# Patient Record
Sex: MALE | Race: WHITE | Employment: OTHER | URBAN - METROPOLITAN AREA
[De-identification: names, ages, dates, MRNs, and addresses within clinical notes are randomized per-mention and may not be internally consistent; named-entity substitution may affect disease eponyms.]

---

## 2017-04-18 ENCOUNTER — OFFICE VISIT (OUTPATIENT)
Dept: CARDIOLOGY CLINIC | Age: 77
End: 2017-04-18

## 2017-04-18 VITALS
SYSTOLIC BLOOD PRESSURE: 110 MMHG | OXYGEN SATURATION: 99 % | WEIGHT: 226.4 LBS | HEIGHT: 71 IN | HEART RATE: 67 BPM | DIASTOLIC BLOOD PRESSURE: 80 MMHG | BODY MASS INDEX: 31.69 KG/M2

## 2017-04-18 DIAGNOSIS — I11.9 BENIGN HYPERTENSIVE HEART DISEASE WITHOUT HEART FAILURE: ICD-10-CM

## 2017-04-18 DIAGNOSIS — G47.33 OSA (OBSTRUCTIVE SLEEP APNEA): ICD-10-CM

## 2017-04-18 DIAGNOSIS — I51.89 DIASTOLIC DYSFUNCTION: ICD-10-CM

## 2017-04-18 DIAGNOSIS — I48.20 CHRONIC ATRIAL FIBRILLATION (HCC): Primary | ICD-10-CM

## 2017-04-18 DIAGNOSIS — I83.90 VARICOSE VEIN OF LEG: ICD-10-CM

## 2017-04-18 NOTE — MR AVS SNAPSHOT
Visit Information Date & Time Provider Department Dept. Phone Encounter #  
 4/18/2017  1:40 PM Chris Gan MD [facility] Narda Mehta [phone] [id number] Follow-up Instructions Return in about 6 months (around 10/18/2017). Upcoming Health Maintenance Date Due DTaP/Tdap/Td series (1 - Tdap) 1/24/1959 ZOSTER VACCINE AGE 60> 1/24/1998 GLAUCOMA SCREENING Q2Y 1/24/2003 Pneumococcal 65+ Low/Medium Risk (1 of 2 - PCV13) 1/24/2003 MEDICARE YEARLY EXAM 1/24/2003 INFLUENZA AGE 9 TO ADULT 8/1/2016 Allergies as of 4/18/2017  Review Complete On: 4/18/2017 By: Cristina Verma  
  
 Severity Noted Reaction Type Reactions Ace Inhibitors Medium 12/09/2013   Side Effect Cough Patients states he is not allergic Pcn [Penicillins] Medium 01/12/2011   Side Effect Rash  
 Nsaids (Non-steroidal Anti-inflammatory Drug)  05/17/2016    Other (comments) \"causes blood in urine\" Percocet [Oxycodone-acetaminophen]  05/17/2016   Side Effect Other (comments)  
 constipation Current Immunizations  Never Reviewed No immunizations on file. Not reviewed this visit You Were Diagnosed With   
  
 Codes Comments Chronic atrial fibrillation (HCC)    -  Primary ICD-10-CM: U96.4 ICD-9-CM: 427.31 Benign hypertensive heart disease without heart failure     ICD-10-CM: I11.9 ICD-9-CM: 402.10 Diastolic dysfunction     WOQ-13-EU: I51.9 ICD-9-CM: 429.9 OLEG (obstructive sleep apnea)     ICD-10-CM: G47.33 
ICD-9-CM: 327.23 Varicose vein of leg     ICD-10-CM: I83.90 ICD-9-CM: 454.9 Vitals BP Pulse Height(growth percentile) Weight(growth percentile) SpO2 BMI  
 110/80 (BP 1 Location: Left arm, BP Patient Position: Sitting) 67 5' 11\" (1.803 m) 226 lb 6.4 oz (102.7 kg) 99% 31.58 kg/m2 Smoking Status Former Smoker Vitals History BMI and BSA Data Body Mass Index Body Surface Area 31.58 kg/m 2 2.27 m 2 Preferred Pharmacy Pharmacy Name Phone [address] [address], [place] 2017 [facility] [phone] Your Updated Medication List  
  
   
This list is accurate as of: 4/18/17  2:11 PM.  Always use your most recent med list.  
  
  
  
  
 fish oil-omega-3 fatty acids 340-1,000 mg capsule Take 1 Cap by mouth daily (after breakfast). ipratropium 0.03 % nasal spray Commonly known as:  ATROVENT  
2 Sprays by Both Nostrils route daily (after breakfast). Indications: ALLERGIC RHINITIS  
  
 losartan-hydroCHLOROthiazide 100-25 mg per tablet Commonly known as:  HYZAAR  
TAKE ONE TABLET BY MOUTH DAILY  
  
 magnesium 250 mg Tab Take 400 mg by mouth daily (after dinner). multivitamin tablet Commonly known as:  ONE A DAY Take 1 Tab by mouth daily (after breakfast). VITAMIN C PO Take 500 mg by mouth daily (after breakfast). VITAMIN D 2,000 unit Cap capsule Generic drug:  Cholecalciferol (Vitamin D3) Take 2,000 Units by mouth daily (after dinner). * warfarin 4 mg tablet Commonly known as:  COUMADIN Take 4 mg by mouth six (6) days a week. * warfarin 2 mg tablet Commonly known as:  COUMADIN Take 2 mg by mouth every Monday. * Notice: This list has 2 medication(s) that are the same as other medications prescribed for you. Read the directions carefully, and ask your doctor or other care provider to review them with you. Follow-up Instructions Return in about 6 months (around 10/18/2017). Patient Instructions Check out Dr. Lindsay Clark for (vascular surgeon) . Introducing [facility] & HEALTH SERVICES! Michelle Tucker introduces [employer] patient portal. Now you can access parts of your medical record, email your doctor's office, and request medication refills online. 1. In your internet browser, go to [email or web address] 2. Click on the First Time User? Click Here link in the Sign In box. You will see the New Member Sign Up page. 3. Enter your Medipacs Access Code exactly as it appears below. You will not need to use this code after youve completed the sign-up process. If you do not sign up before the expiration date, you must request a new code. · Medipacs Access Code: [email or web address] Expires: 7/17/2017  2:11 PM 
 
4. Enter the last four digits of your Social Security Number (xxxx) and Date of Birth (mm/dd/yyyy) as indicated and click Submit. You will be taken to the next sign-up page. 5. Create a Medipacs ID. This will be your Medipacs login ID and cannot be changed, so think of one that is secure and easy to remember. 6. Create a Medipacs password. You can change your password at any time. 7. Enter your Password Reset Question and Answer. This can be used at a later time if you forget your password. 8. Enter your e-mail address. You will receive e-mail notification when new information is available in [address] 9. Click Sign Up. You can now view and download portions of your medical record. 10. Click the Download Summary menu link to download a portable copy of your medical information. If you have questions, please visit the Frequently Asked Questions section of the Medipacs website. Remember, Medipacs is NOT to be used for urgent needs. For medical emergencies, dial 911. Now available from your iPhone and Android! Please provide this summary of care documentation to your next provider. Your primary care clinician is listed as Carlos Hogan. If you have any questions after today's visit, please call [phone].

## 2017-04-18 NOTE — PROGRESS NOTES
Visit Vitals    /80 (BP 1 Location: Left arm, BP Patient Position: Sitting)    Pulse 67    Ht 5' 11\" (1.803 m)    Wt 226 lb 6.4 oz (102.7 kg)    SpO2 99%    BMI 31.58 kg/m2

## 2017-04-18 NOTE — PROGRESS NOTES
History of Present Illness    Bonnie Brito is a 76 y.o. male. Last seen 6 months ago. Problem List  Date Reviewed: 6/17/2013          Codes Class Noted    Hip arthritis ICD-10-CM: M16.10  ICD-9-CM: 716.95  0/93/9948        Diastolic dysfunction OWX-65-KS: I51.9  ICD-9-CM: 429.9  9/17/2013        DJD (degenerative joint disease) of knee ICD-10-CM: M17.10  ICD-9-CM: 715.36  Unknown    Overview Signed 6/1/2013  1:02 PM by Brooklyn Padron MD     right             Diverticulosis ICD-10-CM: K57.90  ICD-9-CM: 562.10  Unknown        Varicose vein of leg ICD-10-CM: I83.90  ICD-9-CM: 454.9  1/12/2012        OLEG (obstructive sleep apnea) ICD-10-CM: G47.33  ICD-9-CM: 327.23  7/12/2011        Atrial fibrillation ([address] ICD-10-CM: I48.91  ICD-9-CM: 427.31  Unknown    Overview Signed 1/12/2011  4:13 PM by Fatmata Green MD     Discovered incidentally prior to hernia surgery 2008  Previously intermittent, now permanent  CHADS2 score 2 - coumadin    Cardiac testing  Normal stress echo May 2008             Benign hypertensive heart disease without heart failure ICD-10-CM: I11.9  ICD-9-CM: 402.10  Unknown               Cardiac Testing  Echo 6/3/13: EF 50-55%; JULIO C; TR - moderate; pulm. htn - moderate, PA 45 mmHg  [facility] cardiolite 10/3/13 - normal perfusion, EF 54%  [facility] 5/10[facility]2016- normal perfusion, EF 64%      HPI    Mr. Hakeem Walker is doing well with no interval issues. No recurrent spells of A-fib. His heart rates typically run in the 58-60s range, but once in a while runs in th 49s usually in the morning. He has OLEG and wears his CPAP faithfully. No bleeding issues on Coumadin. He remains active without any exertional symptoms. Patient denies any exertional chest pain, dyspnea, palpitations, syncope, orthopnea, edema or paroxysmal nocturnal dyspnea.     Current Outpatient Prescriptions on File Prior to Visit   Medication Sig Dispense Refill    losartan-hydroCHLOROthiazide (HYZAAR) 100-25 mg per tablet TAKE ONE TABLET BY MOUTH DAILY 90 Tab 3    ipratropium (ATROVENT) 0.03 % nasal spray 2 Sprays by Both Nostrils route daily (after breakfast). Indications: ALLERGIC RHINITIS      fish oil-omega-3 fatty acids 340-1,000 mg capsule Take 1 Cap by mouth daily (after breakfast).  warfarin (COUMADIN) 2 mg tablet Take 2 mg by mouth every Monday.  magnesium 250 mg tab Take 400 mg by mouth daily (after dinner).  warfarin (COUMADIN) 4 mg tablet Take 4 mg by mouth six (6) days a week.  multivitamin (ONE A DAY) tablet Take 1 Tab by mouth daily (after breakfast).  ASCORBATE CALCIUM (VITAMIN C PO) Take 500 mg by mouth daily (after breakfast).  Cholecalciferol, Vitamin D3, (VITAMIN D) 2,000 unit Cap Take 2,000 Units by mouth daily (after dinner). No current facility-administered medications on file prior to visit. Allergies   Allergen Reactions    Ace Inhibitors Cough     Patients states he is not allergic    Pcn [Penicillins] Rash    Nsaids (Non-Steroidal Anti-Inflammatory Drug) Other (comments)     \"causes blood in urine\"    Percocet [Oxycodone-Acetaminophen] Other (comments)     constipation     . Review of Systems  Constitutional: Negative for fever, chills, malaise/fatigue and diaphoresis. Respiratory: Negative for cough, hemoptysis, sputum production, shortness of breath and wheezing. Cardiovascular: Negative for chest pain, palpitations, orthopnea, claudication, leg swelling and PND. Gastrointestinal: Negative for heartburn, nausea, vomiting, blood in stool and melena. Genitourinary: Negative for dysuria and flank pain. Musculoskeletal: Positive for knee pain. Skin: Negative for rash. Neurological: Negative for focal weakness, seizures, loss of consciousness, weakness and headaches. Endo/Heme/Allergies: Does not bruise/bleed easily. Psychiatric/Behavioral: Negative for memory loss. The patient does not have insomnia.       Visit Vitals    /80 (BP 1 Location: Left arm, BP Patient Position: Sitting)    Pulse 67    Ht 5' 11\" (1.803 m)    Wt 226 lb 6.4 oz (102.7 kg)    SpO2 99%    BMI 31.58 kg/m2     Wt Readings from Last 3 Encounters:   04/18/17 226 lb 6.4 oz (102.7 kg)   10/10/16 228 lb 3.2 oz (103.5 kg)   05/17/16 221 lb (100.2 kg)       Physical Exam  Vitals reviewed. Constitutional: He is oriented to person, place and time. He appears well-nourished. Head: Normocephalic. Neck: Neck supple. No JVD present. Cardiovascular: Normal rate, irregular rhythm, normal heart sounds and intact distal pulses. Exam reveals no gallop. 2/6 systolic murmur heard  Pulmonary/Chest: Effort normal and breath sounds normal.  Abdominal: Soft, nontender. Bowel sounds normal.  Musculoskeletal: 1+ edema, R>L  Neurological: Alert and oriented to person, place and time. Skin: Skin is warm and dry. Psychiatric: He has a normal mood and affect. Cardiographics  EKG 7/7/14 - AF 60s  EKG 7/6/15 - AF 60s    ASSESSMENT and PLAN:  Encounter Diagnoses   Name Primary?  Chronic atrial fibrillation (HCC) Yes    Benign hypertensive heart disease without heart failure     Diastolic dysfunction     OLEG (obstructive sleep apnea)     Varicose vein of leg         Mr. Lesley An has chronic AF with asymptomatic conduction disease in the setting of HTN and OLEG. He has no structural or ischemic heart disease. He has no symptoms of bradycarida. We reviewed sxs that would raise concern. No issues with anticoagulation. Normotensive on combination therapy. His functional capacity is somewhat limited by diffuse arthritis. I encouraged him to remain active. Follow-up Disposition:  Return in about 6 months (around 10/18/2017).      Written by Silverio Whitlock, as dictated by Erlin Leal MD.   Erlin Leal MD

## 2017-05-09 ENCOUNTER — HOSPITAL ENCOUNTER (OUTPATIENT)
Dept: MAMMOGRAPHY | Age: 77
Discharge: HOME | End: 2017-05-09
Attending: FAMILY MEDICINE
Payer: MEDICARE

## 2017-05-09 DIAGNOSIS — M81.0 OSTEOPOROSIS: ICD-10-CM

## 2017-05-09 PROCEDURE — 77080 DXA BONE DENSITY AXIAL: CPT

## 2017-10-17 ENCOUNTER — OFFICE VISIT (OUTPATIENT)
Dept: CARDIOLOGY CLINIC | Age: 77
End: 2017-10-17

## 2017-10-17 DIAGNOSIS — I11.9 BENIGN HYPERTENSIVE HEART DISEASE WITHOUT HEART FAILURE: Primary | ICD-10-CM

## 2017-10-17 DIAGNOSIS — I48.20 CHRONIC ATRIAL FIBRILLATION (HCC): ICD-10-CM

## 2017-10-17 DIAGNOSIS — I51.89 DIASTOLIC DYSFUNCTION: ICD-10-CM

## 2017-10-17 DIAGNOSIS — G47.33 OSA (OBSTRUCTIVE SLEEP APNEA): ICD-10-CM

## 2017-10-17 NOTE — MR AVS SNAPSHOT
Visit Information Date & Time Provider Department Dept. Phone Encounter #  
 10/17/2017  3:20 PM Damari Laguerre MD [facility] Sarai Lugo [phone] [id number] Follow-up Instructions Return in about 6 months (around 4/17/2018). Your Appointments 4/30/2018  9:40 AM  
ESTABLISHED PATIENT with Damari Laguerre MD  
[facility] ([facility]) Appt Note: 6 mo fup; 6 mo [address] [facility]  
[address] [facility] [address] [facility] [address] Upcoming Health Maintenance Date Due DTaP/Tdap/Td series (1 - Tdap) 1/24/1959 ZOSTER VACCINE AGE 60> 11/24/1997 GLAUCOMA SCREENING Q2Y 1/24/2003 Pneumococcal 65+ Low/Medium Risk (1 of 2 - PCV13) 1/24/2003 MEDICARE YEARLY EXAM 1/24/2003 INFLUENZA AGE 9 TO ADULT 8/1/2017 Allergies as of 10/17/2017  Review Complete On: 4/18/2017 By: Kati Dodge  
  
 Severity Noted Reaction Type Reactions Ace Inhibitors Medium 12/09/2013   Side Effect Cough Patients states he is not allergic Pcn [Penicillins] Medium 01/12/2011   Side Effect Rash  
 Nsaids (Non-steroidal Anti-inflammatory Drug)  05/17/2016    Other (comments) \"causes blood in urine\" Percocet [Oxycodone-acetaminophen]  05/17/2016   Side Effect Other (comments)  
 constipation Current Immunizations  Never Reviewed No immunizations on file. Not reviewed this visit You Were Diagnosed With   
  
 Codes Comments Benign hypertensive heart disease without heart failure    -  Primary ICD-10-CM: I11.9 ICD-9-CM: 402.10 Chronic atrial fibrillation (HCC)     ICD-10-CM: B92.8 ICD-9-CM: 427.31 Diastolic dysfunction     AAQ-36-TO: I51.9 ICD-9-CM: 429.9 OLEG (obstructive sleep apnea)     ICD-10-CM: G47.33 
ICD-9-CM: 327.23 Vitals Smoking Status Former Smoker Preferred Pharmacy Pharmacy Name Phone Kasey Tobar [address], [place] 2017 [facility] [phone] Your Updated Medication List  
  
   
This list is accurate as of: 10/17/17  3:43 PM.  Always use your most recent med list.  
  
  
  
  
 fish oil-omega-3 fatty acids 340-1,000 mg capsule Take 1 Cap by mouth daily (after breakfast). FLONASE NA  
by Nasal route. ipratropium 0.03 % nasal spray Commonly known as:  ATROVENT  
2 Sprays by Both Nostrils route daily (after breakfast). Indications: ALLERGIC RHINITIS  
  
 losartan-hydroCHLOROthiazide 100-25 mg per tablet Commonly known as:  HYZAAR  
TAKE ONE TABLET BY MOUTH DAILY  
  
 magnesium 250 mg Tab Take 400 mg by mouth daily (after dinner). multivitamin tablet Commonly known as:  ONE A DAY Take 1 Tab by mouth daily (after breakfast). PRESERVISION AREDS 2 PO Take  by mouth. VITAMIN C PO Take 500 mg by mouth daily (after breakfast). VITAMIN D 2,000 unit Cap capsule Generic drug:  Cholecalciferol (Vitamin D3) Take 2,000 Units by mouth daily (after dinner). * warfarin 4 mg tablet Commonly known as:  COUMADIN Take 4 mg by mouth six (6) days a week. * warfarin 2 mg tablet Commonly known as:  COUMADIN Take 2 mg by mouth every Monday. * Notice: This list has 2 medication(s) that are the same as other medications prescribed for you. Read the directions carefully, and ask your doctor or other care provider to review them with you. We Performed the Following AMB POC EKG ROUTINE W/ 12 LEADS, INTER & REP [53052 CPT(R)] Follow-up Instructions Return in about 6 months (around 4/17/2018). Introducing [facility] & HEALTH SERVICES! [facility] introduces [employer] patient portal. Now you can access parts of your medical record, email your doctor's office, and request medication refills online.    
 
1. In your internet browser, go to [email or web address] 2. Click on the First Time User? Click Here link in the Sign In box. You will see the New Member Sign Up page. 3. Enter your Nexgate Access Code exactly as it appears below. You will not need to use this code after youve completed the sign-up process. If you do not sign up before the expiration date, you must request a new code. · Nexgate Access Code: [email or web address] Expires: 1/15/2018  3:43 PM 
 
4. Enter the last four digits of your Social Security Number (xxxx) and Date of Birth (mm/dd/yyyy) as indicated and click Submit. You will be taken to the next sign-up page. 5. Create a Yooz[employer] ID. This will be your Nexgate login ID and cannot be changed, so think of one that is secure and easy to remember. 6. Create a Nexgate password. You can change your password at any time. 7. Enter your Password Reset Question and Answer. This can be used at a later time if you forget your password. 8. Enter your e-mail address. You will receive e-mail notification when new information is available in [address] 9. Click Sign Up. You can now view and download portions of your medical record. 10. Click the Download Summary menu link to download a portable copy of your medical information. If you have questions, please visit the Frequently Asked Questions section of the Nexgate website. Remember, Nexgate is NOT to be used for urgent needs. For medical emergencies, dial 911. Now available from your iPhone and Android! Please provide this summary of care documentation to your next provider. Your primary care clinician is listed as James Sal. If you have any questions after today's visit, please call [phone].

## 2017-10-17 NOTE — PROGRESS NOTES
History of Present Illness    Arabella Medellin is a 76 y.o. male. Last seen 6 months ago. Problem List  Date Reviewed: 6/17/2013          Codes Class Noted    Hip arthritis ICD-10-CM: M16.10  ICD-9-CM: 716.95  4/36/4748        Diastolic dysfunction BUG-53-: I51.9  ICD-9-CM: 429.9  9/17/2013        DJD (degenerative joint disease) of knee ICD-10-CM: M17.10  ICD-9-CM: 715.36  Unknown    Overview Signed 6/1/2013  1:02 PM by aRysa Medina MD     right             Diverticulosis ICD-10-CM: K57.90  ICD-9-CM: 562.10  Unknown        Varicose vein of leg ICD-10-CM: I83.90  ICD-9-CM: 454.9  1/12/2012        OLEG (obstructive sleep apnea) ICD-10-CM: G47.33  ICD-9-CM: 327.23  7/12/2011        Atrial fibrillation ([address] ICD-10-CM: I48.91  ICD-9-CM: 427.31  Unknown    Overview Signed 1/12/2011  4:13 PM by Ketty Gann MD     Discovered incidentally prior to hernia surgery 2008  Previously intermittent, now permanent  CHADS2 score 2 - coumadin    Cardiac testing  Normal stress echo May 2008             Benign hypertensive heart disease without heart failure ICD-10-CM: I11.9  ICD-9-CM: 402.10  Unknown               Cardiac Testing  Echo 6/3/13: EF 50-55%; JULIO C; TR - moderate; pulm. htn - moderate, PA 45 mmHg  Lexican cardiolite 10/3/13 - normal perfusion, EF 54%  Lexican cardiolite 5/10/2016- normal perfusion, EF 64%      HPI    Mr. Betty Schultz HRs have been in the 58-62 range at home. BPs at home have been about 120/75. He denies any dizziness or lightheadedness. He walks on the treadmill and uses the stationary bicycle almost daily with no exertional sxs. HR will get up to 80-85 with exercise. His arthritis limits him from walking fast. He is adherent with CPAP, Hyzaar, and a low sodium diet. He states he feels weak and does not have the endurance that he used to but attributes this to getting older.  Patient denies any exertional chest pain, dyspnea, palpitations, syncope, orthopnea, edema or paroxysmal nocturnal dyspnea. He denies any bleeding issues on Coumadin. INR monitored by Dr. Dustin Beltran, has been around 2.3. He notes he had a cortisone shot in his right shoulder this AM.     Current Outpatient Prescriptions on File Prior to Visit   Medication Sig Dispense Refill    losartan-hydroCHLOROthiazide (HYZAAR) 100-25 mg per tablet TAKE ONE TABLET BY MOUTH DAILY 90 Tab 3    fish oil-omega-3 fatty acids 340-1,000 mg capsule Take 1 Cap by mouth daily (after breakfast).  warfarin (COUMADIN) 2 mg tablet Take 2 mg by mouth every Monday.  magnesium 250 mg tab Take 400 mg by mouth daily (after dinner).  warfarin (COUMADIN) 4 mg tablet Take 4 mg by mouth six (6) days a week.  multivitamin (ONE A DAY) tablet Take 1 Tab by mouth daily (after breakfast).  ASCORBATE CALCIUM (VITAMIN C PO) Take 500 mg by mouth daily (after breakfast).  Cholecalciferol, Vitamin D3, (VITAMIN D) 2,000 unit Cap Take 2,000 Units by mouth daily (after dinner).  ipratropium (ATROVENT) 0.03 % nasal spray 2 Sprays by Both Nostrils route daily (after breakfast). Indications: ALLERGIC RHINITIS       No current facility-administered medications on file prior to visit. Allergies   Allergen Reactions    Ace Inhibitors Cough     Patients states he is not allergic    Pcn [Penicillins] Rash    Nsaids (Non-Steroidal Anti-Inflammatory Drug) Other (comments)     \"causes blood in urine\"    Percocet [Oxycodone-Acetaminophen] Other (comments)     constipation     . Review of Systems  Constitutional: Negative for fever, chills, and diaphoresis. +fatigue  Respiratory: Negative for cough, hemoptysis, sputum production, shortness of breath and wheezing. Cardiovascular: Negative for chest pain, palpitations, orthopnea, claudication, leg swelling and PND. Gastrointestinal: Negative for heartburn, nausea, vomiting, blood in stool and melena. Genitourinary: Negative for dysuria and flank pain.    Musculoskeletal: +joint pain.  Skin: Negative for rash. Neurological: Negative for focal weakness, seizures, loss of consciousness, and headaches. +generalized weakness  Endo/Heme/Allergies: Does not bruise/bleed easily. Psychiatric/Behavioral: Negative for memory loss. The patient does not have insomnia. There were no vitals taken for this visit. Wt Readings from Last 3 Encounters:   04/18/17 226 lb 6.4 oz (102.7 kg)   10/10/16 228 lb 3.2 oz (103.5 kg)   05/17/16 221 lb (100.2 kg)       Physical Exam  Vitals reviewed. Constitutional: He is oriented to person, place and time. He appears well-nourished. Head: Normocephalic. Neck: Neck supple. No JVD present. Cardiovascular: Normal rate, irregular rhythm, normal heart sounds and intact distal pulses. Exam reveals no gallop. 2/6 systolic murmur heard  Pulmonary/Chest: Effort normal and breath sounds normal.  Abdominal: Soft, nontender. Bowel sounds normal.  Musculoskeletal: 1+ edema, R>L  Neurological: Alert and oriented to person, place and time. Skin: Skin is warm and dry. Psychiatric: He has a normal mood and affect. Cardiographics  EKG 7/7/14 - AF 60s  EKG 7/6/15 - AF 60s  EKG 10/17/17- AF 60s    ASSESSMENT and PLAN:  Encounter Diagnoses   Name Primary?  Benign hypertensive heart disease without heart failure Yes    Chronic atrial fibrillation (HCC)     Diastolic dysfunction     OLEG (obstructive sleep apnea)         Mr. Tesfaye Goff has chronic AF with asymptomatic conduction disease in the setting of HTN and OLEG. He has no structural or ischemic heart disease. He has no symptoms of bradycardia or chronotropic incompetence. We reviewed sxs that would raise concern. No issues with anticoagulation. BP is high today but apparently normal at home. I have asked him to reevaluate at home. He remains adherent to Hyzaar, low sodium diet and CPAP. He feels like his body is aging, particularly related to arthritis.      Follow-up Disposition:  Return in about 6 months (around 4/17/2018).      Written by Blane Parker, as dictated by Artemio Yun MD.   Artemio Ynu MD

## 2017-12-20 RX ORDER — LOSARTAN POTASSIUM AND HYDROCHLOROTHIAZIDE 25; 100 MG/1; MG/1
TABLET ORAL
Qty: 90 TAB | Refills: 2 | Status: SHIPPED | OUTPATIENT
Start: 2017-12-20 | End: 2018-09-25 | Stop reason: SDUPTHER

## 2018-03-16 ENCOUNTER — OFFICE VISIT (OUTPATIENT)
Dept: NEUROLOGY | Age: 78
End: 2018-03-16

## 2018-03-16 VITALS
DIASTOLIC BLOOD PRESSURE: 80 MMHG | OXYGEN SATURATION: 98 % | HEART RATE: 65 BPM | RESPIRATION RATE: 17 BRPM | TEMPERATURE: 97.8 F | WEIGHT: 220 LBS | HEIGHT: 71 IN | SYSTOLIC BLOOD PRESSURE: 124 MMHG | BODY MASS INDEX: 30.8 KG/M2

## 2018-03-16 DIAGNOSIS — R20.0 NUMBNESS AND TINGLING OF BOTH FEET: ICD-10-CM

## 2018-03-16 DIAGNOSIS — R20.0 NUMBNESS IN FEET: Primary | ICD-10-CM

## 2018-03-16 DIAGNOSIS — R20.2 NUMBNESS AND TINGLING OF BOTH FEET: ICD-10-CM

## 2018-03-16 NOTE — PROGRESS NOTES
New patient presenting with numbness and tingling in feet for several years. Saw podiatrist in [place] and diagnosed with neuropathy.

## 2018-03-16 NOTE — PROGRESS NOTES
Colten We-07-A 1498   [facility] [address] [place], [address]   [phone] [facility]   [phone] Fax             Referring: Xavier Saucedo MD      Chief Complaint   Patient presents with    Numbness     new patient     78-year-old right-handed man who comes today for evaluation of what he calls neuropathy in his feet. He tells me that he has had for the last several years of feeling that his feet are dull her dad and he describes a dull or dead feeling as a tingling sensation. He notes that this occurs more on the bottoms of the feet. It is bilateral.  He has no pain. He feels that his balance is not as good as it used to be but he attributes that to his osteoarthritis having had a left hip replacement and needing a right hip replacement. He does not fall. He does not have diabetes. He says that he spends his shafer in [place] and while down there recently he saw podiatrist who told him he had neuropathy and he should see a neurologist.  No known toxic exposures or vitamin deficiencies. He has not had any EMG. No weakness of the feet although he says at times he has a difficult time controlling his feet and by that he says he has a difficult time knowing where they are. He does not drag her foot. No weakness of the leg. No radicular symptoms. No numbness or tingling in the upper extremities. Symptoms do not go into the dorsum of the feet. He does have atrial fibrillation and he notes that he has not had any strokelike symptoms. His INR has been good and yesterday the INR was 1.2. He endorses compliance with his medications. He has never been given any medication for the symptoms. He has not had any fever or chills. He just recently had a varicose vein procedure performed.     Past Medical History:   Diagnosis Date    A-fib ([facility] [address]     Arthritis     Back, knees, shoulders    Atrial fibrillation (HCC)     Benign hypertensive heart disease without heart failure     Chronic pain     Left hip pain    Diverticulosis     DJD (degenerative joint disease) of knee     right    Hematuria     due to certain medications    Hypertension     Sleep apnea 4/1/2011    compliant with c-pap    Sleep apnea     Snoring     Varicose vein of leg 1/12/2012       Past Surgical History:   Procedure Laterality Date    HX HEENT      tonsillectomy at 2yrs. old    HX HERNIA REPAIR      HX ORTHOPAEDIC      HX OTHER SURGICAL      hernia & Hydrocele    HX OTHER SURGICAL      bilateral catarats    VASCULAR SURGERY PROCEDURE UNLIST  2015    varicose veins       Current Outpatient Prescriptions   Medication Sig Dispense Refill    losartan-hydroCHLOROthiazide (HYZAAR) 100-25 mg per tablet TAKE ONE TABLET BY MOUTH DAILY 90 Tab 2    VIT C/E/ZN/COPPR/LUTEIN/ZEAXAN (PRESERVISION AREDS 2 PO) Take  by mouth.  fish oil-omega-3 fatty acids 340-1,000 mg capsule Take 1 Cap by mouth daily (after breakfast).  magnesium 250 mg tab Take 400 mg by mouth daily (after dinner).  warfarin (COUMADIN) 4 mg tablet Take 4 mg by mouth six (6) days a week.  multivitamin (ONE A DAY) tablet Take 1 Tab by mouth daily (after breakfast).  ASCORBATE CALCIUM (VITAMIN C PO) Take 500 mg by mouth daily (after breakfast).  Cholecalciferol, Vitamin D3, (VITAMIN D) 2,000 unit Cap Take 2,000 Units by mouth daily (after dinner).  FLUTICASONE PROPIONATE (FLONASE NA) by Nasal route.  ipratropium (ATROVENT) 0.03 % nasal spray 2 Sprays by Both Nostrils route daily (after breakfast). Indications: ALLERGIC RHINITIS      warfarin (COUMADIN) 2 mg tablet Take 2 mg by mouth every Monday.           Allergies   Allergen Reactions    Ace Inhibitors Cough     Patients states he is not allergic    Pcn [Penicillins] Rash    Nsaids (Non-Steroidal Anti-Inflammatory Drug) Other (comments)     \"causes blood in urine\"    Percocet [Oxycodone-Acetaminophen] Other (comments) constipation       Social History   Substance Use Topics    Smoking status: Former Smoker     Packs/day: 0.50     Years: 40.00     Types: Cigarettes    Smokeless tobacco: Former User     Quit date: 7/12/1986      Comment: quit >25 years ago    Alcohol use 0.6 oz/week     1 Standard drinks or equivalent per week      Comment: 1 drink weekly        Family History   Problem Relation Age of Onset    Stroke Mother     Dementia Mother     Stroke Father     Asthma Father     Lung Disease Father     Diabetes Brother     Heart Disease Brother     Heart Disease Brother      Review of Systems  Pertinent positives and negatives are as noted above otherwise comprehensive systems review is negative    Examination  Visit Vitals    /80    Pulse 65    Temp 97.8 °F (36.6 °C)    Resp 17    Ht 5' 11\" (1.803 m)    Wt 99.8 kg (220 lb)    SpO2 98%    BMI 30.68 kg/m2     Pleasant, well appearing gentleman with appropriate dress and grooming. He has dry skin. He has varicose veins in the lower extremities. .  No icterus. Oropharynx clear. Supple neck without bruit. Heart is irregular today. Pulses are symmetrical. No edema. He is awake alert oriented and conversant. He has normal speech and language. Normal cognitive function. Cranial nerves intact 2-12. No nystagmus. Disc margins are not well seen. 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 No abnormal movement. No pronation or drift. Resistance is full in the upper and lower extremities in all muscle groups to direct testing. Reflexes are globally depressed but are symmetrical.  No ankle jerks are present bilaterally. No pathologic reflexes. Detailed sensory examination finds decreased vibratory sense to the ankle bilaterally. He has decreased pinprick to the shins bilaterally. He has some sway with Romberg but does not lose his balance and does not fall. Gait is steady. No ataxia.     Impression/Plan  [de-identified]year-old gentleman with numbness and tingling in his bilateral feet and with an examination demonstrating graded sensory loss to pinprick to the shin bilaterally and question as to whether or not this may be peripheral neuropathy. He has no known cause for such. At this juncture we will arrange for an EMG to get a definitive diagnosis. If the EMG returns with a diagnosis of peripheral neuropathy then we will send him for a multitude of blood work looking for potential treatable causes. He will follow after his EMG. Zhen Gaxiola MD    This note was created using voice recognition software. Despite editing, there may be syntax errors. This note will not be viewable in [address]

## 2018-03-16 NOTE — MR AVS SNAPSHOT
[facility] [address] [address] [phone] Patient: Freida Driver MRN: [id number] RZX:[id number] Visit Information Date & Time Provider Department Dept. Phone Encounter #  
 3/16/2018  1:00 PM Karen Sultana MD [facility] Neurology [facility] [phone] [id number] Follow-up Instructions Return for After Tests. Your Appointments 3/26/2018 11:00 AM  
PROCEDURE with EMG SCHEDULE 1991 [facility] Appt Note: bilat [address] [address] [phone]  
  
   
 [facility] [address] [id number] [address] [place] 4/30/2018  9:40 AM  
ESTABLISHED PATIENT with Reid Oro MD  
[facility] ([facility]) Appt Note: 6 mo fup; 6 mo [address] [facility] [address] [address] [address] [facility] 91[facility] Upcoming Health Maintenance Date Due DTaP/Tdap/Td series (1 - Tdap) 1/24/1961 ZOSTER VACCINE AGE 60> 11/24/1997 GLAUCOMA SCREENING Q2Y 1/24/2003 Pneumococcal 65+ Low/Medium Risk (1 of 2 - PCV13) 1/24/2003 MEDICARE YEARLY EXAM 1/24/2003 Influenza Age 5 to Adult 8/1/2017 Allergies as of 3/16/2018  Review Complete On: 3/16/2018 By: Karen Sultana MD  
  
 Severity Noted Reaction Type Reactions Ace Inhibitors Medium 12/09/2013   Side Effect Cough Patients states he is not allergic Pcn [Penicillins] Medium 01/12/2011   Side Effect Rash  
 Nsaids (Non-steroidal Anti-inflammatory Drug)  05/17/2016    Other (comments) \"causes blood in urine\" Percocet [Oxycodone-acetaminophen]  05/17/2016   Side Effect Other (comments)  
 constipation Current Immunizations  Never Reviewed No immunizations on file. Not reviewed this visit You Were Diagnosed With   
  
 Codes Comments Numbness in feet    -  Primary ICD-10-CM: R20.0 ICD-9-CM: 782.0 Numbness and tingling of both feet     ICD-10-CM: R20.0, R20.2 ICD-9-CM: 387. 0 Vitals BP Pulse Temp Resp Height(growth percentile) Weight(growth percentile) 124/80 65 97.8 °F (36.6 °C) 17 5' 11\" (1.803 m) 220 lb (99.8 kg) SpO2 BMI Smoking Status 98% 30.68 kg/m2 Former Smoker BMI and BSA Data Body Mass Index Body Surface Area  
 30.68 kg/m 2 2.24 m 2 Preferred Pharmacy Pharmacy Name Phone [facility] [address] [place] [phone] Your Updated Medication List  
  
   
This list is accurate as of 3/16/18  1:28 PM.  Always use your most recent med list.  
  
  
  
  
 fish oil-omega-3 fatty acids 340-1,000 mg capsule Take 1 Cap by mouth daily (after breakfast). losartan-hydroCHLOROthiazide 100-25 mg per tablet Commonly known as:  HYZAAR  
TAKE ONE TABLET BY MOUTH DAILY  
  
 magnesium 250 mg Tab Take 400 mg by mouth daily (after dinner). multivitamin tablet Commonly known as:  ONE A DAY Take 1 Tab by mouth daily (after breakfast). PRESERVISION AREDS 2 PO Take  by mouth. VITAMIN C PO Take 500 mg by mouth daily (after breakfast). VITAMIN D 2,000 unit Cap capsule Generic drug:  Cholecalciferol (Vitamin D3) Take 2,000 Units by mouth daily (after dinner). warfarin 4 mg tablet Commonly known as:  COUMADIN Take 4 mg by mouth six (6) days a week. Follow-up Instructions Return for After Tests. To-Do List   
 03/16/2018 Neurology:  EMG LIMITED Patient Instructions Information Regarding Testing If you have physican order for a test or a medication denied by your insurance company, this does not mean the test or medication is not appropriate for you as that is a medical decision, not a decision to be made by an insurance company representative or by an [address] [place] [address] who has not interviewed and examined you. This is a decision to be made between you and your physician. The denial of services is a contractual matter between you and your insurance company, not an issue between your physician and the insurance company. If your test or medication is denied, you can take the following steps to help resolve the issue: 1. File a complaint with the [facility] regarding your insurance company's denial of services ordered for you. You can do this either by calling them directly or by completing an on-line complaint form on the [employer]. This can be found at [email or web address].[email or web address] 2. Also file a formal complaint with your insurance company and ask to have the name of the person denying the service so that you may explore a legal option should you be harmed by this denial of service. Again, the fact the insurance company will not pay for the service does not mean it is not medically necessary and I would encourage you to follow through with the plan that was made with your physician 3. File a written complaint with your employer so your employer and benefit manager is aware of the poor coverage they are providing their employees. If you have medicare/medicaid, complain to your representative in the House and to your Heather Stack. PRESCRIPTION REFILL POLICY [facility] Statement to Patients April 1, 2014 In an effort to ensure the large volume of patient prescription refills is processed in the most efficient and expeditious manner, we are asking our patients to assist us by calling your Pharmacy for all prescription refills, this will include also your  Mail Order Pharmacy. The pharmacy will contact our office electronically to continue the refill process. Please do not wait until the last minute to call your pharmacy. We need at least 48 hours (2days) to fill prescriptions. We also encourage you to call your pharmacy before going to  your prescription to make sure it is ready. With regard to controlled substance prescription refill requests (narcotic refills) that need to be picked up at our office, we ask your cooperation by providing us with at least 72 hours (3days) notice that you will need a refill. We will not refill narcotic prescription refill requests after 4:00pm on any weekday, Monday through Thursday, or after 2:00pm on Fridays, or on the weekends. We encourage everyone to explore another way of getting your prescription refill request processed using [employer], our patient web portal through our electronic medical record system. [employer] is an efficient and effective way to communicate your medication request directly to the office and  downloadable as an sabino on your smart phone . S[employer] also features a review functionality that allows you to view your medication list as well as leave messages for your physician. Are you ready to get connected? If so please review the attatched instructions or speak to any of our staff to get you set up right away! Thank you so much for your cooperation. Should you have any questions please contact our Practice Administrator. The Physicians and Staff,  Jessyfranklin AlmazanQueen of the [facility] If we have ordered testing for you, we do not call patients with results and we do not give test results over the phone. We schedule follow up appointments so that your results can be discussed in person and any questions you have regarding them may be addressed. If something of concern is revealed on your test, we will call you for a sooner follow up appointment.   Additionally, results may be found by using the My Chart feature and one of our patient service representatives at the  can give you instructions on how to access this feature of our electronic medical record system. Lana Diaz 1721 What is a living will? A living will is a legal form you use to write down the kind of care you want at the end of your life. It is used by the health professionals who will treat you if you aren't able to decide for yourself. If you put your wishes in writing, your loved ones and others will know what kind of care you want. They won't need to guess. This can ease your mind and be helpful to others. A living will is not the same as an estate or property will. An estate will explains what you want to happen with your money and property after you die. Is a living will a legal document? A living will is a legal document. Each state has its own laws about living lizarraga. If you move to another state, make sure that your living will is legal in the state where you now live. Or you might use a universal form that has been approved by many states. This kind of form can sometimes be completed and stored online. Your electronic copy will then be available wherever you have a connection to the Internet. In most cases, doctors will respect your wishes even if you have a form from a different state. · You don't need an  to complete a living will. But legal advice can be helpful if your state's laws are unclear, your health history is complicated, or your family can't agree on what should be in your living will. · You can change your living will at any time. Some people find that their wishes about end-of-life care change as their health changes. · In addition to making a living will, think about completing a medical power of  form. This form lets you name the person you want to make end-of-life treatment decisions for you (your \"health care agent\") if you're not able to.  Many hospitals and nursing homes will give you the forms you need to complete a living will and a medical power of . · Your living will is used only if you can't make or communicate decisions for yourself anymore. If you become able to make decisions again, you can accept or refuse any treatment, no matter what you wrote in your living will. · Your state may offer an online registry. This is a place where you can store your living will online so the doctors and nurses who need to treat you can find it right away. What should you think about when creating a living will? Talk about your end-of-life wishes with your family members and your doctor. Let them know what you want. That way the people making decisions for you won't be surprised by your choices. Think about these questions as you make your living will: · Do you know enough about life support methods that might be used? If not, talk to your doctor so you know what might be done if you can't breathe on your own, your heart stops, or you're unable to swallow. · What things would you still want to be able to do after you receive life-support methods? Would you want to be able to walk? To speak? To eat on your own? To live without the help of machines? · If you have a choice, where do you want to be cared for? In your home? At a hospital or nursing home? · Do you want certain Adventism practices performed if you become very ill? · If you have a choice at the end of your life, where would you prefer to die? At home? In a hospital or nursing home? Somewhere else? · Would you prefer to be buried or cremated? · Do you want your organs to be donated after you die? What should you do with your living will? · Make sure that your family members and your health care agent have copies of your living will. · Give your doctor a copy of your living will to keep in your medical record. If you have more than one doctor, make sure that each one has a copy. · You may want to put a copy of your living will where it can be easily found. Where can you learn more? Go to http://siddharth-joão.info/. Enter M484 in the search box to learn more about \"Learning About Living Yong. \" Current as of: September 24, 2016 Content Version: 11.4 © 6844-1798 [employer]. Care instructions adapted under license by [employer] (which disclaims liability or warranty for this information). If you have questions about a medical condition or this instruction, always ask your healthcare professional. [address] any warranty or liability for your use of this information. Introducing [facility] & HEALTH SERVICES! Loren Simmons introduces [employer] patient portal. Now you can access parts of your medical record, email your doctor's office, and request medication refills online. 1. In your internet browser, go to [email or web address] 2. Click on the First Time User? Click Here link in the Sign In box. You will see the New Member Sign Up page. 3. Enter your [employer] Access Code exactly as it appears below. You will not need to use this code after youve completed the sign-up process. If you do not sign up before the expiration date, you must request a new code. · [employer] Access Code: [email or web address] Expires: 6/14/2018 12:33 PM 
 
4. Enter the last four digits of your Social Security Number (xxxx) and Date of Birth (mm/dd/yyyy) as indicated and click Submit. You will be taken to the next sign-up page. 5. Create a [employer]2Workst ID. This will be your [employer] login ID and cannot be changed, so think of one that is secure and easy to remember. 6. Create a [employer] password. You can change your password at any time. 7. Enter your Password Reset Question and Answer. This can be used at a later time if you forget your password. 8. Enter your e-mail address.  You will receive e-mail notification when new information is available in [employer]. 9. Click Sign Up. You can now view and download portions of your medical record. 10. Click the Download Summary menu link to download a portable copy of your medical information. If you have questions, please visit the Frequently Asked Questions section of the [employer] website. Remember, [employer] is NOT to be used for urgent needs. For medical emergencies, dial 911. Now available from your iPhone and Android! Please provide this summary of care documentation to your next provider. Your primary care clinician is listed as Marie Rojas. If you have any questions after today's visit, please call [phone].

## 2018-03-16 NOTE — PATIENT INSTRUCTIONS
Information Regarding Testing     If you have physican order for a test or a medication denied by your insurance company, this does not mean the test or medication is not appropriate for you as that is a medical decision, not a decision to be made by an insurance company representative or by an [facility] physician who has not interviewed and examined you. This is a decision to be made between you and your physician. The denial of services is a contractual matter between you and your insurance company, not an issue between your physician and the insurance company. If your test or medication is denied, you can take the following steps to help resolve the issue:    1. File a complaint with the [facility] regarding your insurance company's denial of services ordered for you. You can do this either by calling them directly or by completing an on-line complaint form on the [employer]. This can be found at [email or web address]    2. Also file a formal complaint with your insurance company and ask to have the name of the person denying the service so that you may explore a legal option should you be harmed by this denial of service. Again, the fact the insurance company will not pay for the service does not mean it is not medically necessary and I would encourage you to follow through with the plan that was made with your physician    3. File a written complaint with your employer so your employer and benefit manager is aware of the poor coverage they are providing their employees. If you have medicare/medicaid, complain to your representative in the House and to your Heather Stack.         [facility]   Statement to Patients  April 1, 2014      In an effort to ensure the large volume of patient prescription refills is processed in the most efficient and expeditious manner, we are asking our patients to assist us by calling your Pharmacy for all prescription refills, this will include also your  Mail Order Pharmacy. The pharmacy will contact our office electronically to continue the refill process. Please do not wait until the last minute to call your pharmacy. We need at least 48 hours (2days) to fill prescriptions. We also encourage you to call your pharmacy before going to  your prescription to make sure it is ready. With regard to controlled substance prescription refill requests (narcotic refills) that need to be picked up at our office, we ask your cooperation by providing us with at least 72 hours (3days) notice that you will need a refill. We will not refill narcotic prescription refill requests after 4:00pm on any weekday, Monday through Thursday, or after 2:00pm on Fridays, or on the weekends. We encourage everyone to explore another way of getting your prescription refill request processed using [employer], our patient web portal through our electronic medical record system. [employer]burg is an efficient and effective way to communicate your medication request directly to the office and  downloadable as an sabino on your smart phone . Canburg also features a review functionality that allows you to view your medication list as well as leave messages for your physician. Are you ready to get connected? If so please review the attatched instructions or speak to any of our staff to get you set up right away! Thank you so much for your cooperation. Should you have any questions please contact our Practice Administrator. The Physicians and Staff,  [address] [place] [address] Neu[facility] Clinic       If we have ordered testing for you, we do not call patients with results and we do not give test results over the phone. We schedule follow up appointments so that your results can be discussed in person and any questions you have regarding them may be addressed.   If something of concern is revealed on your test, we will call you for a sooner follow up appointment. Additionally, results may be found by using the My Chart feature and one of our patient service representatives at the  can give you instructions on how to access this feature of our electronic medical record system. Learning About Living Yong  What is a living will? A living will is a legal form you use to write down the kind of care you want at the end of your life. It is used by the health professionals who will treat you if you aren't able to decide for yourself. If you put your wishes in writing, your loved ones and others will know what kind of care you want. They won't need to guess. This can ease your mind and be helpful to others. A living will is not the same as an estate or property will. An estate will explains what you want to happen with your money and property after you die. Is a living will a legal document? A living will is a legal document. Each state has its own laws about living lizarraga. If you move to another state, make sure that your living will is legal in the state where you now live. Or you might use a universal form that has been approved by many states. This kind of form can sometimes be completed and stored online. Your electronic copy will then be available wherever you have a connection to the Internet. In most cases, doctors will respect your wishes even if you have a form from a different state. · You don't need an  to complete a living will. But legal advice can be helpful if your state's laws are unclear, your health history is complicated, or your family can't agree on what should be in your living will. · You can change your living will at any time. Some people find that their wishes about end-of-life care change as their health changes. · In addition to making a living will, think about completing a medical power of  form.  This form lets you name the person you want to make end-of-life treatment decisions for you (your \"health care agent\") if you're not able to. Many hospitals and nursing homes will give you the forms you need to complete a living will and a medical power of . · Your living will is used only if you can't make or communicate decisions for yourself anymore. If you become able to make decisions again, you can accept or refuse any treatment, no matter what you wrote in your living will. · Your state may offer an online registry. This is a place where you can store your living will online so the doctors and nurses who need to treat you can find it right away. What should you think about when creating a living will? Talk about your end-of-life wishes with your family members and your doctor. Let them know what you want. That way the people making decisions for you won't be surprised by your choices. Think about these questions as you make your living will:  · Do you know enough about life support methods that might be used? If not, talk to your doctor so you know what might be done if you can't breathe on your own, your heart stops, or you're unable to swallow. · What things would you still want to be able to do after you receive life-support methods? Would you want to be able to walk? To speak? To eat on your own? To live without the help of machines? · If you have a choice, where do you want to be cared for? In your home? At a hospital or nursing home? · Do you want certain Anglican practices performed if you become very ill? · If you have a choice at the end of your life, where would you prefer to die? At home? In a hospital or nursing home? Somewhere else? · Would you prefer to be buried or cremated? · Do you want your organs to be donated after you die? What should you do with your living will? · Make sure that your family members and your health care agent have copies of your living will. · Give your doctor a copy of your living will to keep in your medical record.  If you have more than one doctor, make sure that each one has a copy. · You may want to put a copy of your living will where it can be easily found. Where can you learn more? Go to http://siddharth-joão.info/. Enter U490 in the search box to learn more about \"Learning About Living Perropapo. \"  Current as of: September 24, 2016  Content Version: 11.4  © 0132-2307 [employer]. Care instructions adapted under license by [employer] (which disclaims liability or warranty for this information). If you have questions about a medical condition or this instruction, always ask your healthcare professional. [address] any warranty or liability for your use of this information.

## 2018-03-26 ENCOUNTER — OFFICE VISIT (OUTPATIENT)
Dept: NEUROLOGY | Age: 78
End: 2018-03-26

## 2018-03-26 DIAGNOSIS — R20.2 PARESTHESIA: Primary | ICD-10-CM

## 2018-03-26 NOTE — PROCEDURES
EMG/ NCS Report   [facility]  [facility], [address] [place], [address]   Ph: [phone]   FAX: [phone]  Test Date:  3/26/2018    Patient: Michael Judd : 1940 Physician: Ed Curran MD   Sex: Male Height: ' \" Ref Phys: Kailee Kim MD   ID#: [id number] Weight:  lbs. Technician: Jonathan Ruiz     Patient History / Exam:  Patient is coming in for 1-2 yrs of progressive numbness of both feet associated with gait disturbance described as being off balanced. (-) falls (-) weakness (-) lower back pain  (+) Afib on Coumadin. Patient is coming for polyneuropathy evaluation. EMG & NCV Findings:  Evaluation of the left Fibular motor nerve showed normal distal onset latency (3.8 ms), reduced amplitude (0.4 mV), decreased conduction velocity (B Fib-Ankle, 37 m/s), and normal conduction velocity (Poplt-B Fib, 45 m/s). The right Fibular motor nerve showed normal distal onset latency (3.2 ms), reduced amplitude (0.7 mV), decreased conduction velocity (B Fib-Ankle, 33 m/s), and decreased conduction velocity (Poplt-B Fib, 40 m/s). The left Fibular TA motor and the right Fibular TA motor nerves showed normal distal onset latency (L3.9, R3.3 ms), normal amplitude (L3.3, R4.9 mV), and normal conduction velocity (Poplit-Fib Head, L71, R48 m/s). The left tibial motor and the right tibial motor nerves showed normal distal onset latency (L4.3, R5.2 ms), reduced amplitude (L0.2, R0.4 mV), and decreased conduction velocity (Knee-Ankle, L34, R35 m/s). The left Sup Fibular sensory nerve showed no response (Lower leg) and no response (Site 2). The right Sup Fibular sensory nerve showed no response (Lower leg). The left sural sensory and the right sural sensory nerves showed no response (Calf). F Wave studies indicate that the left tibial F wave has prolonged latency (66.31 ms). The right tibial F wave has no response.       H-reflex studies indicate that the left tibial H-reflex has no response. The right tibial H-reflex has no response. Needle evaluation of the left extensor digitorum brevis muscle showed recruitment, Incr Duration, and very increased polyphasic potentials. The left abductor hallucis and the right abductor hallucis muscles showed recruitment, - Duration, motor unit amplitude, and polyphasic potentials. The right extensor digitorum brevis muscle showed recruitment, Incr Duration, and moderately increased polyphasic potentials. All remaining muscles (as indicated in the following table) showed no evidence of electrical instability. 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 Needle examination of the lower back was not done due to patient's history of Coumadin use.         Arnav Isidro MD  Diplomate, American Board of Psychiatry and Neurology  Diplomate, Neuromuscular Medicine  Diplomate, American Board of Electrodiagnostic Medicine  , [address] [place] [address] Accredited Laboratory with Exemplary Status          Nerve Conduction Studies  Anti Sensory Summary Table     Stim Site NR Peak (ms) Norm Peak (ms) P-T Amp (µV) Norm P-T Amp Site1 Site2 Dist (cm)   Left Sup Fibular Anti Sensory (Lat ankle)  31.8°C   Lower leg NR  <4.6  >4 Lower leg Lat ankle 10.0   Site 2 NR          Right Sup Fibular Anti Sensory (Lat ankle)  36.1°C   Lower leg NR  <4.6  >4 Lower leg Lat ankle 10.0   Left Sural Anti Sensory (Lat Mall) 32.1°C   Calf NR  <4.5  >4.0 Calf Lat Mall 14.0   Right Sural Anti Sensory (Lat Mall)  33.2°C   Calf NR  <4.5  >4.0 Calf Lat Mall 14.0     Motor Summary Table     Stim Site NR Onset (ms) Norm Onset (ms) O-P Amp (mV) Norm O-P Amp Amp (Prev) (%) Site1 Site2 Dist (cm) Salvador (m/s) Norm Salvador (m/s)   Left Fibular Motor (Ext Dig Brev)  31.5°C   Ankle    3.8 <6.5 0.4 >1.1 100.0 Ankle Ext Dig Brev 8.0     B Fib    13.1  0.4  100.0 B Fib Ankle 34.0 37 >38   Poplt    15.3  0.3  75.0 Poplt B Fib 10.0 45 >42   Right Fibular Motor (Ext Dig Brev)  33.2°C   Ankle    3.2 <6.5 0.7 >1.1 100.0 Ankle Ext Dig Brev 8.0     B Fib    13.7  0.6  85.7 B Fib Ankle 35.0 33 >38   Poplt    16.2  0.5  83.3 Poplt B Fib 10.0 40 >42   Left Fibular TA Motor (Tib Ant)  30.9°C   Fib Head    3.9 <4.5 3.3 >3.0 100.0 Fib Head Tib Ant 10.0     Poplit    5.3  3.4  103.0 Poplit Fib Head 10.0 71 >40   Right Fibular TA Motor (Tib Ant)  31.3°C   Fib Head    3.3 <4.5 4.9 >3.0 100.0 Fib Head Tib Ant 10.0     Poplit    5.4  4.9  100.0 Poplit Fib Head 10.0 48 >40   Left Tibial Motor (Abd Bui Brev)  31.1°C   Ankle    4.3 <6.1 0.2 >1.1 100.0 Ankle Abd Bui Brev 8.0     Knee    16.0  0.1  50.0 Knee Ankle 40.0 34 >39   Right Tibial Motor (Abd Bui Brev)  31.7°C   Ankle    5.2 <6.1 0.4 >1.1 100.0 Ankle Abd Bui Brev 8.0     Knee    17.3  0.4  100.0 Knee Ankle 42.0 35 >39     F Wave Studies     NR F-Lat (ms) Lat Norm (ms) L-R F-Lat (ms) L-R Lat Norm   Left Tibial (Mrkrs) (Abd Hallucis)  30.7°C      66.31 <56  <5.7   Right Tibial (Mrkrs) (Abd Hallucis)  30.9°C   NR  <56  <5.7     H Reflex Studies     NR H-Lat (ms) L-R H-Lat (ms) L-R Lat Norm   Left Tibial (Gastroc)  30.6°C   NR   <2.0   Right Tibial (Gastroc)  30.8°C   NR   <2.0     EMG     Side Muscle Nerve Root Ins Act Fibs Psw Recrt Duration Amp Poly Comment   Left Ext Dig Brev Dp Br Peron L5, S1 Nml Nml Nml SMU Incr Nml 3+    Left AbdHallucis MedPlantar S1-2 Nml Nml Nml No MUAP - - -    Left AntTibialis Dp Br Peron L4-5 Nml Nml Nml Nml Nml Nml Nml    Left MedGastroc Tibial S1-2 Nml Nml Nml Nml Nml Nml Nml    Left VastusLat Femoral L2-4 Nml Nml Nml Nml Nml Nml Nml    Right Ext Dig Brev Dp Br Peron L5, S1 Nml Nml Nml SMU Incr Nml 2+    Right AbdHallucis MedPlantar S1-2 Nml Nml Nml No MUAP - - -    Right AntTibialis Dp Br Peron L4-5 Nml Nml Nml Nml Nml Nml Nml    Right MedGastroc Tibial S1-2 Nml Nml Nml Nml Nml Nml Nml    Right VastusLat Femoral L2-4 Nml Nml Nml Nml Nml Nml Nml                Nerve Conduction Studies  Anti Sensory Left/Right Comparison     Stim Site L Lat (ms) R Lat (ms) L-R Lat (ms) L Amp (µV) R Amp (µV) L-R Amp (%) Site1 Site2 L Salvador (m/s) R Salvador (m/s) L-R Salvador (m/s)   Sup Fibular Anti Sensory (Lat ankle)  31.8°C   Lower leg       Lower leg Lat ankle      Site 2              Sural Anti Sensory (Lat Mall)  32.1°C   Calf       Calf Lat Mall        Motor Left/Right Comparison     Stim Site L Lat (ms) R Lat (ms) L-R Lat (ms) L Amp (mV) R Amp (mV) L-R Amp (%) Site1 Site2 L Salvador (m/s) R Salvador (m/s) L-R Salvador (m/s)   Fibular Motor (Ext Dig Brev)  31.5°C   Ankle 3.8 3.2 0.6 0.4 0.7 42.9 Ankle Ext Dig Brev      B Fib 13.1 13.7 0.6 0.4 0.6 33.3 B Fib Ankle 37 33 4   Poplt 15.3 16.2 0.9 0.3 0.5 40.0 Poplt B Fib 45 40 5   Fibular TA Motor (Tib Ant)  30.9°C   Fib Head 3.9 3.3 0.6 3.3 4.9 32.7 Fib Head Tib Ant      Poplit 5.3 5.4 0.1 3.4 4.9 30.6 Poplit Fib Head 71 48 23   Tibial Motor (Abd Bui Brev)  31.1°C   Ankle 4.3 5.2 0.9 0.2 0.4 50.0 Ankle Abd Bui Brev      Knee 16.0 17.3 1.3 0.1 0.4 75.0 Knee Ankle 34 35 1         Waveforms:

## 2018-04-04 ENCOUNTER — OFFICE VISIT (OUTPATIENT)
Dept: NEUROLOGY | Age: 78
End: 2018-04-04

## 2018-04-04 VITALS
WEIGHT: 221 LBS | BODY MASS INDEX: 30.94 KG/M2 | DIASTOLIC BLOOD PRESSURE: 78 MMHG | HEIGHT: 71 IN | SYSTOLIC BLOOD PRESSURE: 112 MMHG

## 2018-04-04 DIAGNOSIS — G62.9 NEUROPATHY: Primary | ICD-10-CM

## 2018-04-04 NOTE — PROGRESS NOTES
Patient is here for EMG results. Patient had shots in both knees about 2 weeks. He is not sure what questions to ask.

## 2018-04-04 NOTE — PATIENT INSTRUCTIONS
10 [facility]   Statement to Patients  April 1, 2014      In an effort to ensure the large volume of patient prescription refills is processed in the most efficient and expeditious manner, we are asking our patients to assist us by calling your Pharmacy for all prescription refills, this will include also your  Mail Order Pharmacy. The pharmacy will contact our office electronically to continue the refill process. Please do not wait until the last minute to call your pharmacy. We need at least 48 hours (2days) to fill prescriptions. We also encourage you to call your pharmacy before going to  your prescription to make sure it is ready. With regard to controlled substance prescription refill requests (narcotic refills) that need to be picked up at our office, we ask your cooperation by providing us with at least 72 hours (3days) notice that you will need a refill. We will not refill narcotic prescription refill requests after 4:00pm on any weekday, Monday through Thursday, or after 2:00pm on Fridays, or on the weekends. We encourage everyone to explore another way of getting your prescription refill request processed using [email or web address], our patient web portal through our electronic medical record system. [email or web address] is an efficient and effective way to communicate your medication request directly to the office and  downloadable as an sabino on your smart phone . [email or web address] also features a review functionality that allows you to view your medication list as well as leave messages for your physician. Are you ready to get connected? If so please review the attatched instructions or speak to any of our staff to get you set up right away! Thank you so much for your cooperation. Should you have any questions please contact our Practice Administrator.     The Physicians and Staff,  [facility]

## 2018-04-04 NOTE — MR AVS SNAPSHOT
[facility] 
 
 
 [place] [address] [address] [phone] Patient: Maria Ines Diop MRN: [id number] Visit Information Date & Time Provider Department Dept. Phone Encounter #  
 4/4/2018 11:00 AM Tyler Edmonds NP [facility] Neurology [facility] [phone] [id number] Your Appointments 4/30/2018  9:40 AM  
ESTABLISHED PATIENT with Natasha Barfield MD  
[facility] ([facility]) Appt Note: 6 mo fup; 6 mo [address] [facility] [address] [facility] [address]  
[phone]  
  
   
 [address] [place] [id number]  
  
    
 5/30/2018  9:40 AM  
Follow Up with Jared Pugh MD  
[facility]) Appt Note: f/u leathw [place] [address] [address] [phone]  
  
   
 [place] [address] [facility] [address] [address] [address] Upcoming Health Maintenance Date Due DTaP/Tdap/Td series (1 - Tdap) 1/24/1959 ZOSTER VACCINE AGE 60> 11/24/1997 GLAUCOMA SCREENING Q2Y 1/24/2003 Pneumococcal 65+ Low/Medium Risk (1 of 2 - PCV13) 1/24/2003 Influenza Age 5 to Adult 8/1/2017 MEDICARE YEARLY EXAM 3/14/2018 Allergies as of 4/4/2018  Review Complete On: 4/4/2018 By: Nubia Gifford Severity Noted Reaction Type Reactions Ace Inhibitors Medium 12/09/2013   Side Effect Cough Patients states he is not allergic Pcn [Penicillins] Medium 01/12/2011   Side Effect Rash  
 Nsaids (Non-steroidal Anti-inflammatory Drug)  05/17/2016    Other (comments) \"causes blood in urine\" Percocet [Oxycodone-acetaminophen]  05/17/2016   Side Effect Other (comments)  
 constipation Current Immunizations  Never Reviewed No immunizations on file. Not reviewed this visit You Were Diagnosed With   
  
 Codes Comments Neuropathy    -  Primary ICD-10-CM: G62.9 ICD-9-CM: 039. 9 Vitals BP Height(growth percentile) Weight(growth percentile) BMI Smoking Status 112/78 5' 11\" (1.803 m) 221 lb (100.2 kg) 30.82 kg/m2 Former Smoker BMI and BSA Data Body Mass Index Body Surface Area  
 30.82 kg/m 2 2.24 m 2 Preferred Pharmacy Pharmacy Name Phone [facility] [phone] Your Updated Medication List  
  
   
This list is accurate as of 4/4/18 11:48 AM.  Always use your most recent med list.  
  
  
  
  
 fish oil-omega-3 fatty acids 340-1,000 mg capsule Take 1 Cap by mouth daily (after breakfast). losartan-hydroCHLOROthiazide 100-25 mg per tablet Commonly known as:  HYZAAR  
TAKE ONE TABLET BY MOUTH DAILY  
  
 magnesium 250 mg Tab Take 400 mg by mouth daily (after dinner). multivitamin tablet Commonly known as:  ONE A DAY Take 1 Tab by mouth daily (after breakfast). PRESERVISION AREDS 2 PO Take  by mouth. VITAMIN C PO Take 500 mg by mouth daily (after breakfast). VITAMIN D 2,000 unit Cap capsule Generic drug:  Cholecalciferol (Vitamin D3) Take 2,000 Units by mouth daily (after dinner). warfarin 4 mg tablet Commonly known as:  COUMADIN Take 4 mg by mouth six (6) days a week. We Performed the Following SOCRATES W/REFLEX [25002 CPT(R)] HEAVY METALS PROFILE I, BLOOD [40241 CPT(R)] LEAD PROFILE [67330 CPT(R)] PROTEIN ELECTROPHORESIS [00888 CPT(R)] REFERRAL TO PHYSICAL THERAPY [HZH68 Custom] Comments:  
 Gait and balance training SED RATE (ESR) V8233238 CPT(R)] VITAMIN B12 & FOLATE [92070 CPT(R)] Referral Information Referral ID Referred By Referred To  
  
 [id number] Evangelista Ceja [facility] Not Available Visits Status Start Date End Date 1 New Request 4/4/18 4/4/19 If your referral has a status of pending review or denied, additional information will be sent to support the outcome of this decision. Patient Instructions PRESCRIPTION REFILL POLICY [facility] Statement to Patients April 1, 2014 In an effort to ensure the large volume of patient prescription refills is processed in the most efficient and expeditious manner, we are asking our patients to assist us by calling your Pharmacy for all prescription refills, this will include also your  Mail Order Pharmacy. The pharmacy will contact our office electronically to continue the refill process. Please do not wait until the last minute to call your pharmacy. We need at least 48 hours (2days) to fill prescriptions. We also encourage you to call your pharmacy before going to  your prescription to make sure it is ready. With regard to controlled substance prescription refill requests (narcotic refills) that need to be picked up at our office, we ask your cooperation by providing us with at least 72 hours (3days) notice that you will need a refill. We will not refill narcotic prescription refill requests after 4:00pm on any weekday, Monday through Thursday, or after 2:00pm on Fridays, or on the weekends. We encourage everyone to explore another way of getting your prescription refill request processed using [employer], our patient web portal through our electronic medical record system. [employer] is an efficient and effective way to communicate your medication request directly to the office and  downloadable as an sabino on your smart phone . [employer] also features a review functionality that allows you to view your medication list as well as leave messages for your physician. Are you ready to get connected? If so please review the attatched instructions or speak to any of our staff to get you set up right away! Thank you so much for your cooperation. Should you have any questions please contact our Practice Administrator. The Physicians and Staff,  [facility] Introducing [facility]! [employer] introduces [employer] patient portal. Now you can access parts of your medical record, email your doctor's office, and request medication refills online. 1. In your internet browser, go to [email or web address] 2. Click on the First Time User? Click Here link in the Sign In box. You will see the New Member Sign Up page. 3. Enter your [employer] Access Code exactly as it appears below. You will not need to use this code after youve completed the sign-up process. If you do not sign up before the expiration date, you must request a new code. · [employer] Access Code: [email or web address] Expires: 6/14/2018 12:33 PM 
 
4. Enter the last four digits of your Social Security Number (xxxx) and Date of Birth (mm/dd/yyyy) as indicated and click Submit. You will be taken to the next sign-up page. 5. Create a [employer] ID. This will be your [employer] login ID and cannot be changed, so think of one that is secure and easy to remember. 6. Create a [employer] password. You can change your password at any time. 7. Enter your Password Reset Question and Answer. This can be used at a later time if you forget your password. 8. Enter your e-mail address. You will receive e-mail notification when new information is available in [address] 9. Click Sign Up. You can now view and download portions of your medical record. 10. Click the Download Summary menu link to download a portable copy of your medical information. If you have questions, please visit the Frequently Asked Questions section of the [employer] website. Remember, [employer] is NOT to be used for urgent needs. For medical emergencies, dial 911. Now available from your iPhone and Android! Please provide this summary of care documentation to your next provider. Your primary care clinician is listed as Marianna Beauchamp. If you have any questions after today's visit, please call [phone].

## 2018-04-04 NOTE — PROGRESS NOTES
Flor Singh is a [de-identified] y.o. male who presents with the following  Chief Complaint   Patient presents with    Results     EMG results       HPI Patient comes back for a follow up for EMG results bilateral LE to look at numbness, tingling, weakness, falls and balance disturbance. No pain associated with symptoms. He has fallen but taken precautions to hopefully not fall again. Symptoms have been ongoing for about 3 years or longer now. Allergies   Allergen Reactions    Ace Inhibitors Cough     Patients states he is not allergic    Pcn [Penicillins] Rash    Nsaids (Non-Steroidal Anti-Inflammatory Drug) Other (comments)     \"causes blood in urine\"    Percocet [Oxycodone-Acetaminophen] Other (comments)     constipation       Current Outpatient Prescriptions   Medication Sig    losartan-hydroCHLOROthiazide (HYZAAR) 100-25 mg per tablet TAKE ONE TABLET BY MOUTH DAILY    VIT C/E/ZN/COPPR/LUTEIN/ZEAXAN (PRESERVISION AREDS 2 PO) Take  by mouth.  fish oil-omega-3 fatty acids 340-1,000 mg capsule Take 1 Cap by mouth daily (after breakfast).  magnesium 250 mg tab Take 400 mg by mouth daily (after dinner).  warfarin (COUMADIN) 4 mg tablet Take 4 mg by mouth six (6) days a week.  multivitamin (ONE A DAY) tablet Take 1 Tab by mouth daily (after breakfast).  ASCORBATE CALCIUM (VITAMIN C PO) Take 500 mg by mouth daily (after breakfast).  Cholecalciferol, Vitamin D3, (VITAMIN D) 2,000 unit Cap Take 2,000 Units by mouth daily (after dinner). No current facility-administered medications for this visit.         History   Smoking Status    Former Smoker    Packs/day: 0.50    Years: 40.00    Types: Cigarettes   Smokeless Tobacco    Former User    Quit date: 7/12/1986     Comment: quit >25 years ago       Past Medical History:   Diagnosis Date    A-fib ([facility] [address]     Arthritis     Back, knees, shoulders    Atrial fibrillation (HCC)     Benign hypertensive heart disease without heart failure     Chronic pain     Left hip pain    Diverticulosis     DJD (degenerative joint disease) of knee     right    Hematuria     due to certain medications    Hypertension     Sleep apnea 2011    compliant with c-pap    Sleep apnea     Snoring     Varicose vein of leg 2012       Past Surgical History:   Procedure Laterality Date    HX HEENT      tonsillectomy at 2yrs. old    HX HERNIA REPAIR      HX ORTHOPAEDIC      HX OTHER SURGICAL      hernia & Hydrocele    HX OTHER SURGICAL      bilateral catarats    VASCULAR SURGERY PROCEDURE UNLIST      varicose veins       Family History   Problem Relation Age of Onset    Stroke Mother     Dementia Mother     Stroke Father     Asthma Father     Lung Disease Father     Diabetes Brother     Heart Disease Brother     Heart Disease Brother        Social History     Social History    Marital status:      Spouse name: N/A    Number of children: N/A    Years of education: N/A     Social History Main Topics    Smoking status: Former Smoker     Packs/day: 0.50     Years: 40.00     Types: Cigarettes    Smokeless tobacco: Former User     Quit date: 1986      Comment: quit >25 years ago    Alcohol use 0.6 oz/week     1 Standard drinks or equivalent per week      Comment: 1 drink weekly     Drug use: No    Sexual activity: Not Asked     Other Topics Concern    None     Social History Narrative       Review of Systems   Constitutional: Positive for malaise/fatigue. Musculoskeletal: Positive for falls. Neurological: Positive for tingling, sensory change and weakness. Remainder of comprehensive review is negative.      Physical Exam :    Visit Vitals    /78    Ht 5' 11\" (1.803 m)    Wt 100.2 kg (221 lb)    BMI 30.82 kg/m2       Impression:  ABNORMAL     Extensive electrodiagnostic examination of the right and left lower extremities shows the followin) Absent sensory responses in both lower extremities  2) Reduced motor amplitude responses recording intrinsic foot muscles in both lower extremities  3) Chronic motor axon loss changes in the distal muscles of the lower extremity in a distal to proximal gradient     These findings are consistent with a symmetric generalized sensorimotor polyneuropathy, 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     Needle examination of the lower back was not done due to patient's history of Coumadin use.              Results for orders placed or performed during the hospital encounter of 10/72/99   METABOLIC PANEL, BASIC   Result Value Ref Range    Sodium 136 136 - 145 mmol/L    Potassium 4.2 3.5 - 5.1 mmol/L    Chloride 102 97 - 108 mmol/L    CO2 28 21 - 32 mmol/L    Anion gap 6 5 - 15 mmol/L    Glucose 131 (H) 65 - 100 mg/dL    BUN 19 6 - 20 MG/DL    Creatinine 0.98 0.70 - 1.30 MG/DL    BUN/Creatinine ratio 19 12 - 20      GFR est AA >60 >60 ml/min/1.73m2    GFR est non-AA >60 >60 ml/min/1.73m2    Calcium 8.1 (L) 8.5 - 10.1 MG/DL   HEMOGLOBIN   Result Value Ref Range    HGB 12.8 12.1 - 17.0 g/dL   PROTHROMBIN TIME + INR   Result Value Ref Range    INR 1.2 (H) 0.9 - 1.1      Prothrombin time 12.0 (H) 9.0 - 11.1 sec   HEMOGLOBIN   Result Value Ref Range    HGB 12.1 12.1 - 17.0 g/dL   PROTHROMBIN TIME + INR   Result Value Ref Range    INR 1.2 (H) 0.9 - 1.1      Prothrombin time 12.6 (H) 9.0 - 11.1 sec   PROTHROMBIN TIME + INR   Result Value Ref Range    INR 1.2 (H) 0.9 - 1.1      Prothrombin time 11.8 (H) 9.0 - 11.1 sec   PROTHROMBIN TIME + INR   Result Value Ref Range    INR 1.3 (H) 0.9 - 1.1      Prothrombin time 13.6 (H) 9.0 - 11.1 sec   PROTHROMBIN TIME + INR   Result Value Ref Range    INR 1.5 (H) 0.9 - 1.1      Prothrombin time 15.1 (H) 9.0 - 11.1 sec   PROTHROMBIN TIME + INR   Result Value Ref Range    INR 2.1 (H) 0.9 - 1.1      Prothrombin time 21.9 (H) 9.0 - 11.1 sec   POC INR   Result Value Ref Range    INR (POC) 1.5 (H) <1.2         Orders Placed This Encounter    PROTEIN ELECTROPHORESIS    LEAD PROFILE    HEAVY METALS PROFILE I, BLOOD    VITAMIN B12 & FOLATE    SOCRATES W/REFLEX    SED RATE (ESR)    REFERRAL TO PHYSICAL THERAPY     Referral Priority:   Routine     Referral Type:   PT/OT/ST     Referral Reason:   Specialty Services Required       1. Neuropathy        Follow-up Disposition: Not on File    EMG as seen above. We discussed this in full and he has no questions. We will evaluate for further causes with blood work as seen above in order to find out if anything is causing this. He has had recent thyroid and h1c through PCP with normal limits. For his balance, falls, unsteady feeling we will send him to PT for some balance and gait training. Discussed no treatment to reverse what is going on but want him more confident on feet. Can treat pain if necessary but nothing yet.          This note will not be viewable in [employer]

## 2018-04-26 ENCOUNTER — TELEPHONE (OUTPATIENT)
Dept: CARDIOLOGY CLINIC | Age: 78
End: 2018-04-26

## 2018-04-26 NOTE — TELEPHONE ENCOUNTER
[phone],,,Pt returned call regarding a vm he received re: r/s of Dr. Faby Hood appt from April 30th. Pt expressed he doesn't want to wait until July to be seen and doesn't want to see the NP. Pt is requesting a return call from his  for a sooner appt.   Thanks

## 2018-05-04 ENCOUNTER — OFFICE VISIT (OUTPATIENT)
Dept: CARDIOLOGY CLINIC | Age: 78
End: 2018-05-04

## 2018-05-04 VITALS
OXYGEN SATURATION: 97 % | DIASTOLIC BLOOD PRESSURE: 76 MMHG | WEIGHT: 227.4 LBS | SYSTOLIC BLOOD PRESSURE: 124 MMHG | HEIGHT: 71 IN | BODY MASS INDEX: 31.84 KG/M2 | RESPIRATION RATE: 14 BRPM | HEART RATE: 65 BPM

## 2018-05-04 DIAGNOSIS — I48.20 CHRONIC ATRIAL FIBRILLATION (HCC): Primary | ICD-10-CM

## 2018-05-04 DIAGNOSIS — G47.33 OSA (OBSTRUCTIVE SLEEP APNEA): ICD-10-CM

## 2018-05-04 DIAGNOSIS — I10 ESSENTIAL HYPERTENSION: ICD-10-CM

## 2018-05-04 RX ORDER — IPRATROPIUM BROMIDE 21 UG/1
SPRAY, METERED NASAL AS NEEDED
COMMUNITY
Start: 2017-08-07

## 2018-05-04 NOTE — PROGRESS NOTES
Chief Complaint   Patient presents with    Hypertension     6 mth f/u    Irregular Heart Beat     1. Have you been to the ER, urgent care clinic since your last visit? Hospitalized since your last visit? No    2. Have you seen or consulted any other health care providers outside of the [address] [place] [address] since your last visit? Include any pap smears or colon screening.  No    Visit Vitals    /76 (BP 1 Location: Right arm, BP Patient Position: Sitting)    Pulse 65    Resp 14    Ht 5' 11\" (1.803 m)    Wt 227 lb 6.4 oz (103.1 kg)    SpO2 97%    BMI 31.72 kg/m2

## 2018-05-04 NOTE — PROGRESS NOTES
Office Follow-up    NAME: Nikki Fisher   :  1940  MRM:  [id number]    Date:  2018            Assessment:     Problem List  Date Reviewed: 2018          Codes Class Noted    Hip arthritis ICD-10-CM: M16.10  ICD-9-CM: 716.95  264        Diastolic dysfunction ZQO-68-ZC: I51.9  ICD-9-CM: 429.9  2013        DJD (degenerative joint disease) of knee ICD-10-CM: M17.10  ICD-9-CM: 715.36  Unknown    Overview Signed 2013  1:02 PM by Olivia Garcia MD     right             Diverticulosis ICD-10-CM: K57.90  ICD-9-CM: 562.10  Unknown        Varicose vein of leg ICD-10-CM: I83.90  ICD-9-CM: 454.9  2012        OLEG (obstructive sleep apnea) ICD-10-CM: O72.75  ICD-9-CM: 327.23  2011        Atrial fibrillation ([address] ICD-10-CM: I48.91  ICD-9-CM: 427.31  Unknown    Overview Signed 2011  4:13 PM by Merlinda Sellar, MD     Discovered incidentally prior to hernia surgery   Previously intermittent, now permanent  CHADS2 score 2 - coumadin    Cardiac testing  Normal stress echo May 2008             Benign hypertensive heart disease without heart failure ICD-10-CM: I11.9  ICD-9-CM: 402.10  Unknown                 Plan:     1. Atrial fibrillation: The rate is intrinsically controlled. No rate control medications. Continue anticoagulation with Coumadin. 2. Hypertension: Blood pressure is well controlled. We will continue Hyzaar. Low-salt diet. Continue to use CPAP. 3. Sleep apnea: Continue CPAP. 4. Mild bilateral lower extremity edema: This is likely due to varicose vein. He underwent laser stripping of the varicose veins by Dr. James Eden. 5. Follow-up with Dr. Corinthia Goodell in 6 months. Subjective:     Nikki Fisher, a [de-identified]y.o. year-old who presents for followup. He normally sees Dr. Corinthia Goodell and is here for follow-up because of inability to achieve appointments with him. He has known history of chronic atrial fibrillation, hypertension.   His atrial fibrillation is intrinsically rate controlled without any rate control medications. He is on chronic anticoagulation with Coumadin. He was recently diagnosed with neuropathy and is taking physical therapy. He denies any symptoms of chest pain, shortness of breath, lightheadedness, dizziness, presyncope or syncope. Exam:     Physical Exam:  Visit Vitals    /76 (BP 1 Location: Right arm, BP Patient Position: Sitting)    Pulse 65    Resp 14    Ht 5' 11\" (1.803 m)    Wt 227 lb 6.4 oz (103.1 kg)    SpO2 97%    BMI 31.72 kg/m2     General appearance - alert, well appearing, and in no distress  Mental status - affect appropriate to mood  Eyes - sclera anicteric, moist mucous membranes  Neck - supple, no significant adenopathy  Chest - clear to auscultation, no wheezes, rales or rhonchi  Heart - normal rate, irregular rhythm, normal S1, S2, no murmurs, rubs, clicks or gallops  Abdomen - soft, nontender, nondistended, no masses or organomegaly  Extremities - peripheral pulses normal, Mild 1+ b/l pedal edema  Skin - normal coloration  no rashes    Medications:     Current Outpatient Prescriptions   Medication Sig    ipratropium (ATROVENT) 0.03 % nasal spray by Both Nostrils route daily.  losartan-hydroCHLOROthiazide (HYZAAR) 100-25 mg per tablet TAKE ONE TABLET BY MOUTH DAILY    VIT C/E/ZN/COPPR/LUTEIN/ZEAXAN (PRESERVISION AREDS 2 PO) Take  by mouth.  fish oil-omega-3 fatty acids 340-1,000 mg capsule Take 1 Cap by mouth daily (after breakfast).  magnesium 250 mg tab Take 400 mg by mouth daily (after dinner).  warfarin (COUMADIN) 4 mg tablet Take 4 mg by mouth every seven (7) days.  multivitamin (ONE A DAY) tablet Take 1 Tab by mouth daily (after breakfast).  ASCORBATE CALCIUM (VITAMIN C PO) Take 500 mg by mouth daily (after breakfast).  Cholecalciferol, Vitamin D3, (VITAMIN D) 2,000 unit Cap Take 2,000 Units by mouth daily (after dinner).      No current facility-administered medications for this visit.       Diagnostic Data Review:       5/10/2016: Doneta Boor- normal perfusion, EF 64%  10/3/13: LEXISCAN- normal perfusion, EF 54%  6/3/13: ECHO- EF 50-55%; JULIO C; TR - moderate; pulm. htn - moderate, PA 45 mmHg      Lab Review:   No results found for: CHOL, CHOLX, CHLST, CHOLV, 944241, HDL, LDL, LDLC, DLDLP, TGLX, TRIGL, TRIGP, CHHD, CHHDX  Lab Results   Component Value Date/Time    Creatinine (POC) 1.6 (H) 05/31/2013 06:45 PM    Creatinine 0.98 05/18/2016 04:40 AM     Lab Results   Component Value Date/Time    BUN 19 05/18/2016 04:40 AM    BUN (POC) 45 (H) 05/31/2013 06:45 PM     Lab Results   Component Value Date/Time    Potassium 4.2 05/18/2016 04:40 AM     Lab Results   Component Value Date/Time    Hemoglobin A1c 6.0 05/09/2016 12:00 PM     Lab Results   Component Value Date/Time    Hemoglobin (POC) 14.3 05/31/2013 06:45 PM    HGB 12.1 05/19/2016 03:10 AM     Lab Results   Component Value Date/Time    PLATELET 011 (L) 10/71/3299 05:00 AM     No results for input(s): CPK, CKMB, TROIQ in the last 72 hours. No lab exists for component: CKQMB, CPKMB             ___________________________________________________    Dimple Paul.  Vinayak Almendarez MD, [facility]

## 2018-05-07 ENCOUNTER — TELEPHONE (OUTPATIENT)
Dept: NEUROLOGY | Age: 78
End: 2018-05-07

## 2018-05-07 NOTE — TELEPHONE ENCOUNTER
----- Message from [employer] sent at 5/7/2018  9:20 AM EDT -----  Regarding: Dr. Montana Love  Pt called concerning fasting before the lab is complete and would like a call back.  Pt best contact number [phone]

## 2018-05-09 LAB
ALBUMIN SERPL ELPH-MCNC: 4.1 G/DL (ref 2.9–4.4)
ALBUMIN/GLOB SERPL: 1.5 {RATIO} (ref 0.7–1.7)
ALPHA1 GLOB SERPL ELPH-MCNC: 0.1 G/DL (ref 0–0.4)
ALPHA2 GLOB SERPL ELPH-MCNC: 0.6 G/DL (ref 0.4–1)
ANA SER QL: NEGATIVE
ARSENIC BLD-MCNC: 6 UG/L (ref 2–23)
B-GLOBULIN SERPL ELPH-MCNC: 0.9 G/DL (ref 0.7–1.3)
ERYTHROCYTE [SEDIMENTATION RATE] IN BLOOD BY WESTERGREN METHOD: 3 MM/HR (ref 0–30)
FEP BLD-MCNC: 26 UG/DL (ref 0–100)
FOLATE SERPL-MCNC: >20 NG/ML
GAMMA GLOB SERPL ELPH-MCNC: 1 G/DL (ref 0.4–1.8)
GLOBULIN SER CALC-MCNC: 2.7 G/DL (ref 2.2–3.9)
LEAD BLD-MCNC: 2 UG/DL (ref 0–19)
LEAD BLOOD, ADULT, 17379: 2 UG/DL (ref 0–19)
M PROTEIN SERPL ELPH-MCNC: NORMAL G/DL
MERCURY BLD-MCNC: NORMAL UG/L (ref 0–14.9)
PLEASE NOTE, 011150: NORMAL
PROT SERPL-MCNC: 6.8 G/DL (ref 6–8.5)
VIT B12 SERPL-MCNC: 436 PG/ML (ref 232–1245)
ZPP RBC-MCNC: 29 UG/DL (ref 0–100)

## 2018-05-30 ENCOUNTER — OFFICE VISIT (OUTPATIENT)
Dept: NEUROLOGY | Age: 78
End: 2018-05-30

## 2018-05-30 VITALS
HEIGHT: 71 IN | RESPIRATION RATE: 18 BRPM | BODY MASS INDEX: 31.78 KG/M2 | WEIGHT: 227 LBS | HEART RATE: 62 BPM | OXYGEN SATURATION: 90 % | SYSTOLIC BLOOD PRESSURE: 128 MMHG | DIASTOLIC BLOOD PRESSURE: 84 MMHG | TEMPERATURE: 98 F

## 2018-05-30 DIAGNOSIS — G60.9 IDIOPATHIC PERIPHERAL NEUROPATHY: Primary | ICD-10-CM

## 2018-05-30 DIAGNOSIS — R26.89 IMBALANCE: ICD-10-CM

## 2018-05-30 NOTE — PROGRESS NOTES
[address] [facility]   [place] [address]   [place], [address]   [phone] [facility]   [phone] Fax               Chief Complaint   Patient presents with    Tingling     follow up     Current Outpatient Prescriptions   Medication Sig Dispense Refill    ipratropium (ATROVENT) 0.03 % nasal spray by Both Nostrils route daily.  losartan-hydroCHLOROthiazide (HYZAAR) 100-25 mg per tablet TAKE ONE TABLET BY MOUTH DAILY 90 Tab 2    VIT C/E/ZN/COPPR/LUTEIN/ZEAXAN (PRESERVISION AREDS 2 PO) Take  by mouth.  fish oil-omega-3 fatty acids 340-1,000 mg capsule Take 1 Cap by mouth daily (after breakfast).  magnesium 250 mg tab Take 400 mg by mouth daily (after dinner).  warfarin (COUMADIN) 4 mg tablet Take 4 mg by mouth every seven (7) days.  multivitamin (ONE A DAY) tablet Take 1 Tab by mouth daily (after breakfast).  ASCORBATE CALCIUM (VITAMIN C PO) Take 500 mg by mouth daily (after breakfast).  Cholecalciferol, Vitamin D3, (VITAMIN D) 2,000 unit Cap Take 2,000 Units by mouth daily (after dinner). Allergies   Allergen Reactions    Ace Inhibitors Cough     Patients states he is not allergic    Pcn [Penicillins] Rash    Nsaids (Non-Steroidal Anti-Inflammatory Drug) Other (comments)     \"causes blood in urine\"    Percocet [Oxycodone-Acetaminophen] Other (comments)     constipation     Social History   Substance Use Topics    Smoking status: Former Smoker     Packs/day: 0.50     Years: 40.00     Types: Cigarettes    Smokeless tobacco: Former User     Quit date: 7/12/1986      Comment: quit >25 years ago    Alcohol use 0.6 oz/week     1 Standard drinks or equivalent per week      Comment: 1 drink weekly      Patient returns today for follow-up numbness and tingling in his feet.   He had an EMG performed and the results of which demonstrated:  \"symmetric generalized sensorimotor polyneuropathy, axon loss in type, moderately affecting the lower extremities. \"  He saw our nurse practitioner, Marisabel Page, on last visit. He was sent for physical therapy. I have the physical therapy report today from [facility] and. This is a indicates that he has had no falls since beginning physical therapy. Patient reported balance is better. His TUG score has improved. Recommendations were made in terms of continued exercises. He notes that he is not having any pain. Has multiple questions about exercises. We discussed the fact that we cannot change numbness. We discussed that doing the exercises regularly at home i.e. home exercise program is the only way to help maintain his balance. He has several questions about the exercises at home versus the gym. We discussed having him meet with physical therapy for another couple to 3 sessions to really translate exercises to the gym and answer these questions. Discussed that there is nothing we can do about the neuropathy meaning that it is not fixable. Examination  Visit Vitals    /84    Pulse 62    Temp 98 °F (36.7 °C)    Resp 18    Ht 5' 11\" (1.803 m)    Wt 103 kg (227 lb)    SpO2 90%    BMI 31.66 kg/m2         Impression/Plan    Idiopathic peripheral neuropathy. No known etiology. Continue home exercise program.  Order sent to the physical therapist.  Follow-up here as needed. Total time: 15 min   Counseling / coordination time: 10 min   > 50% counseling / coordination?: Yes re: as documented above      Denise Enriquez MD        This note was created using voice recognition software. Despite editing, there may be syntax errors. This note will not be viewable in [address]

## 2018-05-30 NOTE — PROGRESS NOTES
Follow up for numbness and tingling in feet. Reports no significant changes in numbness and tingling in feet. No acute problems reported. Completed therapy.

## 2018-05-30 NOTE — MR AVS SNAPSHOT
[address] [facility] [address] [facility] [address] [phone] Patient: Adeline Mccann MRN: [id number] IMD:[id number] Visit Information Date & Time Provider Department Dept. Phone Encounter #  
 5/30/2018  9:40 AM Deepika Morris MD [facility] Neurology [facility] [phone] [id number] Your Appointments 11/12/2018 10:20 AM  
ESTABLISHED PATIENT with Jackie Varela MD  
[facility] ([address]) Appt Note: 6 month follow up  
 [address] [facility] [address] [address] [address] Upcoming Health Maintenance Date Due DTaP/Tdap/Td series (1 - Tdap) [birth year]/24/1961 ZOSTER VACCINE AGE 60> 11/24/1997 GLAUCOMA SCREENING Q2Y 1/24/2003 Pneumococcal 65+ Low/Medium Risk (1 of 2 - PCV13) 1/24/2003 MEDICARE YEARLY EXAM 3/14/2018 Influenza Age 5 to Adult 8/1/2018 Allergies as of 5/30/2018  Review Complete On: 5/30/2018 By: Deepika Morris MD  
  
 Severity Noted Reaction Type Reactions Ace Inhibitors Medium 12/09/2013   Side Effect Cough Patients states he is not allergic Pcn [Penicillins] Medium 01/12/2011   Side Effect Rash  
 Nsaids (Non-steroidal Anti-inflammatory Drug)  05/17/2016    Other (comments) \"causes blood in urine\" Percocet [Oxycodone-acetaminophen]  05/17/2016   Side Effect Other (comments)  
 constipation Current Immunizations  Reviewed on 5/4/2018 No immunizations on file. Not reviewed this visit You Were Diagnosed With   
  
 Codes Comments Idiopathic peripheral neuropathy    -  Primary ICD-10-CM: G60.9 ICD-9-CM: 356.9 Imbalance     ICD-10-CM: R26.89 
ICD-9-CM: 781. 2 Vitals BP Pulse Temp Resp Height(growth percentile) Weight(growth percentile) 128/84 62 98 °F (36.7 °C) 18 5' 11\" (1.803 m) 227 lb (103 kg) SpO2 BMI Smoking Status 90% 31.66 kg/m2 Former Smoker Vitals History BMI and BSA Data Body Mass Index Body Surface Area  
 31.66 kg/m 2 2.27 m 2 Preferred Pharmacy Pharmacy Name Phone [facility] [address] [phone] Your Updated Medication List  
  
   
This list is accurate as of 5/30/18 10:14 AM.  Always use your most recent med list.  
  
  
  
  
 fish oil-omega-3 fatty acids 340-1,000 mg capsule Take 1 Cap by mouth daily (after breakfast). ipratropium 0.03 % nasal spray Commonly known as:  ATROVENT  
by Both Nostrils route daily. losartan-hydroCHLOROthiazide 100-25 mg per tablet Commonly known as:  HYZAAR  
TAKE ONE TABLET BY MOUTH DAILY  
  
 magnesium 250 mg Tab Take 400 mg by mouth daily (after dinner). multivitamin tablet Commonly known as:  ONE A DAY Take 1 Tab by mouth daily (after breakfast). PRESERVISION AREDS 2 PO Take  by mouth. VITAMIN C PO Take 500 mg by mouth daily (after breakfast). VITAMIN D 2,000 unit Cap capsule Generic drug:  Cholecalciferol (Vitamin D3) Take 2,000 Units by mouth daily (after dinner). warfarin 4 mg tablet Commonly known as:  COUMADIN Take 4 mg by mouth every seven (7) days. Introducing [facility]! [employer] introduces [employer] patient portal. Now you can access parts of your medical record, email your doctor's office, and request medication refills online. 1. In your internet browser, go to [email or web address] 2. Click on the First Time User? Click Here link in the Sign In box. You will see the New Member Sign Up page. 3. Enter your [employer] Access Code exactly as it appears below. You will not need to use this code after youve completed the sign-up process. If you do not sign up before the expiration date, you must request a new code. · [employer] Access Code: [email or web address] Expires: 6/14/2018 12:33 PM 
 
4. Enter the last four digits of your Social Security Number (xxxx) and Date of Birth (mm/dd/yyyy) as indicated and click Submit. You will be taken to the next sign-up page. 5. Create a [employer] ID. This will be your [employer] login ID and cannot be changed, so think of one that is secure and easy to remember. 6. Create a [employer] password. You can change your password at any time. 7. Enter your Password Reset Question and Answer. This can be used at a later time if you forget your password. 8. Enter your e-mail address. You will receive e-mail notification when new information is available in [address] 9. Click Sign Up. You can now view and download portions of your medical record. 10. Click the Download Summary menu link to download a portable copy of your medical information. If you have questions, please visit the Frequently Asked Questions section of the [employer] website. Remember, [employer] is NOT to be used for urgent needs. For medical emergencies, dial 911. Now available from your iPhone and Android! Please provide this summary of care documentation to your next provider. Your primary care clinician is listed as Rickey Pacheco. If you have any questions after today's visit, please call [phone].

## 2018-07-26 ENCOUNTER — HOSPITAL ENCOUNTER (EMERGENCY)
Age: 78
Discharge: HOME | End: 2018-07-27
Attending: STUDENT IN AN ORGANIZED HEALTH CARE EDUCATION/TRAINING PROGRAM
Payer: MEDICARE

## 2018-07-26 VITALS
OXYGEN SATURATION: 99 % | HEART RATE: 65 BPM | HEIGHT: 71 IN | RESPIRATION RATE: 18 BRPM | TEMPERATURE: 97.7 F | WEIGHT: 225 LBS | SYSTOLIC BLOOD PRESSURE: 196 MMHG | DIASTOLIC BLOOD PRESSURE: 100 MMHG | BODY MASS INDEX: 31.5 KG/M2

## 2018-07-26 DIAGNOSIS — I83.899 BLEEDING FROM VARICOSE VEIN: Primary | ICD-10-CM

## 2018-07-26 PROCEDURE — 99282 EMERGENCY DEPT VISIT SF MDM: CPT

## 2018-07-26 PROCEDURE — 90715 TDAP VACCINE 7 YRS/> IM: CPT | Performed by: STUDENT IN AN ORGANIZED HEALTH CARE EDUCATION/TRAINING PROGRAM

## 2018-07-26 PROCEDURE — 74011250636 HC RX REV CODE- 250/636: Performed by: STUDENT IN AN ORGANIZED HEALTH CARE EDUCATION/TRAINING PROGRAM

## 2018-07-26 PROCEDURE — 75810000293 HC SIMP/SUPERF WND  RPR

## 2018-07-26 PROCEDURE — 90471 IMMUNIZATION ADMIN: CPT

## 2018-07-26 RX ADMIN — TETANUS TOXOID, REDUCED DIPHTHERIA TOXOID AND ACELLULAR PERTUSSIS VACCINE, ADSORBED 0.5 ML: 5; 2.5; 8; 8; 2.5 SUSPENSION INTRAMUSCULAR at 23:52

## 2018-07-27 PROCEDURE — 77030039266 HC ADH SKN EXOFIN S2SG -A
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
## 2018-07-27 NOTE — DISCHARGE INSTRUCTIONS
Varicose Veins: Care Instructions  Your Care Instructions  Varicose veins are twisted, enlarged veins near the surface of the skin. They develop most often in the legs and ankles. Some people may be more likely than others to get varicose veins because of aging or hormone changes or because a parent has them. Being overweight or pregnant can make varicose veins worse. Jobs that require standing for long periods of time also can make them worse. Follow-up care is a key part of your treatment and safety. Be sure to make and go to all appointments, and call your doctor if you are having problems. It's also a good idea to know your test results and keep a list of the medicines you take. How can you care for yourself at home? · Wear compression stockings during the day to help relieve symptoms. They improve blood flow and are the main treatment for varicose veins. Talk to your doctor about which ones to get and where to get them. · Prop up your legs at or above the level of your heart when possible. This helps keep the blood from pooling in your lower legs and improves blood flow to the rest of your body. · Avoid sitting and standing for long periods. This puts added stress on your veins. · Get regular exercise, and control your weight. Walk, bicycle, or swim to improve blood flow in your legs. · If you bump your leg so hard that you know it is likely to bruise, prop up your leg and put ice or a cold pack on the area for 10 to 20 minutes at a time. Try to do this every 1 to 2 hours for the next 3 days (when you are awake) or until the swelling goes down. Put a thin cloth between the ice and your skin. · If you cut or scratch the skin over a vein, it may bleed a lot. Prop up your leg and apply firm pressure with a clean bandage over the site of the bleeding. Continue to apply pressure for a full 15 minutes. Do not check sooner to see if the bleeding has stopped.  If the bleeding has not stopped after 15 minutes, apply pressure again for another 15 minutes. You can repeat this up to 3 times for a total of 45 minutes. If you have a blood clot in a varicose vein, you may have tenderness and swelling over the vein. The vein may feel firm. Be sure to call your doctor right away if you have these symptoms. If your doctor has told you how to care for the clot, follow his or her instructions. Care may include the following:  · Prop up your leg and apply heat with a warm, damp cloth or a heating pad set on low (put a towel or cloth between your leg and the heating pad to prevent burns). · Ask your doctor if you can take an over-the-counter pain medicine, such as acetaminophen (Tylenol), ibuprofen (Advil, Motrin), or naproxen (Aleve). Be safe with medicines. Read and follow all instructions on the label. When should you call for help? Call 911 anytime you think you may need emergency care. For example, call if:    · You have sudden chest pain and shortness of breath, or you cough up blood.    Call your doctor now or seek immediate medical care if:    · You have signs of a blood clot, such as:  ¨ Pain in your calf, back of the knee, thigh, or groin. ¨ Redness and swelling in your leg or groin.     · A varicose vein begins to bleed and you cannot stop it.     · You have a tender lump in your leg.     · You get an open sore.    Watch closely for changes in your health, and be sure to contact your doctor if:    · Your varicose vein symptoms do not improve with home treatment. Where can you learn more? Go to http://siddharth-joão.info/. Enter C398 in the search box to learn more about \"Varicose Veins: Care Instructions. \"  Current as of: November 21, 2017  Content Version: 11.7  © 2629-9546 [employer]. Care instructions adapted under license by [employer] (which disclaims liability or warranty for this information).  If you have questions about a medical condition or this instruction, always ask your healthcare professional. [place] [address] any warranty or liability for your use of this information.

## 2018-07-27 NOTE — ED NOTES
Pt discharged by provider, pt and wife verbalized an understanding, agree to discharge and follow up plan. No further questions at this time.      Pt able to ambulate to car
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
## 2018-09-25 RX ORDER — LOSARTAN POTASSIUM AND HYDROCHLOROTHIAZIDE 25; 100 MG/1; MG/1
TABLET ORAL
Qty: 90 TAB | Refills: 1 | Status: SHIPPED | OUTPATIENT
Start: 2018-09-25 | End: 2019-04-19 | Stop reason: SDUPTHER

## 2018-11-12 ENCOUNTER — OFFICE VISIT (OUTPATIENT)
Dept: CARDIOLOGY CLINIC | Age: 78
End: 2018-11-12

## 2018-11-12 VITALS
RESPIRATION RATE: 18 BRPM | SYSTOLIC BLOOD PRESSURE: 138 MMHG | WEIGHT: 227.6 LBS | HEART RATE: 58 BPM | HEIGHT: 71 IN | BODY MASS INDEX: 31.86 KG/M2 | DIASTOLIC BLOOD PRESSURE: 82 MMHG

## 2018-11-12 DIAGNOSIS — I11.9 BENIGN HYPERTENSIVE HEART DISEASE WITHOUT HEART FAILURE: ICD-10-CM

## 2018-11-12 DIAGNOSIS — G47.33 OSA (OBSTRUCTIVE SLEEP APNEA): ICD-10-CM

## 2018-11-12 DIAGNOSIS — I51.89 DIASTOLIC DYSFUNCTION: ICD-10-CM

## 2018-11-12 DIAGNOSIS — I48.20 CHRONIC ATRIAL FIBRILLATION (HCC): Primary | ICD-10-CM

## 2018-11-12 DIAGNOSIS — I10 ESSENTIAL HYPERTENSION: ICD-10-CM

## 2018-11-12 NOTE — PROGRESS NOTES
Visit Vitals  /82 (BP 1 Location: Left arm)   Resp 18   Ht 5' 11\" (1.803 m)   Wt 227 lb 9.6 oz (103.2 kg)   BMI 31.74 kg/m²       Patient is here for follow up. No cardiac complaints.

## 2018-11-12 NOTE — PROGRESS NOTES
History of Present Illness    Hernán Li is a [de-identified] y.o. male. Last seen about 1 year ago. Problem List  Date Reviewed: 5/30/2018          Codes Class Noted    Hip arthritis ICD-10-CM: M16.10  ICD-9-CM: 716.95  8/70/7995        Diastolic dysfunction TUT-62-MY: I51.9  ICD-9-CM: 429.9  9/17/2013        DJD (degenerative joint disease) of knee ICD-10-CM: M17.10  ICD-9-CM: 715.36  Unknown    Overview Signed 6/1/2013  1:02 PM by Carolyn Munoz MD     right             Diverticulosis ICD-10-CM: K57.90  ICD-9-CM: 562.10  Unknown        Varicose vein of leg ICD-10-CM: I83.90  ICD-9-CM: 454.9  1/12/2012        OLEG (obstructive sleep apnea) ICD-10-CM: G47.33  ICD-9-CM: 327.23  7/12/2011        Atrial fibrillation ([address] ICD-10-CM: I48.91  ICD-9-CM: 427.31  Unknown    Overview Signed 1/12/2011  4:13 PM by Quan Mendez MD     Discovered incidentally prior to hernia surgery 2008  Previously intermittent, now permanent  CHADS2 score 2 - coumadin    Cardiac testing  Normal stress echo May 2008             Benign hypertensive heart disease without heart failure ICD-10-CM: I11.9  ICD-9-CM: 402.10  Unknown             Cardiac Testing  5/10/2016: Fide Gallagher- normal perfusion, EF 64%  10/3/13: LEXISCAN- normal perfusion, EF 54%  6/3/13: ECHO- EF 50-55%; JULIO C; TR - moderate; pulm. htn - moderate, PA 45 mmHg    HPI  Mr. Ronnie Mena reports neuropathy in his legs and osteoarthritis in his knees, hips and backs. He is s/p THR. He is fairly sedentary due to pain, but goes to the gym and walks on the treadmill 3-4x/week. He denies exertional symptoms with the things he does. He has some difficulty with his balance. He states his HR is 85-90 with exercise. Patient denies any dizziness, exertional chest pain, dyspnea, syncope, orthopnea, edema or paroxysmal nocturnal dyspnea. He does not notice he is in AF, apart from an episode of diaphoresis 1x/month. He is adherent with Warfarin. His INR has been within range.      Current Outpatient Medications on File Prior to Visit   Medication Sig Dispense Refill    OTHER Metamx - take one capsule twice daily.  losartan-hydroCHLOROthiazide (HYZAAR) 100-25 mg per tablet TAKE ONE TABLET BY MOUTH DAILY 90 Tab 1    ipratropium (ATROVENT) 0.03 % nasal spray by Both Nostrils route as needed.  VIT C/E/ZN/COPPR/LUTEIN/ZEAXAN (PRESERVISION AREDS 2 PO) Take  by mouth two (2) times a day.  fish oil-omega-3 fatty acids 340-1,000 mg capsule Take 1 Cap by mouth daily (after breakfast).  magnesium 250 mg tab Take 400 mg by mouth daily (after dinner).  warfarin (COUMADIN) 4 mg tablet Take 4mg five days a week, then 2mg all other days.  multivitamin (ONE A DAY) tablet Take 1 Tab by mouth daily (after breakfast).  ASCORBATE CALCIUM (VITAMIN C PO) Take 500 mg by mouth daily (after breakfast).  Cholecalciferol, Vitamin D3, (VITAMIN D) 2,000 unit Cap Take 2,000 Units by mouth daily (after dinner). No current facility-administered medications on file prior to visit. Allergies   Allergen Reactions    Ace Inhibitors Cough     Patients states he is not allergic    Pcn [Penicillins] Rash    Nsaids (Non-Steroidal Anti-Inflammatory Drug) Other (comments)     \"causes blood in urine\"    Percocet [Oxycodone-Acetaminophen] Other (comments)     constipation     . Review of Systems  Constitutional: Negative for fever, chills, and diaphoresis. +fatigue  Respiratory: Negative for cough, hemoptysis, sputum production, shortness of breath and wheezing. Cardiovascular: Negative for chest pain, palpitations, orthopnea, claudication, leg swelling and PND. Gastrointestinal: Negative for heartburn, nausea, vomiting, blood in stool and melena. Genitourinary: Negative for dysuria and flank pain. Musculoskeletal: +joint pain. Skin: Negative for rash.    Neurological: Negative for focal weakness, seizures, loss of consciousness, and headaches. +generalized weakness  Endo/Heme/Allergies: Does not bruise/bleed easily. Psychiatric/Behavioral: Negative for memory loss. The patient does not have insomnia. Visit Vitals  /82 (BP 1 Location: Left arm)   Pulse (!) 58   Resp 18   Ht 5' 11\" (1.803 m)   Wt 227 lb 9.6 oz (103.2 kg)   BMI 31.74 kg/m²     Wt Readings from Last 3 Encounters:   11/12/18 227 lb 9.6 oz (103.2 kg)   07/26/18 225 lb (102.1 kg)   05/30/18 227 lb (103 kg)       Physical Exam  Vitals reviewed. Constitutional: He is oriented to person, place and time. He appears well-nourished. Head: Normocephalic. Neck: Neck supple. No JVD present. Cardiovascular: Normal rate, irregular rhythm, normal heart sounds and intact distal pulses. Exam reveals no gallop. 2/6 systolic murmur heard  Pulmonary/Chest: Effort normal and breath sounds normal.  Abdominal: Soft, nontender. Bowel sounds normal.  Musculoskeletal: 1+ edema, R>L  Neurological: Alert and oriented to person, place and time. Skin: Skin is warm and dry. Psychiatric: He has a normal mood and affect. Cardiographics  EKG 7/7/14 - AF 60s  EKG 7/6/15 - AF 60s  EKG 10/17/17- AF 60s  EKG 11/12/18 - AF 60s    ASSESSMENT and PLAN:  Encounter Diagnoses   Name Primary?  Chronic atrial fibrillation (HCC) Yes    OLEG (obstructive sleep apnea)     Essential hypertension     Benign hypertensive heart disease without heart failure     Diastolic dysfunction       Mr. Cj Moody has chronic AF with asymptomatic conduction disease in the setting of HTN and OLEG. He has no structural or ischemic heart disease. He has no symptoms of bradycardia or chronotropic incompetence. We reviewed sxs that would raise concern. No issues with anticoagulation. HTN, controlled on combination therapy. Functional capacity limited by diffuse arthritis and lower extremity arthritis. I encouraged him to continue going to the gym but add resistance training to his regimen.      Follow-up Disposition:  Return in about 6 months (around 5/12/2019).      Written by Phan Luevano, as dictated by Earle Severe, MD.     Earle Severe, MD

## 2019-05-21 ENCOUNTER — TELEPHONE (OUTPATIENT)
Dept: CARDIOLOGY CLINIC | Age: 79
End: 2019-05-21

## 2019-05-21 NOTE — TELEPHONE ENCOUNTER
Patient states he had surgery and will not be able to make his appointment on 6/3/19 with Dr. Sanna Heaton.  He does not want to see the NP and would like to know if he can be squeezed in with Dr. Sanna Heaton sooner than August.     Phone: [phone]

## 2019-05-28 ENCOUNTER — TELEPHONE (OUTPATIENT)
Dept: CARDIOLOGY CLINIC | Age: 79
End: 2019-05-28

## 2019-05-28 NOTE — TELEPHONE ENCOUNTER
Patient wife calling to get an appt for Dr. Connor Canchola ASAP for knee surg. Adina Leung can be reached at [phone].     [facility]

## 2019-05-29 NOTE — TELEPHONE ENCOUNTER
Patient states that he needs to see Dr. Niesha Romeo soon. He stated that he needs to have a stress test but I did not see an order. He stated that his surgery was canceled because his heart is too weak. Please advise.     Phone #: [phone]  Thanks

## 2019-05-31 ENCOUNTER — OFFICE VISIT (OUTPATIENT)
Dept: CARDIOLOGY CLINIC | Age: 79
End: 2019-05-31

## 2019-05-31 VITALS
OXYGEN SATURATION: 98 % | HEART RATE: 66 BPM | BODY MASS INDEX: 32.28 KG/M2 | HEIGHT: 71 IN | DIASTOLIC BLOOD PRESSURE: 80 MMHG | WEIGHT: 230.6 LBS | SYSTOLIC BLOOD PRESSURE: 130 MMHG

## 2019-05-31 DIAGNOSIS — I11.9 BENIGN HYPERTENSIVE HEART DISEASE WITHOUT HEART FAILURE: ICD-10-CM

## 2019-05-31 DIAGNOSIS — G47.33 OSA (OBSTRUCTIVE SLEEP APNEA): ICD-10-CM

## 2019-05-31 DIAGNOSIS — R79.89 ELEVATED BRAIN NATRIURETIC PEPTIDE (BNP) LEVEL: ICD-10-CM

## 2019-05-31 DIAGNOSIS — I83.93 ASYMPTOMATIC VARICOSE VEINS OF BOTH LOWER EXTREMITIES: ICD-10-CM

## 2019-05-31 DIAGNOSIS — I48.20 CHRONIC ATRIAL FIBRILLATION (HCC): Primary | ICD-10-CM

## 2019-05-31 DIAGNOSIS — M17.11 PRIMARY OSTEOARTHRITIS OF RIGHT KNEE: ICD-10-CM

## 2019-05-31 NOTE — PROGRESS NOTES
Patient has swollen feet and legs. Patient is suppose to have a right total knee replacement.       Visit Vitals  /80 (BP 1 Location: Left arm, BP Patient Position: Sitting)   Pulse 66   Ht 5' 11\" (1.803 m)   Wt 230 lb 9.6 oz (104.6 kg)   SpO2 98%   BMI 32.16 kg/m²

## 2019-05-31 NOTE — PROGRESS NOTES
[address], [address]  [address], [facility], [address] [facility]    Office [phone]  Fax [phone]  Cell [phone]      Alexsandra King is a 78 y.o. male. Last seen by me 6 months ago. Pre-op cardiac visit for right TKR by Dr. Jesus Rodriguez  Encounter Diagnoses     ICD-10-CM ICD-9-CM   1. Chronic atrial fibrillation (HCC) I48.2 427.31   2. Elevated brain natriuretic peptide (BNP) level R79.89 790.99   3. Benign hypertensive heart disease without heart failure I11.9 402.10   4. Primary osteoarthritis of right knee M17.11 715.16   5. Asymptomatic varicose veins of both lower extremities I83.93 454.9   6. OLEG (obstructive sleep apnea) G47.33 327.23       ASSESSMENT/PLAN    1. Chronic AF with bradycardia, asymptomatic in the setting of HTN and OLEG. He has no structural or ischemic heart disease. Lexiscan cardiolite 2016 was normal. Echo 2013 demonstrated EF 28-53%, PA systolic 17AZPZ. He has no symptoms of bradycardia or chronotropic incompetence. He has no exertional sxs suggestive of HF. We reviewed sxs that would raise concern. Agree with recent switch from warfarin to Xarelto, but the optimal dose should be 20 mg/d. We discussed recent safety and efficacy data comparing s Eliquis vs Xarelto. 2. HTN, controlled on combination therapy. 3. Elevated ProBNP is due to chronic AF, not HF    4. Chronic lower extremity edema is due to venous insufficiency/varicose veins, likely aggravated by inflammation from arthritis. Advised continue compression stockings. No indication for diuretics. 5. Right knee DJD. Intermediate cardiac risk for TKR. Will revaluate cardiac function with echo preop since it has been 6 years since last study. Assuming no significant abnormalities, will proceed with surgery in near future.  Stop Xarelto 3 days preop     Phone follow up after reviewing tests         Patient Active Problem List    Diagnosis    Hip arthritis    Diastolic dysfunction    DJD (degenerative joint disease) of knee     right      Diverticulosis    Varicose vein of leg    OLEG (obstructive sleep apnea)    Atrial fibrillation ([address]     Discovered incidentally prior to hernia surgery 2008  Previously intermittent, now permanent  CHADS2 score 2 - coumadin    Cardiac testing  Normal stress echo May 2008      Benign hypertensive heart disease without heart failure       Cardiac Testing  5/10/2016: Rosalva Led- normal perfusion, EF 64%  10/3/13: LEXISCAN- normal perfusion, EF 54%  6/3/13: ECHO- EF 50-55%; JULIO C; TR - moderate; pulm. htn - moderate, PA 45 mmHg    HPI  Marita Marin reports he has been doing well. He presents today for a cardiac pre-op for right TKR. He was set for surgery on Tuesday and about to undergo anesthesia, but the anesthesiologist halted the procedure because of his elevated ProBNP. He had seen Dr. Trini Gutierrez a couple of weeks prior for pre-op labs, which were WNL except for elevated Pro-BNP of 1276. Therefore, surgery was cancelled and he was told he needed cardiac clearance. Additionally, he states he was told to stop his Xarelto only 24 hours prior to surgery initially, but in the hospital the surgeons told him he had to stop Xarelto 3 days prior to surgery. He notes that he was switched from Warfarin to Xarelto 15 mg about one month ago. He complains of severe neuropathy in legs and feet bilaterally. He also has 2+ bilateral edema attributed to varcose veins. He notes these remain unchanged from before. He reports going to the gym regularly and using the treadmill and stationary bike for about 20 minutes, although  the knee pain and neuropathy have been limiting this activity. He was exercising daily but now only exercising about 2-3 x/week due to his knee/leg pain. He mentions his pulse goes up to about 80 while exercising. He is also able to use stairs at home, albeit slowly, but without exertional symptoms.  He denies noticing any WALSH or SOB but notes that people occasionally tell him he looks short of breath. Patient denies any dizziness, exertional chest pain, dyspnea, syncope, orthopnea, or paroxysmal nocturnal dyspnea. Pt's wife is present and contributing to hx    Past Medical History:   Diagnosis Date    A-fib ([address]     Arthritis     Back, knees, shoulders    Atrial fibrillation (HCC)     Benign hypertensive heart disease without heart failure     Chronic pain     Left hip pain    Diverticulosis     DJD (degenerative joint disease) of knee     right    Hematuria     due to certain medications    Hypertension     Sleep apnea 4/1/2011    compliant with c-pap    Sleep apnea     Snoring     Varicose vein of leg 1/12/2012       Current Outpatient Medications on File Prior to Visit   Medication Sig Dispense Refill    rivaroxaban (XARELTO) 15 mg tab tablet Take  by mouth daily.  UBIQUINONE PO Take 100 mg by mouth daily.  psyllium (METAMUCIL) powd Take  by mouth.  losartan-hydroCHLOROthiazide (HYZAAR) 100-25 mg per tablet TAKE ONE TABLET BY MOUTH DAILY 90 Tab 0    VIT C/E/ZN/COPPR/LUTEIN/ZEAXAN (PRESERVISION AREDS 2 PO) Take  by mouth two (2) times a day.  fish oil-omega-3 fatty acids 340-1,000 mg capsule Take 1 Cap by mouth daily (after breakfast).  magnesium 250 mg tab Take 400 mg by mouth daily (after dinner).  multivitamin (ONE A DAY) tablet Take 1 Tab by mouth daily (after breakfast).  ASCORBATE CALCIUM (VITAMIN C PO) Take 500 mg by mouth daily (after breakfast).  Cholecalciferol, Vitamin D3, (VITAMIN D) 2,000 unit Cap Take 2,000 Units by mouth daily (after dinner).  OTHER Metamx - take one capsule twice daily.  ipratropium (ATROVENT) 0.03 % nasal spray by Both Nostrils route as needed.  warfarin (COUMADIN) 4 mg tablet Take 4mg five days a week, then 2mg all other days. No current facility-administered medications on file prior to visit.       Allergies   Allergen Reactions    Ace Inhibitors Cough     Patients states he is not allergic    Pcn [Penicillins] Rash    Nsaids (Non-Steroidal Anti-Inflammatory Drug) Other (comments)     \"causes blood in urine\"    Percocet [Oxycodone-Acetaminophen] Other (comments)     constipation     . Review of Systems  Constitutional: Negative for fever, chills, and diaphoresis. +fatigue  Respiratory: Negative for cough, hemoptysis, sputum production, shortness of breath and wheezing. Cardiovascular: Negative for chest pain, palpitations, orthopnea, claudication, leg swelling and PND. Gastrointestinal: Negative for heartburn, nausea, vomiting, blood in stool and melena. Genitourinary: Negative for dysuria and flank pain. Musculoskeletal: +joint pain. Skin: Negative for rash. Neurological: Negative for focal weakness, seizures, loss of consciousness, and headaches. +generalized weakness  Endo/Heme/Allergies: Does not bruise/bleed easily. Psychiatric/Behavioral: Negative for memory loss. The patient does not have insomnia. Physical Exam  Visit Vitals  /80 (BP 1 Location: Left arm, BP Patient Position: Sitting)   Pulse 66   Ht 5' 11\" (1.803 m)   Wt 230 lb 9.6 oz (104.6 kg)   SpO2 98%   BMI 32.16 kg/m²     Wt Readings from Last 3 Encounters:   05/31/19 230 lb 9.6 oz (104.6 kg)   11/12/18 227 lb 9.6 oz (103.2 kg)   07/26/18 225 lb (102.1 kg)   Vitals reviewed. Constitutional: He is oriented to person, place and time. He appears well-nourished. Head: Normocephalic. Neck: Neck supple. No JVD present. Cardiovascular: Normal rate, irregular rhythm, normal heart sounds and intact distal pulses. Exam reveals no gallop. 2/6 systolic murmur heard  Pulmonary/Chest: Effort normal and breath sounds normal.  Abdominal: Soft, nontender. Bowel sounds normal.  Musculoskeletal: 2+ edema bilaterally, varicose veins bilaterally   Neurological: Alert and oriented to person, place and time.   Skin: Skin is warm and dry.  Psychiatric: He has a normal mood and affect. Cardiographics  EKG 7/7/14 - AF 60s  EKG 7/6/15 - AF 60s  EKG 10/17/17- AF 60s  EKG 11/12/18 - AF 60s  EKG 5/31/19 - AF 50's          Medical documentation entered into the chart by Iraida Abad, medical scribe for Lanie Griffith MD on 5/31/2019.      Lanie Griffith MD

## 2019-06-05 ENCOUNTER — TELEPHONE (OUTPATIENT)
Dept: CARDIOLOGY CLINIC | Age: 79
End: 2019-06-05

## 2019-06-05 NOTE — TELEPHONE ENCOUNTER
Echo 6/4/19 - EF 55%, severe LAE, mild MR, PA sys 48, largely unchanged from 2013 study    He may have component of HFpEF but his functional class is 2, volume status fine on no diuretic Rx. Elevated proBNP largely due to AF    Proceed with TKR as scheduled. Reminded Mr Russo Lively to stop Xarelto 3 days preop. Will switch him to Eliquis 5 mg bid post op - he will contact us to facilitate      Will see him back in 6 months.     Douglas Gibson MD

## 2019-08-09 ENCOUNTER — TELEPHONE (OUTPATIENT)
Dept: CARDIOLOGY CLINIC | Age: 79
End: 2019-08-09

## 2019-08-12 NOTE — TELEPHONE ENCOUNTER
Seb Acosta stated  refilled xarelto and patient will continue with Xarelto. Surgeon sent RX for dental procedure. Patient had no further questions.

## 2019-12-23 ENCOUNTER — OFFICE VISIT (OUTPATIENT)
Dept: CARDIOLOGY CLINIC | Age: 79
End: 2019-12-23

## 2019-12-23 VITALS
HEART RATE: 65 BPM | BODY MASS INDEX: 31.08 KG/M2 | DIASTOLIC BLOOD PRESSURE: 64 MMHG | WEIGHT: 222 LBS | OXYGEN SATURATION: 97 % | SYSTOLIC BLOOD PRESSURE: 130 MMHG | HEIGHT: 71 IN

## 2019-12-23 DIAGNOSIS — I48.20 CHRONIC ATRIAL FIBRILLATION (HCC): Primary | ICD-10-CM

## 2019-12-23 DIAGNOSIS — I11.9 BENIGN HYPERTENSIVE HEART DISEASE WITHOUT HEART FAILURE: ICD-10-CM

## 2019-12-23 DIAGNOSIS — M16.10 HIP ARTHRITIS: ICD-10-CM

## 2019-12-23 DIAGNOSIS — I83.93 ASYMPTOMATIC VARICOSE VEINS OF BOTH LOWER EXTREMITIES: ICD-10-CM

## 2019-12-23 DIAGNOSIS — G47.33 OSA (OBSTRUCTIVE SLEEP APNEA): ICD-10-CM

## 2019-12-23 RX ORDER — LOSARTAN POTASSIUM AND HYDROCHLOROTHIAZIDE 25; 100 MG/1; MG/1
TABLET ORAL
Qty: 90 TAB | Refills: 3 | Status: SHIPPED | OUTPATIENT
Start: 2019-12-23 | End: 2020-12-28

## 2019-12-23 NOTE — PROGRESS NOTES
[address], [address]  [address],  [address]  [place], [address] [facility]    Office [phone]  Fax [phone]  Cell [phone]      Yolis Golden is a 78 y.o. male. Last seen by me 7 months ago. DIAGNOSES  Encounter Diagnoses     ICD-10-CM ICD-9-CM   1. Chronic atrial fibrillation I48.20 427.31   2. Benign hypertensive heart disease without heart failure I11.9 402.10   3. OLEG (obstructive sleep apnea) G47.33 327.23   4. Asymptomatic varicose veins of both lower extremities I83.93 454.9   5. Hip arthritis M16.10 716.95       ASSESSMENT/PLAN    Chronic AF with bradycardia, asymptomatic in the setting of HTN and OLEG. He has no structural or ischemic heart disease. Lexiscan cardiolite 2016 was normal. Echo June 2019 demonstrated EF 35%, PA systolic 51FQCL. He has no sxs of bradycardia or chronotropic incompetence. He has no exertional sxs. We reviewed sxs that would raise concern. - Continue Xarelto 20mg/d    HTN, controlled on combination therapy. Chronic lower extremity edema is due to venous insufficiency/varicose veins, likely aggravated by inflammation from arthritis. Advised continue compression stockings. No indication for diuretics. Hip DJD. He will likely require right hip replacement sometime int he spring/early summer. His cardiac risk would be low-intermediate. He can stop Xarelto 3 days pre-op    He and his wife are going to [facility] ''  in [facility] or the next 3 months. Follow-up and Dispositions    · Return in about 6 months (around 6/23/2020). HPI    Yolis Golden reports he underwent right TKR in June by Dr. Traci Beltrán. Today, he is doing well and recovering. He has some hip issues post TKR as well. He is thinking about having his right hip replaced in May with Dr. Alejandra Gomez. Patient denies any dizziness, exertional chest pain, dyspnea, syncope, orthopnea, or paroxysmal nocturnal dyspnea. He has neuropathy, followed by Dr. Robert Bentley.      He is adherent with CPAP.    Of note, he is going to [facility] in [place] in a few weeks. Cardiac testing  5/10/2016: LEXISCAN- normal perfusion, EF 64%  10/3/13: LEXISCAN- normal perfusion, EF 54%  6/3/13: ECHO- EF 50-55%; JULIO C; TR - moderate; pulm. htn - moderate, PA 45 mmHg  Echo 6/4/19 - EF 55%, severe LAE, mild MR, PA sys 48, largely unchanged from 2013 study      Past Medical History:   Diagnosis Date    [address] ([facility] [address]     Arthritis     Back, knees, shoulders    Atrial fibrillation (HCC)     Benign hypertensive heart disease without heart failure     Chronic pain     Left hip pain    Diverticulosis     DJD (degenerative joint disease) of knee     right    Hematuria     due to certain medications    Hypertension     Sleep apnea 4/1/2011    compliant with c-pap    Sleep apnea     Snoring     Varicose vein of leg 1/12/2012       Current Outpatient Medications on File Prior to Visit   Medication Sig Dispense Refill    UBIQUINONE PO Take 100 mg by mouth daily.  psyllium (METAMUCIL) powd Take  by mouth.  ipratropium (ATROVENT) 0.03 % nasal spray by Both Nostrils route as needed.  VIT C/E/ZN/COPPR/LUTEIN/ZEAXAN (PRESERVISION AREDS 2 PO) Take  by mouth two (2) times a day.  fish oil-omega-3 fatty acids 340-1,000 mg capsule Take 1 Cap by mouth daily (after breakfast).  magnesium 250 mg tab Take 400 mg by mouth daily (after dinner).  multivitamin (ONE A DAY) tablet Take 1 Tab by mouth daily (after breakfast).  ASCORBATE CALCIUM (VITAMIN C PO) Take 500 mg by mouth daily (after breakfast).  Cholecalciferol, Vitamin D3, (VITAMIN D) 2,000 unit Cap Take 2,000 Units by mouth daily (after dinner). No current facility-administered medications on file prior to visit.       Allergies   Allergen Reactions    Ace Inhibitors Cough     Patients states he is not allergic    Pcn [Penicillins] Rash    Nsaids (Non-Steroidal Anti-Inflammatory Drug) Other (comments)     \"causes blood in urine\"  Percocet [Oxycodone-Acetaminophen] Other (comments)     constipation     . Review of Systems  Constitutional: Negative for fever, chills, and diaphoresis. Respiratory: Negative for cough, hemoptysis, sputum production, shortness of breath and wheezing. Cardiovascular: Negative for chest pain, palpitations, orthopnea, claudication, leg swelling and PND. Gastrointestinal: Negative for heartburn, nausea, vomiting, blood in stool and melena. Genitourinary: Negative for dysuria and flank pain. Musculoskeletal: +knee and hip pain bilaterally   Skin: Negative for rash. Neurological: Negative for focal weakness, seizures, loss of consciousness, and headaches. Endo/Heme/Allergies: Does not bruise/bleed easily. Psychiatric/Behavioral: Negative for memory loss. The patient does not have insomnia. Physical Exam  Visit Vitals  /64 (BP 1 Location: Left arm, BP Patient Position: Sitting)   Pulse 65   Ht 5' 11\" (1.803 m)   Wt 222 lb (100.7 kg)   SpO2 97%   BMI 30.96 kg/m²     Wt Readings from Last 3 Encounters:   12/23/19 222 lb (100.7 kg)   06/04/19 230 lb (104.3 kg)   05/31/19 230 lb 9.6 oz (104.6 kg)   Vitals reviewed. Constitutional: He is oriented to person, place and time. He appears well-nourished. Head: Normocephalic. Neck: Neck supple. No JVD present. Cardiovascular: Normal rate, irregular rhythm, normal heart sounds and intact distal pulses. Exam reveals no gallop. 2/6 systolic murmur heard  Pulmonary/Chest: Effort normal and breath sounds normal.  Abdominal: Soft, nontender. Bowel sounds normal.  Musculoskeletal: 2+ edema bilaterally, varicose veins bilaterally   Neurological: Alert and oriented to person, place and time. Skin: Skin is warm and dry. Psychiatric: He has a normal mood and affect.     Cardiographics  EKG 7/7/14 - AF 60s  EKG 7/6/15 - AF 60s  EKG 10/17/17- AF 60s  EKG 11/12/18 - AF 60s  EKG 5/31/19 - AF 50's          Written by Stefan Benjamin, as dictated by Macey Haywood M.D.      Macey Haywood MD

## 2019-12-23 NOTE — LETTER
12/27/19 Patient: Cookie Carty YOB: 1940 Date of Visit: 12/23/2019 Krysten Lyons MD 
[address] [facility] [address] [place] [id number] VIA Facsimile: [phone] Dear Krysten Lyons MD, Thank you for referring Mr. Yajaira King to [address] [place] [address] for evaluation. My notes for this consultation are attached. If you have questions, please do not hesitate to call me. I look forward to following your patient along with you. Sincerely, Betty Rowan MD

## 2019-12-23 NOTE — PROGRESS NOTES
Cheli Whitman is a 78 y.o. male    Chief Complaint   Patient presents with    Hypertension    Irregular Heart Beat       Chest pain No    SOB No    Dizziness No    Swelling No    Refills losartan-hydrochlorothiazide; has a question about Xarelto    Visit Vitals  /64 (BP 1 Location: Left arm, BP Patient Position: Sitting)   Pulse 65   Ht 5' 11\" (1.803 m)   Wt 222 lb (100.7 kg)   SpO2 97%   BMI 30.96 kg/m²       1. Have you been to the ER, urgent care clinic since your last visit? Hospitalized since your last visit? no    2. Have you seen or consulted any other health care providers outside of the [address] [place] [address] since your last visit? Include any pap smears or colon screening.   No

## 2020-01-20 ENCOUNTER — TELEPHONE (OUTPATIENT)
Dept: CARDIOLOGY CLINIC | Age: 80
End: 2020-01-20

## 2020-01-20 NOTE — TELEPHONE ENCOUNTER
[facility] requesting recommendation to hold xarelto for dental appointment.     Marcia Pemberton advise

## 2020-01-20 NOTE — TELEPHONE ENCOUNTER
Vidya w/ [address] [place] [address] is checking the status of a clearance request that was faxed to [phone] today. She can be reached at [phone]. Please advise.     Fax #: [phone]  Thanks

## 2020-02-27 ENCOUNTER — TELEPHONE (OUTPATIENT)
Dept: CARDIOLOGY CLINIC | Age: 80
End: 2020-02-27

## 2020-04-09 ENCOUNTER — TELEPHONE (OUTPATIENT)
Dept: CARDIOLOGY CLINIC | Age: 80
End: 2020-04-09

## 2020-04-09 NOTE — TELEPHONE ENCOUNTER
Patient states that he would like to speak with Dr. Vasiliy Allen or Sudha Granado regarding question the he needs answered \"fairly soon\". Please advise.     Phone: [phone]

## 2020-06-04 ENCOUNTER — OFFICE VISIT (OUTPATIENT)
Dept: CARDIOLOGY CLINIC | Age: 80
End: 2020-06-04

## 2020-06-04 VITALS
HEIGHT: 71 IN | HEART RATE: 60 BPM | DIASTOLIC BLOOD PRESSURE: 70 MMHG | OXYGEN SATURATION: 96 % | BODY MASS INDEX: 31.78 KG/M2 | WEIGHT: 227 LBS | SYSTOLIC BLOOD PRESSURE: 158 MMHG

## 2020-06-04 DIAGNOSIS — Z79.01 CHRONIC ANTICOAGULATION: ICD-10-CM

## 2020-06-04 DIAGNOSIS — G47.33 OSA (OBSTRUCTIVE SLEEP APNEA): ICD-10-CM

## 2020-06-04 DIAGNOSIS — I11.9 BENIGN HYPERTENSIVE HEART DISEASE WITHOUT HEART FAILURE: ICD-10-CM

## 2020-06-04 DIAGNOSIS — I87.2 EDEMA OF BOTH LOWER EXTREMITIES DUE TO PERIPHERAL VENOUS INSUFFICIENCY: ICD-10-CM

## 2020-06-04 DIAGNOSIS — M17.0 PRIMARY OSTEOARTHRITIS OF BOTH KNEES: ICD-10-CM

## 2020-06-04 DIAGNOSIS — I48.20 CHRONIC ATRIAL FIBRILLATION (HCC): Primary | ICD-10-CM

## 2020-06-04 DIAGNOSIS — I83.12 VARICOSE VEINS OF BOTH LOWER EXTREMITIES WITH INFLAMMATION: ICD-10-CM

## 2020-06-04 DIAGNOSIS — I83.11 VARICOSE VEINS OF BOTH LOWER EXTREMITIES WITH INFLAMMATION: ICD-10-CM

## 2020-06-04 RX ORDER — LORATADINE 10 MG/1
TABLET ORAL
COMMUNITY

## 2020-06-04 NOTE — PROGRESS NOTES
[place], [address]  [address]  [place], [address] [facility]    Office [phone]  Fax [phone]  Cell [phone]      Bianca Moore is a 78 y.o. male. Last seen by me 7 months ago. DIAGNOSES  Encounter Diagnoses     ICD-10-CM ICD-9-CM   1. Chronic atrial fibrillation (HCC) I48.20 427.31   2. Benign hypertensive heart disease without heart failure I11.9 402.10   3. Varicose veins of both lower extremities with inflammation I83.11 454.1    I83.12    4. OLEG (obstructive sleep apnea) G47.33 327.23   5. Primary osteoarthritis of both knees M17.0 715.16   6. Edema of both lower extremities due to peripheral venous insufficiency I87.2 459.81   7. Chronic anticoagulation Z79.01 V58.61       ASSESSMENT/PLAN    Chronic AF with controlled VR on no AV nicki meds, asymptomatic in the setting of HTN and OLEG. He has no structural or ischemic heart disease. Lexiscan cardiolite 2016 was normal. Echo June 2019 demonstrated EF 29%, PA systolic 20ZHTK. He has no sxs of bradycardia or chronotropic incompetence. He has no exertional sxs. We reviewed sxs that would raise concern. - Continue Xarelto 20mg/d. Encouraged him to shift dosing from dinner to breakfast to improve adherence    HTN, controlled on combination therapy. Chronic lower extremity edema is due to venous insufficiency/varicose veins, likely aggravated by inflammation from arthritis. No indication for diuretics. DJD involving knees and hips s/p right TKR. Will be seeing Dr Jase Hernandez in followup near future    Follow-up and Dispositions    · Return in about 6 months (around 12/4/2020). HPI    Bianca Moore has no major interval issues. Has not been able to exercise due to gym closings. Fairly sedentary. Has chronic LE swelling, also has varicose veins. Notes some balance issues but no presyncope or syncope.  Patient denies any exertional chest pain, dyspnea, palpitations, syncope, orthopnea, or paroxysmal nocturnal dyspnea. Still has right knee pain following right TKR last year. Has arthritis in the left knee today. He has neuropathy, followed by Dr. Tootie Gallo. He is adherent with CPAP. No bleeding issues. Sometimes forgets to take evening Xarelto    Had recent colonoscopy with 4 benign polyps    Cardiac testing  5/10/2016: LEXISCAN- normal perfusion, EF 64%  10/3/13: LEXISCAN- normal perfusion, EF 54%  6/3/13: ECHO- EF 50-55%; JULIO C; TR - moderate; pulm. htn - moderate, PA 45 mmHg  Echo 6/4/19 - EF 55%, severe LAE, mild MR, PA sys 48, largely unchanged from 2013 study      Past Medical History:   Diagnosis Date    A-fib ([facility] [address]     Arthritis     Back, knees, shoulders    Atrial fibrillation (HCC)     Benign hypertensive heart disease without heart failure     Chronic pain     Left hip pain    Diverticulosis     DJD (degenerative joint disease) of knee     right    Hematuria     due to certain medications    Hypertension     Sleep apnea 4/1/2011    compliant with c-pap    Sleep apnea     Snoring     Varicose vein of leg 1/12/2012       Current Outpatient Medications on File Prior to Visit   Medication Sig Dispense Refill    loratadine (CLARITIN) 10 mg tablet loratadine 10 mg tablet   Take 1 tablet every day by oral route as directed.  losartan-hydroCHLOROthiazide (HYZAAR) 100-25 mg per tablet TAKE ONE TABLET BY MOUTH DAILY  Indications: high blood pressure 90 Tab 3    rivaroxaban (XARELTO) 20 mg tab tablet Take 1 Tab by mouth daily (with dinner). Indications: prevention of thromboembolism in paroxysmal atrial fibrillation 90 Tab 3    UBIQUINONE PO Take 100 mg by mouth daily.  psyllium (METAMUCIL) powd Take  by mouth.  ipratropium (ATROVENT) 0.03 % nasal spray by Both Nostrils route as needed.  VIT C/E/ZN/COPPR/LUTEIN/ZEAXAN (PRESERVISION AREDS 2 PO) Take  by mouth two (2) times a day.  fish oil-omega-3 fatty acids 340-1,000 mg capsule Take 1 Cap by mouth daily (after breakfast).  magnesium 250 mg tab Take 400 mg by mouth daily (after dinner).  multivitamin (ONE A DAY) tablet Take 1 Tab by mouth daily (after breakfast).  ASCORBATE CALCIUM (VITAMIN C PO) Take 500 mg by mouth daily (after breakfast).  Cholecalciferol, Vitamin D3, (VITAMIN D) 2,000 unit Cap Take 2,000 Units by mouth daily (after dinner). No current facility-administered medications on file prior to visit. Allergies   Allergen Reactions    Ace Inhibitors Cough     Patients states he is not allergic    Pcn [Penicillins] Rash    Nsaids (Non-Steroidal Anti-Inflammatory Drug) Other (comments)     \"causes blood in urine\"    Percocet [Oxycodone-Acetaminophen] Other (comments)     constipation     . Review of Systems  Constitutional: Negative for fever, chills, and diaphoresis. Respiratory: Negative for cough, hemoptysis, sputum production, shortness of breath and wheezing. Cardiovascular: Negative for chest pain, palpitations, orthopnea, claudication, l and PND. Gastrointestinal: Negative for heartburn, nausea, vomiting, blood in stool and melena. Genitourinary: Negative for dysuria and flank pain. Musculoskeletal: +knee and hip pain bilaterally   Skin: Negative for rash. Neurological: Negative for focal weakness, seizures, loss of consciousness, and headaches. Endo/Heme/Allergies: Does not bruise/bleed easily. Psychiatric/Behavioral: Negative for memory loss. The patient does not have insomnia. Physical Exam  Visit Vitals  /70 (BP 1 Location: Left arm, BP Patient Position: Sitting)   Pulse 60   Ht 5' 11\" (1.803 m)   Wt 227 lb (103 kg)   SpO2 96%   BMI 31.66 kg/m²     Wt Readings from Last 3 Encounters:   06/04/20 227 lb (103 kg)   12/23/19 222 lb (100.7 kg)   06/04/19 230 lb (104.3 kg)   Vitals reviewed. Constitutional: He is oriented to person, place and time. He appears well-nourished. Head: Normocephalic. Neck: Neck supple. No JVD present.   Cardiovascular: Normal rate, irregular rhythm, normal heart sounds and intact distal pulses. Exam reveals no gallop. 2/6 systolic murmur heard  Pulmonary/Chest: Effort normal and breath sounds normal.  Abdominal: Soft, nontender. Bowel sounds normal.  Musculoskeletal: 2+ edema bilaterally, varicose veins bilaterally   Neurological: Alert and oriented to person, place and time. Skin: Skin is warm and dry. Psychiatric: He has a normal mood and affect.     Cardiographics  EKG 7/7/14 - AF 60s  EKG 7/6/15 - AF 60s  EKG 10/17/17- AF 60s  EKG 11/12/18 - AF 60s  EKG 5/31/19 - AF 50's  EKG 5/7/20 Ermalinda Hinders) - AF 60s      Valetnine Hendrix MD

## 2020-06-04 NOTE — PROGRESS NOTES
Tomeka Miller is a 78 y.o. male    Chief Complaint   Patient presents with    Irregular Heart Beat    Hypertension     Patient had a colonoscopy on 5/19- 4 polyps removed. Annual physical on 5/7 with Dr Leesa Wiggins and everything was good. Chest pain No    SOB No    Dizziness No    Swelling some swelling in both lower legs    Refills No    Visit Vitals  /70 (BP 1 Location: Left arm, BP Patient Position: Sitting)   Pulse 60   Ht 5' 11\" (1.803 m)   Wt 227 lb (103 kg)   SpO2 96%   BMI 31.66 kg/m²       1. Have you been to the ER, urgent care clinic since your last visit? Hospitalized since your last visit? no    2. Have you seen or consulted any other health care providers outside of the [address] [place] [address] since your last visit? Include any pap smears or colon screening.   no

## 2020-07-16 ENCOUNTER — HOSPITAL ENCOUNTER (OUTPATIENT)
Dept: CT IMAGING | Age: 80
Discharge: HOME | End: 2020-07-16
Attending: ORTHOPAEDIC SURGERY
Payer: MEDICARE

## 2020-07-16 DIAGNOSIS — M16.11 ARTHRITIS OF RIGHT HIP: ICD-10-CM

## 2020-07-16 PROCEDURE — 73700 CT LOWER EXTREMITY W/O DYE: CPT

## 2020-07-29 ENCOUNTER — HOSPITAL ENCOUNTER (OUTPATIENT)
Dept: CT IMAGING | Age: 80
Discharge: HOME | End: 2020-07-29
Attending: ORTHOPAEDIC SURGERY
Payer: MEDICARE

## 2020-07-29 DIAGNOSIS — Z96.651 STATUS POST RIGHT PARTIAL KNEE REPLACEMENT: ICD-10-CM

## 2020-07-29 PROCEDURE — 73700 CT LOWER EXTREMITY W/O DYE: CPT

## 2020-08-13 ENCOUNTER — TELEPHONE (OUTPATIENT)
Dept: CARDIOLOGY CLINIC | Age: 80
End: 2020-08-13

## 2020-08-13 NOTE — TELEPHONE ENCOUNTER
Eduarda Flores requesting cardiac stratification for Right NIURKA on 9/21/20    anesthesia unknown    LOV 6/4/20      advise

## 2020-08-14 NOTE — TELEPHONE ENCOUNTER
Low to intermediate cardiac risk based on office visit June 2020. Assuming he is still doing well, would proceed with surgery as planned.  Can stop Xarelto 3 days preop, resuming postop

## 2020-09-09 ENCOUNTER — HOSPITAL ENCOUNTER (OUTPATIENT)
Dept: PREADMISSION TESTING | Age: 80
Discharge: HOME | End: 2020-09-09
Payer: MEDICARE

## 2020-09-09 VITALS
BODY MASS INDEX: 33.42 KG/M2 | HEIGHT: 70 IN | RESPIRATION RATE: 18 BRPM | WEIGHT: 233.47 LBS | HEART RATE: 59 BPM | SYSTOLIC BLOOD PRESSURE: 182 MMHG | DIASTOLIC BLOOD PRESSURE: 77 MMHG | TEMPERATURE: 98.7 F | OXYGEN SATURATION: 97 %

## 2020-09-09 LAB
ABO + RH BLD: NORMAL
ALBUMIN SERPL-MCNC: 4 G/DL (ref 3.5–5)
ALBUMIN/GLOB SERPL: 1.3 {RATIO} (ref 1.1–2.2)
ALP SERPL-CCNC: 50 U/L (ref 45–117)
ALT SERPL-CCNC: 27 U/L (ref 12–78)
ANION GAP SERPL CALC-SCNC: 4 MMOL/L (ref 5–15)
APPEARANCE UR: CLEAR
AST SERPL-CCNC: 19 U/L (ref 15–37)
BACTERIA URNS QL MICRO: NEGATIVE /HPF
BASOPHILS # BLD: 0 K/UL (ref 0–0.1)
BASOPHILS NFR BLD: 0 % (ref 0–1)
BILIRUB SERPL-MCNC: 0.9 MG/DL (ref 0.2–1)
BILIRUB UR QL: NEGATIVE
BLOOD GROUP ANTIBODIES SERPL: NORMAL
BUN SERPL-MCNC: 26 MG/DL (ref 6–20)
BUN/CREAT SERPL: 21 (ref 12–20)
CALCIUM SERPL-MCNC: 9 MG/DL (ref 8.5–10.1)
CHLORIDE SERPL-SCNC: 103 MMOL/L (ref 97–108)
CO2 SERPL-SCNC: 32 MMOL/L (ref 21–32)
COLOR UR: NORMAL
CREAT SERPL-MCNC: 1.22 MG/DL (ref 0.7–1.3)
CRP SERPL-MCNC: 0.63 MG/DL (ref 0–0.6)
DIFFERENTIAL METHOD BLD: ABNORMAL
EOSINOPHIL # BLD: 0.2 K/UL (ref 0–0.4)
EOSINOPHIL NFR BLD: 2 % (ref 0–7)
EPITH CASTS URNS QL MICRO: NORMAL /LPF
ERYTHROCYTE [DISTWIDTH] IN BLOOD BY AUTOMATED COUNT: 14.1 % (ref 11.5–14.5)
ERYTHROCYTE [SEDIMENTATION RATE] IN BLOOD: 8 MM/HR (ref 0–20)
EST. AVERAGE GLUCOSE BLD GHB EST-MCNC: 123 MG/DL
GLOBULIN SER CALC-MCNC: 3.2 G/DL (ref 2–4)
GLUCOSE SERPL-MCNC: 68 MG/DL (ref 65–100)
GLUCOSE UR STRIP.AUTO-MCNC: NEGATIVE MG/DL
HBA1C MFR BLD: 5.9 % (ref 4–5.6)
HCT VFR BLD AUTO: 43.5 % (ref 36.6–50.3)
HGB BLD-MCNC: 14 G/DL (ref 12.1–17)
HGB UR QL STRIP: NEGATIVE
IMM GRANULOCYTES # BLD AUTO: 0 K/UL (ref 0–0.04)
IMM GRANULOCYTES NFR BLD AUTO: 0 % (ref 0–0.5)
KETONES UR QL STRIP.AUTO: NEGATIVE MG/DL
LEUKOCYTE ESTERASE UR QL STRIP.AUTO: NEGATIVE
LYMPHOCYTES # BLD: 1.7 K/UL (ref 0.8–3.5)
LYMPHOCYTES NFR BLD: 19 % (ref 12–49)
MCH RBC QN AUTO: 32.7 PG (ref 26–34)
MCHC RBC AUTO-ENTMCNC: 32.2 G/DL (ref 30–36.5)
MCV RBC AUTO: 101.6 FL (ref 80–99)
MONOCYTES # BLD: 1.1 K/UL (ref 0–1)
MONOCYTES NFR BLD: 12 % (ref 5–13)
NEUTS SEG # BLD: 5.6 K/UL (ref 1.8–8)
NEUTS SEG NFR BLD: 67 % (ref 32–75)
NITRITE UR QL STRIP.AUTO: NEGATIVE
NRBC # BLD: 0 K/UL (ref 0–0.01)
NRBC BLD-RTO: 0 PER 100 WBC
PH UR STRIP: 7 [PH] (ref 5–8)
PLATELET # BLD AUTO: 192 K/UL (ref 150–400)
PMV BLD AUTO: 10.2 FL (ref 8.9–12.9)
POTASSIUM SERPL-SCNC: 4 MMOL/L (ref 3.5–5.1)
PROT SERPL-MCNC: 7.2 G/DL (ref 6.4–8.2)
PROT UR STRIP-MCNC: NEGATIVE MG/DL
RBC # BLD AUTO: 4.28 M/UL (ref 4.1–5.7)
RBC #/AREA URNS HPF: NORMAL /HPF (ref 0–5)
SODIUM SERPL-SCNC: 139 MMOL/L (ref 136–145)
SP GR UR REFRACTOMETRY: 1.01 (ref 1–1.03)
SPECIMEN EXP DATE BLD: NORMAL
UA: UC IF INDICATED,UAUC: NORMAL
UROBILINOGEN UR QL STRIP.AUTO: 0.2 EU/DL (ref 0.2–1)
WBC # BLD AUTO: 8.6 K/UL (ref 4.1–11.1)
WBC URNS QL MICRO: NORMAL /HPF (ref 0–4)

## 2020-09-09 PROCEDURE — 83036 HEMOGLOBIN GLYCOSYLATED A1C: CPT

## 2020-09-09 PROCEDURE — 85652 RBC SED RATE AUTOMATED: CPT

## 2020-09-09 PROCEDURE — 86140 C-REACTIVE PROTEIN: CPT

## 2020-09-09 PROCEDURE — 80053 COMPREHEN METABOLIC PANEL: CPT

## 2020-09-09 PROCEDURE — 86900 BLOOD TYPING SEROLOGIC ABO: CPT

## 2020-09-09 PROCEDURE — 85025 COMPLETE CBC W/AUTO DIFF WBC: CPT

## 2020-09-09 PROCEDURE — 36415 COLL VENOUS BLD VENIPUNCTURE: CPT

## 2020-09-09 PROCEDURE — 84466 ASSAY OF TRANSFERRIN: CPT

## 2020-09-09 PROCEDURE — 81001 URINALYSIS AUTO W/SCOPE: CPT

## 2020-09-09 RX ORDER — FLUTICASONE PROPIONATE 50 MCG
2 SPRAY, SUSPENSION (ML) NASAL AS NEEDED
COMMUNITY

## 2020-09-09 NOTE — H&P
Preoperative Evaluation                     History and Physical with Surgical Risk Stratification     9/9/2020    CC: Right hip pain  Surgery: RTH     HPI:   Jasper Gonzalez is a 78 y.o. male referred for pre-operative evaluation by Dr. Nicole Vizcaino for surgery on 9/21/20. Mr. Clover Dahl states he has had pain for several years. He decided to do his [address] four years ago and then his [place] in 2019. He notes his knee has not healed properly and they have told him the hip OA is causing his knee pain. The pain does not radiate from his hip. The pain is constantly. He cannot put on his shoes or walk up the steps. He also has constant pain in his knee all the time. He denies buckling of his right leg. He is followed by Dr. Annemarie Wong and last note in [address] [place] [address] gives clearance on 8/13/20. He is on Xarelto and it is ok to stop 3 days prior to surgery. The patient was evaluated in the surgeon's office and it was determined that the most appropriate plan of care is to proceed with surgical intervention. Patient's PCP Lang Flowers MD    Review of Systems     Constitutional: Negative for chills and fever  HENT: Negative for congestion and sore throat  Eyes: negative for blurred vision and double vision  Respiratory: Negative for cough, shortness of breath and wheezing  Mouth: Negative for loose, broken or chipped teeth. Cardiovascular: Negative for chest pain and palpitations  Gastrointestinal: Negative for abdominal pain, constipation, diarrhea and nausea  Genitourinary: Negative for dysuria and hematuria  Musculoskeletal: Right hip pain  Skin: Negative for rash, open wounds. Bruises easily  Neurological: Negative for dizziness, tremors and headaches  Psychiatric: Negative for depression. The patient is not nervous/anxious.     Inherent Risk of Surgery     Surgical risk:  Intermediate  Low:  EIntermediate:  Joint Replacement, High:    Patient Cardiac Risk Assessment     Revised Cardiac Risk Index (RCRI)    Rate if cardiac death, nonfatal MI, nonfatal cardiac arrest by number of risk factor- 0.4%    AYUSH/AHA 2007 Guidelines:   1) Surgery Emergency, Non-cardiac -> to surgery  2) If not, look at clinical predictors    Major Intermediate Minor   Abnormal EKG    Blood Thinner: Xarelto    METS      EQUAL TO 4 Care for self Walk indoors around house Walk 2-3 blocks on level ground (2-3 mph) Light work around house (dust, dishes)     Other Risk Factors:   Screening for ETOH use:  Done and low risk  Smoking status:  Former    Personal or FH of bleeding problems:  No  Personal or FH of blood clots:  No  Personal or FH of anesthesia problems:   No    Pulmonary Risk:  Asthma or COPD:  No  Body mass index is 33.5 kg/m². Known OLEG:  Yes  Albumin normal, BUN abnormal    Past Medical, Surgical, Social History     Allergies: Allergies   Allergen Reactions    Ace Inhibitors Cough     Patients states he is not allergic    Nsaids (Non-Steroidal Anti-Inflammatory Drug) Other (comments)     \"causes blood in urine\"    Pcn [Penicillins] Rash    Percocet [Oxycodone-Acetaminophen] Other (comments)     constipation       Medication Documentation Review Audit     Reviewed by Bennie Mcdowell RN (Registered Nurse) on 09/09/20 at 0912    Medication Sig Documenting Provider Last Dose Status Taking? ASCORBATE CALCIUM (VITAMIN C PO) Take 500 mg by mouth daily (after breakfast). Provider, Historical  Active Yes   Cholecalciferol, Vitamin D3, (VITAMIN D) 2,000 unit Cap Take 2,000 Units by mouth daily (after dinner). Provider, Historical  Active Yes   fish oil-omega-3 fatty acids 340-1,000 mg capsule Take 1 Cap by mouth daily (after breakfast). Provider, Historical  Active Yes           Med Note (Christiano June Mon Nov 12, 2018 10:25 AM)     fluticasone propionate (FLONASE) 50 mcg/actuation nasal spray 2 Sprays by Both Nostrils route as needed.  Provider, Historical  Active Yes   ipratropium (ATROVENT) 0.03 % nasal spray by Both Nostrils route as needed. Provider, Historical  Active Yes           Med Note (Syeda Camarasy   Mon Nov 12, 2018 10:25 AM)     loratadine (CLARITIN) 10 mg tablet loratadine 10 mg tablet   Take 1 tablet every day by oral route as directed. Provider, Historical  Active Yes   losartan-hydroCHLOROthiazide (HYZAAR) 100-25 mg per tablet TAKE ONE TABLET BY MOUTH DAILY  Indications: high blood pressure Michaelle Simmons MD  Active Yes   magnesium 250 mg tab Take 400 mg by mouth daily (after dinner). Provider, Historical  Active Yes           Med Note (Syeda Goss   Mon Nov 12, 2018 10:26 AM)     multivitamin (ONE A DAY) tablet Take 1 Tab by mouth daily (after breakfast). Provider, Historical  Active Yes   psyllium (METAMUCIL) powd Take  by mouth daily. Provider, Historical  Active Yes   rivaroxaban (XARELTO) 20 mg tab tablet Take 1 Tab by mouth daily (with dinner). Indications: prevention of thromboembolism in paroxysmal atrial fibrillation Michaelle Simmons MD  Active Yes   UBIQUINONE PO Take 100 mg by mouth daily. Provider, Historical  Active Yes   VIT C/E/ZN/COPPR/LUTEIN/ZEAXAN (PRESERVISION AREDS 2 PO) Take  by mouth two (2) times a day.  Provider, Historical  Active Yes              Past Medical History:   Diagnosis Date    Arthritis     Back, knees, shoulders    Atrial fibrillation ([address]     BCC (basal cell carcinoma of skin)     Benign hypertensive heart disease without heart failure     Diverticulosis     DJD (degenerative joint disease) of knee     right    Hematuria     due to certain medications    Hypertension     OLEG on CPAP 04/01/2011    Varicose vein of leg 1/12/2012     Past Surgical History:   Procedure Laterality Date    HX CATARACT REMOVAL Bilateral     HX COLONOSCOPY  2019    Removed polyps    HX HERNIA REPAIR      HX HIP REPLACEMENT Left 2016    HX KNEE REPLACEMENT Right 2019    HX MOHS PROCEDURES Left     x 3 Forhead and ear    HX TONSILLECTOMY  1943    HX VEIN STRIPPING Bilateral 2015     Social History     Tobacco Use    Smoking status: Former Smoker     Packs/day: 0.50     Years: 40.00     Pack years: 20.00     Types: Cigarettes     Last attempt to quit:      Years since quittin.7    Smokeless tobacco: Former User     Quit date: 1986   Substance Use Topics    Alcohol use: Yes     Alcohol/week: 2.0 standard drinks     Types: 1 Cans of beer, 1 Shots of liquor per week    Drug use: No     Family History   Problem Relation Age of Onset    Stroke Mother     Dementia Mother     Stroke Father     Asthma Father     Lung Disease Father     Diabetes Brother     Heart Disease Brother     Heart Disease Brother        Objective     Vitals:    20 0902   BP: 182/77   Pulse: (!) 59   Resp: 18   Temp: 98.7 °F (37.1 °C)   SpO2: 97%   Weight: 105.9 kg (233 lb 7.5 oz)   Height: 5' 10\" (1.778 m)       Constitutional:  Appears well,  No Acute Distress, Vitals noted  Psychiatric:   Affect normal, Alert and Oriented to person/place/time    Eyes:   Pupils equally round and reactive, EOMI, conjunctiva clear, eyelids normal  ENT:   External ears and nose normal, teeth normal, gums normal, TMs and Orophyarynx normal  Neck:   General inspection and Thyroid normal.  No abnormal cervical or supraclavicular nodes    Lungs:   Clear to auscultation, good respiratory effort  Heart: Ausculation normal.  Regular rhythm. No cardiac murmurs.   No carotid bruits or palpable thrills  Chest wall normal  Musculoskeletal: Gait antalgic  Extremities:   Without edema, good peripheral pulses  Skin:   Warm to palpation, without rashes, bruising, or suspicious lesions     Recent Results (from the past 72 hour(s))   C REACTIVE PROTEIN, QT    Collection Time: 20  9:55 AM   Result Value Ref Range    C-Reactive protein 0.63 (H) 0.00 - 0.60 mg/dL   CBC WITH AUTOMATED DIFF    Collection Time: 20  9:55 AM   Result Value Ref Range    WBC 8.6 4.1 - 11.1 K/uL    RBC 4.28 4.10 - 5.70 M/uL    HGB 14.0 12.1 - 17.0 g/dL HCT 43.5 36.6 - 50.3 %    .6 (H) 80.0 - 99.0 FL    MCH 32.7 26.0 - 34.0 PG    MCHC 32.2 30.0 - 36.5 g/dL    RDW 14.1 11.5 - 14.5 %    PLATELET 199 906 - 928 K/uL    MPV 10.2 8.9 - 12.9 FL    NRBC 0.0 0  WBC    ABSOLUTE NRBC 0.00 0.00 - 0.01 K/uL    NEUTROPHILS 67 32 - 75 %    LYMPHOCYTES 19 12 - 49 %    MONOCYTES 12 5 - 13 %    EOSINOPHILS 2 0 - 7 %    BASOPHILS 0 0 - 1 %    IMMATURE GRANULOCYTES 0 0.0 - 0.5 %    ABS. NEUTROPHILS 5.6 1.8 - 8.0 K/UL    ABS. LYMPHOCYTES 1.7 0.8 - 3.5 K/UL    ABS. MONOCYTES 1.1 (H) 0.0 - 1.0 K/UL    ABS. EOSINOPHILS 0.2 0.0 - 0.4 K/UL    ABS. BASOPHILS 0.0 0.0 - 0.1 K/UL    ABS. IMM. GRANS. 0.0 0.00 - 0.04 K/UL    DF AUTOMATED     METABOLIC PANEL, COMPREHENSIVE    Collection Time: 09/09/20  9:55 AM   Result Value Ref Range    Sodium 139 136 - 145 mmol/L    Potassium 4.0 3.5 - 5.1 mmol/L    Chloride 103 97 - 108 mmol/L    CO2 32 21 - 32 mmol/L    Anion gap 4 (L) 5 - 15 mmol/L    Glucose 68 65 - 100 mg/dL    BUN 26 (H) 6 - 20 MG/DL    Creatinine 1.22 0.70 - 1.30 MG/DL    BUN/Creatinine ratio 21 (H) 12 - 20      GFR est AA >60 >60 ml/min/1.73m2    GFR est non-AA 57 (L) >60 ml/min/1.73m2    Calcium 9.0 8.5 - 10.1 MG/DL    Bilirubin, total 0.9 0.2 - 1.0 MG/DL    ALT (SGPT) 27 12 - 78 U/L    AST (SGOT) 19 15 - 37 U/L    Alk.  phosphatase 50 45 - 117 U/L    Protein, total 7.2 6.4 - 8.2 g/dL    Albumin 4.0 3.5 - 5.0 g/dL    Globulin 3.2 2.0 - 4.0 g/dL    A-G Ratio 1.3 1.1 - 2.2     HEMOGLOBIN A1C WITH EAG    Collection Time: 09/09/20  9:55 AM   Result Value Ref Range    Hemoglobin A1c 5.9 (H) 4.0 - 5.6 %    Est. average glucose 123 mg/dL   CULTURE, MRSA    Collection Time: 09/09/20  9:55 AM    Specimen: Nares; Nasal   Result Value Ref Range    Special Requests: NO SPECIAL REQUESTS      Culture result: MRSA NOT PRESENT      Culture result:            Screening of patient nares for MRSA is for surveillance purposes and, if positive, to facilitate isolation considerations in high risk settings. It is not intended for automatic decolonization interventions per se as regimens are not sufficiently effective to warrant routine use. SED RATE (ESR)    Collection Time: 09/09/20  9:55 AM   Result Value Ref Range    Sed rate, automated 8 0 - 20 mm/hr   TRANSFERRIN    Collection Time: 09/09/20  9:55 AM   Result Value Ref Range    Transferrin 271 149 - 313 mg/dL   URINALYSIS W/ REFLEX CULTURE    Collection Time: 09/09/20  9:55 AM    Specimen: Urine   Result Value Ref Range    Color YELLOW/STRAW      Appearance CLEAR CLEAR      Specific gravity 1.014 1.003 - 1.030      pH (UA) 7.0 5.0 - 8.0      Protein Negative NEG mg/dL    Glucose Negative NEG mg/dL    Ketone Negative NEG mg/dL    Bilirubin Negative NEG      Blood Negative NEG      Urobilinogen 0.2 0.2 - 1.0 EU/dL    Nitrites Negative NEG      Leukocyte Esterase Negative NEG      WBC 5-10 0 - 4 /hpf    RBC 0-5 0 - 5 /hpf    Epithelial cells FEW FEW /lpf    Bacteria Negative NEG /hpf    UA:UC IF INDICATED CULTURE NOT INDICATED BY UA RESULT CNI     TYPE & SCREEN    Collection Time: 09/09/20  9:55 AM   Result Value Ref Range    Crossmatch Expiration 09/23/2020     ABO/Rh(D) A NEGATIVE     Antibody screen NEG        Assessment and Plan     Assessment/Plan:   1) OA Right Hip  2) Pre-Operative Evaluation    Labs and EKG reviewed. MRSAnegative    Preoperative Clearance  Per RCRI, the patient has a 0.4% risk of cardiac death, nonfatal MI, nonfatal cardiac arrest based on no risk factors. Per ACC/AHA guidelines, patient is low risk for a(n) intermediate risk surgery and may proceed to planned surgery with the above noted risk.     Tim Corea, NP

## 2020-09-09 NOTE — FACE TO FACE
The patient attended the pre-operative joint replacement class. The content of the class was presented using a power point presentation and visual demonstrations specific for patients undergoing total hip and knee replacement surgery. A pre-operative Patient education booklet specific to hip and knee replacement was given to the patient. Incentive spirometer and CHG bath kit were  demonstrated in class. Day of surgery routine and expectations, hospital routine and expectations, nutrition, alcohol, nicotine, medications, infection control, pain management, DVT precautions and equipment, ice therapy, home preparation and safety were reviewed in class. Physical Therapy present in class and educated patient and caregivers about Durable Medical Equipment, exercises, mobility expectations, caregiver education, and home safety. During class, the patient had opportunities to ask questions of the material presented as well as any concerns about their upcoming surgery.

## 2020-09-09 NOTE — PERIOP NOTES
[address], [address] [facility]   MAIN OR                                  [phone]   MAIN PRE OP                          [phone]                                                                                AMBULATORY PRE OP          [phone]  PRE-ADMISSION TESTING    [phone]   Surgery Date:  Monday 9/21/20       Is surgery arrival time given by surgeon? NO  [facility] will call you Friday 9/18/20 between 3 and 7pm the day before your surgery with your arrival time. (If your surgery is on a Monday, we will call you the Friday before.)    Call [phone] after 7pm Monday-Friday if you did not receive this call. INSTRUCTIONS BEFORE YOUR SURGERY   When You  Arrive Arrive at the [address] [facility] on the day of your surgery  Have your insurance card, photo ID, and any copayment (if needed)   Food   and   Drink NO food or drink after midnight the night before surgery    This means NO water, gum, mints, coffee, juice, etc.  No alcohol (beer, wine, liquor) 24 hours before and after surgery   Medications to   TAKE   Morning of Surgery MEDICATIONS TO TAKE THE MORNING OF SURGERY WITH A SIP OF WATER:    Flonase. Atrovent,claritin if needed   Medications  To  STOP      7 days before surgery  Non-Steroidal anti-inflammatory Drugs (NSAID's): for example, Ibuprofen (Advil, Motrin), Naproxen (Aleve)   Aspirin, if taking for pain    Herbal supplements, vitamins, and fish oil     Blood  Thinners  If you take  Aspirin, Plavix, Coumadin, or any blood-thinning or anti-blood clot medicine, per dr Penny Larson last dose 9/17/20) talk to the doctor who prescribed the medications for pre-operative instructions.    Bathing Clothing  Jewelry  Valuables      May shower the morning of surgery, please do not apply anything to your skin (lotions, powders, deodorant)   Follow Chlorhexidine Care Fusion body wash instructions provided to you during PAT appointment. Begin 3 days prior to surgery.  Do not shave or trim anywhere 24 hours before surgery   Wear your hair loose or down; no pony-tails, buns, or metal hair clips   Wear loose, comfortable, clean clothes   Wear glasses instead of contacts   Leave money, valuables, and jewelry, including body piercings, at home   Going Home - or Spending the Night  SAME-DAY SURGERY: You must have a responsible adult drive you home and stay with you 24 hours after surgery   ADMITS: If your doctor is keeping you in the hospital after surgery, leave personal belongings/luggage in your car until you have a hospital room number. Hospital discharge time is 12 noon  Drivers must be here before 12 noon unless you are told differently   Special Instructions · Bring CPAP machine to hospital on day of surgery  · Incentive spirometer given with instructions to practice at home and bring back to the hospital on the day of surgery. · Pain pamphlet and Call Don't Fall reminder reviewed with patient. · Bring PTA Medication list day of surgery with the last doses taken documented   · Do not bring medication bottles the day of surgery    It is now mandated that all surgical patients be tested for COVID-19 prior to surgery. Testing has to be exactly 4 days prior to surgery. Your COVID test date is Thursday 9/17/20 between 8:00 am and 11:00 am.       COVID testing will be performed curbside at the [facility] [address]. There will be signs leading you to the testing site. You will need to bring a photo ID with you to be swabbed. Patients are advised to self-quarantine at home after testing and prior to your surgery date. You will be notified if your results are positive.     What to watch for:   Coronavirus (COVID-19) affects different people in different ways   It also appears with a wide range of symptoms from mild to severe   Signs usually appear 2-14 days after exposure     If you develop any of the following, notify your doctor immediately:  o Fever  o Chills, with or without a shiver  o Muscle pain  o Headache  o Sore throat  o Dry cough  o New loss of taste or smell  o Tiredness      If you develop any of the following, call [phone]:  o Shortness of breath  o Difficulty breathing  o Chest pain  o New confusion  o Blueness of fingers and/or lips     Follow all instructions so your surgery wont be cancelled. Please, be on time. If a situation occurs and you are delayed the day of surgery, call [phone] or [phone]. If your physical condition changes (like a fever, cold, flu, etc.) call your surgeon. Home medication(s) reviewed and verified via    LIST   VERBAL   during PAT appointment. The patient was contacted by    IN-PERSON  The patient verbalizes understanding of all instructions and    DOES NOT   need reinforcement.

## 2020-09-10 LAB
BACTERIA SPEC CULT: NORMAL
BACTERIA SPEC CULT: NORMAL
SERVICE CMNT-IMP: NORMAL
TRANSFERRIN SERPL-MCNC: 271 MG/DL (ref 149–313)

## 2020-09-17 ENCOUNTER — HOSPITAL ENCOUNTER (OUTPATIENT)
Dept: PREADMISSION TESTING | Age: 80
Discharge: HOME | End: 2020-09-17
Payer: MEDICARE

## 2020-09-17 PROCEDURE — 87635 SARS-COV-2 COVID-19 AMP PRB: CPT

## 2020-09-18 ENCOUNTER — ANESTHESIA EVENT (OUTPATIENT)
Dept: SURGERY | Age: 80
DRG: 470 | End: 2020-09-18
Payer: MEDICARE

## 2020-09-18 LAB — SARS-COV-2, COV2NT: NOT DETECTED

## 2020-09-21 ENCOUNTER — ANESTHESIA (OUTPATIENT)
Dept: SURGERY | Age: 80
DRG: 470 | End: 2020-09-21
Payer: MEDICARE

## 2020-09-21 ENCOUNTER — HOSPITAL ENCOUNTER (INPATIENT)
Age: 80
LOS: 1 days | Discharge: HOME | DRG: 470 | End: 2020-09-22
Attending: ORTHOPAEDIC SURGERY | Admitting: ORTHOPAEDIC SURGERY
Payer: MEDICARE

## 2020-09-21 ENCOUNTER — APPOINTMENT (OUTPATIENT)
Dept: GENERAL RADIOLOGY | Age: 80
DRG: 470 | End: 2020-09-21
Attending: PHYSICIAN ASSISTANT
Payer: MEDICARE

## 2020-09-21 DIAGNOSIS — M16.11 ARTHRITIS OF RIGHT HIP: Primary | ICD-10-CM

## 2020-09-21 PROBLEM — M19.90 OSTEOARTHRITIS: Status: ACTIVE | Noted: 2020-09-21

## 2020-09-21 PROCEDURE — 74011250636 HC RX REV CODE- 250/636

## 2020-09-21 PROCEDURE — 74011250636 HC RX REV CODE- 250/636: Performed by: NURSE ANESTHETIST, CERTIFIED REGISTERED

## 2020-09-21 PROCEDURE — C1713 ANCHOR/SCREW BN/BN,TIS/BN: HCPCS | Performed by: ORTHOPAEDIC SURGERY

## 2020-09-21 PROCEDURE — C1776 JOINT DEVICE (IMPLANTABLE): HCPCS | Performed by: ORTHOPAEDIC SURGERY

## 2020-09-21 PROCEDURE — 77030020788: Performed by: ORTHOPAEDIC SURGERY

## 2020-09-21 PROCEDURE — 74011250636 HC RX REV CODE- 250/636: Performed by: PHYSICIAN ASSISTANT

## 2020-09-21 PROCEDURE — 74011000250 HC RX REV CODE- 250: Performed by: PHYSICIAN ASSISTANT

## 2020-09-21 PROCEDURE — 2709999900 HC NON-CHARGEABLE SUPPLY: Performed by: ORTHOPAEDIC SURGERY

## 2020-09-21 PROCEDURE — 77030018723 HC ELCTRD BLD COVD -A: Performed by: ORTHOPAEDIC SURGERY

## 2020-09-21 PROCEDURE — 77030040361 HC SLV COMPR DVT MDII -B

## 2020-09-21 PROCEDURE — 0SR903A REPLACEMENT OF RIGHT HIP JOINT WITH CERAMIC SYNTHETIC SUBSTITUTE, UNCEMENTED, OPEN APPROACH: ICD-10-PCS | Performed by: ORTHOPAEDIC SURGERY

## 2020-09-21 PROCEDURE — 74011000258 HC RX REV CODE- 258: Performed by: ORTHOPAEDIC SURGERY

## 2020-09-21 PROCEDURE — 76060000065 HC AMB SURG ANES 2.5 TO 3 HR: Performed by: ORTHOPAEDIC SURGERY

## 2020-09-21 PROCEDURE — 74011250637 HC RX REV CODE- 250/637: Performed by: ANESTHESIOLOGY

## 2020-09-21 PROCEDURE — 74011000250 HC RX REV CODE- 250

## 2020-09-21 PROCEDURE — 77030006835 HC BLD SAW SAG STRY -B: Performed by: ORTHOPAEDIC SURGERY

## 2020-09-21 PROCEDURE — 77030040922 HC BLNKT HYPOTHRM STRY -A

## 2020-09-21 PROCEDURE — 77030029829 HC TIB INST CKPNT DISP STRY -B: Performed by: ORTHOPAEDIC SURGERY

## 2020-09-21 PROCEDURE — 77030002933 HC SUT MCRYL J&J -A: Performed by: ORTHOPAEDIC SURGERY

## 2020-09-21 PROCEDURE — 74011000250 HC RX REV CODE- 250: Performed by: ORTHOPAEDIC SURGERY

## 2020-09-21 PROCEDURE — 77030018836 HC SOL IRR NACL ICUM -A: Performed by: ORTHOPAEDIC SURGERY

## 2020-09-21 PROCEDURE — 74011250636 HC RX REV CODE- 250/636: Performed by: ORTHOPAEDIC SURGERY

## 2020-09-21 PROCEDURE — 77030018846 HC SOL IRR STRL H20 ICUM -A

## 2020-09-21 PROCEDURE — 97161 PT EVAL LOW COMPLEX 20 MIN: CPT

## 2020-09-21 PROCEDURE — 74011250637 HC RX REV CODE- 250/637: Performed by: ORTHOPAEDIC SURGERY

## 2020-09-21 PROCEDURE — 65270000029 HC RM PRIVATE

## 2020-09-21 PROCEDURE — 76210000038 HC AMBSU PH I REC 2.5 TO 3 HR: Performed by: ORTHOPAEDIC SURGERY

## 2020-09-21 PROCEDURE — 77030041680 HC PNCL ELECSURG SMK EVAC CNMD -B: Performed by: ORTHOPAEDIC SURGERY

## 2020-09-21 PROCEDURE — 72170 X-RAY EXAM OF PELVIS: CPT

## 2020-09-21 PROCEDURE — 74011250636 HC RX REV CODE- 250/636: Performed by: ANESTHESIOLOGY

## 2020-09-21 PROCEDURE — 77030035236 HC SUT PDS STRATFX BARB J&J -B: Performed by: ORTHOPAEDIC SURGERY

## 2020-09-21 PROCEDURE — 77030041075 HC DRSG AG OPTIFRM MDII -B: Performed by: ORTHOPAEDIC SURGERY

## 2020-09-21 PROCEDURE — 77030007866 HC KT SPN ANES BBMI -B

## 2020-09-21 PROCEDURE — 77030020263 HC SOL INJ SOD CL0.9% LFCR 1000ML: Performed by: ORTHOPAEDIC SURGERY

## 2020-09-21 PROCEDURE — 77030031139 HC SUT VCRL2 J&J -A: Performed by: ORTHOPAEDIC SURGERY

## 2020-09-21 PROCEDURE — 77030038587 HC LNR BOOT DISP ALLN -B: Performed by: ORTHOPAEDIC SURGERY

## 2020-09-21 PROCEDURE — 74011000272 HC RX REV CODE- 272: Performed by: ORTHOPAEDIC SURGERY

## 2020-09-21 PROCEDURE — 97116 GAIT TRAINING THERAPY: CPT

## 2020-09-21 PROCEDURE — 8E0W0CZ ROBOTIC ASSISTED PROCEDURE OF TRUNK REGION, OPEN APPROACH: ICD-10-PCS | Performed by: ORTHOPAEDIC SURGERY

## 2020-09-21 PROCEDURE — 74011250637 HC RX REV CODE- 250/637: Performed by: PHYSICIAN ASSISTANT

## 2020-09-21 PROCEDURE — 77030010507 HC ADH SKN DERMBND J&J -B: Performed by: ORTHOPAEDIC SURGERY

## 2020-09-21 PROCEDURE — 77030036660

## 2020-09-21 PROCEDURE — 76030000022 HC AMB SURG 2.5 TO 3 HR INTENSV-TIER 1: Performed by: ORTHOPAEDIC SURGERY

## 2020-09-21 PROCEDURE — 77030029821: Performed by: ORTHOPAEDIC SURGERY

## 2020-09-21 PROCEDURE — 77030018673: Performed by: ORTHOPAEDIC SURGERY

## 2020-09-21 DEVICE — 132 DEGREE CEMENTED HIP STEM
Type: IMPLANTABLE DEVICE | Site: HIP | Status: FUNCTIONAL
Brand: ACCOLADE

## 2020-09-21 DEVICE — HIP H2 TOT ADV OTHER HD -- IMPL CAPPED H2: Type: IMPLANTABLE DEVICE | Status: FUNCTIONAL

## 2020-09-21 DEVICE — 0 DEGREE POLYETHYLENE INSERT
Type: IMPLANTABLE DEVICE | Site: HIP | Status: FUNCTIONAL
Brand: TRIDENT

## 2020-09-21 DEVICE — CERAMIC V40 FEMORAL HEAD
Type: IMPLANTABLE DEVICE | Site: HIP | Status: FUNCTIONAL
Brand: BIOLOX

## 2020-09-21 DEVICE — BONE PREPARATION KIT
Type: IMPLANTABLE DEVICE | Site: HIP | Status: FUNCTIONAL
Brand: BIOPREP

## 2020-09-21 DEVICE — TRIDENT II TRITANIUM CLUSTER 54E
Type: IMPLANTABLE DEVICE | Site: HIP | Status: FUNCTIONAL
Brand: TRIDENT II

## 2020-09-21 DEVICE — UNIVERSAL DISTAL CEMENT SPACER
Type: IMPLANTABLE DEVICE | Site: HIP | Status: FUNCTIONAL
Brand: OMNIFIT

## 2020-09-21 RX ORDER — FACIAL-BODY WIPES
10 EACH TOPICAL DAILY PRN
Status: DISCONTINUED | OUTPATIENT
Start: 2020-09-23 | End: 2020-09-22 | Stop reason: HOSPADM

## 2020-09-21 RX ORDER — NALOXONE HYDROCHLORIDE 0.4 MG/ML
0.2 INJECTION, SOLUTION INTRAMUSCULAR; INTRAVENOUS; SUBCUTANEOUS
Status: DISCONTINUED | OUTPATIENT
Start: 2020-09-21 | End: 2020-09-21 | Stop reason: HOSPADM

## 2020-09-21 RX ORDER — LIDOCAINE HYDROCHLORIDE 10 MG/ML
0.1 INJECTION, SOLUTION EPIDURAL; INFILTRATION; INTRACAUDAL; PERINEURAL AS NEEDED
Status: DISCONTINUED | OUTPATIENT
Start: 2020-09-21 | End: 2020-09-21 | Stop reason: HOSPADM

## 2020-09-21 RX ORDER — SODIUM CHLORIDE 9 MG/ML
125 INJECTION, SOLUTION INTRAVENOUS CONTINUOUS
Status: DISCONTINUED | OUTPATIENT
Start: 2020-09-21 | End: 2020-09-22 | Stop reason: HOSPADM

## 2020-09-21 RX ORDER — AMOXICILLIN 250 MG
1 CAPSULE ORAL 2 TIMES DAILY
Status: DISCONTINUED | OUTPATIENT
Start: 2020-09-21 | End: 2020-09-22 | Stop reason: HOSPADM

## 2020-09-21 RX ORDER — ASCORBIC ACID 500 MG
500 TABLET ORAL
Status: DISCONTINUED | OUTPATIENT
Start: 2020-09-22 | End: 2020-09-22 | Stop reason: HOSPADM

## 2020-09-21 RX ORDER — LORATADINE 10 MG/1
10 TABLET ORAL
Status: DISCONTINUED | OUTPATIENT
Start: 2020-09-21 | End: 2020-09-22 | Stop reason: HOSPADM

## 2020-09-21 RX ORDER — SODIUM CHLORIDE, SODIUM LACTATE, POTASSIUM CHLORIDE, CALCIUM CHLORIDE 600; 310; 30; 20 MG/100ML; MG/100ML; MG/100ML; MG/100ML
125 INJECTION, SOLUTION INTRAVENOUS CONTINUOUS
Status: DISCONTINUED | OUTPATIENT
Start: 2020-09-21 | End: 2020-09-21 | Stop reason: HOSPADM

## 2020-09-21 RX ORDER — ONDANSETRON 8 MG/1
4 TABLET, ORALLY DISINTEGRATING ORAL
Qty: 30 TAB | Refills: 0 | Status: SHIPPED | OUTPATIENT
Start: 2020-09-21

## 2020-09-21 RX ORDER — SODIUM CHLORIDE 0.9 % (FLUSH) 0.9 %
5-40 SYRINGE (ML) INJECTION EVERY 8 HOURS
Status: DISCONTINUED | OUTPATIENT
Start: 2020-09-21 | End: 2020-09-22 | Stop reason: HOSPADM

## 2020-09-21 RX ORDER — HYDROMORPHONE HYDROCHLORIDE 1 MG/ML
0.5 INJECTION, SOLUTION INTRAMUSCULAR; INTRAVENOUS; SUBCUTANEOUS
Status: DISCONTINUED | OUTPATIENT
Start: 2020-09-21 | End: 2020-09-22 | Stop reason: HOSPADM

## 2020-09-21 RX ORDER — TRAMADOL HYDROCHLORIDE 50 MG/1
50 TABLET ORAL
Qty: 40 TAB | Refills: 0 | Status: SHIPPED | OUTPATIENT
Start: 2020-09-21 | End: 2020-09-28

## 2020-09-21 RX ORDER — GABAPENTIN 300 MG/1
300 CAPSULE ORAL
Status: DISCONTINUED | OUTPATIENT
Start: 2020-09-21 | End: 2020-09-22 | Stop reason: HOSPADM

## 2020-09-21 RX ORDER — IPRATROPIUM BROMIDE 21 UG/1
1 SPRAY, METERED NASAL DAILY PRN
Status: DISCONTINUED | OUTPATIENT
Start: 2020-09-21 | End: 2020-09-22 | Stop reason: HOSPADM

## 2020-09-21 RX ORDER — FENTANYL CITRATE 50 UG/ML
INJECTION, SOLUTION INTRAMUSCULAR; INTRAVENOUS AS NEEDED
Status: DISCONTINUED | OUTPATIENT
Start: 2020-09-21 | End: 2020-09-21 | Stop reason: HOSPADM

## 2020-09-21 RX ORDER — MIDAZOLAM HYDROCHLORIDE 1 MG/ML
INJECTION, SOLUTION INTRAMUSCULAR; INTRAVENOUS AS NEEDED
Status: DISCONTINUED | OUTPATIENT
Start: 2020-09-21 | End: 2020-09-21 | Stop reason: HOSPADM

## 2020-09-21 RX ORDER — OXYCODONE HYDROCHLORIDE 5 MG/1
5 TABLET ORAL
Qty: 30 TAB | Refills: 0 | Status: SHIPPED | OUTPATIENT
Start: 2020-09-21 | End: 2020-09-28

## 2020-09-21 RX ORDER — GABAPENTIN 100 MG/1
CAPSULE ORAL
Qty: 120 CAP | Refills: 1 | Status: SHIPPED | OUTPATIENT
Start: 2020-09-21

## 2020-09-21 RX ORDER — MELATONIN
2000 DAILY
Status: DISCONTINUED | OUTPATIENT
Start: 2020-09-22 | End: 2020-09-22 | Stop reason: HOSPADM

## 2020-09-21 RX ORDER — ACETAMINOPHEN 500 MG
500-1000 TABLET ORAL
Qty: 60 TAB | Refills: 0 | Status: SHIPPED | OUTPATIENT
Start: 2020-09-21

## 2020-09-21 RX ORDER — BUPIVACAINE HYDROCHLORIDE 5 MG/ML
INJECTION, SOLUTION EPIDURAL; INTRACAUDAL AS NEEDED
Status: DISCONTINUED | OUTPATIENT
Start: 2020-09-21 | End: 2020-09-21 | Stop reason: HOSPADM

## 2020-09-21 RX ORDER — THERA TABS 400 MCG
1 TAB ORAL
Status: DISCONTINUED | OUTPATIENT
Start: 2020-09-22 | End: 2020-09-22 | Stop reason: HOSPADM

## 2020-09-21 RX ORDER — SODIUM CHLORIDE 0.9 % (FLUSH) 0.9 %
5-40 SYRINGE (ML) INJECTION AS NEEDED
Status: DISCONTINUED | OUTPATIENT
Start: 2020-09-21 | End: 2020-09-22 | Stop reason: HOSPADM

## 2020-09-21 RX ORDER — OXYCODONE HYDROCHLORIDE 5 MG/1
10 TABLET ORAL
Status: DISCONTINUED | OUTPATIENT
Start: 2020-09-21 | End: 2020-09-22 | Stop reason: HOSPADM

## 2020-09-21 RX ORDER — PROPOFOL 10 MG/ML
INJECTION, EMULSION INTRAVENOUS
Status: DISCONTINUED | OUTPATIENT
Start: 2020-09-21 | End: 2020-09-21 | Stop reason: HOSPADM

## 2020-09-21 RX ORDER — ASPIRIN 325 MG
325 TABLET, DELAYED RELEASE (ENTERIC COATED) ORAL 2 TIMES DAILY
Qty: 60 TAB | Refills: 0 | Status: SHIPPED | OUTPATIENT
Start: 2020-09-21

## 2020-09-21 RX ORDER — POVIDONE-IODINE 10 %
SOLUTION, NON-ORAL TOPICAL AS NEEDED
Status: DISCONTINUED | OUTPATIENT
Start: 2020-09-21 | End: 2020-09-21 | Stop reason: HOSPADM

## 2020-09-21 RX ORDER — PREGABALIN 75 MG/1
75 CAPSULE ORAL ONCE
Status: COMPLETED | OUTPATIENT
Start: 2020-09-21 | End: 2020-09-21

## 2020-09-21 RX ORDER — FLUTICASONE PROPIONATE 50 MCG
2 SPRAY, SUSPENSION (ML) NASAL DAILY PRN
Status: DISCONTINUED | OUTPATIENT
Start: 2020-09-21 | End: 2020-09-22 | Stop reason: HOSPADM

## 2020-09-21 RX ORDER — DIPHENHYDRAMINE HYDROCHLORIDE 50 MG/ML
12.5 INJECTION, SOLUTION INTRAMUSCULAR; INTRAVENOUS AS NEEDED
Status: DISCONTINUED | OUTPATIENT
Start: 2020-09-21 | End: 2020-09-21 | Stop reason: HOSPADM

## 2020-09-21 RX ORDER — ONDANSETRON 2 MG/ML
4 INJECTION INTRAMUSCULAR; INTRAVENOUS
Status: DISCONTINUED | OUTPATIENT
Start: 2020-09-21 | End: 2020-09-22 | Stop reason: HOSPADM

## 2020-09-21 RX ORDER — GABAPENTIN 100 MG/1
100 CAPSULE ORAL
Status: DISCONTINUED | OUTPATIENT
Start: 2020-09-22 | End: 2020-09-22 | Stop reason: HOSPADM

## 2020-09-21 RX ORDER — HYDROMORPHONE HYDROCHLORIDE 1 MG/ML
.25-1 INJECTION, SOLUTION INTRAMUSCULAR; INTRAVENOUS; SUBCUTANEOUS
Status: DISCONTINUED | OUTPATIENT
Start: 2020-09-21 | End: 2020-09-21 | Stop reason: HOSPADM

## 2020-09-21 RX ORDER — FLUMAZENIL 0.1 MG/ML
0.2 INJECTION INTRAVENOUS
Status: DISCONTINUED | OUTPATIENT
Start: 2020-09-21 | End: 2020-09-21 | Stop reason: HOSPADM

## 2020-09-21 RX ORDER — FAMOTIDINE 20 MG/1
20 TABLET, FILM COATED ORAL 2 TIMES DAILY
Status: DISCONTINUED | OUTPATIENT
Start: 2020-09-21 | End: 2020-09-22 | Stop reason: HOSPADM

## 2020-09-21 RX ORDER — OXYCODONE HYDROCHLORIDE 5 MG/1
5 TABLET ORAL
Status: DISCONTINUED | OUTPATIENT
Start: 2020-09-21 | End: 2020-09-22 | Stop reason: HOSPADM

## 2020-09-21 RX ORDER — FENTANYL CITRATE 50 UG/ML
25 INJECTION, SOLUTION INTRAMUSCULAR; INTRAVENOUS
Status: DISCONTINUED | OUTPATIENT
Start: 2020-09-21 | End: 2020-09-21 | Stop reason: HOSPADM

## 2020-09-21 RX ORDER — NALOXONE HYDROCHLORIDE 0.4 MG/ML
0.4 INJECTION, SOLUTION INTRAMUSCULAR; INTRAVENOUS; SUBCUTANEOUS AS NEEDED
Status: DISCONTINUED | OUTPATIENT
Start: 2020-09-21 | End: 2020-09-22 | Stop reason: HOSPADM

## 2020-09-21 RX ORDER — POLYETHYLENE GLYCOL 3350 17 G/17G
17 POWDER, FOR SOLUTION ORAL DAILY
Status: DISCONTINUED | OUTPATIENT
Start: 2020-09-22 | End: 2020-09-22 | Stop reason: HOSPADM

## 2020-09-21 RX ORDER — ACETAMINOPHEN 325 MG/1
650 TABLET ORAL EVERY 6 HOURS
Status: DISCONTINUED | OUTPATIENT
Start: 2020-09-21 | End: 2020-09-22 | Stop reason: HOSPADM

## 2020-09-21 RX ORDER — DIPHENHYDRAMINE HYDROCHLORIDE 50 MG/ML
12.5 INJECTION, SOLUTION INTRAMUSCULAR; INTRAVENOUS
Status: DISCONTINUED | OUTPATIENT
Start: 2020-09-21 | End: 2020-09-22 | Stop reason: HOSPADM

## 2020-09-21 RX ORDER — ACETAMINOPHEN 325 MG/1
975 TABLET ORAL ONCE
Status: COMPLETED | OUTPATIENT
Start: 2020-09-21 | End: 2020-09-21

## 2020-09-21 RX ORDER — SODIUM CHLORIDE 0.9 G/100ML
IRRIGANT IRRIGATION AS NEEDED
Status: DISCONTINUED | OUTPATIENT
Start: 2020-09-21 | End: 2020-09-21 | Stop reason: HOSPADM

## 2020-09-21 RX ORDER — LANOLIN ALCOHOL/MO/W.PET/CERES
400 CREAM (GRAM) TOPICAL
Status: DISCONTINUED | OUTPATIENT
Start: 2020-09-21 | End: 2020-09-22 | Stop reason: HOSPADM

## 2020-09-21 RX ADMIN — MIDAZOLAM HYDROCHLORIDE 1 MG: 2 INJECTION, SOLUTION INTRAMUSCULAR; INTRAVENOUS at 09:50

## 2020-09-21 RX ADMIN — ACETAMINOPHEN 650 MG: 325 TABLET ORAL at 22:53

## 2020-09-21 RX ADMIN — SODIUM CHLORIDE 125 ML/HR: 900 INJECTION, SOLUTION INTRAVENOUS at 23:31

## 2020-09-21 RX ADMIN — SODIUM CHLORIDE, POTASSIUM CHLORIDE, SODIUM LACTATE AND CALCIUM CHLORIDE: 600; 310; 30; 20 INJECTION, SOLUTION INTRAVENOUS at 09:03

## 2020-09-21 RX ADMIN — ACETAMINOPHEN 975 MG: 325 TABLET ORAL at 08:58

## 2020-09-21 RX ADMIN — CEFAZOLIN SODIUM 2 G: 1 POWDER, FOR SOLUTION INTRAMUSCULAR; INTRAVENOUS at 10:39

## 2020-09-21 RX ADMIN — Medication 10 ML: at 22:53

## 2020-09-21 RX ADMIN — GABAPENTIN 300 MG: 300 CAPSULE ORAL at 22:53

## 2020-09-21 RX ADMIN — MIDAZOLAM HYDROCHLORIDE 1 MG: 2 INJECTION, SOLUTION INTRAMUSCULAR; INTRAVENOUS at 09:48

## 2020-09-21 RX ADMIN — Medication 400 MG: at 18:47

## 2020-09-21 RX ADMIN — SODIUM CHLORIDE, POTASSIUM CHLORIDE, SODIUM LACTATE AND CALCIUM CHLORIDE: 600; 310; 30; 20 INJECTION, SOLUTION INTRAVENOUS at 10:48

## 2020-09-21 RX ADMIN — ACETAMINOPHEN 650 MG: 325 TABLET ORAL at 17:27

## 2020-09-21 RX ADMIN — FENTANYL CITRATE 50 MCG: 0.05 INJECTION, SOLUTION INTRAMUSCULAR; INTRAVENOUS at 09:48

## 2020-09-21 RX ADMIN — DOCUSATE SODIUM 50MG AND SENNOSIDES 8.6MG 1 TABLET: 8.6; 5 TABLET, FILM COATED ORAL at 17:27

## 2020-09-21 RX ADMIN — PREGABALIN 75 MG: 75 CAPSULE ORAL at 09:00

## 2020-09-21 RX ADMIN — SODIUM CHLORIDE, SODIUM LACTATE, POTASSIUM CHLORIDE, AND CALCIUM CHLORIDE 1000 ML: 600; 310; 30; 20 INJECTION, SOLUTION INTRAVENOUS at 08:57

## 2020-09-21 RX ADMIN — SODIUM CHLORIDE, POTASSIUM CHLORIDE, SODIUM LACTATE AND CALCIUM CHLORIDE: 600; 310; 30; 20 INJECTION, SOLUTION INTRAVENOUS at 12:08

## 2020-09-21 RX ADMIN — FAMOTIDINE 20 MG: 20 TABLET, FILM COATED ORAL at 17:27

## 2020-09-21 RX ADMIN — CEFAZOLIN SODIUM 2 G: 1 INJECTION, POWDER, FOR SOLUTION INTRAMUSCULAR; INTRAVENOUS at 17:28

## 2020-09-21 RX ADMIN — BUPIVACAINE HYDROCHLORIDE 2.6 ML: 5 INJECTION, SOLUTION EPIDURAL; INTRACAUDAL; PERINEURAL at 09:54

## 2020-09-21 RX ADMIN — PROPOFOL 50 MCG/KG/MIN: 10 INJECTION, EMULSION INTRAVENOUS at 10:30

## 2020-09-21 NOTE — PROGRESS NOTES
Problem: Mobility Impaired (Adult and Pediatric)  Goal: *Acute Goals and Plan of Care (Insert Text)  Description: FUNCTIONAL STATUS PRIOR TO ADMISSION: Patient was independent and active without use of DME.    HOME SUPPORT PRIOR TO ADMISSION: The patient lived with wife but did not require assist.    Physical Therapy Goals  Initiated 9/21/2020    1. Patient will move from supine to sit and sit to supine  in bed with independence within 4 days. 2. Patient will perform sit to stand with modified independence within 4 days. 3. Patient will ambulate with modified independence for 50 feet with the least restrictive device within 4 days. 4. Patient will ascend/descend 4 stairs with 1 handrail(s) with minimal assistance/contact guard assist within 4 days. 5. Patient will verbalize and demonstrate understanding of anterior hip precautions per protocol within 4 days. 6. Patient will perform total hip home exercise program per protocol with independence within 4 days. Outcome: Progressing Towards Goal  PHYSICAL THERAPY EVALUATION  Patient: Neyda Fagan (4 y.o. male)  Date: 9/21/2020  Primary Diagnosis: Arthritis of right hip [M16.11]  Osteoarthritis [M19.90]  Procedure(s) (LRB):  RIGHT TOTAL HIP ARTHROPLASTY DIRECT ANTERIOR, MAKOPLASTY (Right) Day of Surgery   Precautions:   WBAT, Fall, Total hip    ASSESSMENT  Based on the objective data described below, the patient presents with decreased ROM and strength to RLE, decreased bed mobility, transfers and gait, following admission for right NIURKA, anterior makoplasty. Patient has history of left NIURKA in 2016 and right TKA in 2019. He has all DME and will benefit from OP PT. Current Level of Function Impacting Discharge (mobility/balance): Educated patient regarding hip precautions and exercises. Handout provided and he expressed understanding. Patient stood and ambulated 3 feet with rolling walker +2 min assist. BP elevated but no headache. Nursing aware.  Wife present during PT. Patient a bit impulsive at times. Functional Outcome Measure: The patient scored 40/100 on the Barthel outcome measure. Other factors to consider for discharge: none     Patient will benefit from skilled therapy intervention to address the above noted impairments. PLAN :  Recommendations and Planned Interventions: bed mobility training, transfer training, gait training, and therapeutic exercises      Frequency/Duration: Patient will be followed by physical therapy:  twice daily to address goals. Recommendation for discharge: (in order for the patient to meet his/her long term goals)  Outpatient physical therapy follow up recommended for NIURKA    This discharge recommendation:  Has not yet been discussed the attending provider and/or case management    IF patient discharges home will need the following DME: patient owns DME required for discharge         SUBJECTIVE:   Patient stated I thought I would not see you until tomorrow.     OBJECTIVE DATA SUMMARY:   HISTORY:    Past Medical History:   Diagnosis Date    Arthritis     Back, knees, shoulders    Atrial fibrillation (HCC)     BCC (basal cell carcinoma of skin)     Benign hypertensive heart disease without heart failure     Diverticulosis     DJD (degenerative joint disease) of knee     right    Hematuria     due to certain medications    Hypertension     OLEG on CPAP 04/01/2011    Varicose vein of leg 1/12/2012     Past Surgical History:   Procedure Laterality Date    HX CATARACT REMOVAL Bilateral     HX COLONOSCOPY  2019    Removed polyps    HX HERNIA REPAIR      HX HIP REPLACEMENT Left 2016    HX KNEE REPLACEMENT Right 2019    HX MOHS PROCEDURES Left     x 3 Forhead and ear    HX TONSILLECTOMY  1943    HX VEIN STRIPPING Bilateral 2015       Personal factors and/or comorbidities impacting plan of care: none    Home Situation  Home Environment: Private residence  # Steps to Enter: 6  Rails to Enter: Yes  One/Two Story Residence: Split level  # of Interior Steps: 15  Height of Each Step (in): 6 inches  Interior Rails: Both  Lift Chair Available: No  Living Alone: No  Support Systems: Family member(s), Spouse/Significant Other/Partner, Child(kamlesh)  Patient Expects to be Discharged to[de-identified] Private residence  Current DME Used/Available at Home: CPAP, Walker, rolling, Cane, straight, Grab bars, Commode, bedside, Shower chair    EXAMINATION/PRESENTATION/DECISION MAKING:   Critical Behavior:  Neurologic State: Alert  Orientation Level: Oriented X4  Cognition: Appropriate decision making  Safety/Judgement: Good awareness of safety precautions  Hearing: Auditory  Auditory Impairment: Hard of hearing, bilateral  Skin:  not fully observed    Range Of Motion:  AROM: Within functional limits(RLE less due to surgery)                       Strength:    Strength: Within functional limits(RLE less due to surgery)                    Tone & Sensation:   Tone: Normal              Sensation: Intact                         Functional Mobility:  Bed Mobility:     Supine to Sit: Contact guard assistance  Sit to Supine: Contact guard assistance;Assist x2  Scooting: Stand-by assistance  Transfers:  Sit to Stand: Assist x2;Minimum assistance  Stand to Sit: Assist x2;Minimum assistance                       Balance:   Sitting: Intact  Standing: Intact; With support  Ambulation/Gait Training:  Distance (ft): 3 Feet (ft)  Assistive Device: Gait belt;Walker, rolling  Ambulation - Level of Assistance: Assist x2;Minimal assistance        Gait Abnormalities: Step to gait  Right Side Weight Bearing: As tolerated                                                Therapeutic Exercises:    Ankle pumps, quad, heel and gluteal sets, heel slides, hip abduction, internal rotation    Functional Measure:  Barthel Index:    Bathin  Bladder: 10  Bowels: 10  Groomin  Dressin  Feeding: 10  Mobility: 0  Stairs: 0  Toilet Use: 0  Transfer (Bed to Chair and Back): 0  Total: 40/100 The Barthel ADL Index: Guidelines  1. The index should be used as a record of what a patient does, not as a record of what a patient could do. 2. The main aim is to establish degree of independence from any help, physical or verbal, however minor and for whatever reason. 3. The need for supervision renders the patient not independent. 4. A patient's performance should be established using the best available evidence. Asking the patient, friends/relatives and nurses are the usual sources, but direct observation and common sense are also important. However direct testing is not needed. 5. Usually the patient's performance over the preceding 24-48 hours is important, but occasionally longer periods will be relevant. 6. Middle categories imply that the patient supplies over 50 per cent of the effort. 7. Use of aids to be independent is allowed. Leslie Noble., Barthel, MICHELET. (8846). Functional evaluation: the Barthel Index. [address] [facility] (14)2. Gilbert Fitzpatrick raymundo XIN Avila, Bienvenido Townsend., Yari December.[facility], [address] (1999). Measuring the change indisability after inpatient rehabilitation; comparison of the responsiveness of the Barthel Index and Functional [place] Measure. Journal of Neurology, Neurosurgery, and Psychiatry, 66(4), 460-827. SHELLI Marinelli, JORGE A Hoyos, & Luigi Jacobs MNOE. (2004.) Assessment of post-stroke quality of life in cost-effectiveness studies: The usefulness of the Barthel Index and the EuroQoL-5D.  Quality of Life Research, 15, 567-74           Physical Therapy Evaluation Charge Determination   History Examination Presentation Decision-Making   LOW Complexity : Zero comorbidities / personal factors that will impact the outcome / POC LOW Complexity : 1-2 Standardized tests and measures addressing body structure, function, activity limitation and / or participation in recreation  LOW Complexity : Stable, uncomplicated  Other outcome measures Barthel  LOW       Based on the above components, the patient evaluation is determined to be of the following complexity level: LOW     Pain Rating:  None at this time    Activity Tolerance:   Good  Please refer to the flowsheet for vital signs taken during this treatment. After treatment patient left in no apparent distress:   Supine in bed, Call bell within reach, Bed / chair alarm activated, Caregiver / family present, and Side rails x 3    COMMUNICATION/EDUCATION:   The patients plan of care was discussed with: Registered nurse. Fall prevention education was provided and the patient/caregiver indicated understanding. and Patient/family agree to work toward stated goals and plan of care.     Thank you for this referral.  Deshawn Gomez, PT   Time Calculation: 25 mins

## 2020-09-21 NOTE — ANESTHESIA PROCEDURE NOTES
Spinal Block    Start time: 9/21/2020 9:48 AM  End time: 9/21/2020 9:54 AM  Performed by: Gavi Benavides CRNA  Authorized by: Falguni Chavez MD     Pre-procedure:   Indications: at surgeon's request and primary anesthetic  Preanesthetic Checklist: patient identified, risks and benefits discussed, anesthesia consent, site marked, patient being monitored and timeout performed    Timeout Time: 09:48          Spinal Block:   Patient Position:  Seated  Prep Region:  Lumbar  Prep: DuraPrep      Location:  L3-4  Technique:  Single shot        Needle:   Needle Type:  Pencan  Needle Gauge:  25 G  Attempts:  1      Events: CSF confirmed, no blood with aspiration and no paresthesia        Assessment:  Insertion:  Uncomplicated  Patient tolerance:  Patient tolerated the procedure well with no immediate complications

## 2020-09-21 NOTE — ANESTHESIA PREPROCEDURE EVALUATION
Relevant Problems   No relevant active problems       Anesthetic History   No history of anesthetic complications            Review of Systems / Medical History  Patient summary reviewed, nursing notes reviewed and pertinent labs reviewed    Pulmonary        Sleep apnea           Neuro/Psych   Within defined limits           Cardiovascular    Hypertension        Dysrhythmias : atrial fibrillation           GI/Hepatic/Renal  Within defined limits              Endo/Other        Arthritis     Other Findings              Physical Exam    Airway  Mallampati: II  TM Distance: 4 - 6 cm  Neck ROM: normal range of motion   Mouth opening: Normal     Cardiovascular    Rhythm: regular  Rate: normal         Dental  No notable dental hx       Pulmonary  Breath sounds clear to auscultation               Abdominal         Other Findings            Anesthetic Plan    ASA: 3  Anesthesia type: spinal            Anesthetic plan and risks discussed with: Patient

## 2020-09-21 NOTE — OP NOTES
OPERATIVE REPORT     Admit Date: 9/21/2020  Admit Diagnosis: Arthritis of right hip [M16.11]  Preoperative Diagnosis: Arthritis of right hip [M16.11]  Postoperative Diagnosis: Arthritis of right hip [M16.11]    Procedure: Procedure(s):  RIGHT TOTAL HIP ARTHROPLASTY DIRECT ANTERIOR, MAKOPLASTY  Surgeon: Cinthia Almeida MD  Assistant(s): Ben Roman PA-C  Anesthesia: Regional   Estimated Blood Loss: 400cc  Specimens: * No specimens in log *   Complications: None        INDICATIONS:    The patient is a 78 y.o., male who has complained of a long history of Right hip pain / groin pain. The patient has failed conservative treatment to include medical management / therapy and presents for definitive operative care. Informed consent was obtained including a discussion of the risks and benefits, which include, but are not limited to, bleeding, infection, neurovascular damage, wound complications, pain and stiffness in the hip, periprosthetic loosening, fracture, dislocation and venous thrombo-embolic disease, the patient consented for the procedure. DESCRIPTION OF PROCEDURE:       The proper side and hip were identified and signed in the preoperative holding area. All questions were answered. The patient was given Ancef preop for an antibiotic. After adequate anesthesia, the patient was positioned supine on the [place] table, the feet were well padded in the boots. The hip was then prepped and draped in sterile fashion. The contralateral tracking array was placed. A direct anterior approach was used through skin and the tensor fascia. The interval between the Tensor Fascia and Sartorius was used and retractors were placed in an atraumatic fashion. The lateral femoral circumflex artery and vein were identified and coagulated. The proximal and distal capsule was identified and excised. A tracking array was placed in the proximal greater trochanter. The leg length and offset were verified with the MAKOplasty probe. A standard neck cut was made according to the implant geometry. The femoral head was removed. Further soft tissue was debrided and medial capsule along the neck cut released. Acetabular exposure was then obtained and the labrum was excised along with the foveal contents. Registration and acetabular mapping was performed. Once registration was complete and all soft tissues were removed the planned acetabular reamer was used in conjunction with the MAKOplasty arm. Remaining bone and osteophyte was removed, cysts were bone grafted as necessary. The final cup was then impacted in place with haptic guidance and rigid robotic arm assistance. There were no screws placed. The liner was then placed. The femur was then exposed, residual soft tissue was removed and the box osteotome was used along with blunt rounded canal finder. Sequential broaching was started with the opening broach and then the zero broach and broached up to the final implant size. A cement restrictor was placed and all bony surfaces irrigated. The cement was pressurized and the implant with a centralizer was placed. Care was taken to adjust the proper anteversion and placement of the stem collar. The cement was allowed to completely harden. A trial head was placed then the reduction was performed. Leg lengths and offset were verified. The final head was then impacted in place and thorough head and neck irrigation, the leg length was verified again. The femoral tracker was removed. The wound was copiously irrigated with 1L of dilute betadine solution 5%. The interval between the tensor and Sartorius was closed with 0-Vicryl and running stratafix suture. The sub-Q was closed with with 2-0 Vicryl, 4-0 monocryl and dermabond. The pin sites were closed with 4-0 Monocryl. A sterile dressing was applied. The patient was awoken from anesthesia and taken to the recovery room in a stable condiition.      OPERATIVE FINDINGS : Severe right hip OA noted.     IMPLANTS :   Implant Name Type Inv. Item Serial No.  Lot No. LRB No. Used Action   STEM FEM SZ 4 L137MM NK L35MM 42MM OFFSET 132DEG PROX DST - SNA  STEM FEM SZ 4 L137MM NK L35MM 42MM OFFSET 132DEG PROX DST NA [employer] [facility] [id number] Right 1 Implanted   LINER ACET SZ E ID36MM THK5. 9MM 0DEG HIP X3 ANY RNG FOR - SNA  LINER ACET SZ E ID36MM THK5. 9MM 0DEG HIP X3 ANY RNG FOR NA [employer] [facility] [id number] Right 1 Implanted   SPACER FEM OD11MM UNIV DST HIP BLU ACCOLADE - SNA  SPACER FEM OD11MM UNIV DST HIP BLU ACCOLADE NA [employer] [facility] [id number] Right 1 Implanted   STEM FEM SZ 4 L137MM NK L35MM 42MM OFFSET 132DEG PROX DST - SNA  STEM FEM SZ 4 L137MM NK L35MM 42MM OFFSET 132DEG PROX DST NA [employer] [facility] [id number] Right 1 Implanted   Bioprep bone preparation kit medium cement restrictor   NA [employer] [id number] Right 1 Implanted   simplex HV cement    NA Vtap [id number] Right 2 Implanted   HEAD FEM [id number] +0MM OFFSET HIP BIOLOX DELT CERAMIC TAPR - SNA  HEAD FEM [id number] +0MM OFFSET HIP BIOLOX DELT CERAMIC TAPR NA [employer] [facility] [id number] Right 1 Implanted       POST OPERATIVE CONSIDERATIONS :   WBAT    JUSTIFICATION FOR SURGICAL ASSISTANT:   Surgical Assistant, was requried and necessary in this case, to help with soft tissue retraction, extremity positioning, equiment management, implant management, and wound closure.       Ta Sommers MD

## 2020-09-21 NOTE — ROUTINE PROCESS
TRANSFER - OUT REPORT:    Verbal report given to [address] [place] [address] Kenyon RN (name) on Miguel Ángel Varela  being transferred to [facility] (unit) for routine post - op       Report consisted of patients Situation, Background, Assessment and   Recommendations(SBAR). Information from the following report(s) SBAR, Intake/Output, MAR and Cardiac Rhythm Afib  was reviewed with the receiving nurse. Lines:   Peripheral IV 09/21/20 Left Arm (Active)   Site Assessment Clean, dry, & intact 09/21/20 1530   Phlebitis Assessment 0 09/21/20 1530   Infiltration Assessment 0 09/21/20 1530   Dressing Status Clean, dry, & intact 09/21/20 1530   Dressing Type Transparent 09/21/20 1530   Hub Color/Line Status Infusing 09/21/20 1530   Alcohol Cap Used Yes 09/21/20 1300        Opportunity for questions and clarification was provided.       Patient transported with:   Registered Nurse

## 2020-09-21 NOTE — PERIOP NOTES
Pt will occasionally drop his heart rate to 37 to 39 and immediately will rebound to 40s and 50s. No effects noted. Patient is resting comfortably with no pain as spinal is still working. All other vital signs numbers are within normal limits. Will continue to monitor.

## 2020-09-21 NOTE — PROGRESS NOTES
Bedside and Verbal shift change report given to Matteo Morrison (oncoming nurse) by Porfirio Thompson (offgoing nurse). Report included the following information SBAR, Kardex, OR Summary, Intake/Output, MAR and Recent Results.

## 2020-09-21 NOTE — PERIOP NOTES
4th floor Charge RN ( Edward De Paz)  notified of admission/pending return and awaiting receiving ( Silke QUIROS) call-back for assignment to/return to room 412.

## 2020-09-21 NOTE — DISCHARGE INSTRUCTIONS
TOTAL HIP DISCHARGE INSTRUCTIONS    Patient: Yany Levin MRN: [id number]  SSN: [id number]              Please take the time to review the following instructions before you leave the hospital and use them as guidelines during your recovery from surgery. If you have any questions you may contact my office at [phone]  After business hours or during the weekend you can contact me through [facility] or text / call at [phone] (cell phone) for emergency's. Please use the office number during regular business hours. SPECIAL INSTRUCTIONS :   1. Do not bend greater than 90 degrees at the hip for 4 weeks following your discharge  2. Avoid exercises or activities which bring the leg out or away from the mid-line of the body. The surgical repair involves this muscle and it will require 4 weeks to heal. You may disregard these instructions for a direct anterior approach. 3. You may walk as tolerated and are encouraged to work daily on progressing your activities with a walker initially. 4. You may transition to a cane for walking 5-7 days from surgery once you feel safe. You may use a walker for longer periods if you feel unstable. 5. Call my office for routine questions M-F at [phone]. You may contact me directly through [address] [place] [address] if there are specific questions or text / call using my cell number [phone]. DRESSING :     Post-op Dressings : This should be removed by physical therapy or you may remove this yourself 7 days after the date of your surgery. If there is no drainage, then a simple dressing may be used or no dressing at all. Other dressing options can be purchased over the counter at a local pharmacy or medical supply vendor. A porous adhesive dressing such as pictured above can be purchased at a local [employer] or [employer]. You only need to keep the incision covered for 7 days after showers.  A dressing may be used for longer if there are issues with clothing clinging to the incision. Showering/ Bathing: You may shower with the Post-op dressing in place. This is left in place for 7 days following discharge from the hospital. If your incision is dry without drainage you may shower following your discharge home. After 7 days your dressing should be removed for showering. It is fine to have water run over the incision. Do not vigorously scrub your incision. Apply a clean, dry dressing after you have dried your incision. Do not take a bath or get into a swimming pool / [employer] until you follow up with Dr. Oleg Henriquez. Do not soak your incision under water. If there is continued drainage or you are concerned contact Dr Pete Alt office prior to showering [phone] ext [phone] . Diet:  You may advance to your regular diet as tolerated. Increase your clear liquid intake for the next 2-3 days. Medication:      1. You will be given prescriptions for pain medication when you are discharged from the hospital. The side effects of these medications can be substantial and the narcotic medications are not mandatory. You may substitute these medications with Tylenol or Alleve / Motrin. 2. Please use the medications as prescribed. Pain medications may cause constipation- Colace twice daily and Miralax one scoop daily while taking the narcotic medication should help prevent constipation. Please discuss with your local pharmacist regarding increasing this dosage if constipation persists. Other possible side effects of pain medication are dizziness, headache, nausea, vomiting, and urinary retention. Discontinue the pain medication if you develop itching, rash, shortness of breath, or difficulties swallowing. If these symptoms become severe or are not relieved by discontinuing the medication, you should seek immediate medical attention. 3. Refills of pain medication are authorized during office hours only (8 AM- 5 PM  Monday thru Friday).  Many of these medication will require you or a family member to pick-up a physical prescription at the office. 4. Medications other than antiinflammatories will not be called into the pharmacy after business hours. 5. You may resume the medication(s) you were taking prior to your surgery. Narcotics may change the effects of some antidepressant medication(s). If you have any questions about possible interactions between your regular medications and the pain medication, you should ask the pharmacist or contact the prescribing physician. 6. If you have constipation which is not improved by oral stool softeners then a Ducolax suppository should be purchased over the counter. 7. Continue the blood thinner (Aspirin or Lovenox) for a total of 30 days following surgery. Follow up appointment:    Please call our office at [phone] for your follow up appointment. This should be scheduled 14 days following the date of surgery. Physical Therapy / Nursing:    Physical Therapy following surgery will be arranged as an outpatient or at home. They have specific instructions for rehab and wound care. It is fine to have physical therapy remove your dressing at 7 days following surgery. Returning to work:    Normal return to work is 6-12 weeks following surgery. Depending on your progression following surgery and specific job duties you may take longer for a full return to work. DRIVING    You should not return to driving until you are off all opioid pain medications and able to safely and quickly apply the brakes. This is normally 2-6 weeks for left sided surgery and 2-8 weeks for right sided surgery. Important Signs and Symptoms:    If any of the following signs or symptoms occur, you should contact Dr. Belia Fragoso office.   Please be advised if a problem arises which you feel requires immediate medical attention or you are unable to contact Dr. Belia Fragoso office you should seek immediate medical attention at the ER or other health care facility you have access to.    1. A sudden increase in swelling and/or redness or warmth at the area your surgery was performed which isnt relieved by rest, ice, and elevation. 2. Oral temperature greater than 101 degrees for 12 hours or more which isnt relieved by an increase in fluid intake and taking 2 Tylenol every 4-6 hours. 3. Excessive drainage from your incisions, or drainage which hasnt stopped by 72 hours after your surgery. 4. Fever, chills, shortness of breath, chest pain, nausea, vomiting or other signs and symptoms which are of concern to you. YOUR TOTAL JOINT REPLACEMENT  FREQUENTLY ASKED QUESTIONS   What should I take for pain?  o You will be discharged with four medications for pain (Oxycodone, Tramadol, Ibuprofen and Tylenol). These may vary slightly depending on what you were taking in the hospital.   - 1st Line - Tylenol Arthritis Strength - 325-650 mg every 4 hours (scheduled for the 1st 24 hours)  - 2nd Line -  Ibuprofen 400 (2 tabs) - 800 (4 tabs) mg every 8 hours  (scheduled for the 1st 24 hours)  - 3rd Line - Oxycodone 5 mg (1-2 tablets every 4-6 hrs)  - 4th Line - Tramadol 50 mg (1-2 tablets every 4-6 hours) - take these between Oxycodone doses if your pain is not alleviated.  When should I call for advice regarding my pain?  o If your pain is still uncontrolled after being on the regimen above for at least 12 hours, please call the office [phone] or text / call my cell after hours [phone].  Can I get refills?  o Opioid refills are provided for the first 2-6 weeks following surgery. o Use Tylenol 500 mg along with Aleve 220mg twice daily or Motrin 200-800mg every 4-6 hours during the daytime hours after two weeks. - After two weeks, I suggest the opioid pain medications be used only 1 hour prior to your physical therapy appointment and 1 hour before sleeping at night. Use Tylenol and Ibuprofen at other times during the day. - Keep in mind that you will need to discontinue opioids before you resume driving.  Is swelling normal?  o Almost everyone has some degree of swelling following surgery. o Following hip and knee replacement surgery, swelling can be normal below the incision for the first few weeks. - This swelling peaks around 5-7 days after surgery. - It is not unusual to have some bruising about the back of the thigh, calf, ankle, and foot.  What should I do for the swelling?  o Keep the limb elevated above the level of your heart - 'Toes above Nose'. o Apply compression socks (knee high for total knees and up to the mid-thigh for total hips). o Use the ice packs that you are discharged home with several times a day for the first several weeks.  How long should I remain on blood thinners following surgery? o 30 days   Which blood thinners will I be on? Can I take them with Tylenol?  o  Aspirin 81 mg twice daily - these should be taken with meals and can be used with Tylenol. In certain instances, you may be sent home on Lovenox for 30 days. o For short periods of time (30 days), aspirin and anti-inflammatories (i.e. Aleve, Motrin / Advil / ibuprofen, diclofenac, etc. can be taken together).  When can I drive?  o Once you have stopped using regular narcotic pain medications (Oxycodone, Percocet, Lortab, Norco etc.) and can safely apply the brakes without hesitation, (emergency braking).  When can I shower?  o You may shower immediately if your Optifoam bandage is dry and without discharge. The Optifoam dressing should be removed 7 days following surgery, after which you may continue to shower.  o No submersion of the incision, bathing or swimming for 14 days following surgery or until cleared by Dr Omero Olivia.    Can I remove this dressing?  o Yes, this is removed just like removing a band aid.  o If you are concerned, this can be removed by your therapist.   [facility] What do I do with the dressing when I shower?  o The Optifoam dressing is waterproof and you may shower with it.   o The incision is sealed with Dermabond, a biologic skin glue, which also serves as a watertight seal. If your incision is draining, it is no longer considered to be watertight - you should contact our office prior to showering if you experience any drainage.  Which dressing should I purchase after I remove my Optifoam?  o An occlusive dressing which covers your entire incision. This does not have to be waterproof, but will need to be removed when you shower and then replaced. (Example Only)   How active should I be following surgery? o Progress activities in moderation and at your own pace.   o Walking room to room in your house is encouraged. o Walk each day and set progressive goals with small increments (1st week -1/2 block of walking, 2nd week - 1 block, 3rd week - 2 blocks, etc.)   Will I need help at home?  o You will likely need a caretaker who should be available for the first week following surgery. It is fine for family members to work during the day, as long as they are available by phone. o Planning ahead makes coming home from the hospital a much easier transition.  How long will my surgery take?  o On average, total joint replacement takes approximately 1-2 hour.   o The entire process, including pre-op and post-op care can last as long as 4- 5 hours before you are transferred to your room. o stroke, pulmonary embolism (a clot going from the legs to the lungs), and even death with surgery.  Will I be given antibiotics? Will I need antibiotics at discharge?  o Antibiotics will be given to you both before and after your procedure. To further minimize the risk of infection, we have streamlined the surgical procedure to take less time in the operating room.    o You do not require antibiotics following surgery.       Please do not hesitate to contact me through [employer] or by text / call me at [phone] (cell phone) for questions following surgery - MD Hero Davis MD  Cell [phone]  Alva Christy PA-C  Cell [phone]  Medical Staff : Shelley Tello @ [phone]

## 2020-09-21 NOTE — ANESTHESIA POSTPROCEDURE EVALUATION
Procedure(s):  RIGHT TOTAL HIP ARTHROPLASTY DIRECT ANTERIOR, MAKOPLASTY. spinal    Anesthesia Post Evaluation        Patient location during evaluation: bedside  Level of consciousness: awake  Pain management: satisfactory to patient  Airway patency: patent  Anesthetic complications: no  Cardiovascular status: acceptable  Respiratory status: acceptable  Hydration status: acceptable        INITIAL Post-op Vital signs:   Vitals Value Taken Time   /76 9/21/2020  3:00 PM   Temp 36.4 °C (97.5 °F) 9/21/2020  1:30 PM   Pulse 47 9/21/2020  3:04 PM   Resp 12 9/21/2020  3:04 PM   SpO2 96 % 9/21/2020  3:04 PM   Vitals shown include unvalidated device data.

## 2020-09-22 VITALS
BODY MASS INDEX: 32.86 KG/M2 | OXYGEN SATURATION: 98 % | HEIGHT: 70 IN | DIASTOLIC BLOOD PRESSURE: 66 MMHG | WEIGHT: 229.5 LBS | RESPIRATION RATE: 16 BRPM | HEART RATE: 63 BPM | TEMPERATURE: 98.6 F | SYSTOLIC BLOOD PRESSURE: 133 MMHG

## 2020-09-22 LAB
ANION GAP SERPL CALC-SCNC: 5 MMOL/L (ref 5–15)
BUN SERPL-MCNC: 27 MG/DL (ref 6–20)
BUN/CREAT SERPL: 24 (ref 12–20)
CALCIUM SERPL-MCNC: 7.8 MG/DL (ref 8.5–10.1)
CHLORIDE SERPL-SCNC: 108 MMOL/L (ref 97–108)
CO2 SERPL-SCNC: 26 MMOL/L (ref 21–32)
CREAT SERPL-MCNC: 1.13 MG/DL (ref 0.7–1.3)
GLUCOSE SERPL-MCNC: 116 MG/DL (ref 65–100)
HGB BLD-MCNC: 11.6 G/DL (ref 12.1–17)
POTASSIUM SERPL-SCNC: 4 MMOL/L (ref 3.5–5.1)
SODIUM SERPL-SCNC: 139 MMOL/L (ref 136–145)

## 2020-09-22 PROCEDURE — 74011250637 HC RX REV CODE- 250/637: Performed by: PHYSICIAN ASSISTANT

## 2020-09-22 PROCEDURE — 85018 HEMOGLOBIN: CPT

## 2020-09-22 PROCEDURE — 74011000250 HC RX REV CODE- 250: Performed by: PHYSICIAN ASSISTANT

## 2020-09-22 PROCEDURE — 97116 GAIT TRAINING THERAPY: CPT

## 2020-09-22 PROCEDURE — 97110 THERAPEUTIC EXERCISES: CPT

## 2020-09-22 PROCEDURE — 97535 SELF CARE MNGMENT TRAINING: CPT

## 2020-09-22 PROCEDURE — 36415 COLL VENOUS BLD VENIPUNCTURE: CPT

## 2020-09-22 PROCEDURE — 74011250636 HC RX REV CODE- 250/636: Performed by: PHYSICIAN ASSISTANT

## 2020-09-22 PROCEDURE — 97165 OT EVAL LOW COMPLEX 30 MIN: CPT

## 2020-09-22 PROCEDURE — 80048 BASIC METABOLIC PNL TOTAL CA: CPT

## 2020-09-22 PROCEDURE — 97530 THERAPEUTIC ACTIVITIES: CPT

## 2020-09-22 PROCEDURE — 2709999900 HC NON-CHARGEABLE SUPPLY

## 2020-09-22 RX ADMIN — OXYCODONE HYDROCHLORIDE AND ACETAMINOPHEN 500 MG: 500 TABLET ORAL at 09:45

## 2020-09-22 RX ADMIN — CEFAZOLIN SODIUM 2 G: 1 INJECTION, POWDER, FOR SOLUTION INTRAMUSCULAR; INTRAVENOUS at 09:44

## 2020-09-22 RX ADMIN — DOCUSATE SODIUM 50MG AND SENNOSIDES 8.6MG 1 TABLET: 8.6; 5 TABLET, FILM COATED ORAL at 09:45

## 2020-09-22 RX ADMIN — Medication 2 TABLET: at 09:44

## 2020-09-22 RX ADMIN — GABAPENTIN 100 MG: 100 CAPSULE ORAL at 09:45

## 2020-09-22 RX ADMIN — Medication 10 ML: at 06:31

## 2020-09-22 RX ADMIN — CEFAZOLIN SODIUM 2 G: 1 INJECTION, POWDER, FOR SOLUTION INTRAMUSCULAR; INTRAVENOUS at 01:50

## 2020-09-22 RX ADMIN — ACETAMINOPHEN 650 MG: 325 TABLET ORAL at 06:31

## 2020-09-22 RX ADMIN — RIVAROXABAN 20 MG: 20 TABLET, FILM COATED ORAL at 09:45

## 2020-09-22 RX ADMIN — POLYETHYLENE GLYCOL 3350 17 G: 17 POWDER, FOR SOLUTION ORAL at 09:46

## 2020-09-22 RX ADMIN — FAMOTIDINE 20 MG: 20 TABLET, FILM COATED ORAL at 09:45

## 2020-09-22 RX ADMIN — OXYCODONE 5 MG: 5 TABLET ORAL at 06:31

## 2020-09-22 RX ADMIN — THERA TABS 1 TABLET: TAB at 09:45

## 2020-09-22 RX ADMIN — PSYLLIUM HUSK 1 PACKET: 3.4 POWDER ORAL at 09:46

## 2020-09-22 RX ADMIN — ACETAMINOPHEN 650 MG: 325 TABLET ORAL at 13:13

## 2020-09-22 NOTE — PROGRESS NOTES
Problem: Mobility Impaired (Adult and Pediatric)  Goal: *Acute Goals and Plan of Care (Insert Text)  Description: FUNCTIONAL STATUS PRIOR TO ADMISSION: Patient was independent and active without use of DME.    HOME SUPPORT PRIOR TO ADMISSION: The patient lived with wife but did not require assist.    Physical Therapy Goals  Initiated 9/21/2020    1. Patient will move from supine to sit and sit to supine  in bed with independence within 4 days. 2. Patient will perform sit to stand with modified independence within 4 days. 3. Patient will ambulate with modified independence for 50 feet with the least restrictive device within 4 days. 4. Patient will ascend/descend 4 stairs with 1 handrail(s) with minimal assistance/contact guard assist within 4 days. 5. Patient will verbalize and demonstrate understanding of anterior hip precautions per protocol within 4 days. 6. Patient will perform total hip home exercise program per protocol with independence within 4 days. 9/22/2020 1348 by Rome Miller PT  Outcome: Progressing Towards Goal   PHYSICAL THERAPY TREATMENT  Patient: Miguel Ángel Varela (79 y.o. male)  Date: 9/22/2020  Diagnosis: Arthritis of right hip [M16.11]  Osteoarthritis [M19.90]   <principal problem not specified>  Procedure(s) (LRB):  RIGHT TOTAL HIP ARTHROPLASTY DIRECT ANTERIOR, MAKOPLASTY (Right) 1 Day Post-Op  Precautions: WBAT, Fall, Total hip  Chart, physical therapy assessment, plan of care and goals were reviewed. ASSESSMENT  Patient continues with skilled PT services and is progressing towards goals. Patient remains slightly impulsive but is steady on his feet. Wife present this session. He is safe for discharge with OP PT and close supervision for safety. He has all DME. Current Level of Function Impacting Discharge (mobility/balance): Received patient up in chair. He had good recall of all hip precautions.  Patient ambulated 75 feet with rolling walker and CGA and also went up and down 4 steps with 2 rails, then with one rail and a cane and CGA. Wife present and observing. Patient is safe for discharge. Other factors to consider for discharge: none         PLAN :  Patient continues to benefit from skilled intervention to address the above impairments. Continue treatment per established plan of care. to address goals. Recommendation for discharge: (in order for the patient to meet his/her long term goals)  Outpatient physical therapy follow up recommended for NIURKA    This discharge recommendation:  Has been made in collaboration with the attending provider and/or case management    IF patient discharges home will need the following DME: patient owns DME required for discharge       SUBJECTIVE:   Patient stated Felipe Sainz you going to help me with the steps? Jose M Kearns    OBJECTIVE DATA SUMMARY:   Critical Behavior:  Neurologic State: Alert  Orientation Level: Oriented X4  Cognition: Appropriate decision making, Appropriate for age attention/concentration, Appropriate safety awareness, Follows commands  Safety/Judgement: Good awareness of safety precautions  Functional Mobility Training:  Bed Mobility:      Received up in chair and left up in chair. Transfers:  Sit to Stand: Contact guard assistance  Stand to Sit: Contact guard assistance                             Balance:  Sitting: Intact  Standing: Intact; With support  Ambulation/Gait Training:  Distance (ft): 75 Feet (ft)  Assistive Device: Gait belt;Walker, rolling  Ambulation - Level of Assistance: Contact guard assistance           Right Side Weight Bearing: As tolerated                                  Stairs:  Number of Stairs Trained: 4(x2)  Stairs - Level of Assistance: Contact guard assistance;Assist X1   Rail Use: Both    Therapeutic Exercises:   SUPINE  EXERCISES   Sets   Reps   Active Active Assist   Passive Self ROM   Comments   Ankle Pumps   [x]                                        []                                        [] []                                           Quad Sets   [x]                                        []                                        []                                        []                                           Heel Slides   [x]                                        []                                        []                                        []                                           Hip Abduction   [x]                                        []                                        []                                        []                                           Glut Sets   [x]                                        []                                        []                                        []                                              []                                        []                                        []                                        []                                              []                                        []                                        []                                        []                                             STANDING  EXERCISES   Sets   Reps   Active Active Assist   Passive Self ROM   Comments   Heel Raises   []                                        []                                        []                                        []                                           Hip Abduction   []                                        []                                        []                                        []                                              []                                        []                                        []                                        []                                              []                                        []                                        []                                        [] Pain Rating:  None at this time    Activity Tolerance:   Good  Please refer to the flowsheet for vital signs taken during this treatment. After treatment patient left in no apparent distress:   Sitting in chair, Call bell within reach, Bed / chair alarm activated, and Caregiver / family present    COMMUNICATION/COLLABORATION:   The patients plan of care was discussed with: Registered nurse.      Jonathan Oconnor, PT   Time Calculation: 23 mins

## 2020-09-22 NOTE — PROGRESS NOTES
Discharge instructions reviewed with the patient and spouse. Copy of instructions and Prescriptions handed to patient. All questions answered. VSS. Patient escorted out via wheelchair accompanied by [facility] RN. Spouse will drive patient home.

## 2020-09-22 NOTE — PROGRESS NOTES
Ortho F/u Visit    Patient doing well. Likely to clear therapy this afternoon. Pt states his pain is controlled. Discussed plan of care and answered all questions. Will d/c home this afternoon if cleared by therapy.     Bailey Dodson NP

## 2020-09-22 NOTE — PROGRESS NOTES
Problem: Falls - Risk of  Goal: *Absence of Falls  Description: Document Mane Burr Fall Risk and appropriate interventions in the flowsheet.   Outcome: Progressing Towards Goal  Note: Fall Risk Interventions:  Mobility Interventions: Patient to call before getting OOB, Bed/chair exit alarm, Strengthening exercises (ROM-active/passive), Utilize walker, cane, or other assistive device, Utilize gait belt for transfers/ambulation         Medication Interventions: Patient to call before getting OOB, Teach patient to arise slowly, Utilize gait belt for transfers/ambulation, Bed/chair exit alarm    Elimination Interventions: Bed/chair exit alarm, Call light in reach, Patient to call for help with toileting needs, Toilet paper/wipes in reach, Stay With Me (per policy), Urinal in reach, Toileting schedule/hourly rounds              Problem: Patient Education: Go to Patient Education Activity  Goal: Patient/Family Education  Outcome: Progressing Towards Goal     Problem: Patient Education: Go to Patient Education Activity  Goal: Patient/Family Education  Outcome: Progressing Towards Goal     Problem: Patient Education: Go to Patient Education Activity  Goal: Patient/Family Education  Outcome: Progressing Towards Goal     Problem: Hip Replacement: Day of Surgery/Unit  Goal: Off Pathway (Use only if patient is Off Pathway)  Outcome: Progressing Towards Goal  Goal: Activity/Safety  Outcome: Progressing Towards Goal  Goal: Consults, if ordered  Outcome: Progressing Towards Goal  Goal: Diagnostic Test/Procedures  Outcome: Progressing Towards Goal  Goal: Nutrition/Diet  Outcome: Progressing Towards Goal  Goal: Medications  Outcome: Progressing Towards Goal  Goal: Respiratory  Outcome: Progressing Towards Goal  Goal: Treatments/Interventions/Procedures  Outcome: Progressing Towards Goal  Goal: Psychosocial  Outcome: Progressing Towards Goal  Goal: *Initiate mobility  Outcome: Progressing Towards Goal  Goal: *Optimal pain control at patient's stated goal  Outcome: Progressing Towards Goal  Goal: *Hemodynamically stable  Outcome: Progressing Towards Goal     Problem: Hip Replacement: Post Op Day 1  Goal: Off Pathway (Use only if patient is Off Pathway)  Outcome: Progressing Towards Goal  Goal: Activity/Safety  Outcome: Progressing Towards Goal  Goal: Diagnostic Test/Procedures  Outcome: Progressing Towards Goal  Goal: Nutrition/Diet  Outcome: Progressing Towards Goal  Goal: Medications  Outcome: Progressing Towards Goal  Goal: Respiratory  Outcome: Progressing Towards Goal  Goal: Treatments/Interventions/Procedures  Outcome: Progressing Towards Goal  Goal: Psychosocial  Outcome: Progressing Towards Goal  Goal: Discharge Planning  Outcome: Progressing Towards Goal  Goal: *Demonstrates progressive activity  Outcome: Progressing Towards Goal  Goal: *Optimal pain control at patient's stated goal  Outcome: Progressing Towards Goal  Goal: *Hemodynamically stable  Outcome: Progressing Towards Goal  Goal: *Discharge plan identified  Outcome: Progressing Towards Goal     Problem: Hip Replacement: Post-Op Day 2  Goal: Off Pathway (Use only if patient is Off Pathway)  Outcome: Progressing Towards Goal  Goal: Activity/Safety  Outcome: Progressing Towards Goal  Goal: Diagnostic Test/Procedures  Outcome: Progressing Towards Goal  Goal: Nutrition/Diet  Outcome: Progressing Towards Goal  Goal: Medications  Outcome: Progressing Towards Goal  Goal: Respiratory  Outcome: Progressing Towards Goal  Goal: Treatments/Interventions/Procedures  Outcome: Progressing Towards Goal  Goal: Psychosocial  Outcome: Progressing Towards Goal  Goal: *Met physical therapy criteria for discharge to the next level of care  Outcome: Progressing Towards Goal  Goal: *Optimal pain control with oral analgesia  Outcome: Progressing Towards Goal  Goal: *Hemodynamically stable  Outcome: Progressing Towards Goal  Goal: *Tolerating diet  Outcome: Progressing Towards Goal  Goal: *Verbalizes understanding of any indicated hip precautions  Outcome: Progressing Towards Goal  Goal: *Patient verbalizes understanding of discharge instructions  Outcome: Progressing Towards Goal     Problem: Pressure Injury - Risk of  Goal: *Prevention of pressure injury  Description: Document Vitaly Scale and appropriate interventions in the flowsheet.   Outcome: Progressing Towards Goal     Problem: Patient Education: Go to Patient Education Activity  Goal: Patient/Family Education  Outcome: Progressing Towards Goal     Problem: Infection - Risk of, Surgical Site Infection  Goal: *Absence of surgical site infection signs and symptoms  Outcome: Progressing Towards Goal     Problem: Patient Education: Go to Patient Education Activity  Goal: Patient/Family Education  Outcome: Progressing Towards Goal

## 2020-09-22 NOTE — PROGRESS NOTES
9- CASE MANAGEMENT NOTE:  Reason for Admission:  Right Total Hip                    RUR Score:  10% Low                   Plan for utilizing home health:  No        PCP: First and Last name: Dr. Roman Records    Name of Practice:    Are you a current patient: Yes/No: Yes   Approximate date of last visit: May 2020   Can you participate in a virtual visit with your PCP:                     Current Advanced Directive/Advance Care Plan: Not on File                         Transition of Care Plan:     1. Lives with wife, Key Pablo ([phone]), in a tri-level home  2. Independent with ADL's, has a cane and rolling walker and drove  3. Has scheduled appointment at [address] [place] [address] for 9- at 11:00 AM  4. Has prescription drug coverage and uses [facility] on [address]   5. Anticipates no discharge needs and wife will transport home    Care Management Interventions  PCP Verified by CM: Yes(Dr. Roman Records)  Mode of Transport at Discharge:  Other (see comment)(Wife)  Transition of Care Consult (CM Consult): [address]: No  Reason Outside [place]: (Recommending opt-pt-has appointment set up for 9-)  Discharge Durable Medical Equipment: No(Has a cane and rolling walker)  Physical Therapy Consult: Yes  Occupational Therapy Consult: Yes  Speech Therapy Consult: No  Current Support Network: Lives with Spouse, Own Home  Confirm Follow Up Transport: Family  The Plan for Transition of Care is Related to the Following Treatment Goals : Right Total Hip  Discharge Location  Discharge Placement: Home with family assistance    Mayur Bautista, [address] [facility], CM

## 2020-09-22 NOTE — PROGRESS NOTES
TOTAL HIP ARTHROPLASTY DAILY NOTE     ASSESSMENT / PLAN :   1. Pain Control : Excellent - Able to sleep and participate with therapy  2. Overnight Issues : none  3. Wound or incisional issue : Healing incision with no visible drainage  4. Therapy / Weight Bearing Recommendations : Weight bear as tolerated with use of a walker and two person assist while mobilizing  5. DVT Prophylaxis : Mechanical and Xarelto and mechanical lower extremity compression device  6. Disposition : home - outpatient PT  7. Medical Concerns : none - stable  8. Comments : Anticipate discharge home if all goes well and he is cleared by PT       POD  1 Day Post-Op s/p Procedure(s):  RIGHT TOTAL HIP ARTHROPLASTY DIRECT ANTERIOR, MAKOPLASTY     SUBJECTIVE :     Concerns : none reported. OBJECTIVE :     Vitals:    09/21/20 1820 09/1938 09/21/20 2235 09/22/20 0354   BP: (!) 158/73 (!) 171/69 132/62 128/69   Pulse:  65 60 64   Resp:  16 16 16   Temp:  97.8 °F (36.6 °C) 97.7 °F (36.5 °C) 98.8 °F (37.1 °C)   SpO2:  97% 95% 93%   Weight:       Height:           Alert and oriented x3. right exam of the hip reveals that the dressing is clean, dry and intact. The patient is able to fire the quadriceps / flex at the hip  Sensation is intact to light touch. No calf pain. ANTICOAGULANTS / LABS :       Key Anti-Platelet Anticoagulant Meds             aspirin delayed-release 325 mg tablet (Taking) Take 1 Tab by mouth two (2) times a day. rivaroxaban (XARELTO) 20 mg tab tablet Take 1 Tab by mouth daily (with dinner).  Indications: prevention of thromboembolism in paroxysmal atrial fibrillation          Labs:  Recent Labs     09/22/20  0441   HGB 11.6*      K 4.0      CO2 26   BUN 27*   CREA 1.13   *        Patient mobility  Gait  Gait Abnormalities: Step to gait  Ambulation - Level of Assistance: Assist x2, Minimal assistance  Distance (ft): 3 Feet (ft)  Assistive Device: Gait belt, Walker, rolling Charlene Mendez MD  Cell [phone]  Donna Sampson PA-C  Cell [phone]  Medical Assistants: Rocoi White [phone]                 Surgery Scheduler: JAZIEL Davies [phone] ext [phone]

## 2020-09-22 NOTE — PROGRESS NOTES
OCCUPATIONAL THERAPY EVALUATION/DISCHARGE  Patient: Miguel Ángel Varela (22 y.o. male)  Date: 9/22/2020  Primary Diagnosis: Arthritis of right hip [M16.11]  Osteoarthritis [M19.90]  Procedure(s) (LRB):  RIGHT TOTAL HIP ARTHROPLASTY DIRECT ANTERIOR, MAKOPLASTY (Right) 1 Day Post-Op   Precautions:  WBAT, Fall, Total hip    ASSESSMENT  Based on the objective data described below, the patient presents with good overall activity tolerance on POD 1 of R NIURKA, anterior approach. Patient lives with his wife and reports she is able to assist at home PRN. Patient demonstrated good understanding of education provided regarding adaptive ADL techniques including use of adaptive aides for LB tasks, safe transfer techniques, energy conservation techniques, and home modifications to ensure safety when transitioning home. Patient performed transfers without LOB or physical assistance needed. She engaged in ADLs with good tolerance; Adaptive dressing aides used for LB dressing. Patient has no further skilled OT needs and is cleared from OT services at this time. Current Level of Function (ADLs/self-care): Patient was received up and required supervision for OOB transfers using rolling walker. Patient was independent with UB ADLs and required increased time + use of adaptive dressing aides (mod I) for LB ADLs. Functional Outcome Measure: The patient scored 80/100 on the Barthel Index outcome measure. PLAN :    Recommendation for discharge: (in order for the patient to meet his/her long term goals)  No skilled occupational therapy/ follow up rehabilitation needs identified at this time; Will receive outpatient PT per MD recommendation    This discharge recommendation:  Has been made in collaboration with the attending provider and/or case management    IF patient discharges home will need the following DME: none       SUBJECTIVE:   Patient stated I will keep my reacher there; Good idea.  re: keeping his reacher with his RW to ensure it is with him when needed      OBJECTIVE DATA SUMMARY:   HISTORY:   Past Medical History:   Diagnosis Date    Arthritis     Back, knees, shoulders    Atrial fibrillation (HCC)     BCC (basal cell carcinoma of skin)     Benign hypertensive heart disease without heart failure     Diverticulosis     DJD (degenerative joint disease) of knee     right    Hematuria     due to certain medications    Hypertension     OLEG on CPAP 04/01/2011    Varicose vein of leg 1/12/2012     Past Surgical History:   Procedure Laterality Date    HX CATARACT REMOVAL Bilateral     HX COLONOSCOPY  2019    Removed polyps    HX HERNIA REPAIR      HX HIP REPLACEMENT Left 2016    HX KNEE REPLACEMENT Right 2019    HX MOHS PROCEDURES Left     x 3 Forhead and ear    HX TONSILLECTOMY  1943    HX VEIN STRIPPING Bilateral 2015       Prior Level of Function/Environment/Context: Patient lives with his wife. Expanded or extensive additional review of patient history:   Home Situation  Home Environment: Private residence  # Steps to Enter: 6  Rails to Enter: Yes  One/Two Story Residence: Split level  # of Interior Steps: 15  Height of Each Step (in): 6 inches  Interior Rails: Both  Lift Chair Available: No  Living Alone: No  Support Systems: Spouse/Significant Other/Partner  Patient Expects to be Discharged to[de-identified] Private residence  Current DME Used/Available at Home: Grab bars, Walker, rolling, Commode, bedside, Shower chair  Tub or Shower Type: Shower    Hand dominance: Right    EXAMINATION OF PERFORMANCE DEFICITS:  Cognitive/Behavioral Status:  Neurologic State: Alert  Orientation Level: Oriented X4  Cognition: Appropriate decision making; Appropriate for age attention/concentration; Appropriate safety awareness  Perception: Appears intact  Perseveration: No perseveration noted  Safety/Judgement: Awareness of environment    Skin: Intact in the uppers    Edema: None noted in the uppers    Hearing:   Auditory  Auditory Impairment: Hard of hearing, bilateral    Vision/Perceptual:    Tracking: Able to track stimulus in all quadrants w/o difficulty    Diplopia: No    Acuity: Within Defined Limits       Range of Motion:  WDL in the uppers    Strength:  WDL in the uppers    Coordination:  Fine Motor Skills-Upper: Left Intact; Right Intact    Gross Motor Skills-Upper: Left Intact; Right Intact    Tone & Sensation:  Tone: normal  Sensation: intact     Balance:  Sitting: Intact  Standing: Intact; With support    Functional Mobility and Transfers for ADLs:  Bed Mobility:  Supine to Sit: (pt was received up and remained up)  Scooting: Supervision    Transfers:  Sit to Stand: Supervision;Assist x1  Stand to Sit: Supervision;Assist x1  Bed to Chair: Supervision;Assist x1  Bathroom Mobility: Supervision/set up  Toilet Transfer : Supervision    ADL Assessment:  Feeding: Independent    Oral Facial Hygiene/Grooming: Independent    Bathing: Modified independent    Upper Body Dressing: Independent    Lower Body Dressing: Modified independent    Toileting: Modified independent     Cognitive Retraining  Safety/Judgement: Awareness of environment    Functional Measure:  Barthel Index:    Bathin  Bladder: 10  Bowels: 10  Groomin  Dressin  Feeding: 10  Mobility: 10  Stairs: 5  Toilet Use: 10  Transfer (Bed to Chair and Back): 10  Total: 80/100        The Barthel ADL Index: Guidelines  1. The index should be used as a record of what a patient does, not as a record of what a patient could do. 2. The main aim is to establish degree of independence from any help, physical or verbal, however minor and for whatever reason. 3. The need for supervision renders the patient not independent. 4. A patient's performance should be established using the best available evidence. Asking the patient, friends/relatives and nurses are the usual sources, but direct observation and common sense are also important. However direct testing is not needed.   5. Usually the patient's performance over the preceding 24-48 hours is important, but occasionally longer periods will be relevant. 6. Middle categories imply that the patient supplies over 50 per cent of the effort. 7. Use of aids to be independent is allowed. Natalia Nichols., Barthel, D.W. (6100). Functional evaluation: the Barthel Index. [address] [facility] (14)2. XIN Azevedo, Kristin Vincent., Elif Gonsalez, Lucretia, [facility] (1999). Measuring the change indisability after inpatient rehabilitation; comparison of the responsiveness of the Barthel Index and Functional [place] Measure. Journal of Neurology, Neurosurgery, and Psychiatry, 66(4), 406-779. Eugene Villalobos, [place].VICKY, JORGE A Hoyos, & Martina Bell M.A. (2004.) Assessment of post-stroke quality of life in cost-effectiveness studies: The usefulness of the Barthel Index and the EuroQoL-5D. Quality of Life Research, 15, 718-62       Occupational Therapy Evaluation Charge Determination   History Examination Decision-Making   LOW Complexity : Brief history review  LOW Complexity : 1-3 performance deficits relating to physical, cognitive , or psychosocial skils that result in activity limitations and / or participation restrictions  LOW Complexity : No comorbidities that affect functional and no verbal or physical assistance needed to complete eval tasks       Based on the above components, the patient evaluation is determined to be of the following complexity level: LOW     Activity Tolerance:   Good  Please refer to the flowsheet for vital signs taken during this treatment. After treatment patient left in no apparent distress:    Sitting in chair, Call bell within reach and Bed / chair alarm activated    COMMUNICATION/EDUCATION:   The patients plan of care was discussed with: Physical therapist, Registered nurse and patient. .     Thank you for this referral.  Sandra Yost, OTR/L  Time Calculation: 41 mins

## 2020-09-22 NOTE — CONSULTS
Nutrition Consult received for post-ortho surgery for oral nutritional supplement management. S/P right total hip arthroplasty. Diet:   Regular  Supplements: N/A  Meal Intake Documented:   Patient Vitals for the past 168 hrs:   % Diet Eaten   09/21/20 1816 100 %       Will offer Ensure/Glucerna supplement with trays as appropriate & encourage continued use at home until usual oral intake has returned or per surgeon instructions.        Brady Mcclellan, [address] [place] [address] Street

## 2020-09-22 NOTE — PROGRESS NOTES
Problem: Mobility Impaired (Adult and Pediatric)  Goal: *Acute Goals and Plan of Care (Insert Text)  Description: FUNCTIONAL STATUS PRIOR TO ADMISSION: Patient was independent and active without use of DME.    HOME SUPPORT PRIOR TO ADMISSION: The patient lived with wife but did not require assist.    Physical Therapy Goals  Initiated 9/21/2020    1. Patient will move from supine to sit and sit to supine  in bed with independence within 4 days. 2. Patient will perform sit to stand with modified independence within 4 days. 3. Patient will ambulate with modified independence for 50 feet with the least restrictive device within 4 days. 4. Patient will ascend/descend 4 stairs with 1 handrail(s) with minimal assistance/contact guard assist within 4 days. 5. Patient will verbalize and demonstrate understanding of anterior hip precautions per protocol within 4 days. 6. Patient will perform total hip home exercise program per protocol with independence within 4 days. Outcome: Progressing Towards Goal   PHYSICAL THERAPY TREATMENT  Patient: Costa Koo (89 y.o. male)  Date: 9/22/2020  Diagnosis: Arthritis of right hip [M16.11]  Osteoarthritis [M19.90]   <principal problem not specified>  Procedure(s) (LRB):  RIGHT TOTAL HIP ARTHROPLASTY DIRECT ANTERIOR, MAKOPLASTY (Right) 1 Day Post-Op  Precautions: WBAT, Fall, Total hip  Chart, physical therapy assessment, plan of care and goals were reviewed. ASSESSMENT  Patient continues with skilled PT services and is progressing towards goals. Patient is impulsive at times but overall doing well. He should be able to do steps in PM session once his wife arrives. Current Level of Function Impacting Discharge (mobility/balance): Reviewed hip precautions and exercises with patient. He ambulated 50 feet with rolling walker +2 CGA. Vitals stable.  Plan for steps in PM.     Other factors to consider for discharge: none         PLAN :  Patient continues to benefit from skilled intervention to address the above impairments. Continue treatment per established plan of care. to address goals. Recommendation for discharge: (in order for the patient to meet his/her long term goals)  Outpatient physical therapy follow up recommended for NIURKA    This discharge recommendation:  Has not yet been discussed the attending provider and/or case management    IF patient discharges home will need the following DME: patient owns DME required for discharge       SUBJECTIVE:   Patient stated You have a different color on today.     OBJECTIVE DATA SUMMARY:   Critical Behavior:  Neurologic State: Alert  Orientation Level: Oriented X4  Cognition: Appropriate decision making, Appropriate for age attention/concentration, Appropriate safety awareness, Follows commands  Safety/Judgement: Good awareness of safety precautions  Functional Mobility Training:  Bed Mobility:     Supine to Sit: Stand-by assistance;Modified independent     Scooting: Stand-by assistance        Transfers:  Sit to Stand: Contact guard assistance;Assist x2  Stand to Sit: Contact guard assistance;Assist x2                             Balance:  Sitting: Intact  Standing: Impaired; With support  Ambulation/Gait Training:  Distance (ft): 50 Feet (ft)  Assistive Device: Gait belt;Walker, rolling  Ambulation - Level of Assistance: Contact guard assistance;Assist x2           Right Side Weight Bearing: As tolerated                                                 Therapeutic Exercises:   SUPINE  EXERCISES   Sets   Reps   Active Active Assist   Passive Self ROM   Comments   Ankle Pumps   [x]                                        []                                        []                                        []                                           Quad Sets   [x]                                        []                                        []                                        [] Heel Slides   []                                        [x]                                        []                                        []                                           Hip Abduction   []                                        [x]                                        []                                        []                                           Glut Sets   [x]                                        []                                        []                                        []                                              []                                        []                                        []                                        []                                              []                                        []                                        []                                        []                                             STANDING  EXERCISES   Sets   Reps   Active Active Assist   Passive Self ROM   Comments   Heel Raises   []                                        []                                        []                                        []                                           Hip Abduction   []                                        []                                        []                                        []                                              []                                        []                                        []                                        []                                              []                                        []                                        []                                        []                                             Pain Rating:  None reported    Activity Tolerance:   Good  Please refer to the flowsheet for vital signs taken during this treatment.     After treatment patient left in no apparent distress:   Sitting in chair, Call bell within reach, and Bed / chair alarm activated    COMMUNICATION/COLLABORATION:   The patients plan of care was discussed with: Registered nurse.      Erna White, PT   Time Calculation: 24 mins

## 2020-09-25 ENCOUNTER — TELEPHONE (OUTPATIENT)
Dept: SURGERY | Age: 80
End: 2020-09-25

## 2020-09-25 NOTE — TELEPHONE ENCOUNTER
Post Discharge Phone placed to patient after Joint Replacement surgery   Spoke with patient    States discharge instructions were clear and easy to understand has no questions  Patient was able to fill prescriptions, medications questions answered   States pain is tolerable and pain medication is effective.    Patient is taking tramadol and tylenol  Patient is using a walker without difficulty, moving every hour  Able to do exercises regularly  Bruising is moderate  Swelling is moderate  Patient is elevating leg and using ice with good relief  States dressing is intact without drainage x2  Denies N/V, appetite is good  Constipation is none  Follow up appointment is scheduled with surgeon   PT is scheduled Monday  Denies fever, cough, chest pain, SOB  Denies any unusual symptoms  Discussed calling surgeon or PCP for concerns  Patient states needs were met by the staff while in the hospital  Please be sure to fill out the survey that you receive  Opportunity given for patient to ask questions

## 2020-10-01 NOTE — DISCHARGE SUMMARY
@4CXCF@ [address] [place] [id number]    DISCHARGE SUMMARY     Patient: Jasper Gonzalez                             Medical Record Number: [id number]                : 1940  Age: 78 y.o. Admit Date: 2020  Discharge Date: 2020    Admission Diagnosis: Arthritis of right hip [M16.11]  Osteoarthritis [M19.90]  Discharge Diagnosis: Arthritis of right hip [M16.11]    Procedures: Procedure(s):  RIGHT TOTAL HIP ARTHROPLASTY DIRECT ANTERIOR, MAKOPLASTY    Surgeon: Ronnell Uriostegui MD  Assistants: Melida Viveros PA-C    Anesthesia: spinal  Complications: None     History of Present Illness:  Jasper Gonzalez is a 78 y.o. male with a history of Right hip pain, swelling, and marked loss of function. 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 Hospital Course:  Jasper Gonzalez tolerated the procedure well. He was transferred  to the recovery room in stable condition. After a brief stay the patient was then transferred to the Joint Replacement Unit at [address] [place] [address]. On postoperative day #1, the dressing was clean and dry, he was neurovascularly intact. The patient was afebrile and vital signs were stable. Calves were soft and non-tender bilaterally. On postoperative day  # 2, the patient was tolerating a regular diet and making satisfactory progress with physical therapy.       Hemoglobin and INR prior to discharge were   Lab Results   Component Value Date/Time    HGB 11.6 (L) 2020 04:41 AM    INR 2.1 (H) 2016 06:07 AM       Jasper Gonzalez was cleared by physical therapy and was discharged to [facility] in stable condition on postoperative day 1. He was provided with routine postoperative instructions and advised to follow up in my office in 2 weeks following discharge from the hospital.  He was prescribed xarelto for DVT prophylaxis, tramadol and oxycodone for post-operative pain, dulcolax suppository for constipation, and zofran for nausea. Discharge Medications:  Cannot display discharge medications since this patient is not currently admitted.       Signed by: Dusty Li MD  9/30/2020

## 2021-01-15 ENCOUNTER — TELEPHONE (OUTPATIENT)
Dept: CARDIOLOGY CLINIC | Age: 81
End: 2021-01-15

## 2021-01-15 NOTE — TELEPHONE ENCOUNTER
Patient calling in regards to vaccine. States he is in [place] until April and was told in order to get the covid vaccine there, he needs a letter since he is on blood thinners and a-fib medication. States he had an appt with Dr. Satish Stewart but it was canceled. States he would like a call today because he is unsure where he will be tomorrow.      Phone: [phone]

## 2022-03-19 PROBLEM — I87.2 EDEMA OF BOTH LOWER EXTREMITIES DUE TO PERIPHERAL VENOUS INSUFFICIENCY: Status: ACTIVE | Noted: 2020-06-04

## 2022-03-20 PROBLEM — M19.90 OSTEOARTHRITIS: Status: ACTIVE | Noted: 2020-09-21

## 2022-09-11 ENCOUNTER — HOSPITAL ENCOUNTER (EMERGENCY)
Age: 82
Discharge: HOME | End: 2022-09-11
Attending: EMERGENCY MEDICINE
Payer: MEDICARE

## 2022-09-11 VITALS
BODY MASS INDEX: 30.8 KG/M2 | SYSTOLIC BLOOD PRESSURE: 183 MMHG | WEIGHT: 220 LBS | HEIGHT: 71 IN | OXYGEN SATURATION: 98 % | RESPIRATION RATE: 16 BRPM | TEMPERATURE: 97.9 F | HEART RATE: 57 BPM | DIASTOLIC BLOOD PRESSURE: 77 MMHG

## 2022-09-11 DIAGNOSIS — T81.41XA INFECTION OF SUPERFICIAL INCISIONAL SURGICAL SITE AFTER PROCEDURE, INITIAL ENCOUNTER: Primary | ICD-10-CM

## 2022-09-11 LAB
ANION GAP SERPL CALC-SCNC: 6 MMOL/L (ref 5–15)
BASOPHILS # BLD: 0 K/UL (ref 0–0.1)
BASOPHILS NFR BLD: 0 % (ref 0–1)
BUN SERPL-MCNC: 34 MG/DL (ref 6–20)
BUN/CREAT SERPL: 30 (ref 12–20)
CALCIUM SERPL-MCNC: 8.5 MG/DL (ref 8.5–10.1)
CHLORIDE SERPL-SCNC: 105 MMOL/L (ref 97–108)
CO2 SERPL-SCNC: 27 MMOL/L (ref 21–32)
CREAT SERPL-MCNC: 1.15 MG/DL (ref 0.7–1.3)
DIFFERENTIAL METHOD BLD: ABNORMAL
EOSINOPHIL # BLD: 0.2 K/UL (ref 0–0.4)
EOSINOPHIL NFR BLD: 2 % (ref 0–7)
ERYTHROCYTE [DISTWIDTH] IN BLOOD BY AUTOMATED COUNT: 13.7 % (ref 11.5–14.5)
GLUCOSE SERPL-MCNC: 96 MG/DL (ref 65–100)
HCT VFR BLD AUTO: 37.5 % (ref 36.6–50.3)
HGB BLD-MCNC: 12.4 G/DL (ref 12.1–17)
IMM GRANULOCYTES # BLD AUTO: 0 K/UL (ref 0–0.04)
IMM GRANULOCYTES NFR BLD AUTO: 0 % (ref 0–0.5)
LACTATE BLD-SCNC: 1.55 MMOL/L (ref 0.4–2)
LYMPHOCYTES # BLD: 1.4 K/UL (ref 0.8–3.5)
LYMPHOCYTES NFR BLD: 16 % (ref 12–49)
MCH RBC QN AUTO: 32.3 PG (ref 26–34)
MCHC RBC AUTO-ENTMCNC: 33.1 G/DL (ref 30–36.5)
MCV RBC AUTO: 97.7 FL (ref 80–99)
MONOCYTES # BLD: 1.1 K/UL (ref 0–1)
MONOCYTES NFR BLD: 13 % (ref 5–13)
NEUTS SEG # BLD: 6 K/UL (ref 1.8–8)
NEUTS SEG NFR BLD: 69 % (ref 32–75)
NRBC # BLD: 0 K/UL (ref 0–0.01)
NRBC BLD-RTO: 0 PER 100 WBC
PLATELET # BLD AUTO: 203 K/UL (ref 150–400)
PMV BLD AUTO: 9.5 FL (ref 8.9–12.9)
POTASSIUM SERPL-SCNC: 4 MMOL/L (ref 3.5–5.1)
RBC # BLD AUTO: 3.84 M/UL (ref 4.1–5.7)
SODIUM SERPL-SCNC: 138 MMOL/L (ref 136–145)
WBC # BLD AUTO: 8.7 K/UL (ref 4.1–11.1)

## 2022-09-11 PROCEDURE — 83605 ASSAY OF LACTIC ACID: CPT

## 2022-09-11 PROCEDURE — 74011250637 HC RX REV CODE- 250/637: Performed by: EMERGENCY MEDICINE

## 2022-09-11 PROCEDURE — 36415 COLL VENOUS BLD VENIPUNCTURE: CPT

## 2022-09-11 PROCEDURE — 85025 COMPLETE CBC W/AUTO DIFF WBC: CPT

## 2022-09-11 PROCEDURE — 80048 BASIC METABOLIC PNL TOTAL CA: CPT

## 2022-09-11 PROCEDURE — 99283 EMERGENCY DEPT VISIT LOW MDM: CPT

## 2022-09-11 RX ORDER — CEPHALEXIN 250 MG/1
500 CAPSULE ORAL
Status: COMPLETED | OUTPATIENT
Start: 2022-09-11 | End: 2022-09-11

## 2022-09-11 RX ORDER — CEPHALEXIN 500 MG/1
500 CAPSULE ORAL 4 TIMES DAILY
Qty: 40 CAPSULE | Refills: 0 | Status: SHIPPED | OUTPATIENT
Start: 2022-09-11 | End: 2022-09-21

## 2022-09-11 RX ORDER — CEPHALEXIN 500 MG/1
500 CAPSULE ORAL 4 TIMES DAILY
Qty: 40 CAPSULE | Refills: 0 | Status: SHIPPED | OUTPATIENT
Start: 2022-09-11 | End: 2022-09-11

## 2022-09-11 RX ADMIN — CEPHALEXIN 500 MG: 250 CAPSULE ORAL at 18:56

## 2022-09-11 NOTE — ED PROVIDER NOTES
Date: 9/11/2022  Patient Name: Janet Pacheco    History of Presenting Illness     Chief Complaint   Patient presents with    Toe Pain    Wound Check       History Provided By: Patient    HPI: Janet Pacheco, 78 y.o. male  presents to the ED with cc of an infected foot wound. Patient states that he had a bunionectomy 1 month ago on his right foot by Dr. Biju Page. He has had difficulty healing the wound because he has a history of lymphadenopathy and neuropathy. He does not have a history of diabetes. He states that the wound has scabbed over and then the scab will get ripped off because of his compression stockings. His wife has been helping him clean and take care of the wound. However yesterday he started noticing that he was leaking clear fluid and he was starting to have surrounding redness around the wound. This morning it spread to the top of his foot and he is having some increased discomfort. He denies significant pain, leg swelling or any fevers. He is on Xarelto for atrial fibrillation. There are no other complaints, changes, or physical findings at this time. PCP: Earl Waller MD    No current facility-administered medications on file prior to encounter. Current Outpatient Medications on File Prior to Encounter   Medication Sig Dispense Refill    losartan-hydroCHLOROthiazide (HYZAAR) 100-25 mg per tablet TAKE ONE TABLET BY MOUTH DAILY 90 Tab 0    aspirin delayed-release 325 mg tablet Take 1 Tab by mouth two (2) times a day. 60 Tab 0    gabapentin (NEURONTIN) 100 mg capsule T1 tablet at breakfast and 3 tablets at night. 120 Cap 1    acetaminophen (Tylenol Extra Strength) 500 mg tablet Take 1-2 Tabs by mouth every six (6) hours as needed for Pain. Not to exceed 4,000mg in any 24 hour period  Indications: pain 60 Tab 0    ondansetron (ZOFRAN ODT) 8 mg disintegrating tablet Take 0.5 Tabs by mouth every eight (8) hours as needed for Nausea.  30 Tab 0    fluticasone propionate (FLONASE) 50 mcg/actuation nasal spray 2 Sprays by Both Nostrils route as needed. loratadine (CLARITIN) 10 mg tablet loratadine 10 mg tablet   Take 1 tablet every day by oral route as directed. rivaroxaban (XARELTO) 20 mg tab tablet Take 1 Tab by mouth daily (with dinner). Indications: prevention of thromboembolism in paroxysmal atrial fibrillation 90 Tab 3    UBIQUINONE PO Take 100 mg by mouth daily. psyllium (METAMUCIL) powd Take  by mouth daily. ipratropium (ATROVENT) 0.03 % nasal spray by Both Nostrils route as needed. VIT C/E/ZN/COPPR/LUTEIN/ZEAXAN (PRESERVISION AREDS 2 PO) Take  by mouth two (2) times a day. fish oil-omega-3 fatty acids 340-1,000 mg capsule Take 1 Cap by mouth daily (after breakfast). magnesium 250 mg tab Take 400 mg by mouth daily (after dinner). multivitamin (ONE A DAY) tablet Take 1 Tab by mouth daily (after breakfast). ASCORBATE CALCIUM (VITAMIN C PO) Take 500 mg by mouth daily (after breakfast). Cholecalciferol, Vitamin D3, (VITAMIN D) 2,000 unit Cap Take 2,000 Units by mouth daily (after dinner).          Past History     Past Medical History:  Past Medical History:   Diagnosis Date    Arthritis     Back, knees, shoulders    Atrial fibrillation ([address]     BCC (basal cell carcinoma of skin)     Benign hypertensive heart disease without heart failure     Diverticulosis     DJD (degenerative joint disease) of knee     right    Hematuria     due to certain medications    Hypertension     OLEG on CPAP 04/01/2011    Varicose vein of leg 1/12/2012       Past Surgical History:  Past Surgical History:   Procedure Laterality Date    HX CATARACT REMOVAL Bilateral     HX COLONOSCOPY  2019    Removed polyps    HX HERNIA REPAIR      HX HIP REPLACEMENT Left 2016    HX KNEE REPLACEMENT Right 2019    HX MOHS PROCEDURES Left     x 3 Forhead and ear    HX TONSILLECTOMY  1943    HX VEIN STRIPPING Bilateral 2015       Family History:  Family History   Problem Relation Age of Onset    Stroke Mother     Dementia Mother     Stroke Father     Asthma Father     Lung Disease Father     Diabetes Brother     Heart Disease Brother     Heart Disease Brother        Social History:  Social History     Tobacco Use    Smoking status: Former     Packs/day: 0.50     Years: 40.00     Pack years: 20.00     Types: Cigarettes     Quit date:      Years since quittin.7    Smokeless tobacco: Former     Quit date: 1986   Substance Use Topics    Alcohol use: Yes     Alcohol/week: 2.0 standard drinks     Types: 1 Cans of beer, 1 Shots of liquor per week    Drug use: No       Allergies: Allergies   Allergen Reactions    Ace Inhibitors Cough     Patients states he is not allergic    Nsaids (Non-Steroidal Anti-Inflammatory Drug) Other (comments)     \"causes blood in urine\"    Pcn [Penicillins] Rash    Percocet [Oxycodone-Acetaminophen] Other (comments)     constipation         Review of Systems   Review of Systems   All other systems reviewed and are negative. Physical Exam   Physical Exam  Vitals and nursing note reviewed. Constitutional:       General: He is not in acute distress. Appearance: Normal appearance. He is well-developed. HENT:      Head: Normocephalic and atraumatic. Eyes:      Extraocular Movements: Extraocular movements intact. Conjunctiva/sclera: Conjunctivae normal.   Neck:      Vascular: No JVD. Trachea: No tracheal deviation. Cardiovascular:      Rate and Rhythm: Normal rate and regular rhythm. Heart sounds: No murmur heard. No friction rub. No gallop. Pulmonary:      Effort: Pulmonary effort is normal. No respiratory distress. Breath sounds: Normal breath sounds. No stridor. No wheezing. Abdominal:      General: Bowel sounds are normal. There is no distension. Palpations: Abdomen is soft. There is no mass. Tenderness: There is no abdominal tenderness. There is no guarding. Musculoskeletal:         General: No tenderness. Normal range of motion. Cervical back: Neck supple. Comments: No deformity   Skin:     General: Skin is warm and dry. Findings: No rash. Comments: Superficial ulcerated lesion approximately 1.5 cm in diameter of the R distal lateral foot. No active drainage at this time, minimal odor. No expressed purulence. Associated surrounding erythema extending medially from the lesion across the dorsum of the foot and R ankle. +2 pitting edema of the bilateral LE. RLE >LLE. Right foot has increased warmth to palpation compared to the left. Neurological:      Mental Status: He is alert and oriented to person, place, and time. Comments: No focal deficits   Psychiatric:         Behavior: Behavior normal.         Thought Content:  Thought content normal.         Judgment: Judgment normal.       Diagnostic Study Results     Labs -  Recent Results (from the past 72 hour(s))   POC LACTIC ACID    Collection Time: 09/11/22  6:03 PM   Result Value Ref Range    Lactic Acid (POC) 1.55 0.40 - 6.45 mmol/L   METABOLIC PANEL, BASIC    Collection Time: 09/11/22  6:06 PM   Result Value Ref Range    Sodium 138 136 - 145 mmol/L    Potassium 4.0 3.5 - 5.1 mmol/L    Chloride 105 97 - 108 mmol/L    CO2 27 21 - 32 mmol/L    Anion gap 6 5 - 15 mmol/L    Glucose 96 65 - 100 mg/dL    BUN 34 (H) 6 - 20 MG/DL    Creatinine 1.15 0.70 - 1.30 MG/DL    BUN/Creatinine ratio 30 (H) 12 - 20      GFR est AA >60 >60 ml/min/1.73m2    GFR est non-AA >60 >60 ml/min/1.73m2    Calcium 8.5 8.5 - 10.1 MG/DL   CBC WITH AUTOMATED DIFF    Collection Time: 09/11/22  6:06 PM   Result Value Ref Range    WBC 8.7 4.1 - 11.1 K/uL    RBC 3.84 (L) 4.10 - 5.70 M/uL    HGB 12.4 12.1 - 17.0 g/dL    HCT 37.5 36.6 - 50.3 %    MCV 97.7 80.0 - 99.0 FL    MCH 32.3 26.0 - 34.0 PG    MCHC 33.1 30.0 - 36.5 g/dL    RDW 13.7 11.5 - 14.5 %    PLATELET 311 337 - 983 K/uL    MPV 9.5 8.9 - 12.9 FL    NRBC 0.0 0  WBC    ABSOLUTE NRBC 0.00 0.00 - 0.01 K/uL NEUTROPHILS 69 32 - 75 %    LYMPHOCYTES 16 12 - 49 %    MONOCYTES 13 5 - 13 %    EOSINOPHILS 2 0 - 7 %    BASOPHILS 0 0 - 1 %    IMMATURE GRANULOCYTES 0 0.0 - 0.5 %    ABS. NEUTROPHILS 6.0 1.8 - 8.0 K/UL    ABS. LYMPHOCYTES 1.4 0.8 - 3.5 K/UL    ABS. MONOCYTES 1.1 (H) 0.0 - 1.0 K/UL    ABS. EOSINOPHILS 0.2 0.0 - 0.4 K/UL    ABS. BASOPHILS 0.0 0.0 - 0.1 K/UL    ABS. IMM. GRANS. 0.0 0.00 - 0.04 K/UL    DF AUTOMATED         Radiologic Studies -   No orders to display     CT Results  (Last 48 hours)      None          CXR Results  (Last 48 hours)      None            Medical Decision Making   I am the first provider for this patient. I reviewed the vital signs, available nursing notes, past medical history, past surgical history, family history and social history. Vital Signs-Reviewed the patient's vital signs. Patient Vitals for the past 12 hrs:   Temp Pulse Resp BP SpO2   09/11/22 1636 97.9 °F (36.6 °C) (!) 57 16 (!) 183/77 98 %         Records Reviewed: Nursing Notes and Old Medical Records    Provider Notes (Medical Decision Making):   Patient is an 78-year-old male presenting the emergency department with an infection of his surgical site after a bunionectomy 1 month ago. Patient is already on Xarelto, no suspicion for DVT. He is not having any fever and otherwise appears nontoxic. He has a chronic history of lymphedema and neuropathy, but no history of diabetes. He has erythema around the lesion and spreading dorsally as well as some associated swelling. Will check labs, if labs are within normal limits, will trial patient on oral antibiotics. Lesion is superficial, low suspicion for osteomyelitis. ED Course:   Initial assessment performed. The patients presenting problems have been discussed, and they are in agreement with the care plan formulated and outlined with them. I have encouraged them to ask questions as they arise throughout their visit.          Progress Note:  Updated patient on his lab results. Treated him with a first dose of Keflex here. Will discharge home with antibiotics and instructed him to call his podiatrist first thing in the morning to set up a follow-up appointment to have his wound reevaluated. He understands to return to the ER if he develops worsening swelling, redness, pain, fever or purulent drainage. Disposition:      The patient's results have been reviewed with His. He has been counseled regarding her diagnosis. He verbally conveys understanding and agreement of the signs, symptoms, diagnosis, treatment, and prognosis and additionally agrees to follow up as recommended with Dr. Jean Fernandes MD in 24-48 hours. He also agrees with the care-plan and conveys that all of His questions have been answered. I have also put together some discharge instructions for His that include: 1) educational information regarding their diagnosis, 2) how to care for their diagnosis at home, as well a 3) list of reasons why they would want to return to the ED prior to their follow-up appointment, should their condition change. DISCHARGE PLAN:  1. Discharge Medication List as of 9/11/2022  6:46 PM        START taking these medications    Details   cephALEXin (Keflex) 500 mg capsule Take 1 Capsule by mouth four (4) times daily for 10 days. , Normal, Disp-40 Capsule, R-0           CONTINUE these medications which have NOT CHANGED    Details   losartan-hydroCHLOROthiazide (HYZAAR) 100-25 mg per tablet TAKE ONE TABLET BY MOUTH DAILY, Normal, Disp-90 Tab, R-0      aspirin delayed-release 325 mg tablet Take 1 Tab by mouth two (2) times a day., Print, Disp-60 Tab,R-0      gabapentin (NEURONTIN) 100 mg capsule T1 tablet at breakfast and 3 tablets at night., Print, Disp-120 Cap,R-1Not to exceed 2000mg      acetaminophen (Tylenol Extra Strength) 500 mg tablet Take 1-2 Tabs by mouth every six (6) hours as needed for Pain.  Not to exceed 4,000mg in any 24 hour period  Indications: pain, Print, Disp-60 Tab,R-0      ondansetron (ZOFRAN ODT) 8 mg disintegrating tablet Take 0.5 Tabs by mouth every eight (8) hours as needed for Nausea. , Print, Disp-30 Tab,R-0      fluticasone propionate (FLONASE) 50 mcg/actuation nasal spray 2 Sprays by Both Nostrils route as needed., Historical Med      loratadine (CLARITIN) 10 mg tablet loratadine 10 mg tablet   Take 1 tablet every day by oral route as directed., Historical Med      rivaroxaban (XARELTO) 20 mg tab tablet Take 1 Tab by mouth daily (with dinner). Indications: prevention of thromboembolism in paroxysmal atrial fibrillation, Print, Disp-90 Tab, R-3      UBIQUINONE PO Take 100 mg by mouth daily. , Historical Med      psyllium (METAMUCIL) powd Take  by mouth daily. , Historical Med      ipratropium (ATROVENT) 0.03 % nasal spray by Both Nostrils route as needed., Historical Med      VIT C/E/ZN/COPPR/LUTEIN/ZEAXAN (PRESERVISION AREDS 2 PO) Take  by mouth two (2) times a day., Historical Med      fish oil-omega-3 fatty acids 340-1,000 mg capsule Take 1 Cap by mouth daily (after breakfast). , Historical Med      magnesium 250 mg tab Take 400 mg by mouth daily (after dinner). , Historical Med      multivitamin (ONE A DAY) tablet Take 1 Tab by mouth daily (after breakfast). , Historical Med      ASCORBATE CALCIUM (VITAMIN C PO) Take 500 mg by mouth daily (after breakfast). , Historical Med      Cholecalciferol, Vitamin D3, (VITAMIN D) 2,000 unit Cap Take 2,000 Units by mouth daily (after dinner). , Historical Med           2.    Follow-up Information       Follow up With Specialties Details Why Contact Info    Seun Palacios MD [facility] Medicine Schedule an appointment as soon as possible for a visit   [address] [place] [address]  [phone]      Your podiatrist  Schedule an appointment as soon as possible for a visit       [facility] EMERGENCY DEPT Emergency Medicine  As needed, If symptoms worsen [phone] [id number]  [phone]          3. Return to ED if worse     Diagnosis     Clinical Impression:   1. Infection of superficial incisional surgical site after procedure, initial encounter        Attestations:    Faiza Hall, DO    Please note that this dictation was completed with [employer], the computer voice recognition software. Quite often unanticipated grammatical, syntax, homophones, and other interpretive errors are inadvertently transcribed by the computer software. Please disregard these errors. Please excuse any errors that have escaped final proofreading. Thank you. Azithromycin Pregnancy And Lactation Text: This medication is considered safe during pregnancy and is also secreted in breast milk.

## 2022-09-11 NOTE — ED TRIAGE NOTES
Pt reports bunyan removal about 1 month ago to right toe . States that the site in red and draining and has poor healing since surgery. Denies fevers at home.

## 2022-09-11 NOTE — DISCHARGE INSTRUCTIONS
You were seen in emergency department today for an infection of your surgical foot wound. Your blood work did not show any concerning signs for a blood infection. We are sending you home with antibiotics. Please take them all as directed and call your podiatrist tomorrow to make a follow-up appointment. If after few days the redness, swelling and pain is getting worse, you are having increased odor or purulent drainage or fever, please return to the ER for reevaluation as you will likely need IV antibiotics at that time.

## 2022-11-16 ENCOUNTER — HOSPITAL ENCOUNTER (INPATIENT)
Age: 82
LOS: 16 days | Discharge: NURSING, REHAB OR LONG-TERM CARE | DRG: 477 | End: 2022-12-02
Attending: EMERGENCY MEDICINE | Admitting: STUDENT IN AN ORGANIZED HEALTH CARE EDUCATION/TRAINING PROGRAM
Payer: MEDICARE

## 2022-11-16 ENCOUNTER — APPOINTMENT (OUTPATIENT)
Dept: GENERAL RADIOLOGY | Age: 82
DRG: 477 | End: 2022-11-16
Attending: EMERGENCY MEDICINE
Payer: MEDICARE

## 2022-11-16 ENCOUNTER — APPOINTMENT (OUTPATIENT)
Dept: VASCULAR SURGERY | Age: 82
DRG: 477 | End: 2022-11-16
Attending: STUDENT IN AN ORGANIZED HEALTH CARE EDUCATION/TRAINING PROGRAM
Payer: MEDICARE

## 2022-11-16 ENCOUNTER — APPOINTMENT (OUTPATIENT)
Dept: MRI IMAGING | Age: 82
DRG: 477 | End: 2022-11-16
Attending: STUDENT IN AN ORGANIZED HEALTH CARE EDUCATION/TRAINING PROGRAM
Payer: MEDICARE

## 2022-11-16 DIAGNOSIS — I73.9 PERIPHERAL VASCULAR DISEASE OF EXTREMITY (HCC): ICD-10-CM

## 2022-11-16 DIAGNOSIS — M86.279 SUBACUTE OSTEOMYELITIS OF FOOT, UNSPECIFIED LATERALITY (HCC): ICD-10-CM

## 2022-11-16 DIAGNOSIS — I82.90 CLOT: ICD-10-CM

## 2022-11-16 DIAGNOSIS — F51.01 PRIMARY INSOMNIA: ICD-10-CM

## 2022-11-16 DIAGNOSIS — G62.9 PERIPHERAL POLYNEUROPATHY: ICD-10-CM

## 2022-11-16 DIAGNOSIS — L03.115 CELLULITIS OF RIGHT LOWER EXTREMITY: Primary | ICD-10-CM

## 2022-11-16 DIAGNOSIS — A41.9 SEPSIS, DUE TO UNSPECIFIED ORGANISM, UNSPECIFIED WHETHER ACUTE ORGAN DYSFUNCTION PRESENT (HCC): ICD-10-CM

## 2022-11-16 LAB
ALBUMIN SERPL-MCNC: 3 G/DL (ref 3.5–5)
ALBUMIN/GLOB SERPL: 0.8 {RATIO} (ref 1.1–2.2)
ALP SERPL-CCNC: 80 U/L (ref 45–117)
ALT SERPL-CCNC: 27 U/L (ref 12–78)
ANION GAP SERPL CALC-SCNC: 11 MMOL/L (ref 5–15)
AST SERPL-CCNC: 25 U/L (ref 15–37)
BASOPHILS # BLD: 0 K/UL (ref 0–0.1)
BASOPHILS NFR BLD: 0 % (ref 0–1)
BILIRUB SERPL-MCNC: 1.2 MG/DL (ref 0.2–1)
BUN SERPL-MCNC: 42 MG/DL (ref 6–20)
BUN/CREAT SERPL: 24 (ref 12–20)
CALCIUM SERPL-MCNC: 8.3 MG/DL (ref 8.5–10.1)
CHLORIDE SERPL-SCNC: 101 MMOL/L (ref 97–108)
CO2 SERPL-SCNC: 24 MMOL/L (ref 21–32)
COMMENT, HOLDF: NORMAL
COMMENT, HOLDF: NORMAL
COVID-19 RAPID TEST, COVR: NOT DETECTED
CREAT SERPL-MCNC: 1.72 MG/DL (ref 0.7–1.3)
DIFFERENTIAL METHOD BLD: ABNORMAL
EOSINOPHIL # BLD: 0 K/UL (ref 0–0.4)
EOSINOPHIL NFR BLD: 0 % (ref 0–7)
ERYTHROCYTE [DISTWIDTH] IN BLOOD BY AUTOMATED COUNT: 14.3 % (ref 11.5–14.5)
FLUAV AG NPH QL IA: NEGATIVE
FLUBV AG NOSE QL IA: NEGATIVE
GLOBULIN SER CALC-MCNC: 3.6 G/DL (ref 2–4)
GLUCOSE SERPL-MCNC: 158 MG/DL (ref 65–100)
HCT VFR BLD AUTO: 34.1 % (ref 36.6–50.3)
HGB BLD-MCNC: 10.8 G/DL (ref 12.1–17)
IMM GRANULOCYTES # BLD AUTO: 0.2 K/UL (ref 0–0.04)
IMM GRANULOCYTES NFR BLD AUTO: 1 % (ref 0–0.5)
LACTATE BLD-SCNC: 1.93 MMOL/L (ref 0.4–2)
LACTATE BLD-SCNC: 3.24 MMOL/L (ref 0.4–2)
LYMPHOCYTES # BLD: 0.5 K/UL (ref 0.8–3.5)
LYMPHOCYTES NFR BLD: 3 % (ref 12–49)
MCH RBC QN AUTO: 31.9 PG (ref 26–34)
MCHC RBC AUTO-ENTMCNC: 31.7 G/DL (ref 30–36.5)
MCV RBC AUTO: 100.6 FL (ref 80–99)
MONOCYTES # BLD: 1.8 K/UL (ref 0–1)
MONOCYTES NFR BLD: 12 % (ref 5–13)
NEUTS SEG # BLD: 12.9 K/UL (ref 1.8–8)
NEUTS SEG NFR BLD: 84 % (ref 32–75)
NRBC # BLD: 0 K/UL (ref 0–0.01)
NRBC BLD-RTO: 0 PER 100 WBC
PLATELET # BLD AUTO: 321 K/UL (ref 150–400)
PMV BLD AUTO: 9.5 FL (ref 8.9–12.9)
POTASSIUM SERPL-SCNC: 3.9 MMOL/L (ref 3.5–5.1)
PROT SERPL-MCNC: 6.6 G/DL (ref 6.4–8.2)
RBC # BLD AUTO: 3.39 M/UL (ref 4.1–5.7)
RBC MORPH BLD: ABNORMAL
SAMPLES BEING HELD,HOLD: NORMAL
SAMPLES BEING HELD,HOLD: NORMAL
SODIUM SERPL-SCNC: 136 MMOL/L (ref 136–145)
SOURCE, COVRS: NORMAL
TROPONIN-HIGH SENSITIVITY: 25 NG/L (ref 0–76)
WBC # BLD AUTO: 15.4 K/UL (ref 4.1–11.1)

## 2022-11-16 PROCEDURE — 0QBL0ZX EXCISION OF RIGHT TARSAL, OPEN APPROACH, DIAGNOSTIC: ICD-10-PCS | Performed by: SURGERY

## 2022-11-16 PROCEDURE — 0QBN0ZZ EXCISION OF RIGHT METATARSAL, OPEN APPROACH: ICD-10-PCS | Performed by: SURGERY

## 2022-11-16 PROCEDURE — 74011250636 HC RX REV CODE- 250/636: Performed by: STUDENT IN AN ORGANIZED HEALTH CARE EDUCATION/TRAINING PROGRAM

## 2022-11-16 PROCEDURE — 74011000250 HC RX REV CODE- 250: Performed by: STUDENT IN AN ORGANIZED HEALTH CARE EDUCATION/TRAINING PROGRAM

## 2022-11-16 PROCEDURE — B4101ZZ FLUOROSCOPY OF ABDOMINAL AORTA USING LOW OSMOLAR CONTRAST: ICD-10-PCS | Performed by: SURGERY

## 2022-11-16 PROCEDURE — 96374 THER/PROPH/DIAG INJ IV PUSH: CPT

## 2022-11-16 PROCEDURE — 87635 SARS-COV-2 COVID-19 AMP PRB: CPT

## 2022-11-16 PROCEDURE — 74011250636 HC RX REV CODE- 250/636: Performed by: EMERGENCY MEDICINE

## 2022-11-16 PROCEDURE — 93971 EXTREMITY STUDY: CPT

## 2022-11-16 PROCEDURE — 047T3ZZ DILATION OF RIGHT PERONEAL ARTERY, PERCUTANEOUS APPROACH: ICD-10-PCS | Performed by: SURGERY

## 2022-11-16 PROCEDURE — 83605 ASSAY OF LACTIC ACID: CPT

## 2022-11-16 PROCEDURE — 65270000029 HC RM PRIVATE

## 2022-11-16 PROCEDURE — 73718 MRI LOWER EXTREMITY W/O DYE: CPT

## 2022-11-16 PROCEDURE — 80053 COMPREHEN METABOLIC PANEL: CPT

## 2022-11-16 PROCEDURE — 74011000250 HC RX REV CODE- 250: Performed by: EMERGENCY MEDICINE

## 2022-11-16 PROCEDURE — 85025 COMPLETE CBC W/AUTO DIFF WBC: CPT

## 2022-11-16 PROCEDURE — 74011250637 HC RX REV CODE- 250/637: Performed by: STUDENT IN AN ORGANIZED HEALTH CARE EDUCATION/TRAINING PROGRAM

## 2022-11-16 PROCEDURE — B41F1ZZ FLUOROSCOPY OF RIGHT LOWER EXTREMITY ARTERIES USING LOW OSMOLAR CONTRAST: ICD-10-PCS | Performed by: SURGERY

## 2022-11-16 PROCEDURE — 71046 X-RAY EXAM CHEST 2 VIEWS: CPT

## 2022-11-16 PROCEDURE — 99285 EMERGENCY DEPT VISIT HI MDM: CPT

## 2022-11-16 PROCEDURE — B41C1ZZ FLUOROSCOPY OF PELVIC ARTERIES USING LOW OSMOLAR CONTRAST: ICD-10-PCS | Performed by: SURGERY

## 2022-11-16 PROCEDURE — 87040 BLOOD CULTURE FOR BACTERIA: CPT

## 2022-11-16 PROCEDURE — 84484 ASSAY OF TROPONIN QUANT: CPT

## 2022-11-16 PROCEDURE — 65270000046 HC RM TELEMETRY

## 2022-11-16 PROCEDURE — 047R3ZZ DILATION OF RIGHT POSTERIOR TIBIAL ARTERY, PERCUTANEOUS APPROACH: ICD-10-PCS | Performed by: SURGERY

## 2022-11-16 PROCEDURE — 87804 INFLUENZA ASSAY W/OPTIC: CPT

## 2022-11-16 RX ORDER — SODIUM CHLORIDE 9 MG/ML
75 INJECTION, SOLUTION INTRAVENOUS CONTINUOUS
Status: DISCONTINUED | OUTPATIENT
Start: 2022-11-16 | End: 2022-11-17

## 2022-11-16 RX ORDER — SODIUM CHLORIDE 0.9 % (FLUSH) 0.9 %
5-40 SYRINGE (ML) INJECTION AS NEEDED
Status: DISCONTINUED | OUTPATIENT
Start: 2022-11-16 | End: 2022-12-02 | Stop reason: HOSPADM

## 2022-11-16 RX ORDER — ONDANSETRON 4 MG/1
4 TABLET, ORALLY DISINTEGRATING ORAL
Status: DISCONTINUED | OUTPATIENT
Start: 2022-11-16 | End: 2022-12-02 | Stop reason: HOSPADM

## 2022-11-16 RX ORDER — ASPIRIN 81 MG/1
81 TABLET ORAL DAILY
Status: DISCONTINUED | OUTPATIENT
Start: 2022-11-17 | End: 2022-12-02

## 2022-11-16 RX ORDER — POLYETHYLENE GLYCOL 3350 17 G/17G
17 POWDER, FOR SOLUTION ORAL DAILY PRN
Status: DISCONTINUED | OUTPATIENT
Start: 2022-11-16 | End: 2022-12-02 | Stop reason: HOSPADM

## 2022-11-16 RX ORDER — ACETAMINOPHEN 325 MG/1
650 TABLET ORAL
Status: DISCONTINUED | OUTPATIENT
Start: 2022-11-16 | End: 2022-12-02 | Stop reason: HOSPADM

## 2022-11-16 RX ORDER — GABAPENTIN 100 MG/1
200 CAPSULE ORAL 2 TIMES DAILY
Status: DISCONTINUED | OUTPATIENT
Start: 2022-11-16 | End: 2022-11-17

## 2022-11-16 RX ORDER — SODIUM CHLORIDE 0.9 % (FLUSH) 0.9 %
5-40 SYRINGE (ML) INJECTION EVERY 8 HOURS
Status: DISCONTINUED | OUTPATIENT
Start: 2022-11-16 | End: 2022-12-02 | Stop reason: HOSPADM

## 2022-11-16 RX ORDER — ONDANSETRON 2 MG/ML
4 INJECTION INTRAMUSCULAR; INTRAVENOUS
Status: DISCONTINUED | OUTPATIENT
Start: 2022-11-16 | End: 2022-12-02 | Stop reason: HOSPADM

## 2022-11-16 RX ORDER — ACETAMINOPHEN 650 MG/1
650 SUPPOSITORY RECTAL
Status: DISCONTINUED | OUTPATIENT
Start: 2022-11-16 | End: 2022-11-26

## 2022-11-16 RX ORDER — ASPIRIN 325 MG
325 TABLET, DELAYED RELEASE (ENTERIC COATED) ORAL DAILY
Status: DISCONTINUED | OUTPATIENT
Start: 2022-11-17 | End: 2022-11-16

## 2022-11-16 RX ADMIN — VANCOMYCIN HYDROCHLORIDE 2250 MG: 10 INJECTION, POWDER, LYOPHILIZED, FOR SOLUTION INTRAVENOUS at 20:02

## 2022-11-16 RX ADMIN — SODIUM CHLORIDE, PRESERVATIVE FREE 10 ML: 5 INJECTION INTRAVENOUS at 21:52

## 2022-11-16 RX ADMIN — SODIUM CHLORIDE 75 ML/HR: 9 INJECTION, SOLUTION INTRAVENOUS at 18:52

## 2022-11-16 RX ADMIN — RIVAROXABAN 15 MG: 15 TABLET, FILM COATED ORAL at 19:59

## 2022-11-16 RX ADMIN — CEFEPIME 2 G: 2 INJECTION, POWDER, FOR SOLUTION INTRAVENOUS at 14:49

## 2022-11-16 RX ADMIN — SODIUM CHLORIDE 1000 ML: 9 INJECTION, SOLUTION INTRAVENOUS at 14:19

## 2022-11-16 NOTE — Clinical Note
Pain assessment by WHITNEY Cerna RN documented in [place] , and transcribed over to [facility] care

## 2022-11-16 NOTE — ED PROVIDER NOTES
3:00 PM  Change of shift. Care of patient taken over from Dr. Tish Acosta. Awaiting labs. 4:08 PM  Work-up is notable for leukocytosis with a white blood cell count of 15.4 K. Lactate is elevated at 3.24, will trend after IV fluids. BUN and creatinine elevated at 42 and 1.72, respectively; mildly elevated from baseline. COVID and influenza negative. Chest x-ray without any acute cardiopulmonary process. Perfect Serve Consult for Admission  4:10 PM    ED Room Number: D30/D30  Patient Name and age:  Mena Murray 78 y.o.  male  Working Diagnosis:   1. Cellulitis of right lower extremity    2. Sepsis, due to unspecified organism, unspecified whether acute organ dysfunction present ([address]        COVID-19 Suspicion:  no  Sepsis present:  yes  Reassessment needed: no  Code Status:  Full Code  Readmission: no  Isolation Requirements:  no  Recommended Level of Care:  med/surg  Department:[facility] ED - [phone]  Other:  79 y/o male presenting for right leg pain and redness. He has been treated with oral antibiotics for suspected cellulitis, worsening over the last 2 days. Today WBC 15.4 and lactate 3.24, will trend after IVF. LABS:  Recent Results (from the past 6 hour(s))   SAMPLES BEING HELD    Collection Time: 11/16/22  1:19 PM   Result Value Ref Range    SAMPLES BEING HELD BC,LAV,PST,BL,GR,SST,RED     COMMENT        Add-on orders for these samples will be processed based on acceptable specimen integrity and analyte stability, which may vary by analyte.    CBC WITH AUTOMATED DIFF    Collection Time: 11/16/22  1:19 PM   Result Value Ref Range    WBC 15.4 (H) 4.1 - 11.1 K/uL    RBC 3.39 (L) 4.10 - 5.70 M/uL    HGB 10.8 (L) 12.1 - 17.0 g/dL    HCT 34.1 (L) 36.6 - 50.3 %    .6 (H) 80.0 - 99.0 FL    MCH 31.9 26.0 - 34.0 PG    MCHC 31.7 30.0 - 36.5 g/dL    RDW 14.3 11.5 - 14.5 %    PLATELET 036 007 - 526 K/uL    MPV 9.5 8.9 - 12.9 FL    NRBC 0.0 0  WBC    ABSOLUTE NRBC 0.00 0.00 - 0.01 K/uL NEUTROPHILS PENDING %    LYMPHOCYTES PENDING %    MONOCYTES PENDING %    EOSINOPHILS PENDING %    BASOPHILS PENDING %    IMMATURE GRANULOCYTES PENDING %    ABS. NEUTROPHILS PENDING K/UL    ABS. LYMPHOCYTES PENDING K/UL    ABS. MONOCYTES PENDING K/UL    ABS. EOSINOPHILS PENDING K/UL    ABS. BASOPHILS PENDING K/UL    ABS. IMM. GRANS. PENDING K/UL    DF PENDING    METABOLIC PANEL, COMPREHENSIVE    Collection Time: 11/16/22  1:19 PM   Result Value Ref Range    Sodium 136 136 - 145 mmol/L    Potassium 3.9 3.5 - 5.1 mmol/L    Chloride 101 97 - 108 mmol/L    CO2 24 21 - 32 mmol/L    Anion gap 11 5 - 15 mmol/L    Glucose 158 (H) 65 - 100 mg/dL    BUN 42 (H) 6 - 20 MG/DL    Creatinine 1.72 (H) 0.70 - 1.30 MG/DL    BUN/Creatinine ratio 24 (H) 12 - 20      eGFR 39 (L) >60 ml/min/1.73m2    Calcium 8.3 (L) 8.5 - 10.1 MG/DL    Bilirubin, total 1.2 (H) 0.2 - 1.0 MG/DL    ALT (SGPT) 27 12 - 78 U/L    AST (SGOT) 25 15 - 37 U/L    Alk. phosphatase 80 45 - 117 U/L    Protein, total 6.6 6.4 - 8.2 g/dL    Albumin 3.0 (L) 3.5 - 5.0 g/dL    Globulin 3.6 2.0 - 4.0 g/dL    A-G Ratio 0.8 (L) 1.1 - 2.2     COVID-19 RAPID TEST    Collection Time: 11/16/22  2:06 PM   Result Value Ref Range    Specimen source Nasopharyngeal      COVID-19 rapid test Not detected NOTD     SAMPLES BEING HELD    Collection Time: 11/16/22  2:06 PM   Result Value Ref Range    SAMPLES BEING HELD NO HOLD     COMMENT        Add-on orders for these samples will be processed based on acceptable specimen integrity and analyte stability, which may vary by analyte. INFLUENZA A+B VIRAL AGS    Collection Time: 11/16/22  2:06 PM   Result Value Ref Range    Influenza A Antigen Negative NEG      Influenza B Antigen Negative NEG     POC LACTIC ACID    Collection Time: 11/16/22  2:09 PM   Result Value Ref Range    Lactic Acid (POC) 3.24 (HH) 0.40 - 2.00 mmol/L        IMAGING:  XR CHEST PA LAT   Final Result      Unchanged mild cardiomegaly.                Medications During Visit:  Medications   cefepime (MAXIPIME) 2 g in 0.9% sodium chloride 10 mL IV syringe (2 g IntraVENous Given 11/16/22 1449)     Followed by   cefepime (MAXIPIME) 2 g in 0.9% sodium chloride (MBP/ADV) 100 mL MBP (has no administration in time range)   sodium chloride 0.9 % bolus infusion 1,000 mL (1,000 mL IntraVENous New Bag 11/16/22 1419)         IMPRESSION:  1. Cellulitis of right lower extremity    2.  Sepsis, due to unspecified organism, unspecified whether acute organ dysfunction present [facility])        DISPOSITION:  Admitted    Sea Sanford DO

## 2022-11-16 NOTE — H&P
History & Physical    Primary Care Provider: Bronson Rubio MD  Source of Information: Patient and chart review    History of Presenting Illness:   Faby Taylor is a 78 y.o. male with htn, afib on xarelto, oleg on cpap who presents to hospital with complaints of right leg pain. Patient and wife at bedside state they noted a right heel ulcer about 1 week ago. 2 days after noticing ulcer they have noticed progressive erythema, warmth and redness creeping up his right lower extremity. They returned from [place] yesterday and proceeded to come to hospital for further evaluation today. Has chronic history of bilateral lower extremity neuropathy and a right anterior forefoot ulcer for which she is followed by podiatry. The patient denies any fever, chills, chest or abdominal pain, nausea, vomiting, cough, congestion, recent illness, palpitations, or dysuria. Remarkable vitals on ER Presentations: vss  Labs Remarkable for: wbc 15.4, glu 158, cr 1.72  ER Images: None. ER treatment: Cefepime     Review of Systems:  Pertinent items are noted in the History of Present Illness. Past Medical History:   Diagnosis Date    Arthritis     Back, knees, shoulders    Atrial fibrillation (HCC)     BCC (basal cell carcinoma of skin)     Benign hypertensive heart disease without heart failure     Diverticulosis     DJD (degenerative joint disease) of knee     right    Hematuria     due to certain medications    Hypertension     OLEG on CPAP 04/01/2011    Varicose vein of leg 1/12/2012      Past Surgical History:   Procedure Laterality Date    HX CATARACT REMOVAL Bilateral     HX COLONOSCOPY  2019    Removed polyps    HX HERNIA REPAIR      HX HIP REPLACEMENT Left 2016    HX KNEE REPLACEMENT Right 2019    HX MOHS PROCEDURES Left     x 3 Forhead and ear    HX TONSILLECTOMY  1943    HX VEIN STRIPPING Bilateral 2015     Prior to Admission medications    Medication Sig Start Date End Date Taking? Authorizing Provider   losartan-hydroCHLOROthiazide (HYZAAR) 100-25 mg per tablet TAKE ONE TABLET BY MOUTH DAILY 12/28/20   Yamila Regalado NP   aspirin delayed-release 325 mg tablet Take 1 Tab by mouth two (2) times a day. 9/21/20   Manuelito Alberto MD   gabapentin (NEURONTIN) 100 mg capsule T1 tablet at breakfast and 3 tablets at night. 9/21/20   Manuelito Alberto MD   acetaminophen (Tylenol Extra Strength) 500 mg tablet Take 1-2 Tabs by mouth every six (6) hours as needed for Pain. Not to exceed 4,000mg in any 24 hour period  Indications: pain 9/21/20   Manuelito Alberto MD   ondansetron [facility] ODT) 8 mg disintegrating tablet Take 0.5 Tabs by mouth every eight (8) hours as needed for Nausea. 9/21/20   Manuelito Alberto MD   fluticasone propionate (FLONASE) 50 mcg/actuation nasal spray 2 Sprays by Both Nostrils route as needed. Provider, Historical   loratadine (CLARITIN) 10 mg tablet loratadine 10 mg tablet   Take 1 tablet every day by oral route as directed. Provider, Historical   rivaroxaban (XARELTO) 20 mg tab tablet Take 1 Tab by mouth daily (with dinner). Indications: prevention of thromboembolism in paroxysmal atrial fibrillation 12/23/19   Chelsea Singh MD   UBIQUINONE PO Take 100 mg by mouth daily. Provider, Historical   psyllium (METAMUCIL) powd Take  by mouth daily. Provider, Historical   ipratropium (ATROVENT) 0.03 % nasal spray by Both Nostrils route as needed. 8/7/17   Provider, Historical   VIT C/E/ZN/COPPR/LUTEIN/ZEAXAN (PRESERVISION AREDS 2 PO) Take  by mouth two (2) times a day. Provider, Historical   fish oil-omega-3 fatty acids 340-1,000 mg capsule Take 1 Cap by mouth daily (after breakfast). Provider, Historical   magnesium 250 mg tab Take 400 mg by mouth daily (after dinner). Provider, Historical   multivitamin (ONE A DAY) tablet Take 1 Tab by mouth daily (after breakfast).     Provider, Historical   ASCORBATE CALCIUM (VITAMIN C PO) Take 500 mg by mouth daily (after breakfast). 1/10/11   Provider, Historical   Cholecalciferol, Vitamin D3, (VITAMIN D) 2,000 unit Cap Take 2,000 Units by mouth daily (after dinner). 1/10/11   Provider, Historical     Allergies   Allergen Reactions    Ace Inhibitors Cough     Patients states he is not allergic    Nsaids (Non-Steroidal Anti-Inflammatory Drug) Other (comments)     \"causes blood in urine\"    Pcn [Penicillins] Rash    Percocet [Oxycodone-Acetaminophen] Other (comments)     constipation      Family History   Problem Relation Age of Onset    Stroke Mother     Dementia Mother     Stroke Father     Asthma Father     Lung Disease Father     Diabetes Brother     Heart Disease Brother     Heart Disease Brother         SOCIAL HISTORY:  Patient resides:  Independently x   Assisted Living    SNF    With family care       Smoking history:   None x   Former    Chronic      Alcohol history:   None x   Social    Chronic      Ambulates:   Independently x   w/cane    w/walker    w/wc    CODE STATUS:  DNR    Full x   Other      Objective:     Physical Exam:     Visit Vitals  /83   Pulse 84   Temp 98.7 °F (37.1 °C)   Resp 18   Ht 5' 11\" (1.803 m)   Wt 95.3 kg (210 lb)   SpO2 98%   BMI 29.29 kg/m²           General:  Alert, cooperative, no distress, appears stated age. Head:  Normocephalic, without obvious abnormality, atraumatic. Eyes:  Conjunctivae/corneas clear. PERRL, EOMs intact. Nose: Nares normal. Septum midline. Mucosa normal.        Neck: Supple, symmetrical, trachea midline       Lungs:   Clear to auscultation bilaterally. Chest wall:  No tenderness or deformity. Heart:  Regular rate and rhythm, S1, S2 normal   Abdomen:   Soft, non-tender. Bowel sounds normal. No masses,  No organomegaly. Extremities: Quarter sized ulcer of right heel with dark-colored center and a macerated surrounding tissue. Dime size clean-based ulcer on lateral anterior forefoot. Erythema of right lower extremity to the knee.   3+ pitting edema right lower extremity   Pulses: 2+ and symmetric all extremities. Skin: Skin color, texture, turgor normal. No rashes or lesions   Neurologic: CNII-XII grossly intact. Data Review:     Recent Days:  Recent Labs     11/16/22  1319   WBC 15.4*   HGB 10.8*   HCT 34.1*        Recent Labs     11/16/22  1319      K 3.9      CO2 24   *   BUN 42*   CREA 1.72*   CA 8.3*   ALB 3.0*   ALT 27     No results for input(s): PH, PCO2, PO2, HCO3, FIO2 in the last 72 hours. 24 Hour Results:  Recent Results (from the past 24 hour(s))   SAMPLES BEING HELD    Collection Time: 11/16/22  1:19 PM   Result Value Ref Range    SAMPLES BEING HELD BC,LAV,PST,BL,GR,SST,RED     COMMENT        Add-on orders for these samples will be processed based on acceptable specimen integrity and analyte stability, which may vary by analyte. CBC WITH AUTOMATED DIFF    Collection Time: 11/16/22  1:19 PM   Result Value Ref Range    WBC 15.4 (H) 4.1 - 11.1 K/uL    RBC 3.39 (L) 4.10 - 5.70 M/uL    HGB 10.8 (L) 12.1 - 17.0 g/dL    HCT 34.1 (L) 36.6 - 50.3 %    .6 (H) 80.0 - 99.0 FL    MCH 31.9 26.0 - 34.0 PG    MCHC 31.7 30.0 - 36.5 g/dL    RDW 14.3 11.5 - 14.5 %    PLATELET 674 117 - 227 K/uL    MPV 9.5 8.9 - 12.9 FL    NRBC 0.0 0  WBC    ABSOLUTE NRBC 0.00 0.00 - 0.01 K/uL    NEUTROPHILS 84 (H) 32 - 75 %    LYMPHOCYTES 3 (L) 12 - 49 %    MONOCYTES 12 5 - 13 %    EOSINOPHILS 0 0 - 7 %    BASOPHILS 0 0 - 1 %    IMMATURE GRANULOCYTES 1 (H) 0.0 - 0.5 %    ABS. NEUTROPHILS 12.9 (H) 1.8 - 8.0 K/UL    ABS. LYMPHOCYTES 0.5 (L) 0.8 - 3.5 K/UL    ABS. MONOCYTES 1.8 (H) 0.0 - 1.0 K/UL    ABS. EOSINOPHILS 0.0 0.0 - 0.4 K/UL    ABS. BASOPHILS 0.0 0.0 - 0.1 K/UL    ABS. IMM.  GRANS. 0.2 (H) 0.00 - 0.04 K/UL    DF SMEAR SCANNED      RBC COMMENTS NORMOCYTIC, NORMOCHROMIC     METABOLIC PANEL, COMPREHENSIVE    Collection Time: 11/16/22  1:19 PM   Result Value Ref Range    Sodium 136 136 - 145 mmol/L    Potassium 3.9 3.5 - 5.1 mmol/L Chloride 101 97 - 108 mmol/L    CO2 24 21 - 32 mmol/L    Anion gap 11 5 - 15 mmol/L    Glucose 158 (H) 65 - 100 mg/dL    BUN 42 (H) 6 - 20 MG/DL    Creatinine 1.72 (H) 0.70 - 1.30 MG/DL    BUN/Creatinine ratio 24 (H) 12 - 20      eGFR 39 (L) >60 ml/min/1.73m2    Calcium 8.3 (L) 8.5 - 10.1 MG/DL    Bilirubin, total 1.2 (H) 0.2 - 1.0 MG/DL    ALT (SGPT) 27 12 - 78 U/L    AST (SGOT) 25 15 - 37 U/L    Alk. phosphatase 80 45 - 117 U/L    Protein, total 6.6 6.4 - 8.2 g/dL    Albumin 3.0 (L) 3.5 - 5.0 g/dL    Globulin 3.6 2.0 - 4.0 g/dL    A-G Ratio 0.8 (L) 1.1 - 2.2     TROPONIN-HIGH SENSITIVITY    Collection Time: 11/16/22  1:19 PM   Result Value Ref Range    Troponin-High Sensitivity 25 0 - 76 ng/L   COVID-19 RAPID TEST    Collection Time: 11/16/22  2:06 PM   Result Value Ref Range    Specimen source Nasopharyngeal      COVID-19 rapid test Not detected NOTD     SAMPLES BEING HELD    Collection Time: 11/16/22  2:06 PM   Result Value Ref Range    SAMPLES BEING HELD NO HOLD     COMMENT        Add-on orders for these samples will be processed based on acceptable specimen integrity and analyte stability, which may vary by analyte. INFLUENZA A+B VIRAL AGS    Collection Time: 11/16/22  2:06 PM   Result Value Ref Range    Influenza A Antigen Negative NEG      Influenza B Antigen Negative NEG     POC LACTIC ACID    Collection Time: 11/16/22  2:09 PM   Result Value Ref Range    Lactic Acid (POC) 3.24 (HH) 0.40 - 2.00 mmol/L         Imaging:     Assessment:     Janessa Lambert is a 78 y.o. male with htn, afib on xarelto, yovany on cpap who is admitted for right lower extremity ulcer and cellulitis.        Plan:       Right lower extremity cellulitis/ Right Heel Ulcer  -Obtain right lower extremity Dopplers  -Given nature of right heel ulcer, will obtain MRI for to rule out osteo-  -Empiric cefepime plus vancomycin  -Follow blood cultures  -Podiatry consult    Atrial fibrillation  -Continue home Xarelto    Hypertension  -Continue home Hyzaar    OLEG  -cPAP nightly    Obesity  -Counseled on weight loss, dieting and exercise        FEN/GI -  [email or web address]  Activity - as tolerated  DVT prophylaxis - xarelto  GI prophylaxis -  NI  Disposition - Home    CODE STATUS:  full code       Signed By: Shashank Archibald MD     November 16, 2022

## 2022-11-16 NOTE — Clinical Note
Right leg run off and abd aortagram performed LFA approach and injection medrad through 65 cm omniflush

## 2022-11-16 NOTE — PROGRESS NOTES
[facility] Dosing [facility]    Cefepime was automatically dose-adjusted per [facility] P&T Committee Protocol, with respect to renal function. Assessment/Plan: Estimated Creatinine Clearance: 56.3 mL/min (by C-G formula based on SCr of 1.15 mg/dL). Cefepime changed to 2 grams IV push x1 followed by Cefepime 2 grams IV every 12 hours extended infusion P&T approved protocol for patient with CrCl less than 60 mL/min. Serum Creatinine Lab Results   Component Value Date/Time    Creatinine 1.15 09/11/2022 06:06 PM    Creatinine (POC) 1.6 (H) 05/31/2013 06:45 PM       Creatinine Clearance Estimated Creatinine Clearance: 56.3 mL/min (by C-G formula based on SCr of 1.15 mg/dL).    BUN Lab Results   Component Value Date/Time    BUN 34 (H) 09/11/2022 06:06 PM    BUN (POC) 45 (H) 05/31/2013 06:45 PM         Pharmacist Frankie Colmenares

## 2022-11-16 NOTE — Clinical Note
TRANSFER - OUT REPORT:     Verbal report given to: Tanesha Saucedo. Report consisted of patient's Situation, Background, Assessment and   Recommendations(SBAR). Opportunity for questions and clarification was provided. Patient transported with a Registered Nurse.

## 2022-11-16 NOTE — ED TRIAGE NOTES
Pt reports cough with productive cough and chills x2-3 days ago. Pt also reports right leg infection that is currently being treated for 2-3 months with antibiotics  redness and swelling has been worsening over 2-3 weeks. Pt denies chest pain and SOB at this time.

## 2022-11-16 NOTE — ED PROVIDER NOTES
Mr. Shira Matson is an 77yo male who presents to the ER with complaints of right leg pain. He is recently been treated with antibiotics for the last few weeks for presumed cellulitis. He has noticed increasing redness and swelling in his right leg over the last 2 weeks. He said that he noticed a sudden change within the last 2 days. His swelling and redness of gotten worse. He otherwise began to feel worse. He began to have chills, rigors, and worsening fatigue. He also ports having a recent runny nose, nasal congestion, and cough. He is scheduled to see his doctor later on today. However, he is feeling so much worse and had rigors and came to the ER instead. No chest pain or trouble breathing. No abdominal pain. No changes with his urine or bowel movements. He denies any other complaints.       Past Medical History:   Diagnosis Date    Arthritis     Back, knees, shoulders    Atrial fibrillation (HCC)     BCC (basal cell carcinoma of skin)     Benign hypertensive heart disease without heart failure     Diverticulosis     DJD (degenerative joint disease) of knee     right    Hematuria     due to certain medications    Hypertension     OLEG on CPAP 04/01/2011    Varicose vein of leg 1/12/2012       Past Surgical History:   Procedure Laterality Date    HX CATARACT REMOVAL Bilateral     HX COLONOSCOPY  2019    Removed polyps    HX HERNIA REPAIR      HX HIP REPLACEMENT Left 2016    HX KNEE REPLACEMENT Right 2019    HX MOHS PROCEDURES Left     x 3 Forhead and ear    HX TONSILLECTOMY  1943    HX VEIN STRIPPING Bilateral 2015         Family History:   Problem Relation Age of Onset    Stroke Mother     Dementia Mother     Stroke Father     Asthma Father     Lung Disease Father     Diabetes Brother     Heart Disease Brother     Heart Disease Brother        Social History     Socioeconomic History    Marital status:      Spouse name: Not on file    Number of children: Not on file    Years of education: Not on file Highest education level: Not on file   Occupational History    Not on file   Tobacco Use    Smoking status: Former     Packs/day: 0.50     Years: 40.00     Pack years: 20.00     Types: Cigarettes     Quit date: 18     Years since quittin.8    Smokeless tobacco: Former     Quit date: 1986   Substance and Sexual Activity    Alcohol use: Yes     Alcohol/week: 2.0 standard drinks     Types: 1 Cans of beer, 1 Shots of liquor per week    Drug use: No    Sexual activity: Not on file   Other Topics Concern    Not on file   Social History Narrative    Not on file     Social Determinants of Health     Financial Resource Strain: Not on file   Food Insecurity: Not on file   Transportation Needs: Not on file   Physical Activity: Not on file   Stress: Not on file   Social Connections: Not on file   Intimate Partner Violence: Not on file   Housing Stability: Not on file         ALLERGIES: Ace inhibitors, Nsaids (non-steroidal anti-inflammatory drug), Pcn [penicillins], and Percocet [oxycodone-acetaminophen]    Review of Systems   Constitutional:  Positive for chills and fatigue. HENT:  Positive for congestion. Negative for rhinorrhea. Respiratory:  Positive for cough. Negative for shortness of breath. Cardiovascular:  Positive for leg swelling. Negative for chest pain. Gastrointestinal:  Negative for abdominal pain, diarrhea, nausea and vomiting. Genitourinary:  Negative for dysuria and hematuria. Musculoskeletal:         Right leg pain   Skin:  Positive for rash. Neurological:  Negative for dizziness, weakness and light-headedness. All other systems reviewed and are negative. Vitals:    22 1315   BP: 120/83   Pulse: 84   Resp: 18   Temp: 98.7 °F (37.1 °C)   SpO2: 98%   Weight: 95.3 kg (210 lb)   Height: 5' 11\" (1.803 m)            Physical Exam       Vital signs reviewed. Nursing notes reviewed.     Const:  No acute distress, well developed, well nourished  Head:  Atraumatic, normocephalic  Eyes:  PERRL, conjunctiva normal, no scleral icterus  Neck:  Supple, trachea midline  Cardiovascular: Regular rate  Resp:  No resp distress, no increased work of breathing  Abd:  Soft, non-tender, non-distended  MSK: Bilateral pedal edema, normal ROM  Neuro:  Alert and oriented x3, no cranial nerve defect  Skin: Erythema of the right lower extremity extending from the right dorsal midfoot and extends up just distal to the right knee, warm, red, tender to the touch  Psych: Pleasant and cooperative          MDM     Amount and/or Complexity of Data Reviewed  Clinical lab tests: ordered and reviewed  Tests in the radiology section of CPT®: ordered and reviewed  Review and summarize past medical records: yes    Patient Progress  Patient progress: stable         Procedures      Mr. Mateo Sims is an 79yo male who presents to the ER with signs of worsening cellulitis. Labs are pending. Pt. Will likely require admission has he seems to have failed outpatient abx. Pt. Signed out to Dr. Ramona Abreu.

## 2022-11-17 ENCOUNTER — APPOINTMENT (OUTPATIENT)
Dept: VASCULAR SURGERY | Age: 82
DRG: 477 | End: 2022-11-17
Attending: PODIATRIST
Payer: MEDICARE

## 2022-11-17 ENCOUNTER — APPOINTMENT (OUTPATIENT)
Dept: GENERAL RADIOLOGY | Age: 82
DRG: 477 | End: 2022-11-17
Attending: PODIATRIST
Payer: MEDICARE

## 2022-11-17 LAB
ALBUMIN SERPL-MCNC: 1.6 G/DL (ref 3.5–5)
ALBUMIN/GLOB SERPL: 0.7 {RATIO} (ref 1.1–2.2)
ALP SERPL-CCNC: 43 U/L (ref 45–117)
ALT SERPL-CCNC: 16 U/L (ref 12–78)
ANION GAP SERPL CALC-SCNC: 8 MMOL/L (ref 5–15)
AST SERPL-CCNC: 19 U/L (ref 15–37)
BASOPHILS # BLD: 0 K/UL (ref 0–0.1)
BASOPHILS NFR BLD: 0 % (ref 0–1)
BILIRUB SERPL-MCNC: 0.7 MG/DL (ref 0.2–1)
BUN SERPL-MCNC: 28 MG/DL (ref 6–20)
BUN/CREAT SERPL: 42 (ref 12–20)
CALCIUM SERPL-MCNC: 5.5 MG/DL (ref 8.5–10.1)
CHLORIDE SERPL-SCNC: 118 MMOL/L (ref 97–108)
CO2 SERPL-SCNC: 18 MMOL/L (ref 21–32)
CREAT SERPL-MCNC: 0.66 MG/DL (ref 0.7–1.3)
DIFFERENTIAL METHOD BLD: ABNORMAL
EOSINOPHIL # BLD: 0 K/UL (ref 0–0.4)
EOSINOPHIL NFR BLD: 0 % (ref 0–7)
ERYTHROCYTE [DISTWIDTH] IN BLOOD BY AUTOMATED COUNT: 14.4 % (ref 11.5–14.5)
GLOBULIN SER CALC-MCNC: 2.4 G/DL (ref 2–4)
GLUCOSE SERPL-MCNC: 89 MG/DL (ref 65–100)
HCT VFR BLD AUTO: 26.8 % (ref 36.6–50.3)
HGB BLD-MCNC: 8.3 G/DL (ref 12.1–17)
IMM GRANULOCYTES # BLD AUTO: 0.1 K/UL (ref 0–0.04)
IMM GRANULOCYTES NFR BLD AUTO: 1 % (ref 0–0.5)
LYMPHOCYTES # BLD: 1 K/UL (ref 0.8–3.5)
LYMPHOCYTES NFR BLD: 8 % (ref 12–49)
MCH RBC QN AUTO: 32.2 PG (ref 26–34)
MCHC RBC AUTO-ENTMCNC: 31 G/DL (ref 30–36.5)
MCV RBC AUTO: 103.9 FL (ref 80–99)
MONOCYTES # BLD: 1.7 K/UL (ref 0–1)
MONOCYTES NFR BLD: 14 % (ref 5–13)
NEUTS SEG # BLD: 9.5 K/UL (ref 1.8–8)
NEUTS SEG NFR BLD: 77 % (ref 32–75)
NRBC # BLD: 0 K/UL (ref 0–0.01)
NRBC BLD-RTO: 0 PER 100 WBC
PLATELET # BLD AUTO: 217 K/UL (ref 150–400)
PMV BLD AUTO: 9.2 FL (ref 8.9–12.9)
POTASSIUM SERPL-SCNC: 2.5 MMOL/L (ref 3.5–5.1)
PROT SERPL-MCNC: 4 G/DL (ref 6.4–8.2)
RBC # BLD AUTO: 2.58 M/UL (ref 4.1–5.7)
SODIUM SERPL-SCNC: 144 MMOL/L (ref 136–145)
WBC # BLD AUTO: 12.3 K/UL (ref 4.1–11.1)

## 2022-11-17 PROCEDURE — 74011250637 HC RX REV CODE- 250/637: Performed by: STUDENT IN AN ORGANIZED HEALTH CARE EDUCATION/TRAINING PROGRAM

## 2022-11-17 PROCEDURE — 74011250636 HC RX REV CODE- 250/636: Performed by: HOSPITALIST

## 2022-11-17 PROCEDURE — 65270000046 HC RM TELEMETRY

## 2022-11-17 PROCEDURE — 74011000250 HC RX REV CODE- 250: Performed by: HOSPITALIST

## 2022-11-17 PROCEDURE — 84100 ASSAY OF PHOSPHORUS: CPT

## 2022-11-17 PROCEDURE — 83735 ASSAY OF MAGNESIUM: CPT

## 2022-11-17 PROCEDURE — 85025 COMPLETE CBC W/AUTO DIFF WBC: CPT

## 2022-11-17 PROCEDURE — 74011250637 HC RX REV CODE- 250/637: Performed by: HOSPITALIST

## 2022-11-17 PROCEDURE — 80053 COMPREHEN METABOLIC PANEL: CPT

## 2022-11-17 PROCEDURE — 73630 X-RAY EXAM OF FOOT: CPT

## 2022-11-17 PROCEDURE — 74011000258 HC RX REV CODE- 258: Performed by: STUDENT IN AN ORGANIZED HEALTH CARE EDUCATION/TRAINING PROGRAM

## 2022-11-17 PROCEDURE — 74011000250 HC RX REV CODE- 250: Performed by: STUDENT IN AN ORGANIZED HEALTH CARE EDUCATION/TRAINING PROGRAM

## 2022-11-17 PROCEDURE — 36415 COLL VENOUS BLD VENIPUNCTURE: CPT

## 2022-11-17 PROCEDURE — 74011250636 HC RX REV CODE- 250/636: Performed by: STUDENT IN AN ORGANIZED HEALTH CARE EDUCATION/TRAINING PROGRAM

## 2022-11-17 RX ORDER — POTASSIUM CHLORIDE 750 MG/1
40 TABLET, FILM COATED, EXTENDED RELEASE ORAL
Status: COMPLETED | OUTPATIENT
Start: 2022-11-17 | End: 2022-11-17

## 2022-11-17 RX ORDER — CALCIUM GLUCONATE 20 MG/ML
2 INJECTION, SOLUTION INTRAVENOUS ONCE
Status: ACTIVE | OUTPATIENT
Start: 2022-11-17 | End: 2022-11-17

## 2022-11-17 RX ORDER — POTASSIUM CHLORIDE 7.45 MG/ML
10 INJECTION INTRAVENOUS
Status: COMPLETED | OUTPATIENT
Start: 2022-11-17 | End: 2022-11-17

## 2022-11-17 RX ORDER — POTASSIUM CHLORIDE 750 MG/1
40 TABLET, FILM COATED, EXTENDED RELEASE ORAL
Status: DISPENSED | OUTPATIENT
Start: 2022-11-17 | End: 2022-11-17

## 2022-11-17 RX ADMIN — POTASSIUM CHLORIDE: 2 INJECTION, SOLUTION, CONCENTRATE INTRAVENOUS at 13:20

## 2022-11-17 RX ADMIN — POTASSIUM CHLORIDE 10 MEQ: 7.45 INJECTION INTRAVENOUS at 13:22

## 2022-11-17 RX ADMIN — HYDROCHLOROTHIAZIDE: 25 TABLET ORAL at 11:21

## 2022-11-17 RX ADMIN — CEFEPIME 2 G: 2 INJECTION, POWDER, FOR SOLUTION INTRAVENOUS at 15:59

## 2022-11-17 RX ADMIN — VANCOMYCIN HYDROCHLORIDE 1000 MG: 1 INJECTION, POWDER, LYOPHILIZED, FOR SOLUTION INTRAVENOUS at 11:40

## 2022-11-17 RX ADMIN — ONDANSETRON 4 MG: 2 INJECTION INTRAMUSCULAR; INTRAVENOUS at 11:24

## 2022-11-17 RX ADMIN — POTASSIUM CHLORIDE 10 MEQ: 7.45 INJECTION INTRAVENOUS at 13:00

## 2022-11-17 RX ADMIN — CEFEPIME 2 G: 2 INJECTION, POWDER, FOR SOLUTION INTRAVENOUS at 02:08

## 2022-11-17 RX ADMIN — POTASSIUM CHLORIDE 10 MEQ: 7.45 INJECTION INTRAVENOUS at 15:00

## 2022-11-17 RX ADMIN — SODIUM PHOSPHATE, MONOBASIC, MONOHYDRATE AND SODIUM PHOSPHATE, DIBASIC, ANHYDROUS: 142; 276 INJECTION, SOLUTION INTRAVENOUS at 14:31

## 2022-11-17 RX ADMIN — POTASSIUM CHLORIDE 10 MEQ: 7.45 INJECTION INTRAVENOUS at 15:30

## 2022-11-17 RX ADMIN — SODIUM CHLORIDE, PRESERVATIVE FREE 10 ML: 5 INJECTION INTRAVENOUS at 13:27

## 2022-11-17 RX ADMIN — POTASSIUM CHLORIDE 40 MEQ: 750 TABLET, FILM COATED, EXTENDED RELEASE ORAL at 11:21

## 2022-11-17 RX ADMIN — RIVAROXABAN 20 MG: 10 TABLET, FILM COATED ORAL at 18:53

## 2022-11-17 RX ADMIN — VANCOMYCIN HYDROCHLORIDE 1000 MG: 1 INJECTION, POWDER, LYOPHILIZED, FOR SOLUTION INTRAVENOUS at 23:50

## 2022-11-17 RX ADMIN — SODIUM CHLORIDE, PRESERVATIVE FREE 10 ML: 5 INJECTION INTRAVENOUS at 21:19

## 2022-11-17 NOTE — ED NOTES
Verbal shift change report given to Carlyn Perez  (oncoming nurse) by Julia QUIROS (offgoing nurse). Report included the following information SBAR, Kardex, ED Summary, MAR, and Recent Results.

## 2022-11-17 NOTE — PROGRESS NOTES
[address] [id number] [facility] Progress Note: Antimicrobial Stewardship  Consult for antibiotic dosing of Vancomycin by Dr. Ana Connor  Indication: SSTI (Right heel ulcer)  Day of Therapy: 1    Plan:  Vancomycin therapy:  Loading dose: Vancomycin 2250 mg IV x1 dose now  Maintenance dose: Vancomycin 1000 mg IV every 24 hours   Dose calculated to approximate a   Target AUC/MERCEDES of 400-500  Trough of 10-15 mcg/mL. Plan: Will order a level within 24hrs of 1st maintenance dose     Other Antimicrobial  (not dosed by pharmacist)   Cefepime 2 gm IV q12hr   Cultures     pending   Serum Creatinine     Lab Results   Component Value Date/Time    Creatinine 1.72 (H) 11/16/2022 01:19 PM    Creatinine (POC) 1.6 (H) 05/31/2013 06:45 PM       Creatinine Clearance Estimated Creatinine Clearance: 37.7 mL/min (A) (based on SCr of 1.72 mg/dL (H)). Procalcitonin  No results found for: PCT     Temp   98.7 °F (37.1 °C)    WBC   Lab Results   Component Value Date/Time    WBC 15.4 (H) 11/16/2022 01:19 PM       For Antifungals, Metronidazole and Nafcillin: Lab Results   Component Value Date/Time    ALT (SGPT) 27 11/16/2022 01:19 PM    AST (SGOT) 25 11/16/2022 01:19 PM    Alk.  phosphatase 80 11/16/2022 01:19 PM    Bilirubin, total 1.2 (H) 11/16/2022 01:19 PM         Pharmacist: Karyle Oms A Bond

## 2022-11-17 NOTE — PROGRESS NOTES
[facility]  [address], [facility] [address]  [phone]         Hospitalist Progress Note        NAME:  Royal Rojas   :  1940   MRN:  [id number]    Date/Time:  2022     Patient PCP:  Rodo Kaiser MD    Emergency Contact:    Extended Emergency Contact Information  Primary Emergency Contact: [address] Phone: [phone]  Relation: Spouse      Code: Full Code     Isolation Precautions: There are currently no Active Isolations        Subjective:     REASON FOR VISIT:  Recheck Foot Ulcer     HPI & INTERVAL HISTORY:     Mr. Lance Fenton is a 78 y.o. male with history that includes HTN, AFIB on Xarelto, and OLEG on CPAP reports right heel ulcer about 1 week ago which then spread up leg with erythema resulting in cellulitis. : Seen and examined. Has no pain. Wound and erythema unchanged per wife.       ALLERGIES  Allergies   Allergen Reactions    Ace Inhibitors Cough     Patients states he is not allergic    Nsaids (Non-Steroidal Anti-Inflammatory Drug) Other (comments)     \"causes blood in urine\"    Pcn [Penicillins] Rash    Percocet [Oxycodone-Acetaminophen] Other (comments)     constipation         ROS:  Gen:   denies fever, chills, fatigue, malaise, generalized weakness  Eyes:  negative  ENT:    negative  Resp:  denies cough, shortness of breath, sputum, pleuritis, hemoptysis, wheezing  CVS:   denies palpitations, CP, dizziness, syncope, edema, PND, WALSH, orthopnea  GI:       denies abd pain, nausea, vomit, diarrhea, constipation, GERD, melena, hematochezia  Skin:   Wond as described in PE  Chance:    negative except for peripheral neuropathy           Objective:      Visit Vitals  BP (!) 123/56   Pulse (!) 59   Temp 98.6 °F (37 °C)   Resp 19   Ht 5' 11\" (1.803 m)   Wt 95.3 kg (210 lb)   SpO2 92%   BMI 29.29 kg/m²       Physical Exam:  General: alert, well appearing, and no distress  Head: Normocephalic, without obvious abnormality, atraumatic  Eyes: PERRL, EOMI, anicteric sclerae, and conjuntiva clear  ENT: lips, mucosa, and tongue normal  Neck: normal, supple, and no tenderness  Lungs: clear to auscultation with good breath sounds and normal respiratory effort  Heart: S1, S2 normal, regular rate, and regular rhythm  Abd: not distended, soft, nontender, BS present and normactive  Ext: no cyanosis and right lower extremity 3+ edema  Skin:  2 cm ulcer of right heel with dark-colored center and a macerated surrounding tissue. Dime size clean-based ulcer on lateral anterior forefoot.   Erythema of right lower extremity to the knee  Neuro:  alert, oriented x 3, no defects noted in general exam.  Psych: not anxious, cooperative, appropriate affect       Medications:  Current Facility-Administered Medications   Medication Dose Route Frequency    potassium chloride SR (KLOR-CON 10) tablet 40 mEq  40 mEq Oral Q1H    calcium gluconate 2 g/100 mL sodium chloride (ISO-OSM)  2 g IntraVENous ONCE    cefepime (MAXIPIME) 2 g in 0.9% sodium chloride (MBP/ADV) 100 mL MBP  2 g IntraVENous Q12H    losartan/hydroCHLOROthiazide (HYZAAR) 100/25 mg   Oral DAILY    sodium chloride (NS) flush 5-40 mL  5-40 mL IntraVENous Q8H    sodium chloride (NS) flush 5-40 mL  5-40 mL IntraVENous PRN    acetaminophen (TYLENOL) tablet 650 mg  650 mg Oral Q6H PRN    Or    acetaminophen (TYLENOL) suppository 650 mg  650 mg Rectal Q6H PRN    polyethylene glycol (MIRALAX) packet 17 g  17 g Oral DAILY PRN    ondansetron (ZOFRAN ODT) tablet 4 mg  4 mg Oral Q8H PRN    Or    ondansetron (ZOFRAN) injection 4 mg  4 mg IntraVENous Q6H PRN    0.9% sodium chloride infusion  75 mL/hr IntraVENous CONTINUOUS    vancomycin (VANCOCIN) 1,000 mg in 0.9% sodium chloride 250 mL ([id number])  1,000 mg IntraVENous Q24H    rivaroxaban (XARELTO) tablet 15 mg  15 mg Oral DAILY WITH DINNER    aspirin delayed-release tablet 81 mg  81 mg Oral DAILY     Current Outpatient Medications Medication Sig    losartan-hydroCHLOROthiazide (HYZAAR) 100-25 mg per tablet TAKE ONE TABLET BY MOUTH DAILY    acetaminophen (Tylenol Extra Strength) 500 mg tablet Take 1-2 Tabs by mouth every six (6) hours as needed for Pain. Not to exceed 4,000mg in any 24 hour period  Indications: pain    rivaroxaban (XARELTO) 20 mg tab tablet Take 1 Tab by mouth daily (with dinner). Indications: prevention of thromboembolism in paroxysmal atrial fibrillation    UBIQUINONE PO Take 100 mg by mouth daily. psyllium (METAMUCIL) powd Take  by mouth daily. VIT C/E/ZN/COPPR/LUTEIN/ZEAXAN (PRESERVISION AREDS 2 PO) Take  by mouth two (2) times a day. fish oil-omega-3 fatty acids 340-1,000 mg capsule Take 1 Cap by mouth daily (after breakfast). magnesium 250 mg tab Take 400 mg by mouth daily (after dinner). multivitamin (ONE A DAY) tablet Take 1 Tab by mouth daily (after breakfast). ASCORBATE CALCIUM (VITAMIN C PO) Take 500 mg by mouth daily (after breakfast). cholecalciferol (VITAMIN D3) (2,000 UNITS /50 MCG) cap capsule Take 2,000 Units by mouth daily (after dinner). aspirin delayed-release 325 mg tablet Take 1 Tab by mouth two (2) times a day. (Patient not taking: Reported on 11/16/2022)    gabapentin (NEURONTIN) 100 mg capsule T1 tablet at breakfast and 3 tablets at night. ondansetron (ZOFRAN ODT) 8 mg disintegrating tablet Take 0.5 Tabs by mouth every eight (8) hours as needed for Nausea. fluticasone propionate (FLONASE) 50 mcg/actuation nasal spray 2 Sprays by Both Nostrils route as needed. loratadine (CLARITIN) 10 mg tablet loratadine 10 mg tablet   Take 1 tablet every day by oral route as directed. ipratropium (ATROVENT) 0.03 % nasal spray by Both Nostrils route as needed.         Labs:  Recent Labs     11/17/22 0435   WBC 12.3*   HGB 8.3*   HCT 26.8*        Recent Labs     11/17/22 0435      K 2.5*   *   CO2 18*   GLU 89   BUN 28*   CREA 0.66*   CA 5.5*   ALB 1.6*   TBILI 0. 7   ALT 16       Radiology:  XR CHEST PA LAT    Result Date: 11/16/2022  Unchanged mild cardiomegaly. MRI LOW EXT RT WO CONT    Result Date: 11/17/2022  1. Study limited motion. 2.  Findings concerning for septic or inflammatory tibiotalar arthritis, with joint effusion and synovitis. 3.  Diffuse soft tissue swelling. Ulceration at the plantar hindfoot. No evidence of underlying acute osteomyelitis nor abscess. 4.  Multifocal tendinopathy and ligamentous pathology as described above. 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 Start lovenox.      Hypertension  Continue home Hyzaar     OLEG  CPAP nightly     Obesity  Counseled on weight loss, dieting and exercise    Body mass index is 29.29 kg/m².:  25.0 - 29.9:  Overweight    F: LR KCl 20 @ 125 ml/hr  E: Hypok, hypocal, hypophos  N: ADULT DIET Regular       Risk of deterioration: high      Discussed:  Pt's condition, Imaging findings, Lab findings, Assessment, and Care Plan discussed with: Patient, Spouse at bedside , RN, and Care Manager    Prophylaxis:  Lovenox SQ    Anticipated discharge disposition:  [facility] Barriers: Currently not medically stable for discharge      Total time: 40 minutes **I personally saw and examined the patient during this time period**      Date of service:    11/17/2022                ___________________________________________________    Admitting Physician: Kyle Montero MD

## 2022-11-17 NOTE — ED NOTES
Consult received. Patient known to my office. I will have my partner, Dr. Carine Sherwood evaluate today. Baseline vascular studies and radiographs ordered. Thanks for calling.

## 2022-11-17 NOTE — PROGRESS NOTES
[address] [address] [facility] Pharmacy Progress Note: Antimicrobial Stewardship  Consult for antibiotic dosing of Vancomycin by Dr. Mike Gamino  Indication: SSTI (Right heel ulcer)  Day of Therapy: 2    Plan:  Vancomycin therapy:  Loading dose: Vancomycin 2250 mg IV x1 dose now  Maintenance dose: Vancomycin 1000 mg IV every 24 hours   Dose calculated to approximate a   Target AUC/MERCEDES of 400-500  Trough of 10-15 mcg/mL. SCr 0.66 (down from 1.72). Patient with apparent VALERY on admission yesterday which has significantly improved. WBC 12.3. Afebrile. MRI concerning for septic or inflammatory tibiotalar arthritis with joint effusion and synovitis. Ulceration of heel shows no evidence of acute osteo or abscess however. Plan: Given improvement in renal function, will empirically adjust dose to 1000 mg IV Q12H. This regimen predicts  per Bayesian kinetics calculations. Level ordered tomorrow prior to 4th dose. Other Antimicrobial  (not dosed by pharmacist)   Cefepime 2 gm IV q12hr   Cultures     11/16 Blood x 2 - pending   Serum Creatinine     Lab Results   Component Value Date/Time    Creatinine 0.66 (L) 11/17/2022 04:35 AM    Creatinine (POC) 1.6 (H) 05/31/2013 06:45 PM       Creatinine Clearance Estimated Creatinine Clearance: 92.6 mL/min (A) (by C-G formula based on SCr of 0.66 mg/dL (L)). Procalcitonin  No results found for: PCT     Temp   98.6 °F (37 °C)    WBC   Lab Results   Component Value Date/Time    WBC 12.3 (H) 11/17/2022 04:35 AM       For Antifungals, Metronidazole and Nafcillin: Lab Results   Component Value Date/Time    ALT (SGPT) 16 11/17/2022 04:35 AM    AST (SGOT) 19 11/17/2022 04:35 AM    Alk.  phosphatase 43 (L) 11/17/2022 04:35 AM    Bilirubin, total 0.7 11/17/2022 04:35 AM         Pharmacist: Signed [address] [facility]

## 2022-11-17 NOTE — PROGRESS NOTES
Occupational Therapy:  11/17/22    1058: Orders received and appreciated, chart reviewed, spoke to RN. Pt currently requesting therapy defer as MD had just un-dressed heel wound/ulcer and he would like to wait until it is re-dressed prior to participate. RN aware. Will follow up.     1200: Observed pt sitting EOB eating lunch, RN dressing heel wound. Upon re-attempt to see pt, he is preparing to go with tech for further imaging (Xray). Will continue to follow.      Thank you,  Clearance ADIN ValleR/L

## 2022-11-17 NOTE — CONSULTS
[facility] Podiatric Surgery  Consult Note    Date: November 17, 2022  Patient: Kanchan Walker  MR #: [id number]  [facility] #: [id number]  Attending/Admitting Physician: Dr Jenniffer Sandoval MD    Reason for Consult:  Dr Jenniffer Sandoval MD asked me to see Kanchan Walker for evaluation and treatment of right heel ulcer and forefoot ulcer management. History of Present Illness:  Patient is an 78 y.o. male with PMH significant for Afib on Xarelto, HTN, OLEG on CPAP, right dropfoot deformity, and a chronic nonhealing ulcer to the lateral aspect of the right forefoot. Patient states during his trip to [place] he and his wife noticed a blister to his heel with serous drainage. His wife notes the blister could be secondary to a new pair of shoes. About two ago he noticed worsening redness and swelling to his right lower extremity. He presented to the ED yesterday with rigors, chills, and a cough. The patient is known to our practice as he has a chronic right lateral fifth metatarsal wound which was being treated conservatively. During his last office visit, about 3 weeks ago, X-rays confirmed osteomyelitis and it was recommended to get an MRI and ID consultation. The patient had a trip to [place] so plans were delayed. He presented to the ED with with leukocytosis, elevated creatinine, negative covid test, negative influenza test, elevated LA, and NGTD blood culture. His WBC has been down trending with the initiation of IV antibiotics. Xrays of the foot show acute OM of the fifth metatarsal head, Venous duplex was negative of DVT, and MRI of the hindfoot with questionable septic/inflammatory arthritis of the tibiotalar joint with no evidence of acute OM or abscess. ROS:   Pertinent items are noted in my HPI, otherwise negative. Blood pressure (!) 144/69, pulse 75, temperature 98.9 °F (37.2 °C), resp. rate 16, height 5' 11\" (1.803 m), weight 95.3 kg (210 lb), SpO2 99 %.     Intake/Output Summary (Last 24 hours) at 2022 1640  Last data filed at 2022 1957  Gross per 24 hour   Intake 1000 ml   Output 0 ml   Net 1000 ml         Medical History:  Past Medical History:   Diagnosis Date    Arthritis     Back, knees, shoulders    Atrial fibrillation (HCC)     BCC (basal cell carcinoma of skin)     Benign hypertensive heart disease without heart failure     Diverticulosis     DJD (degenerative joint disease) of knee     right    Hematuria     due to certain medications    Hypertension     OLEG on CPAP 2011    Varicose vein of leg 2012     Past Surgical History:   Procedure Laterality Date    HX CATARACT REMOVAL Bilateral     HX COLONOSCOPY  2019    Removed polyps    HX HERNIA REPAIR      HX HIP REPLACEMENT Left 2016    HX KNEE REPLACEMENT Right 2019    HX MOHS PROCEDURES Left     x 3 Forhead and ear    HX TONSILLECTOMY  1943    HX VEIN STRIPPING Bilateral 2015       Allergies   Allergen Reactions    Ace Inhibitors Cough     Patients states he is not allergic    Nsaids (Non-Steroidal Anti-Inflammatory Drug) Other (comments)     \"causes blood in urine\"    Pcn [Penicillins] Rash    Percocet [Oxycodone-Acetaminophen] Other (comments)     constipation     Family History   Problem Relation Age of Onset    Stroke Mother     Dementia Mother     Stroke Father     Asthma Father     Lung Disease Father     Diabetes Brother     Heart Disease Brother     Heart Disease Brother      Social History     Tobacco Use   Smoking Status Former    Packs/day: 0.50    Years: 40.00    Pack years: 20.00    Types: Cigarettes    Quit date:     Years since quittin.8   Smokeless Tobacco Former    Quit date: 1986     Social History     Substance and Sexual Activity   Drug Use No     Social History     Substance and Sexual Activity   Alcohol Use Yes    Alcohol/week: 2.0 standard drinks    Types: 1 Cans of beer, 1 Shots of liquor per week       Labs:  Lab Results   Component Value Date/Time    WBC 12.3 (H) 2022 04:35 AM    HGB 8.3 (L) 11/17/2022 04:35 AM    HCT 26.8 (L) 11/17/2022 04:35 AM    .9 (H) 11/17/2022 04:35 AM    PLATELET 668 80/76/9782 04:35 AM     Lab Results   Component Value Date/Time    Sodium 144 11/17/2022 04:35 AM    Potassium 2.5 (LL) 11/17/2022 04:35 AM    Chloride 118 (H) 11/17/2022 04:35 AM    CO2 18 (L) 11/17/2022 04:35 AM    Phosphorus 1.7 (L) 06/02/2013 02:49 AM    BUN 28 (H) 11/17/2022 04:35 AM    Calcium 5.5 (LL) 11/17/2022 04:35 AM      Lab Results   Component Value Date/Time    Glucose 89 11/17/2022 04:35 AM     Lab Results   Component Value Date/Time    INR 2.1 (H) 05/22/2016 06:07 AM    No components found for: PTT  Recent Labs     11/17/22  0435 11/16/22  1319   GLU 89 158*       Physical Exam:  General appearance: alert, cooperative, no acute distress  HEENT: no acute abnormalaties  Lungs: no wheezing noted  Heart: regular rate and rhythm  Abdomen: no acute abnormalaties  Extremities: pitting edema noted bilaterally, right worse then left. Cellulitis noted to the right lower extremity. Pulses: Pedal pulses absent to the right lower extremity. Skin: Ulcer to lateral aspect of right fifth metatarsal head and plantar heel. Neurologic: Peripheral neuropathy      Lower Extremity Exam:  Vascular:    Dorsalis Pedis Pulse: absent  Posterior Tibialis Pulse: absent  Skin Temp: warm to touch  Extremity Edema: 3+ pitting edema to the right lower extremity and 2+ to the left lower extremity  Varicosities: present  Capillary refill time is sluggish but present. Neurological:  Epicritic and Protective Sensations: Absent  Deep Pain Response: Diminished    Dermatological:  Ulceration noted to the lateral aspect of the right fifth metatarsal head down to subcutaneous layer and probe to bone positive. No drainage or overt periwound erythema/cellulitis noted. Right heel ulceration down to dermal layer with central necrotic patch. There is no exposed noted noted.  Periwound epidermolysis was noted and removed with discharge of about 1 cc of purulence. There is fluctuance but no crepitus noted. Cellulitis noted extending from the right midfoot/hindfoot and proximally into the midcalf. Images in media tab. 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 No pain with calf compression. Imaging Modalities: Reviewed both Xrays of right foot and MRI of right hindfoot. Vascular Studies: LIBRADO/PVR are pending. Microbiological: Blood cultures with no growth to date    Impression:  Chronic osteomyelitis, right fifth metatarsal  2. Peripheral neuropathy  3. Peripheral arterial disease  4. Right heel ulceration with concern for osteomyelitis        Plan:  Patient seen and evaluated with his wife at bedside. Discussed with the patient he has chronic osteomyelitis of the right fifth metatarsal head. Discussed both surgical resection versus long term IV antibiotics options. Debrided the epidermolysis from the right heel, about 1 cc of seropurulent material was decompressed. On exam, there was no bone exposed or any areas of crepitus concerning for soft tissue emphysema. I personally reviewed the MRI and agree it is difficult to confirm acute osteomyelitis. I informed the patient, we could obtain a bone biopsy in the operating room to evaluate for OM. Recommend LIBRADO/PVR testing. Will follow up. Surgical plan pending non-invasive vascular studies. Continue IV antibiotics. NWB to the RLE until we rule out osteomyelitis in order to prevent a pathological fracture. Daily dressings with betadine soaked gauze and dry sterile dressings. Will obtain ESR/non cardiac CRP lab work.         Cate Valente DPM  Cell: [phone]    11/17/2022  4:40 PM

## 2022-11-17 NOTE — PROGRESS NOTES
Bedside shift change report given to Jen Sanderson  (oncoming nurse) by Darlin Jordan  (offgoing nurse). Report included the following information SBAR, Kardex, ED Summary, MAR, Accordion, and Recent Results.

## 2022-11-17 NOTE — PROGRESS NOTES
Physical Therapy    Consult received, chart reviewed. Received patient in ED [facility]. Patient firmly declining PT at this time due to wanting to wait until plantar heel wound is dressed before getting up to ambulate. Patient has post-op shoe. Will follow back later today as appropriate.     Kyler Harper MS, PT

## 2022-11-17 NOTE — PROGRESS NOTES
[facility] Dosing Services: 11/16/22  Xarelto dose change per renal protocol  Physician; Dr Kirstin Murillo  Indication: Afib  Previous Regimen Xarelto 20 mg po daily   Serum Creatinine Lab Results   Component Value Date/Time    Creatinine 1.72 (H) 11/16/2022 01:19 PM    Creatinine (POC) 1.6 (H) 05/31/2013 06:45 PM      Creatinine Clearance Estimated Creatinine Clearance: 37.7 mL/min (A) (based on SCr of 1.72 mg/dL (H)).    BUN Lab Results   Component Value Date/Time    BUN 42 (H) 11/16/2022 01:19 PM    BUN (POC) 45 (H) 05/31/2013 06:45 PM       Plan: Changed Xarelto to 15mg po daily    Thank you  Katiuska Schmidt, PharmD  [phone]

## 2022-11-17 NOTE — PROGRESS NOTES
Pharmacy Dosing: Rivaroxaban    Rivaroxaban 15 mg PO daily with dinner adjusted to rivaroxaban 20 mg PO daily with dinner d/t improved renal function (CrCl 93 mL/min) per protocol    Thank you,  Jovanny BOO [facility]

## 2022-11-17 NOTE — WOUND CARE
Wound consult: consulted for heel ulcer/lower legs. Note Dr. Norm Hood note; his associate to see patient. Patient alert, oriented x 4, up in tobar with PCT to restroom. Please re-consult if needed.   David Crawley RN,CWON

## 2022-11-18 ENCOUNTER — APPOINTMENT (OUTPATIENT)
Dept: VASCULAR SURGERY | Age: 82
DRG: 477 | End: 2022-11-18
Attending: STUDENT IN AN ORGANIZED HEALTH CARE EDUCATION/TRAINING PROGRAM
Payer: MEDICARE

## 2022-11-18 LAB
ALBUMIN SERPL-MCNC: 2.3 G/DL (ref 3.5–5)
ALBUMIN/GLOB SERPL: 0.6 {RATIO} (ref 1.1–2.2)
ALP SERPL-CCNC: 77 U/L (ref 45–117)
ALT SERPL-CCNC: 39 U/L (ref 12–78)
ANION GAP SERPL CALC-SCNC: 7 MMOL/L (ref 5–15)
ANION GAP SERPL CALC-SCNC: 7 MMOL/L (ref 5–15)
AST SERPL-CCNC: ABNORMAL U/L (ref 15–37)
BASOPHILS # BLD: 0 K/UL (ref 0–0.1)
BASOPHILS NFR BLD: 0 % (ref 0–1)
BILIRUB SERPL-MCNC: 0.6 MG/DL (ref 0.2–1)
BUN SERPL-MCNC: 33 MG/DL (ref 6–20)
BUN SERPL-MCNC: 36 MG/DL (ref 6–20)
BUN/CREAT SERPL: 31 (ref 12–20)
BUN/CREAT SERPL: 32 (ref 12–20)
CALCIUM SERPL-MCNC: 8.1 MG/DL (ref 8.5–10.1)
CALCIUM SERPL-MCNC: 8.2 MG/DL (ref 8.5–10.1)
CHLORIDE SERPL-SCNC: 106 MMOL/L (ref 97–108)
CHLORIDE SERPL-SCNC: 108 MMOL/L (ref 97–108)
CO2 SERPL-SCNC: 22 MMOL/L (ref 21–32)
CO2 SERPL-SCNC: 23 MMOL/L (ref 21–32)
CREAT SERPL-MCNC: 1.08 MG/DL (ref 0.7–1.3)
CREAT SERPL-MCNC: 1.14 MG/DL (ref 0.7–1.3)
CREAT SERPL-MCNC: 1.18 MG/DL (ref 0.7–1.3)
CRP SERPL HS-MCNC: >9.5 MG/L
DIFFERENTIAL METHOD BLD: ABNORMAL
EOSINOPHIL # BLD: 0 K/UL (ref 0–0.4)
EOSINOPHIL NFR BLD: 0 % (ref 0–7)
ERYTHROCYTE [DISTWIDTH] IN BLOOD BY AUTOMATED COUNT: 14.4 % (ref 11.5–14.5)
ERYTHROCYTE [SEDIMENTATION RATE] IN BLOOD: 63 MM/HR (ref 0–20)
GLOBULIN SER CALC-MCNC: 3.6 G/DL (ref 2–4)
GLUCOSE SERPL-MCNC: 118 MG/DL (ref 65–100)
GLUCOSE SERPL-MCNC: 126 MG/DL (ref 65–100)
HCT VFR BLD AUTO: 29.1 % (ref 36.6–50.3)
HGB BLD-MCNC: 9.3 G/DL (ref 12.1–17)
IMM GRANULOCYTES # BLD AUTO: 0.1 K/UL (ref 0–0.04)
IMM GRANULOCYTES NFR BLD AUTO: 1 % (ref 0–0.5)
LYMPHOCYTES # BLD: 0.9 K/UL (ref 0.8–3.5)
LYMPHOCYTES NFR BLD: 7 % (ref 12–49)
MAGNESIUM SERPL-MCNC: NORMAL MG/DL (ref 1.6–2.4)
MCH RBC QN AUTO: 31.8 PG (ref 26–34)
MCHC RBC AUTO-ENTMCNC: 32 G/DL (ref 30–36.5)
MCV RBC AUTO: 99.7 FL (ref 80–99)
MONOCYTES # BLD: 1.7 K/UL (ref 0–1)
MONOCYTES NFR BLD: 12 % (ref 5–13)
NEUTS SEG # BLD: 11.2 K/UL (ref 1.8–8)
NEUTS SEG NFR BLD: 80 % (ref 32–75)
NRBC # BLD: 0 K/UL (ref 0–0.01)
NRBC BLD-RTO: 0 PER 100 WBC
PHOSPHATE SERPL-MCNC: 2.2 MG/DL (ref 2.6–4.7)
PLATELET # BLD AUTO: 294 K/UL (ref 150–400)
PMV BLD AUTO: 9.9 FL (ref 8.9–12.9)
POTASSIUM SERPL-SCNC: 4.1 MMOL/L (ref 3.5–5.1)
POTASSIUM SERPL-SCNC: ABNORMAL MMOL/L (ref 3.5–5.1)
PROT SERPL-MCNC: 5.9 G/DL (ref 6.4–8.2)
RBC # BLD AUTO: 2.92 M/UL (ref 4.1–5.7)
SODIUM SERPL-SCNC: 135 MMOL/L (ref 136–145)
SODIUM SERPL-SCNC: 138 MMOL/L (ref 136–145)
VANCOMYCIN SERPL-MCNC: 13.6 UG/ML
WBC # BLD AUTO: 14 K/UL (ref 4.1–11.1)

## 2022-11-18 PROCEDURE — 80053 COMPREHEN METABOLIC PANEL: CPT

## 2022-11-18 PROCEDURE — 2709999900 HC NON-CHARGEABLE SUPPLY

## 2022-11-18 PROCEDURE — 97535 SELF CARE MNGMENT TRAINING: CPT

## 2022-11-18 PROCEDURE — 93923 UPR/LXTR ART STDY 3+ LVLS: CPT

## 2022-11-18 PROCEDURE — 74011250636 HC RX REV CODE- 250/636: Performed by: HOSPITALIST

## 2022-11-18 PROCEDURE — 97530 THERAPEUTIC ACTIVITIES: CPT

## 2022-11-18 PROCEDURE — 74011250637 HC RX REV CODE- 250/637: Performed by: STUDENT IN AN ORGANIZED HEALTH CARE EDUCATION/TRAINING PROGRAM

## 2022-11-18 PROCEDURE — 74011000258 HC RX REV CODE- 258: Performed by: STUDENT IN AN ORGANIZED HEALTH CARE EDUCATION/TRAINING PROGRAM

## 2022-11-18 PROCEDURE — 65270000046 HC RM TELEMETRY

## 2022-11-18 PROCEDURE — 74011000258 HC RX REV CODE- 258: Performed by: HOSPITALIST

## 2022-11-18 PROCEDURE — 85652 RBC SED RATE AUTOMATED: CPT

## 2022-11-18 PROCEDURE — 74011000250 HC RX REV CODE- 250: Performed by: STUDENT IN AN ORGANIZED HEALTH CARE EDUCATION/TRAINING PROGRAM

## 2022-11-18 PROCEDURE — 36415 COLL VENOUS BLD VENIPUNCTURE: CPT

## 2022-11-18 PROCEDURE — 97165 OT EVAL LOW COMPLEX 30 MIN: CPT

## 2022-11-18 PROCEDURE — 86141 C-REACTIVE PROTEIN HS: CPT

## 2022-11-18 PROCEDURE — 97162 PT EVAL MOD COMPLEX 30 MIN: CPT

## 2022-11-18 PROCEDURE — 74011250636 HC RX REV CODE- 250/636: Performed by: STUDENT IN AN ORGANIZED HEALTH CARE EDUCATION/TRAINING PROGRAM

## 2022-11-18 PROCEDURE — 97116 GAIT TRAINING THERAPY: CPT

## 2022-11-18 PROCEDURE — 80202 ASSAY OF VANCOMYCIN: CPT

## 2022-11-18 RX ADMIN — POTASSIUM CHLORIDE: 2 INJECTION, SOLUTION, CONCENTRATE INTRAVENOUS at 23:05

## 2022-11-18 RX ADMIN — ACETAMINOPHEN 650 MG: 325 TABLET ORAL at 17:08

## 2022-11-18 RX ADMIN — CEFEPIME 2 G: 2 INJECTION, POWDER, FOR SOLUTION INTRAVENOUS at 13:59

## 2022-11-18 RX ADMIN — RIVAROXABAN 20 MG: 10 TABLET, FILM COATED ORAL at 17:04

## 2022-11-18 RX ADMIN — CEFEPIME 2 G: 2 INJECTION, POWDER, FOR SOLUTION INTRAVENOUS at 21:26

## 2022-11-18 RX ADMIN — POTASSIUM CHLORIDE: 2 INJECTION, SOLUTION, CONCENTRATE INTRAVENOUS at 13:55

## 2022-11-18 RX ADMIN — SODIUM CHLORIDE, PRESERVATIVE FREE 10 ML: 5 INJECTION INTRAVENOUS at 17:06

## 2022-11-18 RX ADMIN — SODIUM CHLORIDE, PRESERVATIVE FREE 10 ML: 5 INJECTION INTRAVENOUS at 21:27

## 2022-11-18 RX ADMIN — VANCOMYCIN HYDROCHLORIDE 750 MG: 750 INJECTION, POWDER, LYOPHILIZED, FOR SOLUTION INTRAVENOUS at 23:06

## 2022-11-18 RX ADMIN — ACETAMINOPHEN 650 MG: 325 TABLET ORAL at 01:14

## 2022-11-18 RX ADMIN — HYDROCHLOROTHIAZIDE: 25 TABLET ORAL at 08:26

## 2022-11-18 RX ADMIN — SODIUM CHLORIDE, PRESERVATIVE FREE 10 ML: 5 INJECTION INTRAVENOUS at 05:46

## 2022-11-18 RX ADMIN — VANCOMYCIN HYDROCHLORIDE 750 MG: 750 INJECTION, POWDER, LYOPHILIZED, FOR SOLUTION INTRAVENOUS at 11:20

## 2022-11-18 RX ADMIN — CEFEPIME 2 G: 2 INJECTION, POWDER, FOR SOLUTION INTRAVENOUS at 03:04

## 2022-11-18 NOTE — PROGRESS NOTES
Problem: Pressure Injury - Risk of  Goal: *Prevention of pressure injury  Description: Document Vitaly Scale and appropriate interventions in the flowsheet. Outcome: Progressing Towards Goal  Note: Pressure Injury Interventions:  Sensory Interventions: Float heels, Pad between skin to skin    Moisture Interventions: Absorbent underpads, Moisture barrier    Activity Interventions: PT/OT evaluation, Increase time out of bed    Mobility Interventions: Chair cushion, Float heels, PT/OT evaluation    Nutrition Interventions: Document food/fluid/supplement intake, Offer support with meals,snacks and hydration    Friction and Shear Interventions: Apply protective barrier, creams and emollients, Lift sheet, Minimize layers                Problem: Patient Education: Go to Patient Education Activity  Goal: Patient/Family Education  Outcome: Progressing Towards Goal     Problem: Falls - Risk of  Goal: *Absence of Falls  Description: Document Nikolas Fall Risk and appropriate interventions in the flowsheet.   Outcome: Progressing Towards Goal  Note: Fall Risk Interventions:  Mobility Interventions: Communicate number of staff needed for ambulation/transfer, PT Consult for mobility concerns, Utilize walker, cane, or other assistive device         Medication Interventions: Teach patient to arise slowly    Elimination Interventions: Patient to call for help with toileting needs              Problem: Patient Education: Go to Patient Education Activity  Goal: Patient/Family Education  Outcome: Progressing Towards Goal     Problem: Cellulitis Care Plan (Adult)  Goal: *Absence of infection signs and symptoms  Outcome: Progressing Towards Goal

## 2022-11-18 NOTE — PROGRESS NOTES
Problem: Self Care Deficits Care Plan (Adult)  Goal: *Acute Goals and Plan of Care (Insert Text)  Description: FUNCTIONAL STATUS PRIOR TO ADMISSION: Patient was modified independent using a single point cane for functional mobility. Recently returned from [place] where they own a house in [facility]. Owns post op shoe. HOME SUPPORT: The patient lived with wife but did not require assist.    Occupational Therapy Goals  Initiated 11/18/2022  1. Patient will perform bathing maintaining RLE NWB with supervision/set-up within 7 day(s). 2.  Patient will perform LB dressing with AD with supervision/set-up within 7 day(s). 3.  Patient will perform toilet transfers while maintaining RLE NWB with supervision/set-up within 7 day(s). 5.  Patient will perform all aspects of toileting with supervision/set-up within 7 day(s). 6.  Patient will participate in upper extremity therapeutic exercise/activities with supervision for 6 minutes within 7 day(s). 7.  Patient will utilize energy conservation techniques during functional activities with verbal cues within 7 day(s). Outcome: Progressing Towards Goal   OCCUPATIONAL THERAPY EVALUATION  Patient: Ginna Hunter (79 y.o. 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 Podiatry note for 11/17 reports NWB RLE pending test results. Nursing cleared for therapy. Educated patient and wife of RLE NWB. He ambulated to bathroom hopping with RW with mod A x2 for sit <> stand and mod A for hopping with seated rest following x5 ft. Patient highly motivated to get to toilet and refusing use of BSC.   Following rest completed x 5 ft to toilet at [address] [place] [address] with mod A. Participated in LB bathing and changing clothing with total A for distal aspect and poor adherence to NWB with standing to pull up pants. Brought chair to doorway for return from bathroom. Current Level of Function Impacting Discharge (ADLs/self-care): LB dressing max A, self care transfers mod A x1-2 at  level    Functional Outcome Measure: The patient scored 45/100 on the Barthel Index outcome measure which is indicative of moderate impairment with ADL tasks. Other factors to consider for discharge: Patient is currently NWB RLE which impacts his ability to participate in ADL tasks. He lives in 2 story home with wife. Awaiting potential surgery. Recommend dc IPR for additional rehab services. Patient will benefit from skilled therapy intervention to address the above noted impairments. PLAN :  Recommendations and Planned Interventions: self care training, functional mobility training, therapeutic exercise, balance training, therapeutic activities, endurance activities, patient education, home safety training, and family training/education    Frequency/Duration: Patient will be followed by occupational therapy 5 times a week to address goals. Recommendation for discharge: (in order for the patient to meet his/her long term goals)  Therapy 3 hours per day 5-7 days per week    This discharge recommendation:  Has not yet been discussed the attending provider and/or case management    IF patient discharges home will need the following DME: bedside commode, shower chair, walker: rolling, and wheelchair       SUBJECTIVE:   Patient stated I am not using that.  regarding [facility]    OBJECTIVE DATA SUMMARY:   HISTORY:   Past Medical History:   Diagnosis Date    Arthritis     Back, knees, shoulders    Atrial fibrillation (HCC)     BCC (basal cell carcinoma of skin)     Benign hypertensive heart disease without heart failure     Diverticulosis     DJD (degenerative joint disease) of knee     right    Hematuria     due to certain medications    Hypertension     OLEG on CPAP 04/01/2011    Varicose vein of leg 1/12/2012     Past Surgical History:   Procedure Laterality Date    HX CATARACT REMOVAL Bilateral     HX COLONOSCOPY  2019    Removed polyps    HX HERNIA REPAIR      HX HIP REPLACEMENT Left 2016    HX KNEE REPLACEMENT Right 2019    HX MOHS PROCEDURES Left     x 3 Forhead and ear    HX TONSILLECTOMY  1943    HX VEIN STRIPPING Bilateral 2015       Expanded or extensive additional review of patient history:     Home Situation  Home Environment: Private residence  # Steps to Enter: 6  Rails to Enter: Yes  Hand Rails : Bilateral  One/Two Story Residence: Two story  # of Interior Steps: 15  Interior Rails: Left  Living Alone: No  Support Systems: Spouse/Significant Other  Patient Expects to be Discharged to[de-identified] Unable to determine at this time  Current DME Used/Available at Home: Cane, straight, Walker, rolling, Grab bars, Shower chair, Raised toilet seat  Tub or Shower Type: Shower    Hand dominance: Right    EXAMINATION OF PERFORMANCE DEFICITS:  Cognitive/Behavioral Status:  Neurologic State: Alert  Orientation Level: Oriented X4  Cognition: Follows commands             Hearing: Auditory  Auditory Impairment: Hearing aid(s), Hard of hearing, bilateral    Vision/Perceptual:                                     Range of Motion:  AROM: Generally decreased, functional                         Strength:  Strength: Generally decreased, functional                Coordination:  Coordination: Generally decreased, functional  Fine Motor Skills-Upper: Right Intact; Left Intact    Gross Motor Skills-Upper: Right Intact; Left Intact    Tone & Sensation:  Tone: Normal  Sensation: Impaired                      Balance:  Sitting: Intact; High guard  Standing: Impaired  Standing - Static: Constant support; Fair  Standing - Dynamic : Constant support;Fair;Poor    Functional Mobility and Transfers for ADLs:  Bed Mobility:  Scooting: Contact guard assistance    Transfers:  Sit to Stand: Moderate assistance;Assist x2  Stand to Sit: Moderate assistance;Assist x2  Bed to Chair: Moderate assistance;Assist x2  Bathroom Mobility: Moderate assistance (x2 RW level NWB, brief rest seated rest break in doorway to get to toilet in bathroom)  Toilet Transfer : Moderate assistance; Adaptive equipment    ADL Assessment:  Feeding: Independent    Oral Facial Hygiene/Grooming: Supervision;Setup (seated)    Bathing: Moderate assistance    Type of Bath: Bath Pack (LB and debo area)    Upper Body Dressing: Setup;Supervision    Lower Body Dressing: Maximum assistance    Toileting: Moderate assistance                  ADL Intervention and task modifications:  Patient instructed and indicated understanding the benefits of maintaining activity tolerance, functional mobility, and independence with self care tasks during acute stay  to ensure safe return home and to baseline. Encouraged patient to increase frequency and duration OOB, be out of bed for all meals, perform daily ADLs (as approved by RN/MD regarding bathing etc), and performing functional mobility to/from bathroom. Type of Bath: Bath Pack (LB and debo area)              Lower Body Dressing Assistance  Underpants: Maximum assistance  Pants With Elastic Waist: Maximum assistance  Socks: Total assistance (dependent)  Shoes with Velcro: Total assistance (dependent)    Toileting  Bowel Hygiene: Supervision  Clothing Management: Maximum assistance         Functional Measure:    Barthel Index:  Bathin  Bladder: 5  Bowels: 10  Groomin  Dressin  Feeding: 10  Mobility: 0  Stairs: 0  Toilet Use: 5  Transfer (Bed to Chair and Back): 5  Total: 45/100      The Barthel ADL Index: Guidelines  1. The index should be used as a record of what a patient does, not as a record of what a patient could do.   2. The main aim is to establish degree of independence from any help, physical or verbal, however minor and for whatever reason. 3. The need for supervision renders the patient not independent. 4. A patient's performance should be established using the best available evidence. Asking the patient, friends/relatives and nurses are the usual sources, but direct observation and common sense are also important. However direct testing is not needed. 5. Usually the patient's performance over the preceding 24-48 hours is important, but occasionally longer periods will be relevant. 6. Middle categories imply that the patient supplies over 50 per cent of the effort. 7. Use of aids to be independent is allowed. Score Interpretation (from [address] [address])    Independent   60-79 Minimally independent   40-59 Partially dependent   20-39 Very dependent   <20 Totally dependent     -Joi Franz., Barthel, DAnnW. (1965). Functional evaluation: the Barthel Index. [facility] ([address] (1997). The Barthel activities of daily living index: self-reporting versus actual performance in the old (> or = 75 years). Journal [address] 45(7), [facility], ..., Isai Brito., [facility]. (1999). Measuring the change in disability after inpatient rehabilitation; comparison of the responsiveness of the Barthel Index and Functional Weber Measure. Journal of Neurology, Neurosurgery, and Psychiatry, 66(4), 360-373. Jaden Villegas, N.J.A, JORGE A Hoyos, & Allyn Page, M.A. (2004) Assessment of post-stroke quality of life in cost-effectiveness studies: The usefulness of the Barthel Index and the EuroQoL-5D.  Quality of Life Research, 15, 605-67         Occupational Therapy Evaluation Charge Determination   History Examination Decision-Making   LOW Complexity : Brief history review  LOW Complexity : 1-3 performance deficits relating to physical, cognitive , or psychosocial skils that result in activity limitations and / or participation restrictions  LOW Complexity : No comorbidities that affect functional and no verbal or physical assistance needed to complete eval tasks       Based on the above components, the patient evaluation is determined to be of the following complexity level: LOW   Pain Rating:  No c/o pain    Activity Tolerance:   Fair    After treatment patient left in no apparent distress:    Sitting in chair, Call bell within reach, Bed / chair alarm activated, Caregiver / family present, and BLE elevated    COMMUNICATION/EDUCATION:   The patients plan of care was discussed with: Physical therapist and Registered nurse. Home safety education was provided and the patient/caregiver indicated understanding. and Patient/family agree to work toward stated goals and plan of care. This patients plan of care is appropriate for delegation to DONALD.     Thank you for this referral.  Fide Lemus, OT  Time Calculation: 42 mins

## 2022-11-18 NOTE — PROGRESS NOTES
[facility] Podiatric Surgery  Progress Note  Subjective:  Stone Little has chronic osteomyelitis of his right fifth metatarsal head and a right heel ulceration with concern for osteomyelitis of his calcaneus/talus. Overall patient is doing better today and has improvement in his RLE swelling and cellulitis. ROS:   Constitutional: Negative for fever, chills, weight loss and malaise/fatigue. HENT: Negative for nosebleeds. Eyes: Negative for blurred vision. Respiratory: Negative for cough, shortness of breath and wheezing. Cardiovascular: Negative for chest pain, palpitations. Gastrointestinal: Negative for heartburn, nausea, vomiting, abdominal pain. Genitourinary: Negative for dysuria and urgency. Musculoskeletal: Right heel ulcer and forefoot ulcer. Skin: Negative for rash. Neurological: Negative for dizziness, focal weakness and headaches. Endo/Heme/Allergies: Negative for environmental allergies. Psychiatric/Behavioral: Negative for depression and suicidal ideas. Objective:  Blood pressure 135/82, pulse 73, temperature 97.5 °F (36.4 °C), resp. rate 19, height 5' 11\" (1.803 m), weight 95.3 kg (210 lb), SpO2 98 %. Intake/Output Summary (Last 24 hours) at 11/18/2022 1045  Last data filed at 11/18/2022 0600  Gross per 24 hour   Intake 2553.33 ml   Output --   Net 2553.33 ml     Lower Extremity Exam:  Vascular:    Dorsalis Pedis Pulse: absent  Posterior Tibialis Pulse: absent  Skin Temp: warm to touch  Extremity Edema: 2+ pitting edema to the right lower extremity and 2+ to the left lower extremity. Edema to the right side appears to be improving as well. Varicosities: present  Capillary refill time is sluggish but present. Neurological:  Epicritic and Protective Sensations: Absent  Deep Pain Response: Diminished     Dermatological:  Ulceration noted to the lateral aspect of the right fifth metatarsal head down to subcutaneous layer and probe to bone positive.  No drainage or overt periwound erythema/cellulitis noted. Right lower extremity cellulitis appears to be improving from yesterday. The right heel ulcer is stable with no further purulence noted. Images in media tab. 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 No pain with calf compression. Labs:  Lab Results   Component Value Date/Time    WBC 14.0 (H) 11/17/2022 11:59 PM    HGB 9.3 (L) 11/17/2022 11:59 PM    HCT 29.1 (L) 11/17/2022 11:59 PM    MCV 99.7 (H) 11/17/2022 11:59 PM    PLATELET 677 77/86/0034 11:59 PM     Lab Results   Component Value Date/Time    Sodium 138 11/18/2022 05:48 AM    Potassium 4.1 11/18/2022 05:48 AM    Chloride 108 11/18/2022 05:48 AM    CO2 23 11/18/2022 05:48 AM    BUN 33 (H) 11/18/2022 05:48 AM    Glucose 126 (H) 11/18/2022 05:48 AM     Lab Results   Component Value Date/Time    INR 2.1 (H) 05/22/2016 06:07 AM       Current Medications:  Reviewed. Impression  Chronic osteomyelitis, right fifth metatarsal  2. Peripheral neuropathy  3. Peripheral arterial disease  4. Right heel ulceration with concern for osteomyelitis      Plan:  Patient seen and evaluated with his wife at bedside. Discussed with the patient he has chronic osteomyelitis of the right fifth metatarsal head. Discussed both surgical resection/amputation versus long term IV antibiotic options. A stab incision was made to the cental aspect of the right heel necrotic patch due to elevation in his WBC today. No purulence was noted, however there was myonecrosis present. The wound was packed with betadine soaked gauze. Recommend LIBRADO/PVR testing. Surgical plan pending non-invasive vascular studies. Continue IV antibiotics. WBAT for transfers only to the [facility] until we rule out osteomyelitis in order to prevent a pathological fracture of the calcaneus. Daily dressings with betadine soaked gauze and dry sterile dressings.         Nolvia Lou DPM  Cell: [phone]

## 2022-11-18 NOTE — PROGRESS NOTES
Patient came up to room at [address] during shift change. Bedside and Verbal shift change report given to Mirela, RN (oncoming nurse) by Tyesha Whittaker RN (offgoing nurse). Report included the following information SBAR, Kardex, ED Summary, Intake/Output, MAR, and Recent Results.

## 2022-11-18 NOTE — PROGRESS NOTES
[address] [address] [facility] Progress Note: Antimicrobial Stewardship  Consult for antibiotic dosing of Vancomycin by Dr. Kaiden Clark  Cefepime dose change per renal protocol  Indication: SSTI (Right heel ulcer)/Septic tibiotalar arthritis  Day of Therapy: 3    Ht Readings from Last 1 Encounters:   11/16/22 180.3 cm (71\")        Wt Readings from Last 1 Encounters:   11/16/22 95.3 kg (210 lb)      Vancomycin therapy:  Current maintenance dose: Vancomycin 1000 mg IV q12hr   Dose calculated to approximate a           a. Target AUC/MERCEDES of 400-600          b. Trough of N/A   Last level: 13.6 mcg/mL this am, predicts AUC >600 which is supratherapeutic  Plan: Will reduce Vancomycin to 750 mg IV q12hr  Dose administration notes:   Doses given appropriately as scheduled    Non-kinetic dosing  Changed Cefepime to 2 gm IV q8hr    Other Antimicrobial   (not dosed by pharmacist) none   Cultures 11/16: Blood: NG x2 days pending   Serum Creatinine Lab Results   Component Value Date/Time    Creatinine 1.08 11/18/2022 05:48 AM    Creatinine (POC) 1.6 (H) 05/31/2013 06:45 PM         Creatinine Clearance Estimated Creatinine Clearance: 60 mL/min (based on SCr of 1.08 mg/dL). Temp Temp: 97.5 °F (36.4 °C)       WBC Lab Results   Component Value Date/Time    WBC 14.0 (H) 11/17/2022 11:59 PM        Procalcitonin No results found for: PCT     For Antifungals, Metronidazole and Nafcillin: Lab Results   Component Value Date/Time    ALT (SGPT) 39 11/17/2022 11:59 PM    AST (SGOT) HEMOLYZED,RECOLLECT REQUESTED 11/17/2022 11:59 PM    Alk.  phosphatase 77 11/17/2022 11:59 PM    Bilirubin, total 0.6 11/17/2022 11:59 PM        Pharmacist Megan Iniguez

## 2022-11-18 NOTE — PROGRESS NOTES
Problem: Cellulitis Care Plan (Adult)  Goal: *Absence of infection signs and symptoms  Outcome: Progressing Towards Goal     Problem: Patient Education: Go to Patient Education Activity  Goal: Patient/Family Education  Outcome: Progressing Towards Goal     Problem: Pressure Injury - Risk of  Goal: *Prevention of pressure injury  Description: Document Vitaly Scale and appropriate interventions in the flowsheet.   Outcome: Progressing Towards Goal  Note: Pressure Injury Interventions:  Sensory Interventions: Assess changes in LOC, Float heels, Minimize linen layers    Moisture Interventions: Absorbent underpads, Minimize layers, Moisture barrier    Activity Interventions: Increase time out of bed, PT/OT evaluation    Mobility Interventions: HOB 30 degrees or less, Float heels, PT/OT evaluation    Nutrition Interventions: Document food/fluid/supplement intake    Friction and Shear Interventions: HOB 30 degrees or less, Minimize layers                Problem: Patient Education: Go to Patient Education Activity  Goal: Patient/Family Education  Outcome: Progressing Towards Goal

## 2022-11-18 NOTE — PROGRESS NOTES
[address] [address] [facility] Progress Note: Antimicrobial Stewardship  Consult for antibiotic dosing of Vancomycin by Dr. Nila Gonsalez  Indication: SSTI (Right heel ulcer)/Septic tibiotalar arthritis  Day of Therapy: 3    Ht Readings from Last 1 Encounters:   11/16/22 180.3 cm (71\")        Wt Readings from Last 1 Encounters:   11/16/22 95.3 kg (210 lb)      Vancomycin therapy:  Current maintenance dose: Vancomycin 1000 mg IV q12hr   Dose calculated to approximate a           a. Target AUC/MERCEDES of 400-600          b. Trough of N/A   Last level: 13.6 mcg/mL this am, predicts AUC >600 which is supratherapeutic  Plan: Will reduce Vancomycin to 750 mg IV q12hr  Dose administration notes:   Doses given appropriately as scheduled    Other Antimicrobial   (not dosed by pharmacist) Cefepime 2 gm IV q12hr   Cultures 11/16: Blood: NG x2 days pending   Serum Creatinine Lab Results   Component Value Date/Time    Creatinine 1.08 11/18/2022 05:48 AM    Creatinine (POC) 1.6 (H) 05/31/2013 06:45 PM         Creatinine Clearance Estimated Creatinine Clearance: 60 mL/min (based on SCr of 1.08 mg/dL). Temp Temp: 97.5 °F (36.4 °C)       WBC Lab Results   Component Value Date/Time    WBC 14.0 (H) 11/17/2022 11:59 PM        Procalcitonin No results found for: PCT     For Antifungals, Metronidazole and Nafcillin: Lab Results   Component Value Date/Time    ALT (SGPT) 39 11/17/2022 11:59 PM    AST (SGOT) HEMOLYZED,RECOLLECT REQUESTED 11/17/2022 11:59 PM    Alk.  phosphatase 77 11/17/2022 11:59 PM    Bilirubin, total 0.6 11/17/2022 11:59 PM        Pharmacist Linda Gutierrez

## 2022-11-18 NOTE — PROGRESS NOTES
Problem: Mobility Impaired (Adult and Pediatric)  Goal: *Acute Goals and Plan of Care (Insert Text)  Description: FUNCTIONAL STATUS PRIOR TO ADMISSION: Patient was modified independent using a single point cane for functional mobility. HOME SUPPORT PRIOR TO ADMISSION: The patient lived with wife but did not require assist.    Physical Therapy Goals  Initiated 11/18/2022  1. Patient will move from supine to sit and sit to supine  in bed with modified independence within 7 day(s). 2.  Patient will transfer from bed to chair and chair to bed with minimal assistance/contact guard assist using the least restrictive device within 7 day(s). 3.  Patient will perform sit to stand with minimal assistance/contact guard assist within 7 day(s). 4.  Patient will ambulate with minimal assistance/contact guard assist for 10'x2 feet with the least restrictive device within 7 day(s). Outcome: Progressing Towards Goal   PHYSICAL THERAPY EVALUATION  Patient: Kanchan Walker (72 y.o. male)  Date: 11/18/2022  Primary Diagnosis: Cellulitis of right lower limb [L03.115]       Precautions:   NWB (RLE)    ASSESSMENT  Based on the objective data described below, the patient presents with admission due to cellulitis of R foot/LE with ulcers to heel and forefoot. 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 Podiatry ordering NWB RLE with preliminary test results pending. Potential surgery per chart review. Pt received sitting in recliner with BLE's elevated. Pt unaware of new NW bearing status. Demonstrated RW use for this pattern. Mod A with sit to stand with manual A to NWB on R. Pt demanding use of toilet vs BSC. Gait of 5' taxing for pt and needing rest.  Pt determined to get to toilet and achieved. Assisted with LBD yet pt able do self care. Recliner brought close to pt for shorter distance gait. Overall pt is remarkable determined to follow NWB status, yet fatigues and is not totally compliant.   Placed in recliner with BLE's elevated. Home terrain includes many stairs. Rehab recommended in IPR or SNF. Current Level of Function Impacting Discharge (mobility/balance): Mod Ax2/fair    Functional Outcome Measure: The patient scored 50/100 on the barthel outcome measure   Other factors to consider for discharge: per above     Patient will benefit from skilled therapy intervention to address the above noted impairments. PLAN :  Recommendations and Planned Interventions: bed mobility training, transfer training, gait training, therapeutic exercises, neuromuscular re-education, edema management/control, patient and family training/education, and therapeutic activities      Frequency/Duration: Patient will be followed by physical therapy:  5 times a week to address goals. Recommendation for discharge: (in order for the patient to meet his/her long term goals)  Therapy 3 hours per day 5-7 days per week or SNF for rehab    This discharge recommendation:  Has been made in collaboration with the attending provider and/or case management    IF patient discharges home will need the following DME: has DME         SUBJECTIVE:   Patient stated This is really hard.     OBJECTIVE DATA SUMMARY:   HISTORY:    Past Medical History:   Diagnosis Date    Arthritis     Back, knees, shoulders    Atrial fibrillation ([address]     BCC (basal cell carcinoma of skin)     Benign hypertensive heart disease without heart failure     Diverticulosis     DJD (degenerative joint disease) of knee     right    Hematuria     due to certain medications    Hypertension     OLEG on CPAP 04/01/2011    Varicose vein of leg 1/12/2012     Past Surgical History:   Procedure Laterality Date    HX CATARACT REMOVAL Bilateral     HX COLONOSCOPY  2019    Removed polyps    HX HERNIA REPAIR      HX HIP REPLACEMENT Left 2016    HX KNEE REPLACEMENT Right 2019    HX MOHS PROCEDURES Left     x 3 Forhead and ear    HX TONSILLECTOMY  1943    HX VEIN STRIPPING Bilateral 2015       Personal factors and/or comorbidities impacting plan of care: per above and below    Home Situation  Home Environment: Private residence  # Steps to Enter: 6  Rails to Enter: Yes  Hand Rails : Bilateral  One/Two Story Residence: Two story  # of Interior Steps: [address] [facility] Floor: Left  Living Alone: No  Support Systems: Spouse/Significant Other  Patient Expects to be Discharged to[de-identified] Unable to determine at this time  Current DME Used/Available at Home: [address], [address], straight, Walker, rolling, Grab bars, Shower chair, Raised toilet seat  Tub or Shower Type: Shower    EXAMINATION/PRESENTATION/DECISION MAKING:   Critical Behavior:  Neurologic State: Alert  Orientation Level: Oriented X4  Cognition: Follows commands     Hearing: Auditory  Auditory Impairment: Hearing aid(s), Hard of hearing, bilateral  Skin:  IV; dsg to R foot  Edema: RLE >L  Range Of Motion:  AROM: Generally decreased, functional                       Strength:    Strength: Generally decreased, functional                    Tone & Sensation:   Tone: Normal              Sensation: Impaired               Coordination:  Coordination: Generally decreased, functional  Vision:      Functional Mobility:  Bed Mobility:           Scooting: Contact guard assistance  Transfers:  Sit to Stand: Moderate assistance;Assist x2  Stand to Sit: Moderate assistance;Assist x2  Stand Pivot Transfers: Moderate assistance;Assist x2                    Balance:   Sitting: Intact; High guard  Standing: Impaired  Standing - Static: Constant support; Fair  Standing - Dynamic : Constant support;Fair;Poor  Ambulation/Gait Training:  Distance (ft): 5 Feet (ft) (x3)  Assistive Device: Walker, rolling;Gait belt  Ambulation - Level of Assistance:  Moderate assistance;Assist x2        Gait Abnormalities: Antalgic;Decreased step clearance  Right Side Weight Bearing: Non-weight bearing     Base of Support: Narrowed     Speed/Filomena: Slow;Pace decreased (<100 feet/min)  Step Length: Left shortened                    Functional Measure:  Barthel Index:    Bathin  Bladder: 5  Bowels: 10  Groomin  Dressin  Feeding: 10  Mobility: 0  Stairs: 0  Toilet Use: 5  Transfer (Bed to Chair and Back): 10  Total: 50/100       The Barthel ADL Index: Guidelines  1. The index should be used as a record of what a patient does, not as a record of what a patient could do. 2. The main aim is to establish degree of independence from any help, physical or verbal, however minor and for whatever reason. 3. The need for supervision renders the patient not independent. 4. A patient's performance should be established using the best available evidence. Asking the patient, friends/relatives and nurses are the usual sources, but direct observation and common sense are also important. However direct testing is not needed. 5. Usually the patient's performance over the preceding 24-48 hours is important, but occasionally longer periods will be relevant. 6. Middle categories imply that the patient supplies over 50 per cent of the effort. 7. Use of aids to be independent is allowed. Score Interpretation (from [address] [address])    Independent   60-79 Minimally independent   40-59 Partially dependent   20-39 Very dependent   <20 Totally dependent     -Joi Franz., Barthel, D.W. (1965). Functional evaluation: the Barthel Index. [facility] ([address] (1997). The Barthel activities of daily living index: self-reporting versus actual performance in the old (> or = 75 years). Journal of [address] [place] [address] 45(7), [address] [facility], JGABRIELLA, Kevin Millard.Ori. (1999). Measuring the change in disability after inpatient rehabilitation; comparison of the responsiveness of the Barthel Index and Functional [place] Measure. Journal of Neurology, Neurosurgery, and Psychiatry, 66(4), 560-992. -Dominic Zuluaga N.J.VICKY, JORGE A Hoyos, Hero Otero M.A. (2004) Assessment of post-stroke quality of life in cost-effectiveness studies: The usefulness of the Barthel Index and the EuroQoL-5D. Quality of Life Research, 15, 107-42           Physical Therapy Evaluation Charge Determination   History Examination Presentation Decision-Making   HIGH Complexity :3+ comorbidities / personal factors will impact the outcome/ POC  MEDIUM Complexity : 3 Standardized tests and measures addressing body structure, function, activity limitation and / or participation in recreation  MEDIUM Complexity : Evolving with changing characteristics  Other outcome measures barthel  MEDIUM      Based on the above components, the patient evaluation is determined to be of the following complexity level: MEDIUM    Activity Tolerance:   Fair    After treatment patient left in no apparent distress:   Sitting in chair, Heels elevated for pressure relief, Call bell within reach, Bed / chair alarm activated, and Caregiver / family present    COMMUNICATION/EDUCATION:   The patients plan of care was discussed with: Occupational therapist, Registered nurse, and Case management. Fall prevention education was provided and the patient/caregiver indicated understanding., Patient/family have participated as able in goal setting and plan of care. , and Patient/family agree to work toward stated goals and plan of care.     Thank you for this referral.  Laura Worrell, PT   Time Calculation: 43 mins

## 2022-11-18 NOTE — PROGRESS NOTES
[facility]  [address] [facility], [facility] [address]  [phone]         Hospitalist Progress Note        NAME:  Raghavendra Suárez   :  1940   MRN:  [id number]    Date/Time:  2022     Patient PCP:  Tye Sarabia MD    Emergency Contact:    Extended Emergency Contact Information  Primary Emergency Contact: [address] Phone: [phone]  Relation: Spouse      Code: Full Code     Isolation Precautions: There are currently no Active Isolations        Subjective:     REASON FOR VISIT:  Recheck Foot Ulcer     HPI & INTERVAL HISTORY:     Mr. Sarika Phan is a 78 y.o. male with history that includes HTN, AFIB on Xarelto, and OLEG on CPAP reports right heel ulcer about 1 week ago which then spread up leg with erythema resulting in cellulitis. : Patient seen and examined alongside a podiatrist..  No acute events overnight. Erythema from cellulitis improving. : Seen and examined. Has no pain. Wound and erythema unchanged per wife. Debrided by podiatry with a purulent discharge noted. ALLERGIES  Allergies   Allergen Reactions    Ace Inhibitors Cough     Patients states he is not allergic    Nsaids (Non-Steroidal Anti-Inflammatory Drug) Other (comments)     \"causes blood in urine\"    Pcn [Penicillins] Rash    Percocet [Oxycodone-Acetaminophen] Other (comments)     constipation         ROS:  Gen:   Negative  Resp:  negative  CVS:   negative  GI:       negative  Skin:   Wound as described in PE  Chance:    negative except for peripheral neuropathy           Objective:      Visit Vitals  BP (!) 153/68   Pulse 77   Temp (!) 100.9 °F (38.3 °C)   Resp 18   Ht 5' 11\" (1.803 m)   Wt 95.3 kg (210 lb)   SpO2 98%   BMI 29.29 kg/m²       Physical Exam:  General: Seen and examined.   Head: Normocephalic, without obvious abnormality, atraumatic  Eyes: anicteric sclerae and conjuntiva clear  ENT: lips, mucosa, and tongue normal  Neck: normal, supple, and no tenderness  Lungs: clear to auscultation  Heart: S1, S2 normal, regular rate, and regular rhythm  Abd: not distended, soft, nontender, BS present and normactive  Ext: no cyanosis and right lower extremity 3+ edema  Skin:  2 cm ulcer of right heel with dark-colored center and a macerated surrounding tissue. Dime size clean-based ulcer on lateral anterior forefoot.   Erythema of right lower extremity to the knee  Neuro:  alert, oriented, no defects noted in general exam.  Psych: not anxious, cooperative, appropriate affect       Medications:  Current Facility-Administered Medications   Medication Dose Route Frequency    vancomycin (VANCOCIN) 750 mg in 0.9% sodium chloride 250 mL ([id number])  750 mg IntraVENous Q12H    cefepime (MAXIPIME) 2 g in 0.9% sodium chloride (MBP/ADV) 100 mL MBP  2 g IntraVENous Q8H    rivaroxaban (XARELTO) tablet 20 mg  20 mg Oral DAILY WITH DINNER    lactated Ringers 1,000 mL with potassium chloride 20 mEq infusion   IntraVENous CONTINUOUS    losartan/hydroCHLOROthiazide (HYZAAR) 100/25 mg   Oral DAILY    sodium chloride (NS) flush 5-40 mL  5-40 mL IntraVENous Q8H    sodium chloride (NS) flush 5-40 mL  5-40 mL IntraVENous PRN    acetaminophen (TYLENOL) tablet 650 mg  650 mg Oral Q6H PRN    Or    acetaminophen (TYLENOL) suppository 650 mg  650 mg Rectal Q6H PRN    polyethylene glycol (MIRALAX) packet 17 g  17 g Oral DAILY PRN    ondansetron (ZOFRAN ODT) tablet 4 mg  4 mg Oral Q8H PRN    Or    ondansetron (ZOFRAN) injection 4 mg  4 mg IntraVENous Q6H PRN    aspirin delayed-release tablet 81 mg  81 mg Oral DAILY        Labs:  Recent Labs     11/17/22  2359   WBC 14.0*   HGB 9.3*   HCT 29.1*          Recent Labs     11/18/22  0548 11/18/22  0153 11/17/22  2359     --  135*   K 4.1  --  HEMOLYZED,RECOLLECT REQUESTED     --  106   CO2 23  --  22   *  --  118*   BUN 33*  --  36*   CREA 1.08   < > 1.14   CA 8.1*  --  8.2*   MG  --   -- HEMOLYZED,RECOLLECT REQUESTED   PHOS  --   --  2.2*   ALB  --   --  2.3*   TBILI  --   --  0.6   ALT  --   --  39    < > = values in this interval not displayed. Radiology:  No results found. 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 Start lovenox.      Hypertension  Continue home Hyzaar     OLEG  CPAP nightly     Obesity  Counseled on weight loss, dieting and exercise    Body mass index is 29.29 kg/m².:  25.0 - 29.9:  Overweight    F: LR KCl 20 @ 125 ml/hr  E: Hypok, hypocal, hypophos  N: ADULT DIET Regular  DIET ONE TIME MESSAGE       Risk of deterioration: high      Discussed:  Pt's condition, Imaging findings, Lab findings, Assessment, and Care Plan discussed with: Patient, Spouse at bedside , RN, and Care Manager    Prophylaxis:  Lovenox SQ    Anticipated discharge disposition:  [facility] Barriers: Currently not medically stable for discharge      Total time: -35- minutes **I personally saw and examined the patient during this time period**       Date of service:    11/18/2022 ___________________________________________________    Admitting Physician: Radha Mtz MD

## 2022-11-18 NOTE — PROGRESS NOTES
Problem: Pressure Injury - Risk of  Goal: *Prevention of pressure injury  Description: Document Vitaly Scale and appropriate interventions in the flowsheet. Outcome: Progressing Towards Goal  Note: Pressure Injury Interventions:  Sensory Interventions: Assess changes in LOC, Float heels, Minimize linen layers    Moisture Interventions: Absorbent underpads, Offer toileting Q_hr, Check for incontinence Q2 hours and as needed, Assess need for specialty bed    Activity Interventions: PT/OT evaluation, Increase time out of bed, Assess need for specialty bed    Mobility Interventions: Float heels, PT/OT evaluation, Assess need for specialty bed, Turn and reposition approx. every two hours(pillow and wedges)    Nutrition Interventions: Document food/fluid/supplement intake    Friction and Shear Interventions: HOB 30 degrees or less, Minimize layers                Problem: Patient Education: Go to Patient Education Activity  Goal: Patient/Family Education  Outcome: Progressing Towards Goal     Problem: Falls - Risk of  Goal: *Absence of Falls  Description: Document Nikolas Fall Risk and appropriate interventions in the flowsheet.   Outcome: Progressing Towards Goal  Note: Fall Risk Interventions:  Mobility Interventions: Utilize walker, cane, or other assistive device, Utilize gait belt for transfers/ambulation, Patient to call before getting OOB, Bed/chair exit alarm, PT Consult for mobility concerns         Medication Interventions: Patient to call before getting OOB, Teach patient to arise slowly    Elimination Interventions: Urinal in reach, Toileting schedule/hourly rounds, Patient to call for help with toileting needs, Call light in reach, Bed/chair exit alarm              Problem: Patient Education: Go to Patient Education Activity  Goal: Patient/Family Education  Outcome: Progressing Towards Goal     Problem: Cellulitis Care Plan (Adult)  Goal: *Absence of infection signs and symptoms  Outcome: Progressing Towards Goal     Problem: Patient Education: Go to Patient Education Activity  Goal: Patient/Family Education  Outcome: Progressing Towards Goal

## 2022-11-18 NOTE — PROGRESS NOTES
11/18/22  9:33 AM    Care Management Initial Evaluation:    Reason for Admission:    1. Cellulitis of right lower extremity    2. Sepsis, due to unspecified organism, unspecified whether acute organ dysfunction present ([facility] [address]                       RUR Score:   13%  Level: Low    Payor: Medicare A and B/[employer]ARP                  Plan for utilizing home health:  Has used in the past    PCP: First and Last name:  Michael Alaniz MD     Name of Practice:    Are you a current patient: Yes/No: Yes   Approximate date of last visit: within the last year   Can you participate in a virtual visit with your PCP:                     Current Advanced Directive/Advance Care Plan: Full Code      Healthcare Decision Maker:   Click here to complete [address] including selection of the [address] Relationship (ie \"Primary\")           Ama Cameron: Spouse-- [phone]                  Transition of Care Plan:                    Patient resides with his spouse in a 2.5 level home with 6 entry steps and 15 steps to the bedroom. Patient is normally independent with all ADL's and uses a cane to assist with ambulation. He uses no other DME.     1). Patient admitted for emergent care  2). Podiatry consulted  3). Family following for dc needs  4). CM following    [address] Management Interventions  PCP Verified by CM: Yes (within the last year)  Mode of Transport at Discharge:  Other (see comment) (family will transport at [place] [place])  Transition of Care Consult (CM Consult): Discharge Planning  Discharge Durable Medical Equipment: No  Physical Therapy Consult: Yes  Occupational Therapy Consult: Yes  Speech Therapy Consult: No  Support Systems: Spouse/Significant Other  Confirm Follow Up Transport: Family  Discharge Location  Patient Expects to be Discharged to[de-identified] Unable to determine at this time

## 2022-11-19 ENCOUNTER — APPOINTMENT (OUTPATIENT)
Dept: VASCULAR SURGERY | Age: 82
DRG: 477 | End: 2022-11-19
Attending: STUDENT IN AN ORGANIZED HEALTH CARE EDUCATION/TRAINING PROGRAM
Payer: MEDICARE

## 2022-11-19 LAB
BASOPHILS # BLD: 0 K/UL (ref 0–0.1)
BASOPHILS NFR BLD: 0 % (ref 0–1)
DIFFERENTIAL METHOD BLD: ABNORMAL
EOSINOPHIL # BLD: 0.1 K/UL (ref 0–0.4)
EOSINOPHIL NFR BLD: 1 % (ref 0–7)
ERYTHROCYTE [DISTWIDTH] IN BLOOD BY AUTOMATED COUNT: 14.3 % (ref 11.5–14.5)
HCT VFR BLD AUTO: 29.2 % (ref 36.6–50.3)
HGB BLD-MCNC: 9.1 G/DL (ref 12.1–17)
IMM GRANULOCYTES # BLD AUTO: 0.1 K/UL (ref 0–0.04)
IMM GRANULOCYTES NFR BLD AUTO: 1 % (ref 0–0.5)
LEFT ABI: 1.18
LEFT ARM BP: 143 MMHG
LEFT POSTERIOR TIBIAL: 141 MMHG
LEFT TBI: 0.59
LEFT TOE PRESSURE: 84 MMHG
LYMPHOCYTES # BLD: 1 K/UL (ref 0.8–3.5)
LYMPHOCYTES NFR BLD: 9 % (ref 12–49)
MCH RBC QN AUTO: 31.5 PG (ref 26–34)
MCHC RBC AUTO-ENTMCNC: 31.2 G/DL (ref 30–36.5)
MCV RBC AUTO: 101 FL (ref 80–99)
MONOCYTES # BLD: 1.3 K/UL (ref 0–1)
MONOCYTES NFR BLD: 12 % (ref 5–13)
NEUTS SEG # BLD: 8.1 K/UL (ref 1.8–8)
NEUTS SEG NFR BLD: 77 % (ref 32–75)
NRBC # BLD: 0 K/UL (ref 0–0.01)
NRBC BLD-RTO: 0 PER 100 WBC
PLATELET # BLD AUTO: 253 K/UL (ref 150–400)
PMV BLD AUTO: 9.4 FL (ref 8.9–12.9)
RBC # BLD AUTO: 2.89 M/UL (ref 4.1–5.7)
RIGHT ABI: 0.99
RIGHT ARM BP: 136 MMHG
RIGHT POSTERIOR TIBIAL: 142 MMHG
RIGHT TBI: 0.2
RIGHT TOE PRESSURE: 28 MMHG
VANCOMYCIN SERPL-MCNC: 11.8 UG/ML
VAS LEFT DORSALIS PEDIS BP: 169 MMHG
VAS RIGHT DORSALIS PEDIS BP: 142 MMHG
WBC # BLD AUTO: 10.6 K/UL (ref 4.1–11.1)

## 2022-11-19 PROCEDURE — 74011250637 HC RX REV CODE- 250/637: Performed by: STUDENT IN AN ORGANIZED HEALTH CARE EDUCATION/TRAINING PROGRAM

## 2022-11-19 PROCEDURE — 93926 LOWER EXTREMITY STUDY: CPT

## 2022-11-19 PROCEDURE — 85025 COMPLETE CBC W/AUTO DIFF WBC: CPT

## 2022-11-19 PROCEDURE — 74011000250 HC RX REV CODE- 250: Performed by: STUDENT IN AN ORGANIZED HEALTH CARE EDUCATION/TRAINING PROGRAM

## 2022-11-19 PROCEDURE — 80202 ASSAY OF VANCOMYCIN: CPT

## 2022-11-19 PROCEDURE — 74011000258 HC RX REV CODE- 258: Performed by: HOSPITALIST

## 2022-11-19 PROCEDURE — 65270000046 HC RM TELEMETRY

## 2022-11-19 PROCEDURE — 74011250636 HC RX REV CODE- 250/636: Performed by: HOSPITALIST

## 2022-11-19 PROCEDURE — 36415 COLL VENOUS BLD VENIPUNCTURE: CPT

## 2022-11-19 RX ADMIN — POTASSIUM CHLORIDE: 2 INJECTION, SOLUTION, CONCENTRATE INTRAVENOUS at 23:03

## 2022-11-19 RX ADMIN — RIVAROXABAN 20 MG: 10 TABLET, FILM COATED ORAL at 17:14

## 2022-11-19 RX ADMIN — SODIUM CHLORIDE, PRESERVATIVE FREE 10 ML: 5 INJECTION INTRAVENOUS at 07:41

## 2022-11-19 RX ADMIN — CEFEPIME 2 G: 2 INJECTION, POWDER, FOR SOLUTION INTRAVENOUS at 07:40

## 2022-11-19 RX ADMIN — SODIUM CHLORIDE, PRESERVATIVE FREE 10 ML: 5 INJECTION INTRAVENOUS at 23:04

## 2022-11-19 RX ADMIN — ASPIRIN 81 MG: 81 TABLET, COATED ORAL at 08:22

## 2022-11-19 RX ADMIN — HYDROCHLOROTHIAZIDE: 25 TABLET ORAL at 08:22

## 2022-11-19 RX ADMIN — POTASSIUM CHLORIDE: 2 INJECTION, SOLUTION, CONCENTRATE INTRAVENOUS at 08:20

## 2022-11-19 RX ADMIN — VANCOMYCIN HYDROCHLORIDE 750 MG: 750 INJECTION, POWDER, LYOPHILIZED, FOR SOLUTION INTRAVENOUS at 23:46

## 2022-11-19 RX ADMIN — CEFEPIME 2 G: 2 INJECTION, POWDER, FOR SOLUTION INTRAVENOUS at 23:03

## 2022-11-19 RX ADMIN — CEFEPIME 2 G: 2 INJECTION, POWDER, FOR SOLUTION INTRAVENOUS at 13:41

## 2022-11-19 RX ADMIN — VANCOMYCIN HYDROCHLORIDE 750 MG: 750 INJECTION, POWDER, LYOPHILIZED, FOR SOLUTION INTRAVENOUS at 10:50

## 2022-11-19 NOTE — PROGRESS NOTES
[facility] Podiatric Surgery  Progress Note  Subjective:  Santana Raymundo has chronic osteomyelitis of his right fifth metatarsal head and a right heel ulceration with concern for osteomyelitis of his calcaneus/talus. Patient is doing better with improvement in cellulitis and swelling. Vascular consult pending. ROS:   Constitutional: Negative for fever, chills, weight loss and malaise/fatigue. HENT: Negative for nosebleeds. Eyes: Negative for blurred vision. Respiratory: Negative for cough, shortness of breath and wheezing. Cardiovascular: Negative for chest pain, palpitations. Gastrointestinal: Negative for heartburn, nausea, vomiting, abdominal pain. Genitourinary: Negative for dysuria and urgency. Musculoskeletal: Right heel ulcer and forefoot ulcer. Skin: Negative for rash. Neurological: Negative for dizziness, focal weakness and headaches. Endo/Heme/Allergies: Negative for environmental allergies. Psychiatric/Behavioral: Negative for depression and suicidal ideas. Objective:  Blood pressure (!) 161/73, pulse 77, temperature 98.2 °F (36.8 °C), resp. rate 18, height 5' 11\" (1.803 m), weight 95.3 kg (210 lb), SpO2 96 %. Intake/Output Summary (Last 24 hours) at 11/19/2022 1128  Last data filed at 11/19/2022 0507  Gross per 24 hour   Intake 2442. 95 ml   Output 1650 ml   Net 792.95 ml       Lower Extremity Exam:  Vascular:    Dorsalis Pedis Pulse: absent  Posterior Tibialis Pulse: absent  Skin Temp: warm to touch  Extremity Edema: 2+ pitting edema to the right lower extremity and 2+ to the left lower extremity. Edema to the right side appears to be improving. Varicosities: present  Capillary refill time is sluggish but present. Neurological:  Epicritic and Protective Sensations: Absent  Deep Pain Response: Diminished     Dermatological:  Ulceration noted to the lateral aspect of the right fifth metatarsal head down to subcutaneous layer and probe to bone positive.  No drainage or overt periwound erythema/cellulitis noted. Right lower extremity cellulitis appears to be improving from yesterday. The right heel ulcer is stable with no purulence noted. Myonecrosis noted from the stab incision. 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 No pain with calf compression. Labs:  Lab Results   Component Value Date/Time    WBC 10.6 11/19/2022 11:01 AM    HGB 9.1 (L) 11/19/2022 11:01 AM    HCT 29.2 (L) 11/19/2022 11:01 AM    .0 (H) 11/19/2022 11:01 AM    PLATELET 721 84/42/7384 11:01 AM     Lab Results   Component Value Date/Time    Sodium 138 11/18/2022 05:48 AM    Potassium 4.1 11/18/2022 05:48 AM    Chloride 108 11/18/2022 05:48 AM    CO2 23 11/18/2022 05:48 AM    BUN 33 (H) 11/18/2022 05:48 AM    Glucose 126 (H) 11/18/2022 05:48 AM     Lab Results   Component Value Date/Time    INR 2.1 (H) 05/22/2016 06:07 AM       Current Medications:  Reviewed. Impression  Chronic osteomyelitis, right fifth metatarsal  2. Peripheral neuropathy  3. Peripheral arterial disease  4. Right heel ulceration with concern for osteomyelitis       Plan:  Patient seen and evaluated with his wife at bedside. Discussed with the patient he has chronic osteomyelitis of the right fifth metatarsal head. Discussed both surgical resection/amputation versus long term IV antibiotic options. Operating room plan will be resection of the right fifth metatarsal head and bone biopsy of the talus and calcaneus to evaluate for osteomyelitis. LIBRADO/PVR testing complete which shows mild PAD to the RLE. Will order arterial duplex to further evaluate for stenosis below the ankle level. Spoke with the Vascular Surgeon, consult placed, will follow up with his recommendations. Continue IV antibiotics and trend labs. WBAT for transfers only to the E until we rule out osteomyelitis in order to prevent a pathological fracture of the calcaneus.   Daily dressings with betadine soaked gauze and dry sterile dressings.       Desmond Hartman DPM  Cell: [phone]

## 2022-11-19 NOTE — CONSULTS
Consult    Patient: Janessa Lambert MRN: [id number]  SSN: [id number]    YOB: 1940  Age: 78 y.o. Sex: male      Subjective:      Janessa Lambert is a 78 y.o. male who is being seen for evaluation of R foot wound. 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 He reports he was compliant with his compression therapy but developed a heel ulcer while in [place]. This made It impossible for  him to wear his compression therapy and his leg swelling has subsequently worsened. He now presents to the hospital with R 5th metatarsal head osteo. Patients wife at bedside during encounter.     Past Medical History:   Diagnosis Date    Arthritis     Back, knees, shoulders    Atrial fibrillation (HCC)     BCC (basal cell carcinoma of skin)     Benign hypertensive heart disease without heart failure     Diverticulosis     DJD (degenerative joint disease) of knee     right    Hematuria     due to certain medications    Hypertension     OLEG on CPAP 2011    Varicose vein of leg 2012     Past Surgical History:   Procedure Laterality Date    HX CATARACT REMOVAL Bilateral     HX COLONOSCOPY  2019    Removed polyps    HX HERNIA REPAIR      HX HIP REPLACEMENT Left 2016    HX KNEE REPLACEMENT Right 2019    HX MOHS PROCEDURES Left     x 3 Forhead and ear    HX TONSILLECTOMY  1943    HX VEIN STRIPPING Bilateral       Family History   Problem Relation Age of Onset    Stroke Mother     Dementia Mother     Stroke Father     Asthma Father     Lung Disease Father     Diabetes Brother     Heart Disease Brother     Heart Disease Brother      Social History     Tobacco Use    Smoking status: Former     Packs/day: 0.50     Years: 40.00     Pack years: 20.00     Types: Cigarettes     Quit date:      Years since quittin.9    Smokeless tobacco: Former     Quit date: 1986   Substance Use Topics Alcohol use:  Yes     Alcohol/week: 2.0 standard drinks     Types: 1 Cans of beer, 1 Shots of liquor per week      Current Facility-Administered Medications   Medication Dose Route Frequency Provider Last Rate Last Admin    Vancomycin RANDOM level @2200 tonight   Other ONCE Chuck Fong MD        vancomycin (VANCOCIN) 750 mg in 0.9% sodium chloride 250 mL ([id number])  750 mg IntraVENous Q12H Chuck Fong  mL/hr at 11/19/22 1050 750 mg at 11/19/22 1050    cefepime (MAXIPIME) 2 g in 0.9% sodium chloride (MBP/ADV) 100 mL MBP  2 g IntraVENous Q8H Chuck Fong MD 25 mL/hr at 11/19/22 1341 2 g at 11/19/22 1341    rivaroxaban (XARELTO) tablet 20 mg  20 mg Oral DAILY WITH DINNER Thompson Stanley MD   20 mg at 11/18/22 1704    lactated Ringers 1,000 mL with potassium chloride 20 mEq infusion   IntraVENous CONTINUOUS Chuck Fong MD 50 mL/hr at 11/19/22 1324 Rate Change at 11/19/22 1324    losartan/hydroCHLOROthiazide (HYZAAR) 100/25 mg   Oral DAILY Thompson Stanley MD   Given at 11/19/22 8904    sodium chloride (NS) flush 5-40 mL  5-40 mL IntraVENous Q8H Thompson Stanley MD   10 mL at 11/19/22 0741    sodium chloride (NS) flush 5-40 mL  5-40 mL IntraVENous PRN Thompson Stanley MD        acetaminophen (TYLENOL) tablet 650 mg  650 mg Oral Q6H PRN Thompson Stanley MD   650 mg at 11/18/22 1708    Or    acetaminophen (TYLENOL) suppository 650 mg  650 mg Rectal Q6H PRN Thompson Stanley MD        polyethylene glycol (MIRALAX) packet 17 g  17 g Oral DAILY PRN Thompson Stanley MD        ondansetron (ZOFRAN ODT) tablet 4 mg  4 mg Oral Q8H PRN Thompson Stanley MD        Or    ondansetron (ZOFRAN) injection 4 mg  4 mg IntraVENous Q6H PRN Thompson Stanley MD   4 mg at 11/17/22 1124    aspirin delayed-release tablet 81 mg  81 mg Oral DAILY Thompson Stanley MD   81 mg at 11/19/22 3112        Allergies   Allergen Reactions    Ace Inhibitors Cough     Patients states he is not allergic Nsaids (Non-Steroidal Anti-Inflammatory Drug) Other (comments)     \"causes blood in urine\"    Pcn [Penicillins] Rash    Percocet [Oxycodone-Acetaminophen] Other (comments)     constipation       Review of Systems:  A comprehensive review of systems was negative except for that written in the History of Present Illness. Objective:     Vitals:    11/19/22 0440 11/19/22 0754 11/19/22 0822 11/19/22 1200   BP: (!) 152/75  (!) 161/73    Pulse: 64 75 77 74   Resp: 16  18    Temp: 98.6 °F (37 °C)  98.2 °F (36.8 °C)    SpO2: 96%  96%    Weight:       Height:            Physical Exam:  General: Patient is pleasant and cooperative and appears to be in no acute distress. HEENT: Normocephalic atraumatic. Appropriate tracking. No scleral icterus. Nares patent. Trachea is midline. Chest: Unlabored respirations. Symmetrical chest expansion. Cardiac: Regular rate and rhythm. Acyanotic  Abdomen: Abdomen is soft, nontender, nondistended. Extremities: Moves all extremities. Vascular: R foot dressing not removed. The patient has changes c/w chronic lymphedema. Foot itself is swollen but warm and appears well perfused. RLE PVR demonstrates no significant disease to the level of the ankle. Both PT and AT are patent. He has a severe drop in flow at the level of the toe where it drops to 0.2  Assessment:     Hospital Problems  Date Reviewed: 6/4/2020            Codes Class Noted POA    Cellulitis of right lower limb ICD-10-CM: L03.115  ICD-9-CM: 682.6  11/16/2022 Unknown         RLE Osteomyelitis      Plan:     Patient in the difficult position of having apparent osteomyelitis R 5th toe, heel wound, neuropathy, lymphedema and microvascular disease. Based on the PVR study I suspect there will be no role for any vascular surgery intervention but will follow up on arterial duplex. The patient and his wife have a large number of appropriate questions regarding the various eventualities.  We discussed that Vascular surgery options for intervention may very well not exist.     Signed By: Doe Donald MD     November 19, 2022

## 2022-11-19 NOTE — PROGRESS NOTES
[facility]  [address] [facility], [facility] [address]  [phone]         Hospitalist Progress Note        NAME:  Leigh Melton   :  1940   MRN:  [id number]    Date/Time:  2022     Patient PCP:  Isaias Martinez MD    Emergency Contact:    Extended Emergency Contact Information  Primary Emergency Contact: [address] Phone: [phone]  Relation: Spouse      Code: Full Code     Isolation Precautions: There are currently no Active Isolations        Subjective:     REASON FOR VISIT:  Recheck Foot Ulcer     HPI & INTERVAL HISTORY:     Mr. Sarika Seay is a 78 y.o. male with history that includes HTN, AFIB on Xarelto, and OLEG on CPAP reports right heel ulcer about 1 week ago which then spread up leg with erythema resulting in cellulitis. : Seen and examined with wife at bedside. Cellulitis left lower extremity improving with no acute events overnight. However appears to have borderline thickened PAD right lower extremity. : Patient seen and examined alongside a podiatrist..  No acute events overnight. Erythema from cellulitis improving. : Seen and examined. Has no pain. Wound and erythema unchanged per wife. Debrided by podiatry with a purulent discharge noted.       ALLERGIES  Allergies   Allergen Reactions    Ace Inhibitors Cough     Patients states he is not allergic    Nsaids (Non-Steroidal Anti-Inflammatory Drug) Other (comments)     \"causes blood in urine\"    Pcn [Penicillins] Rash    Percocet [Oxycodone-Acetaminophen] Other (comments)     constipation         ROS:  Gen:   Negative  Resp:  negative  CVS:   negative  GI:       negative  Skin:   Wound as described in PE  Chance:    negative except for peripheral neuropathy           Objective:      Visit Vitals  BP (!) 152/75 (BP 1 Location: Left upper arm, BP Patient Position: At rest)   Pulse 64   Temp 98.6 °F (37 °C)   Resp 16   Ht 5' 11\" (1.803 m)   Wt 95.3 kg (210 lb)   SpO2 96%   BMI 29.29 kg/m²       Physical Exam:  General: Not in distress.   Head: Normocephalic, without obvious abnormality, atraumatic  Eyes: anicteric sclerae and conjuntiva clear  ENT: lips, mucosa, and tongue normal  Neck: normal, supple, and no tenderness  Lungs: CTA  Heart: S1, S2 normal, regular rate, and regular rhythm  Abd: not distended, soft, nontender, BS present and normactive  Ext: no cyanosis and right lower extremity 4+ edema  Skin: erythema RLE improving  Neuro:  alert, oriented, no defects noted in general exam.  Psych: not anxious, cooperative, appropriate affect      Medications:  Current Facility-Administered Medications   Medication Dose Route Frequency    vancomycin (VANCOCIN) 750 mg in 0.9% sodium chloride 250 mL ([id number])  750 mg IntraVENous Q12H    cefepime (MAXIPIME) 2 g in 0.9% sodium chloride (MBP/ADV) 100 mL MBP  2 g IntraVENous Q8H    rivaroxaban (XARELTO) tablet 20 mg  20 mg Oral DAILY WITH DINNER    lactated Ringers 1,000 mL with potassium chloride 20 mEq infusion   IntraVENous CONTINUOUS    losartan/hydroCHLOROthiazide (HYZAAR) 100/25 mg   Oral DAILY    sodium chloride (NS) flush 5-40 mL  5-40 mL IntraVENous Q8H    sodium chloride (NS) flush 5-40 mL  5-40 mL IntraVENous PRN    acetaminophen (TYLENOL) tablet 650 mg  650 mg Oral Q6H PRN    Or    acetaminophen (TYLENOL) suppository 650 mg  650 mg Rectal Q6H PRN    polyethylene glycol (MIRALAX) packet 17 g  17 g Oral DAILY PRN    ondansetron (ZOFRAN ODT) tablet 4 mg  4 mg Oral Q8H PRN    Or    ondansetron (ZOFRAN) injection 4 mg  4 mg IntraVENous Q6H PRN    aspirin delayed-release tablet 81 mg  81 mg Oral DAILY        Labs:  Recent Labs     11/17/22  2359   WBC 14.0*   HGB 9.3*   HCT 29.1*          Recent Labs     11/18/22  0548 11/18/22  0153 11/17/22  2359     --  135*   K 4.1  --  HEMOLYZED,RECOLLECT REQUESTED     --  106   CO2 23  --  22   *  --  118*   BUN 33*  --  36*   CREA 1.08   < > 1.14   CA 8.1*  --  8.2*   MG  --   --  HEMOLYZED,RECOLLECT REQUESTED   PHOS  --   --  2.2*   ALB  --   --  2.3*   TBILI  --   --  0.6   ALT  --   --  39    < > = values in this interval not displayed. Radiology:  No results found. 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 with joint effusion and synovitis  Improving  MRI showing septic tibiotalar septic  vs inflammatory arthritis  Dopplers appear negative for DVT  Continue cefepime plus vancomycin  Decrease IV fluids to 50 mL/h  Monitor BCXs  Consult(s): Podiatry for wound and ID for DC ABX    Acute osteomyelitis (OM) of fifth metarsal head with overlying soft tissue ulcer  Podiatry managing consider amputation vs 6 wk ABXs     Appears to have significant PAD right lower extremity  Vascular consult pending    Leukocytosis POA  Improved continue treating as above and monitor treat as above and monitor    Electrolyte imbalances with:  Hypocalcemia / Hypophosphatemia / Hypokalemia  Replace and monitor    Atrial fibrillation  Hold Xarelto for anticipated procedure. Continue lovenox.      Hypertension  Continue home Hyzaar     OLEG  CPAP nightly     Obesity  Counseled on weight loss, dieting and exercise    Body mass index is 29.29 kg/m².:  25.0 - 29.9:  Overweight    F: LR KCl 20 @ 50ml/hr consider discontinuing tomorrow due to edema  E: Hypok, hypocal, hypophos  N: ADULT DIET Regular  DIET ONE TIME MESSAGE       Risk of deterioration: high      Discussed:  Pt's condition, Imaging findings, Lab findings, Assessment, and Care Plan discussed with: Patient, Spouse at bedside , RN, and Care Manager    Prophylaxis:  Lovenox SQ    Anticipated discharge disposition:  [facility] Barriers: Currently not medically stable for discharge      Total time: -35- minutes **I personally saw and examined the patient during this time period**        Date of service:    11/19/2022                ___________________________________________________    Admitting Physician: Isael Dsouza MD

## 2022-11-19 NOTE — PROGRESS NOTES
Problem: Pressure Injury - Risk of  Goal: *Prevention of pressure injury  Description: Document Vitaly Scale and appropriate interventions in the flowsheet. 11/19/2022 1129 by Rosa Perez RN  Outcome: Progressing Towards Goal  Note: Pressure Injury Interventions:  Sensory Interventions: Assess need for specialty bed, Keep linens dry and wrinkle-free, Maintain/enhance activity level, Minimize linen layers, Monitor skin under medical devices, Pad between skin to skin, Pressure redistribution bed/mattress (bed type)    Moisture Interventions: Absorbent underpads    Activity Interventions: PT/OT evaluation    Mobility Interventions: Float heels    Nutrition Interventions: Document food/fluid/supplement intake    Friction and Shear Interventions: Lift team/patient mobility team, Lift sheet             11/19/2022 1129 by Rosa Perez RN  Outcome: Progressing Towards Goal  Note: Pressure Injury Interventions:  Sensory Interventions: Assess need for specialty bed, Keep linens dry and wrinkle-free, Maintain/enhance activity level, Minimize linen layers, Monitor skin under medical devices, Pad between skin to skin, Pressure redistribution bed/mattress (bed type)    Moisture Interventions: Absorbent underpads    Activity Interventions: PT/OT evaluation    Mobility Interventions: Float heels    Nutrition Interventions: Document food/fluid/supplement intake    Friction and Shear Interventions: Lift team/patient mobility team, Lift sheet                Problem: Patient Education: Go to Patient Education Activity  Goal: Patient/Family Education  11/19/2022 1129 by Rosa Perez RN  Outcome: Progressing Towards Goal  11/19/2022 1129 by Rosa Perez RN  Outcome: Progressing Towards Goal     Problem: Falls - Risk of  Goal: *Absence of Falls  Description: Document Sofia Fothergill Fall Risk and appropriate interventions in the flowsheet.   11/19/2022 1129 by Rosa Perez RN  Outcome: Progressing Towards Goal  Note: Fall Risk Interventions:  Mobility Interventions: Strengthening exercises (ROM-active/passive), PT Consult for assist device competence, PT Consult for mobility concerns, Patient to call before getting OOB, OT consult for ADLs, Mechanical lift, Communicate number of staff needed for ambulation/transfer, Utilize gait belt for transfers/ambulation, Utilize walker, cane, or other assistive device         Medication Interventions: Patient to call before getting OOB    Elimination Interventions: Urinal in reach, Toileting schedule/hourly rounds, Toilet paper/wipes in reach, Stay With Me (per policy), Patient to call for help with toileting needs, Elevated toilet seat, Call light in reach, Bed/chair exit alarm           11/19/2022 1129 by Bob Jones RN  Outcome: Progressing Towards Goal  Note: Fall Risk Interventions:  Mobility Interventions: Strengthening exercises (ROM-active/passive), PT Consult for assist device competence, PT Consult for mobility concerns, Patient to call before getting OOB, OT consult for ADLs, Mechanical lift, Communicate number of staff needed for ambulation/transfer, Utilize gait belt for transfers/ambulation, Utilize walker, cane, or other assistive device         Medication Interventions: Patient to call before getting OOB    Elimination Interventions: Urinal in reach, Toileting schedule/hourly rounds, Toilet paper/wipes in reach, Stay With Me (per policy), Patient to call for help with toileting needs, Elevated toilet seat, Call light in reach, Bed/chair exit alarm              Problem: Patient Education: Go to Patient Education Activity  Goal: Patient/Family Education  11/19/2022 1129 by Bob Jones RN  Outcome: Progressing Towards Goal  11/19/2022 1129 by Bob Jones RN  Outcome: Progressing Towards Goal     Problem: Cellulitis Care Plan (Adult)  Goal: *Absence of infection signs and symptoms  11/19/2022 1129 by Bob Jones RN  Outcome: Progressing Towards Goal  11/19/2022 1129 by Emmanuel Cruz RN  Outcome: Progressing Towards Goal     Problem: Patient Education: Go to Patient Education Activity  Goal: Patient/Family Education  11/19/2022 1129 by Emmanuel Cruz RN  Outcome: Progressing Towards Goal  11/19/2022 1129 by Emmanuel Cruz RN  Outcome: Progressing Towards Goal     Problem: Patient Education: Go to Patient Education Activity  Goal: Patient/Family Education  11/19/2022 1129 by Emmanuel Cruz RN  Outcome: Progressing Towards Goal  11/19/2022 1129 by Emmanuel Cruz RN  Outcome: Progressing Towards Goal

## 2022-11-20 ENCOUNTER — APPOINTMENT (OUTPATIENT)
Dept: GENERAL RADIOLOGY | Age: 82
DRG: 477 | End: 2022-11-20
Attending: HOSPITALIST
Payer: MEDICARE

## 2022-11-20 LAB
ANION GAP SERPL CALC-SCNC: 5 MMOL/L (ref 5–15)
BASOPHILS # BLD: 0 K/UL (ref 0–0.1)
BASOPHILS NFR BLD: 0 % (ref 0–1)
BUN SERPL-MCNC: 25 MG/DL (ref 6–20)
BUN/CREAT SERPL: 28 (ref 12–20)
CALCIUM SERPL-MCNC: 8.2 MG/DL (ref 8.5–10.1)
CHLORIDE SERPL-SCNC: 106 MMOL/L (ref 97–108)
CO2 SERPL-SCNC: 24 MMOL/L (ref 21–32)
CREAT SERPL-MCNC: 0.88 MG/DL (ref 0.7–1.3)
DIFFERENTIAL METHOD BLD: ABNORMAL
EOSINOPHIL # BLD: 0.1 K/UL (ref 0–0.4)
EOSINOPHIL NFR BLD: 1 % (ref 0–7)
ERYTHROCYTE [DISTWIDTH] IN BLOOD BY AUTOMATED COUNT: 14.3 % (ref 11.5–14.5)
GLUCOSE SERPL-MCNC: 105 MG/DL (ref 65–100)
HCT VFR BLD AUTO: 29.2 % (ref 36.6–50.3)
HGB BLD-MCNC: 9.4 G/DL (ref 12.1–17)
IMM GRANULOCYTES # BLD AUTO: 0.1 K/UL (ref 0–0.04)
IMM GRANULOCYTES NFR BLD AUTO: 1 % (ref 0–0.5)
LYMPHOCYTES # BLD: 1.2 K/UL (ref 0.8–3.5)
LYMPHOCYTES NFR BLD: 11 % (ref 12–49)
MCH RBC QN AUTO: 32.1 PG (ref 26–34)
MCHC RBC AUTO-ENTMCNC: 32.2 G/DL (ref 30–36.5)
MCV RBC AUTO: 99.7 FL (ref 80–99)
MONOCYTES # BLD: 1.3 K/UL (ref 0–1)
MONOCYTES NFR BLD: 12 % (ref 5–13)
NEUTS SEG # BLD: 8.6 K/UL (ref 1.8–8)
NEUTS SEG NFR BLD: 75 % (ref 32–75)
NRBC # BLD: 0 K/UL (ref 0–0.01)
NRBC BLD-RTO: 0 PER 100 WBC
PLATELET # BLD AUTO: 294 K/UL (ref 150–400)
PMV BLD AUTO: 9.1 FL (ref 8.9–12.9)
POTASSIUM SERPL-SCNC: 4.3 MMOL/L (ref 3.5–5.1)
RBC # BLD AUTO: 2.93 M/UL (ref 4.1–5.7)
RIGHT CFA PROX SYS PSV: 102.8 CM/S
RIGHT DIST ATA VELOCITY: 0 CM/S
RIGHT DIST PTA PSV: 0 CM/S
RIGHT MID ATA VELOCITY: 0 CM/S
RIGHT POP A PROX VEL RATIO: 1
RIGHT POPLITEAL DIST SYS PSV: 88.1 CM/S
RIGHT POPLITEAL PROX SYS PSV: 82.6 CM/S
RIGHT PROX ATA VELOCITY: 85 CM/S
RIGHT PROX PFA A PSV: 66.2 CM/S
RIGHT PROX PTA PSV: 62.5 CM/S
RIGHT PTA MID SYS PSV: 0 CM/S
RIGHT SFA DIST VEL RATIO: 0.72
RIGHT SFA MID VEL RATIO: 1.1
RIGHT SFA PROX VEL RATIO: 1
RIGHT SUPER FEMORAL DIST SYS PSV: 82.6 CM/S
RIGHT SUPER FEMORAL MID SYS PSV: 115.5 CM/S
RIGHT SUPER FEMORAL PROX SYS PSV: 100.9 CM/S
SODIUM SERPL-SCNC: 135 MMOL/L (ref 136–145)
WBC # BLD AUTO: 11.4 K/UL (ref 4.1–11.1)

## 2022-11-20 PROCEDURE — 87186 SC STD MICRODIL/AGAR DIL: CPT

## 2022-11-20 PROCEDURE — 87077 CULTURE AEROBIC IDENTIFY: CPT

## 2022-11-20 PROCEDURE — 71045 X-RAY EXAM CHEST 1 VIEW: CPT

## 2022-11-20 PROCEDURE — 85025 COMPLETE CBC W/AUTO DIFF WBC: CPT

## 2022-11-20 PROCEDURE — 74011000250 HC RX REV CODE- 250: Performed by: STUDENT IN AN ORGANIZED HEALTH CARE EDUCATION/TRAINING PROGRAM

## 2022-11-20 PROCEDURE — 74011250637 HC RX REV CODE- 250/637: Performed by: STUDENT IN AN ORGANIZED HEALTH CARE EDUCATION/TRAINING PROGRAM

## 2022-11-20 PROCEDURE — 65270000046 HC RM TELEMETRY

## 2022-11-20 PROCEDURE — 80048 BASIC METABOLIC PNL TOTAL CA: CPT

## 2022-11-20 PROCEDURE — 36415 COLL VENOUS BLD VENIPUNCTURE: CPT

## 2022-11-20 PROCEDURE — 87205 SMEAR GRAM STAIN: CPT

## 2022-11-20 PROCEDURE — 74011250636 HC RX REV CODE- 250/636: Performed by: HOSPITALIST

## 2022-11-20 PROCEDURE — 74011250637 HC RX REV CODE- 250/637: Performed by: HOSPITALIST

## 2022-11-20 PROCEDURE — 74011000258 HC RX REV CODE- 258: Performed by: HOSPITALIST

## 2022-11-20 RX ORDER — GUAIFENESIN/DEXTROMETHORPHAN 100-10MG/5
10 SYRUP ORAL
Status: DISCONTINUED | OUTPATIENT
Start: 2022-11-20 | End: 2022-12-02 | Stop reason: HOSPADM

## 2022-11-20 RX ORDER — METRONIDAZOLE 500 MG/100ML
500 INJECTION, SOLUTION INTRAVENOUS EVERY 12 HOURS
Status: DISCONTINUED | OUTPATIENT
Start: 2022-11-20 | End: 2022-11-20

## 2022-11-20 RX ORDER — CLINDAMYCIN PHOSPHATE 900 MG/50ML
900 INJECTION, SOLUTION INTRAVENOUS EVERY 8 HOURS
Status: DISCONTINUED | OUTPATIENT
Start: 2022-11-20 | End: 2022-11-21

## 2022-11-20 RX ORDER — ENOXAPARIN SODIUM 100 MG/ML
1 INJECTION SUBCUTANEOUS EVERY 12 HOURS
Status: DISCONTINUED | OUTPATIENT
Start: 2022-11-20 | End: 2022-11-23

## 2022-11-20 RX ORDER — CETIRIZINE HYDROCHLORIDE 10 MG/1
10 TABLET ORAL DAILY
Status: DISCONTINUED | OUTPATIENT
Start: 2022-11-20 | End: 2022-12-02 | Stop reason: HOSPADM

## 2022-11-20 RX ADMIN — CEFEPIME 2 G: 2 INJECTION, POWDER, FOR SOLUTION INTRAVENOUS at 07:27

## 2022-11-20 RX ADMIN — ENOXAPARIN SODIUM 100 MG: 100 INJECTION SUBCUTANEOUS at 17:24

## 2022-11-20 RX ADMIN — VANCOMYCIN HYDROCHLORIDE 1000 MG: 1 INJECTION, POWDER, LYOPHILIZED, FOR SOLUTION INTRAVENOUS at 08:42

## 2022-11-20 RX ADMIN — CEFEPIME 2 G: 2 INJECTION, POWDER, FOR SOLUTION INTRAVENOUS at 15:13

## 2022-11-20 RX ADMIN — HYDROCHLOROTHIAZIDE: 25 TABLET ORAL at 08:38

## 2022-11-20 RX ADMIN — CEFEPIME 2 G: 2 INJECTION, POWDER, FOR SOLUTION INTRAVENOUS at 21:21

## 2022-11-20 RX ADMIN — CLINDAMYCIN PHOSPHATE 900 MG: 900 INJECTION, SOLUTION INTRAVENOUS at 14:16

## 2022-11-20 RX ADMIN — GUAIFENESIN AND DEXTROMETHORPHAN 10 ML: 100; 10 SYRUP ORAL at 17:24

## 2022-11-20 RX ADMIN — CETIRIZINE HYDROCHLORIDE 10 MG: 10 TABLET, FILM COATED ORAL at 08:38

## 2022-11-20 RX ADMIN — SODIUM CHLORIDE, PRESERVATIVE FREE 10 ML: 5 INJECTION INTRAVENOUS at 06:12

## 2022-11-20 RX ADMIN — SODIUM CHLORIDE, PRESERVATIVE FREE 10 ML: 5 INJECTION INTRAVENOUS at 21:20

## 2022-11-20 RX ADMIN — SODIUM CHLORIDE, PRESERVATIVE FREE 10 ML: 5 INJECTION INTRAVENOUS at 14:12

## 2022-11-20 NOTE — PROGRESS NOTES
Arterial duplex reviewed. Has tibial occlusive disease. Would benefit from revascularization to assist with his wound healing potential.   No availability Monday but hopeful this can be accomplished Tuesday AM -- Will FU on this tomorrow.

## 2022-11-20 NOTE — PROGRESS NOTES
Mario of Shift Note    Bedside shift change report given to [facility] (oncoming nurse) by Marco Antonio Gomes RN (offgoing nurse). Report included the following information SBAR, Kardex, and MAR    Shift worked:  7AM-7PM     Shift summary and any significant changes:     Patient continues on IV abt therapy. Seen by podiatrist today. Wound care done by nurse and podiatrist today. Patient noted with a non productive cough. Md ordered prn robitussin and chest x ray. Chest x ray negative.       Concerns for physician to address:     Zone phone for oncoming shift:          Marco Antonio Gomes, RN

## 2022-11-20 NOTE — PROGRESS NOTES
[facility]  [address] [facility], [facility] [address]  [phone]         Hospitalist Progress Note    SEE ADDENDUM BELOW    NAME:  Lucille Ramirez   :  1940   MRN:  [id number]    Date/Time:  2022     Patient PCP:  Sanaz Morris MD    Emergency Contact:    Extended Emergency Contact Information  Primary Emergency Contact: [address] Phone: [phone]  Relation: Spouse      Code: Full Code     Isolation Precautions: There are currently no Active Isolations        Subjective:     REASON FOR VISIT:  Recheck Foot Ulcer     HPI & INTERVAL HISTORY:     Mr. Shira Matson is a 78 y.o. male with history that includes HTN, AFIB on Xarelto, and OLEG on CPAP reports right heel ulcer about 1 week ago which then spread up leg with erythema resulting in cellulitis. : Patient seen and examined. Denies pain of the right foot or right lower extremity. Erythema improving. Was sitting up on side of the bed with no issues such as chest pain shortness of breath or acute issues overnight. Telemetry is on be discontinued at this time. : Seen and examined with wife at bedside. Cellulitis left lower extremity improving with no acute events overnight. However appears to have borderline thickened PAD right lower extremity. : Patient seen and examined alongside a podiatrist..  No acute events overnight. Erythema from cellulitis improving. : Seen and examined. Has no pain. Wound and erythema unchanged per wife. Debrided by podiatry with a purulent discharge noted.       ALLERGIES  Allergies   Allergen Reactions    Ace Inhibitors Cough     Patients states he is not allergic    Nsaids (Non-Steroidal Anti-Inflammatory Drug) Other (comments)     \"causes blood in urine\"    Pcn [Penicillins] Rash    Percocet [Oxycodone-Acetaminophen] Other (comments)     constipation         ROS:  Gen:   Negative  Resp:  negative  CVS: negative  GI:       negative  Skin:   Wound as described in PE  Chance:    negative except for peripheral neuropathy           Objective:      Visit Vitals  BP (!) 172/74 (BP 1 Location: Right upper arm, BP Patient Position: At rest)   Pulse 68   Temp 98.1 °F (36.7 °C)   Resp 18   Ht 5' 11\" (1.803 m)   Wt 95.3 kg (210 lb)   SpO2 97%   BMI 29.29 kg/m²       Physical Exam:  General: No distress. .  Head: Normocephalic, without obvious abnormality, atraumatic  Eyes: anicteric sclerae and conjuntiva clear  ENT: lips, mucosa, and tongue normal  Neck: normal, supple, and no tenderness  Lungs: clear to auscultation and normal respiratory effort  Heart: S1, S2 normal, regular rate, and regular rhythm  Abd: not distended, soft, nontender, BS present and normactive  Ext: no cyanosis and right lower extremity 4+ edema  Skin: erythema RLE continues improving  Neuro:  alert, oriented, no defects noted in general exam.  Psych: not anxious, cooperative, appropriate affect      Medications:  Current Facility-Administered Medications   Medication Dose Route Frequency    vancomycin (VANCOCIN) 1,000 mg in 0.9% sodium chloride 250 mL ([id number])  1,000 mg IntraVENous Q12H    cetirizine (ZYRTEC) tablet 10 mg  10 mg Oral DAILY    cefepime (MAXIPIME) 2 g in 0.9% sodium chloride (MBP/ADV) 100 mL MBP  2 g IntraVENous Q8H    rivaroxaban (XARELTO) tablet 20 mg  20 mg Oral DAILY WITH DINNER    lactated Ringers 1,000 mL with potassium chloride 20 mEq infusion   IntraVENous CONTINUOUS    losartan/hydroCHLOROthiazide (HYZAAR) 100/25 mg   Oral DAILY    sodium chloride (NS) flush 5-40 mL  5-40 mL IntraVENous Q8H    sodium chloride (NS) flush 5-40 mL  5-40 mL IntraVENous PRN    acetaminophen (TYLENOL) tablet 650 mg  650 mg Oral Q6H PRN    Or    acetaminophen (TYLENOL) suppository 650 mg  650 mg Rectal Q6H PRN    polyethylene glycol (MIRALAX) packet 17 g  17 g Oral DAILY PRN    ondansetron (ZOFRAN ODT) tablet 4 mg  4 mg Oral Q8H PRN    Or    ondansetron [facility] injection 4 mg  4 mg IntraVENous Q6H PRN    aspirin delayed-release tablet 81 mg  81 mg Oral DAILY        Labs:  Recent Labs     11/20/22  0606   WBC 11.4*   HGB 9.4*   HCT 29.2*          Recent Labs     11/20/22  0606 11/18/22  0153 11/17/22  2359   *   < > 135*   K 4.3   < > HEMOLYZED,RECOLLECT REQUESTED      < > 106   CO2 24   < > 22   *   < > 118*   BUN 25*   < > 36*   CREA 0.88   < > 1.14   CA 8.2*   < > 8.2*   MG  --   --  HEMOLYZED,RECOLLECT REQUESTED   PHOS  --   --  2.2*   ALB  --   --  2.3*   TBILI  --   --  0.6   ALT  --   --  39    < > = values in this interval not displayed. Radiology:  No results found. 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 Likely to obtain cultures. Appears to have significant PAD right lower extremity  Vascular on consult and note appreciated    Leukocytosis POA  Slight increase to 11.4 compared to 10.6 yesterday but overall improved since admission. Continue to monitor and treat as above. Electrolyte imbalances with:  Hypocalcemia / Hypophosphatemia / Hypokalemia  Replace and monitor    Atrial fibrillation  Hold Xarelto for anticipated procedure. Continue lovenox.      Hypertension  Continue home Hyzaar     OLEG  CPAP nightly     Obesity  Counseled on weight loss, dieting and exercise    Body mass index is 29.29 kg/m².:  25.0 - 29.9:  Overweight    F: Discontinue IV fluids  E: Hypok, hypocal, hypophos  N: ADULT DIET Regular  DIET ONE TIME MESSAGE       Risk of deterioration: high      Discussed:  Pt's condition, Imaging findings, Lab findings, Assessment, and Care Plan discussed with: Patient, Spouse at bedside , RN, and Care Manager    Prophylaxis:  Lovenox SQ    Anticipated discharge disposition:  [facility] Barriers: Currently not medically stable for discharge      Total time: -35- minutes **I personally saw and examined the patient during this time period**        Date of service:    11/20/2022       Addendum 11/20/22 1:51 PM:    Had discussion with Dr Eddy Boswell who told me that pt developed another blister which he lanced and got purulence out of. Art dopplers showed evidence of occlusion RLE which he will d/w vascular in the event of need for vascular intervention. Dr John Manjarrez is planning on taking Pt to OR likely tomorrow. To that end I will order PT/PTT, stop xarelto and start lovenox. Also will add Flagyl for anaerobic coverage. ID consulted. Addendum 11/20/22 7:36 PM:    Actually changed flagyl to clinda and dosed d/w pharmacy.               ___________________________________________________    Admitting Physician: Neto Tang MD

## 2022-11-20 NOTE — PROGRESS NOTES
Problem: Pressure Injury - Risk of  Goal: *Prevention of pressure injury  Description: Document Vitaly Scale and appropriate interventions in the flowsheet. Outcome: Progressing Towards Goal  Note: Pressure Injury Interventions:  Sensory Interventions: Assess need for specialty bed    Moisture Interventions: Absorbent underpads    Activity Interventions: PT/OT evaluation    Mobility Interventions: Float heels, HOB 30 degrees or less    Nutrition Interventions: Document food/fluid/supplement intake    Friction and Shear Interventions: HOB 30 degrees or less, Lift sheet, Minimize layers                Problem: Patient Education: Go to Patient Education Activity  Goal: Patient/Family Education  Outcome: Progressing Towards Goal     Problem: Falls - Risk of  Goal: *Absence of Falls  Description: Document Nikolas Fall Risk and appropriate interventions in the flowsheet.   Outcome: Progressing Towards Goal  Note: Fall Risk Interventions:  Mobility Interventions: Strengthening exercises (ROM-active/passive)         Medication Interventions: Patient to call before getting OOB    Elimination Interventions: Call light in reach, Urinal in reach              Problem: Patient Education: Go to Patient Education Activity  Goal: Patient/Family Education  Outcome: Progressing Towards Goal

## 2022-11-20 NOTE — PROGRESS NOTES
Dr Opal Foy on unit to visit. Gave verbal order to discontinue cardiac monitor. Pt is aware. Will continue to monitor. On coming nurse Gayathri Anthony RN made aware.

## 2022-11-20 NOTE — PROGRESS NOTES
Vancomycin trough, drawn 12 hours post-dose, was 11.8 mcg/ml. This predicts an AUC of 342 which is below goal. Will increase dosing to 1000 mg IV q12H beginning now. This predicts an AUC of 453.

## 2022-11-20 NOTE — PROGRESS NOTES
Automatic dose adjustment per [facility] P&T protocol for Metronidazole (Flagyl) for this 78 y.o. for indication of: SSIT,bone and joint infection. Current regimen: Metronidazole 500 mg every 6 hours  Recommended regimen: Metronidazole 500 mg every 12 hours      Intervention:  1.  Pharmacy will automatically change the dose of metronidazole to 500 mg every 12 hours for all indications except for the following:   a. C. difficile infection   b. CNS infections   c. Non-anaerobic infections (giardiasis, trichomonas, H pylori, etc)       Thank you,  MARY Gaffney , PHARMD Contact information: [phone]

## 2022-11-20 NOTE — PROGRESS NOTES
[facility] Podiatric Surgery  Progress Note  Subjective:  Leigh Melton has chronic osteomyelitis of his right fifth metatarsal head and a right heel ulceration with concern for osteomyelitis of his calcaneus/talus. Patient is doing better with improvement in cellulitis and swelling. Today he presented with a new blister to the medial hindfoot which was lanced with about 5 cc of seropurulent discharge. A wound culture was obtained and sent for anaerobic/aerobic cultures. ROS:   Constitutional: Negative for fever, chills, weight loss and malaise/fatigue. HENT: Negative for nosebleeds. Eyes: Negative for blurred vision. Respiratory: Negative for cough, shortness of breath and wheezing. Cardiovascular: Negative for chest pain, palpitations. Gastrointestinal: Negative for heartburn, nausea, vomiting, abdominal pain. Genitourinary: Negative for dysuria and urgency. Musculoskeletal: Right heel ulcer and forefoot ulcer. Skin: Negative for rash. Neurological: Negative for dizziness, focal weakness and headaches. Endo/Heme/Allergies: Negative for environmental allergies. Psychiatric/Behavioral: Negative for depression and suicidal ideas. Objective:  Blood pressure (!) 155/72, pulse 66, temperature 99 °F (37.2 °C), resp. rate 18, height 5' 11\" (1.803 m), weight 95.3 kg (210 lb), SpO2 96 %. Intake/Output Summary (Last 24 hours) at 11/20/2022 1234  Last data filed at 11/20/2022 1035  Gross per 24 hour   Intake 950 ml   Output 1550 ml   Net -600 ml       Lower Extremity Exam:  Vascular:    Dorsalis Pedis Pulse: absent  Posterior Tibialis Pulse: absent  Skin Temp: warm to touch  Extremity Edema: 2+ pitting edema to the right lower extremity and 2+ to the left lower extremity. Edema to the right side appears to be improving. Varicosities: present  Capillary refill time is sluggish but present.      Neurological:  Epicritic and Protective Sensations: Absent  Deep Pain Response: Diminished Dermatological:  Ulceration noted to the lateral aspect of the right fifth metatarsal head down to subcutaneous layer and probe to bone positive. No drainage or overt periwound erythema/cellulitis noted. Right lower extremity cellulitis appears to be improving from yesterday. The right heel ulcer is stable with no purulence noted. Myonecrosis noted from the stab incision. New blister noted to the medial hindfoot of the right side. An incision and drainage revealed about 5 cc of seropurulent discharge. A punctuate lesion was noted once lancing which probed to bone. 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 No pain with calf compression. Labs:  Lab Results   Component Value Date/Time    WBC 11.4 (H) 11/20/2022 06:06 AM    HGB 9.4 (L) 11/20/2022 06:06 AM    HCT 29.2 (L) 11/20/2022 06:06 AM    MCV 99.7 (H) 11/20/2022 06:06 AM    PLATELET 514 50/05/3497 06:06 AM     Lab Results   Component Value Date/Time    Sodium 135 (L) 11/20/2022 06:06 AM    Potassium 4.3 11/20/2022 06:06 AM    Chloride 106 11/20/2022 06:06 AM    CO2 24 11/20/2022 06:06 AM    BUN 25 (H) 11/20/2022 06:06 AM    Glucose 105 (H) 11/20/2022 06:06 AM     Lab Results   Component Value Date/Time    INR 2.1 (H) 05/22/2016 06:07 AM       Current Medications:  Reviewed. Impression  Chronic osteomyelitis, right fifth metatarsal  2. Peripheral neuropathy  3. Peripheral arterial disease  4. Right heel ulceration with concern for osteomyelitis    5. Ulceration to right medial hindfoot with purulent dischage     Plan:  Patient seen and evaluated with his wife at bedside. Arterial duplex shows evidence of right mid-distal anterior and posterior tibial artery occlusion. Will follow up with Vascular Surgery for potential intervention prior to any debridement. If no Vascular Surgery intervention plans, I will take the patient to the operating room tomorrow for incision and drainage and bone biopsy.   A new blister was noted to the medial hindfoot on the right side. An incision and drainage was performed revealing about 5 cc of seropurulent discharge. There was a punctuate lesion which probed to bone. Continue IV antibiotics and trend labs. Wound culture obtained. Will tailor antibiotics accordingly. WBAT for transfers only to the E until we rule out osteomyelitis in order to prevent a pathological fracture of the calcaneus. Daily dressings with betadine soaked gauze and dry sterile dressings.       Allison Olvera DPM  Cell# [phone]

## 2022-11-21 LAB
APTT PPP: 37.3 SEC (ref 22.1–31)
BASOPHILS # BLD: 0.1 K/UL (ref 0–0.1)
BASOPHILS NFR BLD: 1 % (ref 0–1)
DIFFERENTIAL METHOD BLD: ABNORMAL
EOSINOPHIL # BLD: 0.2 K/UL (ref 0–0.4)
EOSINOPHIL NFR BLD: 2 % (ref 0–7)
ERYTHROCYTE [DISTWIDTH] IN BLOOD BY AUTOMATED COUNT: 14.2 % (ref 11.5–14.5)
HCT VFR BLD AUTO: 31.3 % (ref 36.6–50.3)
HGB BLD-MCNC: 10.1 G/DL (ref 12.1–17)
IMM GRANULOCYTES # BLD AUTO: 0.2 K/UL (ref 0–0.04)
IMM GRANULOCYTES NFR BLD AUTO: 2 % (ref 0–0.5)
INR PPP: 1.4 (ref 0.9–1.1)
LYMPHOCYTES # BLD: 1.3 K/UL (ref 0.8–3.5)
LYMPHOCYTES NFR BLD: 13 % (ref 12–49)
MCH RBC QN AUTO: 32.2 PG (ref 26–34)
MCHC RBC AUTO-ENTMCNC: 32.3 G/DL (ref 30–36.5)
MCV RBC AUTO: 99.7 FL (ref 80–99)
MONOCYTES # BLD: 1.1 K/UL (ref 0–1)
MONOCYTES NFR BLD: 10 % (ref 5–13)
NEUTS SEG # BLD: 7.3 K/UL (ref 1.8–8)
NEUTS SEG NFR BLD: 72 % (ref 32–75)
NRBC # BLD: 0 K/UL (ref 0–0.01)
NRBC BLD-RTO: 0 PER 100 WBC
PLATELET # BLD AUTO: 328 K/UL (ref 150–400)
PMV BLD AUTO: 9.5 FL (ref 8.9–12.9)
PROTHROMBIN TIME: 13.9 SEC (ref 9–11.1)
RBC # BLD AUTO: 3.14 M/UL (ref 4.1–5.7)
THERAPEUTIC RANGE,PTTT: ABNORMAL SECS (ref 58–77)
WBC # BLD AUTO: 10.1 K/UL (ref 4.1–11.1)

## 2022-11-21 PROCEDURE — 74011000250 HC RX REV CODE- 250: Performed by: STUDENT IN AN ORGANIZED HEALTH CARE EDUCATION/TRAINING PROGRAM

## 2022-11-21 PROCEDURE — 97110 THERAPEUTIC EXERCISES: CPT

## 2022-11-21 PROCEDURE — 85730 THROMBOPLASTIN TIME PARTIAL: CPT

## 2022-11-21 PROCEDURE — 74011000258 HC RX REV CODE- 258: Performed by: HOSPITALIST

## 2022-11-21 PROCEDURE — 36415 COLL VENOUS BLD VENIPUNCTURE: CPT

## 2022-11-21 PROCEDURE — 74011250637 HC RX REV CODE- 250/637: Performed by: HOSPITALIST

## 2022-11-21 PROCEDURE — 74011250636 HC RX REV CODE- 250/636: Performed by: HOSPITALIST

## 2022-11-21 PROCEDURE — 85610 PROTHROMBIN TIME: CPT

## 2022-11-21 PROCEDURE — 65270000046 HC RM TELEMETRY

## 2022-11-21 PROCEDURE — 74011250637 HC RX REV CODE- 250/637: Performed by: INTERNAL MEDICINE

## 2022-11-21 PROCEDURE — 85025 COMPLETE CBC W/AUTO DIFF WBC: CPT

## 2022-11-21 PROCEDURE — 99223 1ST HOSP IP/OBS HIGH 75: CPT | Performed by: INTERNAL MEDICINE

## 2022-11-21 PROCEDURE — 74011250637 HC RX REV CODE- 250/637: Performed by: STUDENT IN AN ORGANIZED HEALTH CARE EDUCATION/TRAINING PROGRAM

## 2022-11-21 RX ORDER — METRONIDAZOLE 250 MG/1
500 TABLET ORAL EVERY 12 HOURS
Status: DISCONTINUED | OUTPATIENT
Start: 2022-11-21 | End: 2022-11-29

## 2022-11-21 RX ADMIN — METRONIDAZOLE 500 MG: 250 TABLET ORAL at 11:12

## 2022-11-21 RX ADMIN — SODIUM CHLORIDE, PRESERVATIVE FREE 10 ML: 5 INJECTION INTRAVENOUS at 13:29

## 2022-11-21 RX ADMIN — CLINDAMYCIN PHOSPHATE 900 MG: 900 INJECTION, SOLUTION INTRAVENOUS at 01:13

## 2022-11-21 RX ADMIN — SODIUM CHLORIDE, PRESERVATIVE FREE 10 ML: 5 INJECTION INTRAVENOUS at 06:41

## 2022-11-21 RX ADMIN — CEFEPIME 2 G: 2 INJECTION, POWDER, FOR SOLUTION INTRAVENOUS at 06:41

## 2022-11-21 RX ADMIN — CEFEPIME 2 G: 2 INJECTION, POWDER, FOR SOLUTION INTRAVENOUS at 22:07

## 2022-11-21 RX ADMIN — CLINDAMYCIN PHOSPHATE 900 MG: 900 INJECTION, SOLUTION INTRAVENOUS at 06:41

## 2022-11-21 RX ADMIN — HYDROCHLOROTHIAZIDE: 25 TABLET ORAL at 08:50

## 2022-11-21 RX ADMIN — ENOXAPARIN SODIUM 100 MG: 100 INJECTION SUBCUTANEOUS at 17:41

## 2022-11-21 RX ADMIN — SODIUM CHLORIDE, PRESERVATIVE FREE 10 ML: 5 INJECTION INTRAVENOUS at 22:08

## 2022-11-21 RX ADMIN — ENOXAPARIN SODIUM 100 MG: 100 INJECTION SUBCUTANEOUS at 06:41

## 2022-11-21 RX ADMIN — ASPIRIN 81 MG: 81 TABLET, COATED ORAL at 08:50

## 2022-11-21 RX ADMIN — CEFEPIME 2 G: 2 INJECTION, POWDER, FOR SOLUTION INTRAVENOUS at 13:29

## 2022-11-21 RX ADMIN — METRONIDAZOLE 500 MG: 250 TABLET ORAL at 22:07

## 2022-11-21 RX ADMIN — CETIRIZINE HYDROCHLORIDE 10 MG: 10 TABLET, FILM COATED ORAL at 08:50

## 2022-11-21 NOTE — PROGRESS NOTES
Problem: Pressure Injury - Risk of  Goal: *Prevention of pressure injury  Description: Document Vitaly Scale and appropriate interventions in the flowsheet. Outcome: Progressing Towards Goal  Note: Pressure Injury Interventions:  Sensory Interventions: Minimize linen layers, Keep linens dry and wrinkle-free    Moisture Interventions: Minimize layers    Activity Interventions: Increase time out of bed    Mobility Interventions: HOB 30 degrees or less    Nutrition Interventions: Document food/fluid/supplement intake    Friction and Shear Interventions: HOB 30 degrees or less, Lift sheet, Minimize layers                Problem: Patient Education: Go to Patient Education Activity  Goal: Patient/Family Education  Outcome: Progressing Towards Goal     Problem: Falls - Risk of  Goal: *Absence of Falls  Description: Document Nikolas Fall Risk and appropriate interventions in the flowsheet.   Outcome: Progressing Towards Goal  Note: Fall Risk Interventions:  Mobility Interventions: Patient to call before getting OOB         Medication Interventions: Teach patient to arise slowly    Elimination Interventions: Call light in reach              Problem: Patient Education: Go to Patient Education Activity  Goal: Patient/Family Education  Outcome: Progressing Towards Goal     Problem: Cellulitis Care Plan (Adult)  Goal: *Absence of infection signs and symptoms  Outcome: Progressing Towards Goal     Problem: Patient Education: Go to Patient Education Activity  Goal: Patient/Family Education  Outcome: Progressing Towards Goal     Problem: Patient Education: Go to Patient Education Activity  Goal: Patient/Family Education  Outcome: Progressing Towards Goal

## 2022-11-21 NOTE — PROGRESS NOTES
[facility]  [address] [facility], [facility] [address]  [phone]         Hospitalist Progress Note        NAME:  Jeffery Stanley   :  1940   MRN:  [id number]    Date/Time:  2022     Patient PCP:  Avis Baltazar MD    Emergency Contact:    Extended Emergency Contact Information  Primary Emergency Contact: [address] Phone: [phone]  Relation: Spouse      Code: Full Code     Isolation Precautions: There are currently no Active Isolations        Subjective:     REASON FOR VISIT:  Recheck Foot Ulcer     HPI & INTERVAL HISTORY:     Mr. Tamiko Ramey is a 78 y.o. male with history that includes HTN, AFIB on Xarelto, and OLEG on CPAP reports right heel ulcer about 1 week ago which then spread up leg with erythema resulting in cellulitis. : Resting comfortably in bed no acute events overnight or new complaints but another purulent blister found yesterday and found to have arterial occlusions. Vascular likely to take to OR tomorrow, then podiatry surgery after later this week. : Patient seen and examined. Denies pain of the right foot or right lower extremity. Erythema improving. Was sitting up on side of the bed with no issues such as chest pain shortness of breath or acute issues overnight. Telemetry is on be discontinued at this time. : Seen and examined with wife at bedside. Cellulitis left lower extremity improving with no acute events overnight. However appears to have borderline thickened PAD right lower extremity. : Patient seen and examined alongside a podiatrist..  No acute events overnight. Erythema from cellulitis improving. : Seen and examined. Has no pain. Wound and erythema unchanged per wife. Debrided by podiatry with a purulent discharge noted.       ALLERGIES  Allergies   Allergen Reactions    Ace Inhibitors Cough     Patients states he is not allergic    Nsaids (Non-Steroidal Anti-Inflammatory Drug) Other (comments)     \"causes blood in urine\"    Pcn [Penicillins] Rash    Percocet [Oxycodone-Acetaminophen] Other (comments)     constipation         ROS:  Gen:   Negative  Resp:  negative  CVS:   negative  GI:       negative  Chance:    negative except for peripheral neuropathy           Objective:      Visit Vitals  BP (!) 154/88   Pulse 71   Temp 98.1 °F (36.7 °C)   Resp 18   Ht 5' 11\" (1.803 m)   Wt 95.3 kg (210 lb)   SpO2 96%   BMI 29.29 kg/m²       Physical Exam:  General: Not in distress  Head: Normocephalic, without obvious abnormality, atraumatic  Eyes: anicteric sclerae and conjuntiva clear  ENT: lips, mucosa, and tongue normal  Neck: normal, supple, and no tenderness  Lungs: CTA  Heart: S1, S2 normal, regular rate, and regular rhythm  Abd: not distended, soft, nontender, BS present and normactive  Ext: no cyanosis and right lower extremity 4+ edema  Skin: erythema RLE continues improving  Neuro:  alert, oriented, no defects noted in general exam.  Psych: not anxious, cooperative, appropriate affect      Medications:  Current Facility-Administered Medications   Medication Dose Route Frequency    cetirizine (ZYRTEC) tablet 10 mg  10 mg Oral DAILY    enoxaparin (LOVENOX) injection 100 mg  1 mg/kg SubCUTAneous Q12H    clindamycin (CLEOCIN) 900mg D5W 50mL IVPB (premix)  900 mg IntraVENous Q8H    guaiFENesin-dextromethorphan (ROBITUSSIN DM) 100-10 mg/5 mL syrup 10 mL  10 mL Oral Q6H PRN    cefepime (MAXIPIME) 2 g in 0.9% sodium chloride (MBP/ADV) 100 mL MBP  2 g IntraVENous Q8H    [Held by provider] rivaroxaban (XARELTO) tablet 20 mg  20 mg Oral DAILY WITH DINNER    losartan/hydroCHLOROthiazide (HYZAAR) 100/25 mg   Oral DAILY    sodium chloride (NS) flush 5-40 mL  5-40 mL IntraVENous Q8H    sodium chloride (NS) flush 5-40 mL  5-40 mL IntraVENous PRN    acetaminophen (TYLENOL) tablet 650 mg  650 mg Oral Q6H PRN    Or    acetaminophen (TYLENOL) suppository 650 mg  650 mg Rectal Q6H PRN    polyethylene glycol (MIRALAX) packet 17 g  17 g Oral DAILY PRN    ondansetron (ZOFRAN ODT) tablet 4 mg  4 mg Oral Q8H PRN    Or    ondansetron (ZOFRAN) injection 4 mg  4 mg IntraVENous Q6H PRN    aspirin delayed-release tablet 81 mg  81 mg Oral DAILY        Labs:  Recent Labs     11/20/22  0606   WBC 11.4*   HGB 9.4*   HCT 29.2*          Recent Labs     11/20/22  0606   *   K 4.3      CO2 24   *   BUN 25*   CREA 0.88   CA 8.2*         Radiology:  XR CHEST PORT    Result Date: 11/20/2022  No significant change. No acute process. 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 Monitor BCXs  MRSA negative DC'd vancomycin  Consult(s): Podiatry for wound and ID for DC ABX    Acute osteomyelitis (OM) of fifth metarsal head with overlying soft tissue ulcer  Podiatry managing and amputation vs 6 wk ABXs. Likely to obtain cultures. Occlusion of the right mid-distal posterior tibial artery and right mid-distal anterior tibial artery  Vascular to take to OR for revascularization tomorrow  Xarelto held and started on Lovenox. PT/INR and PTT in am  N.p.o. after midnight in the event of procedure. Leukocytosis POA  Improved 10.1 today. Tinea treatment as above with antibiotics  Continue to monitor and treat as above.     Electrolyte imbalances with:  Hypocalcemia / Hypophosphatemia / Hypokalemia  Replace and monitor    Atrial fibrillation  Stable on xarelto but on hold for procedure     Hypertension  Continue home Hyzaar     OLEG  CPAP nightly     Obesity  Counseled on weight loss, dieting and exercise    Body mass index is 29.29 kg/m².:  25.0 - 29.9:  Overweight    F: Discontinue IV fluids  E: Hypok, hypocal, hypophos  N: ADULT DIET Regular  DIET ONE TIME MESSAGE       Risk of deterioration: high      Discussed:  Pt's condition, Imaging findings, Lab findings, Assessment, and Care Plan discussed with: Patient, Spouse at bedside , RN, and Care Manager    Prophylaxis:  Lovenox SQ    Anticipated discharge disposition:  [facility] Barriers: Currently not medically stable for discharge      Total time: 30 minutes **I personally saw and examined the patient during this time period**        Date of service:    11/21/2022                  ___________________________________________________    Admitting Physician: Kat Fernandez MD

## 2022-11-21 NOTE — CONSULTS
Infectious Disease Consult    Impression/Plan   Chronic osteomyelitis of the right fifth metatarsal head and suspected and suspected osteomyelitis of the talus and calcaneus. Podiatry planning for resection of the right fifth metatarsal head and bone biopsy of the talus and calcaneus to evaluate for osteomyelitis. Awaiting clearance by vascular surgery. Continue cefepime. Stop clindamycin due to concern for potential side effects. Metronidazole will be started as wound is necrotic. Vancomycin stopped due to negative MRSA screen however screen was from 2020. We will repeat. Cultures pending from blister which was lanced at bedside yesterday  Peripheral arterial disease. Revascularization planned for tomorrow by vascular surgery  Peripheral neuropathy. Chronic atrial fibrillation. On anticoagulation with Xarelto    Anti-infectives:   Cefepime  Clindamycin    Subjective:   Date of Consultation:  November 21, 2022  Date of Admission: 11/16/2022   Referring Physician:     Patient is a 78 y.o. male with a past medical history significant for atrial fibrillation on Xarelto, peripheral arterial disease, and peripheral neuropathy who is being seen for foot infection. Patient states that he had a corn shaved off by podiatry and since has developed a nonhealing wound adjacent to the right fifth metatarsal.  He also noticed a heel ulcer that developed about a week ago that has developed progressive erythema, warmth, and edema. He has been seen by podiatry and work-up has revealed chronic osteomyelitis of the right fifth metatarsal head and a right heel ulcer with concern for osteomyelitis of the calcaneus/talus. Podiatry is waiting clearance from vascular surgery prior to debridement. He is currently on cefepime and clindamycin.   The infectious diseases service has been asked to assist with antibiotic management    Patient Active Problem List   Diagnosis Code    Atrial fibrillation ([facility] [address] I48.91    Benign hypertensive heart disease without heart failure I11.9    OLEG (obstructive sleep apnea) G47.33    Varicose vein of leg I83.90    DJD (degenerative joint disease) of knee M17.9    Diverticulosis Q00.29    Diastolic dysfunction M73.45    Hip arthritis M16.10    Edema of both lower extremities due to peripheral venous insufficiency I87.2    Osteoarthritis M19.90    Cellulitis of right lower limb L03.115     Past Medical History:   Diagnosis Date    Arthritis     Back, knees, shoulders    Atrial fibrillation (HCC)     BCC (basal cell carcinoma of skin)     Benign hypertensive heart disease without heart failure     Diverticulosis     DJD (degenerative joint disease) of knee     right    Hematuria     due to certain medications    Hypertension     OLEG on CPAP 2011    Varicose vein of leg 2012      Family History   Problem Relation Age of Onset    Stroke Mother     Dementia Mother     Stroke Father     Asthma Father     Lung Disease Father     Diabetes Brother     Heart Disease Brother     Heart Disease Brother       Social History     Tobacco Use    Smoking status: Former     Packs/day: 0.50     Years: 40.00     Pack years: 20.00     Types: Cigarettes     Quit date:      Years since quittin.9    Smokeless tobacco: Former     Quit date: 1986   Substance Use Topics    Alcohol use: Yes     Alcohol/week: 2.0 standard drinks     Types: 1 Cans of beer, 1 Shots of liquor per week     Past Surgical History:   Procedure Laterality Date    HX CATARACT REMOVAL Bilateral     HX COLONOSCOPY  2019    Removed polyps    HX HERNIA REPAIR      HX HIP REPLACEMENT Left 2016    HX KNEE REPLACEMENT Right 2019    HX MOHS PROCEDURES Left     x 3 Forhead and ear    HX TONSILLECTOMY  1943    HX VEIN STRIPPING Bilateral       Prior to Admission medications    Medication Sig Start Date End Date Taking?  Authorizing Provider   losartan-hydroCHLOROthiazide (HYZAAR) 100-25 mg per tablet TAKE ONE TABLET BY MOUTH DAILY 12/28/20  Yes Marco Antonio Hagan NP   acetaminophen (Tylenol Extra Strength) 500 mg tablet Take 1-2 Tabs by mouth every six (6) hours as needed for Pain. Not to exceed 4,000mg in any 24 hour period  Indications: pain 9/21/20  Yes Moriah Summers MD   ondansetron [facility] ODT) 8 mg disintegrating tablet Take 0.5 Tabs by mouth every eight (8) hours as needed for Nausea. 9/21/20  Yes Moriah Summers MD   rivaroxaban (XARELTO) 20 mg tab tablet Take 1 Tab by mouth daily (with dinner). Indications: prevention of thromboembolism in paroxysmal atrial fibrillation 12/23/19  Yes Jorge Anderson MD   VIT C/E/ZN/COPPR/LUTEIN/ZEAXAN (PRESERVISION AREDS 2 PO) Take  by mouth two (2) times a day. Yes Provider, Historical   fish oil-omega-3 fatty acids 340-1,000 mg capsule Take 1 Cap by mouth daily (after breakfast). Yes Provider, Historical   magnesium 250 mg tab Take 400 mg by mouth daily (after dinner). Yes Provider, Historical   multivitamin (ONE A DAY) tablet Take 1 Tab by mouth daily (after breakfast). Yes Provider, Historical   ASCORBATE CALCIUM (VITAMIN C PO) Take 500 mg by mouth daily (after breakfast). 1/10/11  Yes Provider, Historical   cholecalciferol (VITAMIN D3) (2,000 UNITS /50 MCG) cap capsule Take 2,000 Units by mouth daily (after dinner). 1/10/11  Yes Provider, Historical   aspirin delayed-release 325 mg tablet Take 1 Tab by mouth two (2) times a day. Patient not taking: Reported on 11/16/2022 9/21/20   Moriah Summers MD   gabapentin (NEURONTIN) 100 mg capsule T1 tablet at breakfast and 3 tablets at night. 9/21/20   Moriah Summers MD   fluticasone propionate (FLONASE) 50 mcg/actuation nasal spray 2 Sprays by Both Nostrils route as needed. Provider, Historical   loratadine (CLARITIN) 10 mg tablet loratadine 10 mg tablet   Take 1 tablet every day by oral route as directed. Provider, Historical   UBIQUINONE PO Take 100 mg by mouth daily.     Provider, Historical   psyllium (METAMUCIL) powd Take  by mouth daily.    Provider, Historical   ipratropium (ATROVENT) 0.03 % nasal spray by Both Nostrils route as needed. 17   Provider, Historical     Allergies   Allergen Reactions    Ace Inhibitors Cough     Patients states he is not allergic    Nsaids (Non-Steroidal Anti-Inflammatory Drug) Other (comments)     \"causes blood in urine\"    Pcn [Penicillins] Rash    Percocet [Oxycodone-Acetaminophen] Other (comments)     constipation        Review of Systems:  A comprehensive review of systems was negative except for that written in the History of Present Illness. Objective:   Blood pressure (!) 163/84, pulse 67, temperature 98.3 °F (36.8 °C), resp. rate 16, height 5' 11\" (1.803 m), weight 210 lb (95.3 kg), SpO2 98 %. Temp (24hrs), Av.3 °F (36.8 °C), Min:98 °F (36.7 °C), Max:99 °F (37.2 °C)       Exam:                General:  Alert, cooperative, well noursished, well developed, appears stated age   Eyes:  Sclera anicteric. Pupils equally round and reactive to light. Mouth/Throat: Mucous membranes normal, oral pharynx clear   Neck: Supple   Lungs:   Clear to auscultation bilaterally, good effort   CV:  Regular rate and rhythm,no murmur, click, rub or gallop   Abdomen:   Soft, non-tender. bowel sounds normal. non-distended   Extremities: No cyanosis or edema   Skin: Skin color, texture, turgor normal. no acute rash or lesions   Lymph nodes: Cervical and supraclavicular normal   Musculoskeletal: No swelling or deformity   Lines/Devices:  Intact, no erythema, drainage or tenderness   Psych: Alert and oriented, normal mood affect given the setting       Data Review:   Recent Results (from the past 24 hour(s))   CULTURE, WOUND Sasha Edgar STAIN    Collection Time: 22 12:40 PM    Specimen: Ulcer;  Wound   Result Value Ref Range    Special Requests: NO SPECIAL REQUESTS      GRAM STAIN OCCASIONAL WBCS SEEN      GRAM STAIN FEW GRAM NEGATIVE RODS      Culture result: PENDING    CBC WITH AUTOMATED DIFF    Collection Time: 11/21/22  9:42 AM   Result Value Ref Range    WBC 10.1 4.1 - 11.1 K/uL    RBC 3.14 (L) 4.10 - 5.70 M/uL    HGB 10.1 (L) 12.1 - 17.0 g/dL    HCT 31.3 (L) 36.6 - 50.3 %    MCV 99.7 (H) 80.0 - 99.0 FL    MCH 32.2 26.0 - 34.0 PG    MCHC 32.3 30.0 - 36.5 g/dL    RDW 14.2 11.5 - 14.5 %    PLATELET 282 923 - 451 K/uL    MPV 9.5 8.9 - 12.9 FL    NRBC 0.0 0  WBC    ABSOLUTE NRBC 0.00 0.00 - 0.01 K/uL    NEUTROPHILS 72 32 - 75 %    LYMPHOCYTES 13 12 - 49 %    MONOCYTES 10 5 - 13 %    EOSINOPHILS 2 0 - 7 %    BASOPHILS 1 0 - 1 %    IMMATURE GRANULOCYTES 2 (H) 0.0 - 0.5 %    ABS. NEUTROPHILS 7.3 1.8 - 8.0 K/UL    ABS. LYMPHOCYTES 1.3 0.8 - 3.5 K/UL    ABS. MONOCYTES 1.1 (H) 0.0 - 1.0 K/UL    ABS. EOSINOPHILS 0.2 0.0 - 0.4 K/UL    ABS. BASOPHILS 0.1 0.0 - 0.1 K/UL    ABS. IMM.  GRANS. 0.2 (H) 0.00 - 0.04 K/UL    DF AUTOMATED     PROTHROMBIN TIME + INR    Collection Time: 11/21/22  9:42 AM   Result Value Ref Range    INR 1.4 (H) 0.9 - 1.1      Prothrombin time 13.9 (H) 9.0 - 11.1 sec   PTT    Collection Time: 11/21/22  9:42 AM   Result Value Ref Range    aPTT 37.3 (H) 22.1 - 31.0 sec    aPTT, therapeutic range     58.0 - 77.0 SECS        Microbiology:      Studies:      Signed By: Kaye Johns DO     November 21, 2022

## 2022-11-21 NOTE — PROGRESS NOTES
Vascular Surgery Progress Note    Admit Date: 2022    Subjective:     Resting in bed. Continues with some leg discomfort. Objective:     Vitals:  Blood pressure (!) 163/84, pulse 67, temperature 98.3 °F (36.8 °C), resp. rate 16, height 5' 11\" (1.803 m), weight 95.3 kg (210 lb), SpO2 98 %. Temp (24hrs), Av.2 °F (36.8 °C), Min:98 °F (36.7 °C), Max:98.4 °F (36.9 °C)      Physical Exam:    GEN: Awake, alert, NAD  NECK: No hematoma  LUNGS: Clear to auscultation bilaterally  HEART: Regular rate and rhythm  ABDOMEN: Nontender, soft   EXTREM: Warm and dry. Right foot wrapped, toes swollen. Assessment/Plan      PVD   Chronic osteomyelitis     - Arterial duplex with tibial occlusive disease. Concern for osteomyelitis of the calcaneus/talus  - Would benefit from revascularization to assist with his wound healing potential. The procedure, risks, benefits, and alternatives were discussed with him in detail and he is agreeable. - Due to scheduling will plan on Wednesday morning with Dr. Yaneth LUIS after midnight.

## 2022-11-21 NOTE — PROGRESS NOTES
[facility] Podiatric Surgery  Progress Note  Subjective:  Aristeo Hayden has chronic osteomyelitis of his right fifth metatarsal head and a right heel ulceration with concern for osteomyelitis of his calcaneus/talus. Patient is doing better with improvement in cellulitis and swelling. Wounds appear stable. Vascular procedure is planned for Wednesday. ID recommendations appreciated. ROS:   Constitutional: Negative for fever, chills, weight loss and malaise/fatigue. HENT: Negative for nosebleeds. Eyes: Negative for blurred vision. Respiratory: Negative for cough, shortness of breath and wheezing. Cardiovascular: Negative for chest pain, palpitations. Gastrointestinal: Negative for heartburn, nausea, vomiting, abdominal pain. Genitourinary: Negative for dysuria and urgency. Musculoskeletal: Right heel ulcer and forefoot ulcer. Skin: Negative for rash. Neurological: Negative for dizziness, focal weakness and headaches. Endo/Heme/Allergies: Negative for environmental allergies. Psychiatric/Behavioral: Negative for depression and suicidal ideas. Objective:  Blood pressure (!) 174/96, pulse 71, temperature 97.8 °F (36.6 °C), resp. rate 18, height 5' 11\" (1.803 m), weight 95.3 kg (210 lb), SpO2 98 %. Intake/Output Summary (Last 24 hours) at 11/21/2022 1457  Last data filed at 11/20/2022 1604  Gross per 24 hour   Intake 200 ml   Output --   Net 200 ml       Lower Extremity Exam:  Vascular:    Dorsalis Pedis Pulse: absent  Posterior Tibialis Pulse: absent  Skin Temp: warm to touch  Extremity Edema: 2+ pitting edema to the right lower extremity and 2+ to the left lower extremity. Edema to the right side appears to be improving. Varicosities: present  Capillary refill time is sluggish but present.      Neurological:  Epicritic and Protective Sensations: Absent  Deep Pain Response: Diminished     Dermatological:  Ulceration noted to the lateral aspect of the right fifth metatarsal head down to subcutaneous layer and probe to bone positive. No drainage or overt periwound erythema/cellulitis noted. Right lower extremity cellulitis appears to be improving from yesterday. The right heel ulcer is stable with no purulence noted. Myonecrosis noted from the stab incision. Ulcer to the medial right hindfoot is stable with no further purulence observed. 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 No pain with calf compression. Labs:  Lab Results   Component Value Date/Time    WBC 10.1 11/21/2022 09:42 AM    HGB 10.1 (L) 11/21/2022 09:42 AM    HCT 31.3 (L) 11/21/2022 09:42 AM    MCV 99.7 (H) 11/21/2022 09:42 AM    PLATELET 098 50/99/4987 09:42 AM     Lab Results   Component Value Date/Time    Sodium 135 (L) 11/20/2022 06:06 AM    Potassium 4.3 11/20/2022 06:06 AM    Chloride 106 11/20/2022 06:06 AM    CO2 24 11/20/2022 06:06 AM    BUN 25 (H) 11/20/2022 06:06 AM    Glucose 105 (H) 11/20/2022 06:06 AM     Lab Results   Component Value Date/Time    INR 1.4 (H) 11/21/2022 09:42 AM       Current Medications:  Reviewed. Impression  Chronic osteomyelitis, right fifth metatarsal  2. Peripheral neuropathy  3. Peripheral arterial disease  4. Right heel ulceration with concern for osteomyelitis    5. Ulceration to right medial hindfoot      Plan:  Patient seen and evaluated with his wife and son at bedside. Vascular Surgery plans for revascularization on Wednesday. Plan for debridement of right foot pending vascular surgery intervention. Continue IV antibiotics per ID recommendations and trend labs. Follow up wound culture results. WBAT for transfers only to [facility] until we rule out osteomyelitis in order to prevent a pathological fracture of the calcaneus. Daily dressings with betadine soaked gauze and dry sterile dressings.       Iraida Win DPM

## 2022-11-21 NOTE — PROGRESS NOTES
Problem: Mobility Impaired (Adult and Pediatric)  Goal: *Acute Goals and Plan of Care (Insert Text)  Description: FUNCTIONAL STATUS PRIOR TO ADMISSION: Patient was modified independent using a single point cane for functional mobility. HOME SUPPORT PRIOR TO ADMISSION: The patient lived with wife but did not require assist.    Physical Therapy Goals  Initiated 11/18/2022  1. Patient will move from supine to sit and sit to supine  in bed with modified independence within 7 day(s). 2.  Patient will transfer from bed to chair and chair to bed with minimal assistance/contact guard assist using the least restrictive device within 7 day(s). 3.  Patient will perform sit to stand with minimal assistance/contact guard assist within 7 day(s). 4.  Patient will ambulate with minimal assistance/contact guard assist for 10'x2 feet with the least restrictive device within 7 day(s). Outcome: Progressing Towards Goal  Note:   PHYSICAL THERAPY TREATMENT  Patient: Henok Carrillo (25 y.o. male)  Date: 11/21/2022  Diagnosis: Cellulitis of right lower limb [L03.115] <principal problem not specified>  Procedure(s) (LRB):  AORTAGRAM ABDOMINAL (N/A)    Precautions: NWB (RLE)  Chart, physical therapy assessment, plan of care and goals were reviewed. ASSESSMENT  Patient continues with skilled PT services and progressing towards goals. Pt seems anxious regarding procedure tomorrow, wife at bedside. Declines OOB activity at this time, performed bed level thera ex for LLE, declining RLE d/t pain, and given red tband for UE exercise. PT will continue to follow. Current Level of Function Impacting Discharge (mobility/balance): Other factors to consider for discharge:          PLAN :  Patient continues to benefit from skilled intervention to address the above impairments. Continue treatment per established plan of care. to address goals.     Recommendation for discharge: (in order for the patient to meet his/her long term goals)  TBD     This discharge recommendation:  Has been made in collaboration with the attending provider and/or case management    IF patient discharges home will need the following DME: to be determined (TBD)       SUBJECTIVE:   Patient stated Ellie Formosa do the exercise.     OBJECTIVE DATA SUMMARY:   Critical Behavior:  Neurologic State: Alert  Orientation Level: Oriented X4  Cognition: Follows commands     Functional Mobility Training:  Bed Mobility:                    Transfers:                                   Balance:     Ambulation/Gait Training:                                                        Stairs: Therapeutic Exercises:     Pain Ratin/10    Activity Tolerance:   Fair    After treatment patient left in no apparent distress:   Supine in bed, Call bell within reach, and Bed / chair alarm activated    COMMUNICATION/COLLABORATION:   The patients plan of care was discussed with: Registered nurseAnn Mayorga   Time Calculation: 18 mins

## 2022-11-22 LAB
APTT PPP: 34.7 SEC (ref 22.1–31)
BACTERIA SPEC CULT: NORMAL
BASOPHILS # BLD: 0.1 K/UL (ref 0–0.1)
BASOPHILS NFR BLD: 1 % (ref 0–1)
DIFFERENTIAL METHOD BLD: ABNORMAL
EOSINOPHIL # BLD: 0.1 K/UL (ref 0–0.4)
EOSINOPHIL NFR BLD: 1 % (ref 0–7)
ERYTHROCYTE [DISTWIDTH] IN BLOOD BY AUTOMATED COUNT: 14.2 % (ref 11.5–14.5)
GLUCOSE BLD STRIP.AUTO-MCNC: 97 MG/DL (ref 65–117)
HCT VFR BLD AUTO: 31.4 % (ref 36.6–50.3)
HGB BLD-MCNC: 10.2 G/DL (ref 12.1–17)
IMM GRANULOCYTES # BLD AUTO: 0.3 K/UL (ref 0–0.04)
IMM GRANULOCYTES NFR BLD AUTO: 3 % (ref 0–0.5)
INR PPP: 1.3 (ref 0.9–1.1)
LYMPHOCYTES # BLD: 1.2 K/UL (ref 0.8–3.5)
LYMPHOCYTES NFR BLD: 12 % (ref 12–49)
MCH RBC QN AUTO: 32.3 PG (ref 26–34)
MCHC RBC AUTO-ENTMCNC: 32.5 G/DL (ref 30–36.5)
MCV RBC AUTO: 99.4 FL (ref 80–99)
MONOCYTES # BLD: 1 K/UL (ref 0–1)
MONOCYTES NFR BLD: 10 % (ref 5–13)
NEUTS SEG # BLD: 7.7 K/UL (ref 1.8–8)
NEUTS SEG NFR BLD: 73 % (ref 32–75)
NRBC # BLD: 0 K/UL (ref 0–0.01)
NRBC BLD-RTO: 0 PER 100 WBC
PLATELET # BLD AUTO: 345 K/UL (ref 150–400)
PMV BLD AUTO: 9.3 FL (ref 8.9–12.9)
PROTHROMBIN TIME: 13.6 SEC (ref 9–11.1)
RBC # BLD AUTO: 3.16 M/UL (ref 4.1–5.7)
RBC MORPH BLD: ABNORMAL
SERVICE CMNT-IMP: NORMAL
THERAPEUTIC RANGE,PTTT: ABNORMAL SECS (ref 58–77)
WBC # BLD AUTO: 10.4 K/UL (ref 4.1–11.1)

## 2022-11-22 PROCEDURE — 74011000258 HC RX REV CODE- 258: Performed by: INTERNAL MEDICINE

## 2022-11-22 PROCEDURE — 97110 THERAPEUTIC EXERCISES: CPT | Performed by: OCCUPATIONAL THERAPIST

## 2022-11-22 PROCEDURE — 85025 COMPLETE CBC W/AUTO DIFF WBC: CPT

## 2022-11-22 PROCEDURE — 97116 GAIT TRAINING THERAPY: CPT

## 2022-11-22 PROCEDURE — 36415 COLL VENOUS BLD VENIPUNCTURE: CPT

## 2022-11-22 PROCEDURE — 74011250636 HC RX REV CODE- 250/636: Performed by: INTERNAL MEDICINE

## 2022-11-22 PROCEDURE — 65270000046 HC RM TELEMETRY

## 2022-11-22 PROCEDURE — 74011250637 HC RX REV CODE- 250/637: Performed by: STUDENT IN AN ORGANIZED HEALTH CARE EDUCATION/TRAINING PROGRAM

## 2022-11-22 PROCEDURE — 74011000250 HC RX REV CODE- 250: Performed by: STUDENT IN AN ORGANIZED HEALTH CARE EDUCATION/TRAINING PROGRAM

## 2022-11-22 PROCEDURE — 74011250637 HC RX REV CODE- 250/637: Performed by: HOSPITALIST

## 2022-11-22 PROCEDURE — 74011250636 HC RX REV CODE- 250/636: Performed by: STUDENT IN AN ORGANIZED HEALTH CARE EDUCATION/TRAINING PROGRAM

## 2022-11-22 PROCEDURE — 74011250637 HC RX REV CODE- 250/637: Performed by: INTERNAL MEDICINE

## 2022-11-22 PROCEDURE — 97530 THERAPEUTIC ACTIVITIES: CPT

## 2022-11-22 PROCEDURE — 82962 GLUCOSE BLOOD TEST: CPT

## 2022-11-22 PROCEDURE — 97535 SELF CARE MNGMENT TRAINING: CPT | Performed by: OCCUPATIONAL THERAPIST

## 2022-11-22 PROCEDURE — 74011000258 HC RX REV CODE- 258: Performed by: HOSPITALIST

## 2022-11-22 PROCEDURE — 85610 PROTHROMBIN TIME: CPT

## 2022-11-22 PROCEDURE — 85730 THROMBOPLASTIN TIME PARTIAL: CPT

## 2022-11-22 PROCEDURE — 74011250636 HC RX REV CODE- 250/636: Performed by: HOSPITALIST

## 2022-11-22 RX ORDER — HYDRALAZINE HYDROCHLORIDE 20 MG/ML
20 INJECTION INTRAMUSCULAR; INTRAVENOUS
Status: DISCONTINUED | OUTPATIENT
Start: 2022-11-22 | End: 2022-12-02 | Stop reason: HOSPADM

## 2022-11-22 RX ADMIN — SODIUM CHLORIDE, PRESERVATIVE FREE 10 ML: 5 INJECTION INTRAVENOUS at 21:45

## 2022-11-22 RX ADMIN — ENOXAPARIN SODIUM 100 MG: 100 INJECTION SUBCUTANEOUS at 17:29

## 2022-11-22 RX ADMIN — CEFEPIME 2 G: 2 INJECTION, POWDER, FOR SOLUTION INTRAVENOUS at 05:48

## 2022-11-22 RX ADMIN — METRONIDAZOLE 500 MG: 250 TABLET ORAL at 08:52

## 2022-11-22 RX ADMIN — CETIRIZINE HYDROCHLORIDE 10 MG: 10 TABLET, FILM COATED ORAL at 08:52

## 2022-11-22 RX ADMIN — ENOXAPARIN SODIUM 100 MG: 100 INJECTION SUBCUTANEOUS at 05:48

## 2022-11-22 RX ADMIN — SODIUM CHLORIDE, PRESERVATIVE FREE 10 ML: 5 INJECTION INTRAVENOUS at 06:09

## 2022-11-22 RX ADMIN — ONDANSETRON 4 MG: 2 INJECTION INTRAMUSCULAR; INTRAVENOUS at 12:36

## 2022-11-22 RX ADMIN — HYDROCHLOROTHIAZIDE: 25 TABLET ORAL at 08:52

## 2022-11-22 RX ADMIN — CEFEPIME 2 G: 20 INJECTION, POWDER, FOR SOLUTION INTRAVENOUS at 13:12

## 2022-11-22 RX ADMIN — ACETAMINOPHEN 650 MG: 325 TABLET ORAL at 06:05

## 2022-11-22 RX ADMIN — METRONIDAZOLE 500 MG: 250 TABLET ORAL at 21:43

## 2022-11-22 RX ADMIN — CEFEPIME 2 G: 20 INJECTION, POWDER, FOR SOLUTION INTRAVENOUS at 21:44

## 2022-11-22 NOTE — PROGRESS NOTES
Problem: Self Care Deficits Care Plan (Adult)  Goal: *Acute Goals and Plan of Care (Insert Text)  Description: FUNCTIONAL STATUS PRIOR TO ADMISSION: Patient was modified independent using a single point cane for functional mobility. Recently returned from [place] [id number] where they own a house in [facility]. Owns post op shoe. HOME SUPPORT: The patient lived with wife but did not require assist.    Occupational Therapy Goals  Initiated 11/18/2022  1. Patient will perform bathing maintaining RLE NWB with supervision/set-up within 7 day(s). 2.  Patient will perform LB dressing with AD with supervision/set-up within 7 day(s). 3.  Patient will perform toilet transfers while maintaining RLE NWB with supervision/set-up within 7 day(s). 5.  Patient will perform all aspects of toileting with supervision/set-up within 7 day(s). 6.  Patient will participate in upper extremity therapeutic exercise/activities with supervision for 6 minutes within 7 day(s). 7.  Patient will utilize energy conservation techniques during functional activities with verbal cues within 7 day(s). Outcome: Progressing Towards Goal     OCCUPATIONAL THERAPY TREATMENT  Patient: Kait Walhalla (42 y.o. male)  Date: 11/22/2022  Diagnosis: Cellulitis of right lower limb [L03.115] <principal problem not specified>  Procedure(s) (LRB):  AORTAGRAM ABDOMINAL (N/A)    Precautions: WBAT, Fall  Chart, occupational therapy assessment, plan of care, and goals were reviewed. ASSESSMENT  Patient continues with skilled OT services and is progressing towards goals. Patient very tired and reports no sleep last night however with good participation. Instructed on UE strengthening exercises to perform throughout day. Of note, patient WBAT this date, however to have procedure tomorrow and will address goals after procedure.       Current Level of Function Impacting Discharge (ADLs): CGA sit to stand     Other factors to consider for discharge:          PLAN :  Patient continues to benefit from skilled intervention to address the above impairments. Continue treatment per established plan of care to address goals. Recommend with staff: elevated right LE    Recommend next OT session: chair push ups, ? Re-eval    Recommendation for discharge: (in order for the patient to meet his/her long term goals)  Occupational therapy at least 2 days/week in the home     This discharge recommendation:  Has been made in collaboration with the attending provider and/or case management    IF patient discharges home will need the following DME:          SUBJECTIVE:   Patient stated I'm too old for this.  stated multiple times    OBJECTIVE DATA SUMMARY:   Cognitive/Behavioral Status:  Neurologic State: Alert  Orientation Level: Oriented X4  Cognition: Appropriate decision making; Appropriate for age attention/concentration; Appropriate safety awareness; Follows commands  Perception: Appears intact  Perseveration: No perseveration noted  Safety/Judgement: Awareness of environment; Fall prevention;Good awareness of safety precautions; Home safety    Functional Mobility and Transfers for ADLs:  Bed Mobility:  Scooting: Supervision    Transfers:  Sit to Stand: Contact guard assistance     Bed to Chair: Contact guard assistance    Balance:  Sitting: Intact; Without support  Standing: Impaired; With support  Standing - Static: Good  Standing - Dynamic : Fair    ADL Intervention:     Educated on benefit of sitting with hips above knees for increased independence with sit to stand and increased comfort, provided ~4\" folded blankets and verbalized increased comfort    Educated on edema management and positioning of right LE and elevated on pillow with LE reclined          Cognitive Retraining  Safety/Judgement: Awareness of environment; Fall prevention;Good awareness of safety precautions; Home safety    Therapeutic Exercises:   Instructed in benefit of strengthening exercises and increased demand on UE's to increase strength for ADL mobility    Educated patient and his wife on incorporation of exercises throughout day and benefit of 2-3 reps more frequently    Instructed in performance of chair push ups and increased focus on UE's, demonstrated ability to perform however with decreased eccentric control. Instructed to increase focus on sitting \"without plopping\" and increased control and demonstrated ability to perform. Completed 4 reps with rest between    Pain:  No complaint    Activity Tolerance:   Fair    After treatment patient left in no apparent distress:   Sitting in chair, Call bell within reach, Caregiver / family present, and LE's elevated    COMMUNICATION/COLLABORATION:   The patients plan of care was discussed with: Physical therapist and Registered nurse.      Flor Larose OTR/L  Time Calculation: 24 mins

## 2022-11-22 NOTE — PROGRESS NOTES
Bedside shift change report given to Suzanne Edmond RN (oncoming nurse) by Jess Shoemaker RN (offgoing nurse). Report included the following information SBAR, Kardex, Intake/Output, MAR, and Recent Results.

## 2022-11-22 NOTE — PROGRESS NOTES
Physician Progress Note      Jade Nielson  [facility] #:                  [id number]  :                       1940  ADMIT DATE:       2022 1:19 PM  [address] [place] DATE:  RESPONDING  PROVIDER #:        Beatrice Serrano MD          QUERY TEXT:    Good afternoon  Patient admitted with cellulitis. If possible, please document in progress notes and discharge summary if you are evaluating and /or treating any of the following: The medical record reflects the following:  Risk Factors: Cellulitis, osteomyelitis, hypocalcemia, hypokalemia, OLEG  Clinical Indicators: Malnutrition Status:  Mild malnutrition (22 1217)  Context:  Acute illness  Findings of the 6 clinical characteristics of malnutrition:  Energy Intake:  No significant decrease in energy intake  Weight Loss:  No significant weight loss  Body Fat Loss:  No significant body fat loss,  Muscle Mass Loss:  No significant muscle mass loss,  Fluid Accumulation:  Mild, Extremities  Treatment: RD consult, daily labs  1. Continue regular diet as tolerated  2. Consider metamucil if able, patient requests  3. After procedure, Provide Ensure High Protein once daily (160 kcal, 19 g carbs, 16 g protein)    Thank you  Rashaun Best RN CDI  [phone]    ASPEN Criteria:  https://aspenjournals. onlinelibrary. woodard. com/doi/full/10.1177/6089475407926256  Options provided:  -- Protein calorie malnutrition mild  -- Protein calorie malnutrition not present  -- Other - I will add my own diagnosis  -- Disagree - Not applicable / Not valid  -- Disagree - Clinically unable to determine / Unknown  -- Refer to Clinical Documentation Reviewer    PROVIDER RESPONSE TEXT:    This patient has mild protein calorie malnutrition.     Query created by: Heidy Matthew on 2022 4:04 PM      Electronically signed by:  Beatrice Serrano MD 2022 4:07 PM

## 2022-11-22 NOTE — PROGRESS NOTES
[address]  [phone]         Hospitalist Progress Note        NAME:  Nathalia Chaidez   :  1940   MRN:  [id number]    Date/Time:  2022     Patient PCP:  Karsten Aleman MD    Emergency Contact:    Extended Emergency Contact Information  Primary Emergency Contact: [address] Phone: [phone]  Relation: Spouse      Code: Full Code     Isolation Precautions: There are currently no Active Isolations        Subjective:     REASON FOR VISIT:  Recheck Foot Ulcer     HPI & INTERVAL HISTORY:     Mr. Quincy Best is a 78 y.o. male with history that includes HTN, AFIB on Xarelto, and OLEG on CPAP reports right heel ulcer about 1 week ago which then spread up leg with erythema resulting in cellulitis. : No acute events overnight. Feels fine, awaiting procedure. : Resting comfortably in bed no acute events overnight or new complaints but another purulent blister found yesterday and found to have arterial occlusions. Vascular likely to take to OR tomorrow, then podiatry surgery after later this week. : Patient seen and examined. Denies pain of the right foot or right lower extremity. Erythema improving. Was sitting up on side of the bed with no issues such as chest pain shortness of breath or acute issues overnight. Telemetry is on be discontinued at this time. : Seen and examined with wife at bedside. Cellulitis left lower extremity improving with no acute events overnight. However appears to have borderline thickened PAD right lower extremity. : Patient seen and examined alongside a podiatrist..  No acute events overnight. Erythema from cellulitis improving. : Seen and examined. Has no pain. Wound and erythema unchanged per wife. Debrided by podiatry with a purulent discharge noted.       ALLERGIES  Allergies   Allergen Reactions    Ace Inhibitors Cough     Patients states he is not allergic    Nsaids (Non-Steroidal Anti-Inflammatory Drug) Other (comments)     \"causes blood in urine\"    Pcn [Penicillins] Rash    Percocet [Oxycodone-Acetaminophen] Other (comments)     constipation         ROS:  Gen:   Negative  Resp:  negative  CVS:   negative  GI:       negative  Chance:    negative except for peripheral neuropathy           Objective:      Visit Vitals  BP (!) 160/80 (BP 1 Location: Right upper arm, BP Patient Position: Lying; At rest)   Pulse 74   Temp 98.7 °F (37.1 °C)   Resp 16   Ht 5' 11\" (1.803 m)   Wt 95.3 kg (210 lb)   SpO2 96%   BMI 29.29 kg/m²       Physical Exam:  General: Not in distress  Head: Normocephalic, without obvious abnormality, atraumatic  Eyes: anicteric sclerae and conjuntiva clear  ENT: lips, mucosa, and tongue normal  Neck: normal, supple, and no tenderness  Lungs: CTA  Heart: S1, S2 normal, regular rate, and regular rhythm  Abd: not distended, soft, nontender, BS present and normactive  Ext: no cyanosis and right lower extremity 4+ edema  Skin: erythema RLE continues improving  Neuro:  alert, oriented, no defects noted in general exam.  Psych: not anxious, cooperative, appropriate affect      Medications:  Current Facility-Administered Medications   Medication Dose Route Frequency    hydrALAZINE (APRESOLINE) 20 mg/mL injection 20 mg  20 mg IntraVENous Q6H PRN    cefepime (MAXIPIME) 2 g in 0.9% sodium chloride (MBP/ADV) 100 mL MBP  2 g IntraVENous Q8H    metroNIDAZOLE (FLAGYL) tablet 500 mg  500 mg Oral Q12H    cetirizine (ZYRTEC) tablet 10 mg  10 mg Oral DAILY    enoxaparin (LOVENOX) injection 100 mg  1 mg/kg SubCUTAneous Q12H    guaiFENesin-dextromethorphan (ROBITUSSIN DM) 100-10 mg/5 mL syrup 10 mL  10 mL Oral Q6H PRN    [Held by provider] rivaroxaban (XARELTO) tablet 20 mg  20 mg Oral DAILY WITH DINNER    losartan/hydroCHLOROthiazide (HYZAAR) 100/25 mg   Oral DAILY    sodium chloride (NS) flush 5-40 mL  5-40 mL IntraVENous Q8H    sodium chloride (NS) flush 5-40 mL  5-40 mL IntraVENous PRN    acetaminophen (TYLENOL) tablet 650 mg  650 mg Oral Q6H PRN    Or    acetaminophen (TYLENOL) suppository 650 mg  650 mg Rectal Q6H PRN    polyethylene glycol (MIRALAX) packet 17 g  17 g Oral DAILY PRN    ondansetron (ZOFRAN ODT) tablet 4 mg  4 mg Oral Q8H PRN    Or    ondansetron (ZOFRAN) injection 4 mg  4 mg IntraVENous Q6H PRN    aspirin delayed-release tablet 81 mg  81 mg Oral DAILY        Labs:  Recent Labs     11/22/22  0545   WBC 10.4   HGB 10.2*   HCT 31.4*          Recent Labs     11/20/22  0606   *   K 4.3      CO2 24   *   BUN 25*   CREA 0.88   CA 8.2*         Radiology:  No results found. 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 PT/INR and PTT in am  N.p.o. after midnight in the event of procedure.     Electrolyte imbalances with:  Hypocalcemia / Hypophosphatemia / Hypokalemia  Replace and monitor    Atrial fibrillation  Stable on xarelto but on hold for procedure     Hypertension  Continue home Hyzaar     OLEG  CPAP nightly Obesity  Counseled on weight loss, dieting and exercise    Body mass index is 29.29 kg/m².:  25.0 - 29.9:  Overweight    F: Discontinue IV fluids  E: Hypok, hypocal, hypophos  N: DIET ONE TIME MESSAGE  ADULT DIET Regular  DIET ONE TIME MESSAGE       Risk of deterioration: high      Discussed:  Pt's condition, Imaging findings, Lab findings, Assessment, and Care Plan discussed with: Patient, Spouse at bedside , RN, and Care Manager    Prophylaxis:  Lovenox SQ    Anticipated discharge disposition:  [facility] Barriers: Currently not medically stable for discharge      Total time: 30 minutes **I personally saw and examined the patient during this time period**        Date of service:    11/22/2022                  ___________________________________________________    Admitting Physician: My Zhang MD

## 2022-11-22 NOTE — PROGRESS NOTES
Comprehensive Nutrition Assessment    Type and Reason for Visit: Initial, RD nutrition re-screen/LOS    Nutrition Recommendations/Plan:   Continue regular diet as tolerated  Consider metamucil if able, patient requests  After procedure, Provide Ensure High Protein once daily (160 kcal, 19 g carbs, 16 g protein)       Malnutrition Assessment:  Malnutrition Status:  Mild malnutrition (11/22/22 1217)    Context:  Acute illness     Findings of the 6 clinical characteristics of malnutrition:   Energy Intake:  No significant decrease in energy intake  Weight Loss:  No significant weight loss     Body Fat Loss:  No significant body fat loss,     Muscle Mass Loss:  No significant muscle mass loss,    Fluid Accumulation:  Mild, Extremities   Strength:  Not performed     Nutrition Assessment:     Pt is an 78year old male admitted with Cellulitis of right lower limb [L03.115]. 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 RD screen for LOS. Patient was NPO this AM in prep for surgery, but this was rescheduled for tomorrow. Patient states appetite has been fine but not \"feeling it\" today. Intake averaging 75% per documentation. Denies vomiting/diarrhea but feeling some nausea today. No chewing/swallowing problems. NKFA. States # but he recently started trying to lose weight 2/2 a doctor telling him he 'needs to be 175#'. RD provided brief education on \"normal\" BMI for older adults (22-25), and that patient's weight for age should not be a main concern right now. Patient and son at bedside voiced understanding. Patient states he usually takes a fiber supplement daily at home and feels as if he hasn't been getting these here. Provided meal preferences. Voiced acceptance of ONS but stated he does not want one today. Wt Readings from Last 10 Encounters:   11/16/22 95.3 kg (210 lb)   09/11/22 99.8 kg (220 lb)   09/21/20 104.1 kg (229 lb 8 oz)   09/09/20 105.9 kg (233 lb 7.5 oz)   06/04/20 103 kg (227 lb)   12/23/19 100.7 kg (222 lb)   06/04/19 104.3 kg (230 lb)   05/31/19 104.6 kg (230 lb 9.6 oz)   11/12/18 103.2 kg (227 lb 9.6 oz)   07/26/18 102.1 kg (225 lb)       Patient Vitals for the past 168 hrs:   % Diet Eaten   11/22/22 0848 0%   11/20/22 1244 76 - 100%   11/20/22 0822 76 - 100%   11/19/22 0754 76 - 100%       Nutrition Related Findings:      Wound Type: None  Last Bowel Movement Date: 11/21/22  Stool Appearance: Formed  Abdominal Assessment: Soft, Intact  Appetite: Good  Bowel Sounds: Active   Edema:LLE: 2+; Pitting (11/22/2022  8:00 AM)  RLE: 3+; Pitting (11/22/2022  8:00 AM)      Nutr. Labs:  Lab Results   Component Value Date/Time    GFR est AA >60 09/11/2022 06:06 PM    GFR est non-AA >60 09/11/2022 06:06 PM    Creatinine (POC) 1.6 (H) 05/31/2013 06:45 PM    Creatinine 0.88 11/20/2022 06:06 AM    BUN 25 (H) 11/20/2022 06:06 AM    BUN (POC) 45 (H) 05/31/2013 06:45 PM    Sodium (POC) 128 (L) 05/31/2013 06:45 PM    Sodium 135 (L) 11/20/2022 06:06 AM    Potassium 4.3 11/20/2022 06:06 AM    Potassium (POC) 4.3 05/31/2013 06:45 PM    Chloride (POC) 97 (L) 05/31/2013 06:45 PM    Chloride 106 11/20/2022 06:06 AM    CO2 24 11/20/2022 06:06 AM       Lab Results   Component Value Date/Time    Glucose 105 (H) 11/20/2022 06:06 AM    Glucose (POC) 140 (H) 06/03/2013 08:05 PM       Lab Results   Component Value Date/Time    Hemoglobin A1c 5.9 (H) 09/09/2020 09:55 AM       Nutr.  Meds:  Lovenox, zofran PRN, miralax PRN    Current Nutrition Intake & Therapies:  Average Meal Intake: %  Average Supplement Intake: None ordered  DIET ONE TIME MESSAGE  ADULT DIET Regular    Anthropometric Measures:  Height: 5' 11\" (180.3 cm)  Ideal Body Weight (IBW): 172 lbs (78 kg)     Current Body Wt:  95.3 kg (210 lb 1.6 oz), 122.2 % IBW. Current BMI (kg/m2): 29.3  Usual Body Weight: 99.8 kg (220 lb)  % Weight Change (Calculated): -4.5  Weight Adjustment: No adjustment                 BMI Category: Overweight (BMI 25.0-29. 9)    Estimated Daily Nutrient Needs:  Energy Requirements Based On: Formula  Weight Used for Energy Requirements: Current  Energy (kcal/day): 2166 (MSJ x 1.2 x 1.1)  Weight Used for Protein Requirements: Current  Protein (g/day):  (1.0-1.2 g/kg)  Method Used for Fluid Requirements: 1 ml/kcal  Fluid (ml/day): 2166    Nutrition Diagnosis:   No nutrition diagnosis at this time     Nutrition Interventions:   Food and/or Nutrient Delivery: Continue current diet  Nutrition Education/Counseling: Survival skills/brief education completed  Coordination of Nutrition Care: Continue to monitor while inpatient  Plan of Care discussed with: patient, attending MD    Goals:     Goals: Meet at least 75% of estimated needs, by next RD assessment       Nutrition Monitoring and Evaluation:   Behavioral-Environmental Outcomes: None identified  Food/Nutrient Intake Outcomes: Food and nutrient intake  Physical Signs/Symptoms Outcomes: Biochemical data, Hemodynamic status, Weight    Discharge Planning:    No discharge needs at this time    Fidencio Knott [address], RD  Contact: Ext: [phone], or via [email or web address]

## 2022-11-22 NOTE — PROGRESS NOTES
Problem: Pressure Injury - Risk of  Goal: *Prevention of pressure injury  Description: Document Vitaly Scale and appropriate interventions in the flowsheet. Outcome: Progressing Towards Goal  Note: Pressure Injury Interventions:  Sensory Interventions: Keep linens dry and wrinkle-free, Maintain/enhance activity level, Minimize linen layers, Monitor skin under medical devices    Moisture Interventions: Apply protective barrier, creams and emollients, Assess need for specialty bed, Check for incontinence Q2 hours and as needed, Limit adult briefs, Maintain skin hydration (lotion/cream)    Activity Interventions: Pressure redistribution bed/mattress(bed type), Increase time out of bed, PT/OT evaluation    Mobility Interventions: HOB 30 degrees or less, Pressure redistribution bed/mattress (bed type), PT/OT evaluation    Nutrition Interventions: Document food/fluid/supplement intake    Friction and Shear Interventions: Foam dressings/transparent film/skin sealants, HOB 30 degrees or less, Lift team/patient mobility team, Lift sheet, Minimize layers                Problem: Patient Education: Go to Patient Education Activity  Goal: Patient/Family Education  Outcome: Progressing Towards Goal     Problem: Falls - Risk of  Goal: *Absence of Falls  Description: Document Nikolas Fall Risk and appropriate interventions in the flowsheet.   Outcome: Progressing Towards Goal  Note: Fall Risk Interventions:  Mobility Interventions: Bed/chair exit alarm, Communicate number of staff needed for ambulation/transfer, Patient to call before getting OOB, PT Consult for mobility concerns, PT Consult for assist device competence         Medication Interventions: Bed/chair exit alarm, Evaluate medications/consider consulting pharmacy, Patient to call before getting OOB, Teach patient to arise slowly    Elimination Interventions: Bed/chair exit alarm, Call light in reach, Elevated toilet seat, Patient to call for help with toileting needs, Stay With Me (per policy)              Problem: Patient Education: Go to Patient Education Activity  Goal: Patient/Family Education  Outcome: Progressing Towards Goal     Problem: Cellulitis Care Plan (Adult)  Goal: *Absence of infection signs and symptoms  Outcome: Progressing Towards Goal     Problem: Patient Education: Go to Patient Education Activity  Goal: Patient/Family Education  Outcome: Progressing Towards Goal     Problem: Patient Education: Go to Patient Education Activity  Goal: Patient/Family Education  Outcome: Progressing Towards Goal

## 2022-11-22 NOTE — PROGRESS NOTES
Problem: Mobility Impaired (Adult and Pediatric)  Goal: *Acute Goals and Plan of Care (Insert Text)  Description: FUNCTIONAL STATUS PRIOR TO ADMISSION: Patient was modified independent using a single point cane for functional mobility. HOME SUPPORT PRIOR TO ADMISSION: The patient lived with wife but did not require assist.    Physical Therapy Goals  Initiated 11/18/2022  1. Patient will move from supine to sit and sit to supine  in bed with modified independence within 7 day(s). 2.  Patient will transfer from bed to chair and chair to bed with minimal assistance/contact guard assist using the least restrictive device within 7 day(s). 3.  Patient will perform sit to stand with minimal assistance/contact guard assist within 7 day(s). 4.  Patient will ambulate with minimal assistance/contact guard assist for 10'x2 feet with the least restrictive device within 7 day(s). Outcome: Progressing Towards Goal   PHYSICAL THERAPY TREATMENT  Patient: Ijeoma Inman (85 y.o. male)  Date: 11/22/2022  Diagnosis: Cellulitis of right lower limb [L03.115] <principal problem not specified>  Procedure(s) (LRB):  AORTAGRAM ABDOMINAL (N/A)    Precautions: WBAT, Fall  Chart, physical therapy assessment, plan of care and goals were reviewed. ASSESSMENT  Patient continues with skilled PT services and is progressing towards goals. Patient this afternoon with great participation and tolerance to physical therapy session emphasizing gait training. Noted in daily podiatry notes since 11/18, patient okay for WBAT R LE which is a change to initial evaluation. Spoke with patient and family regarding this and implications for functional mobility. Patient with much greater ability to perform functional mobility and transfers and ambulates in room with CGA and RW for 25 ft. Plan for vasculature procedure tomorrow. .     Current Level of Function Impacting Discharge (mobility/balance):  CGA with RW    Other factors to consider for discharge: none additional         PLAN :  Patient continues to benefit from skilled intervention to address the above impairments. Continue treatment per established plan of care. to address goals. Recommendation for discharge: (in order for the patient to meet his/her long term goals)  Physical therapy at least 2 days/week in the home  pending status following vasculature procedure    This discharge recommendation:  Has not yet been discussed the attending provider and/or case management    IF patient discharges home will need the following DME: to be determined (TBD)       SUBJECTIVE:   Patient stated I am feeling better .     OBJECTIVE DATA SUMMARY:   Patient received supine in bed and was agreeable to participate in PT session. Patient was cleared by nursing to participate in PT session. Critical Behavior:  Neurologic State: Alert  Orientation Level: Oriented X4  Cognition: Appropriate decision making, Appropriate for age attention/concentration, Appropriate safety awareness, Follows commands     Functional Mobility Training:  Bed Mobility:           Scooting: Supervision        Transfers:  Sit to Stand: Contact guard assistance  Stand to Sit: Contact guard assistance        Bed to Chair: Contact guard assistance                    Balance:  Sitting: Intact; Without support  Standing: Impaired; With support  Standing - Static: Good  Standing - Dynamic : Fair  Ambulation/Gait Training:  Distance (ft): 25 Feet (ft)  Assistive Device: Gait belt;Walker, rolling  Ambulation - Level of Assistance: Contact guard assistance        Gait Abnormalities: Antalgic; Step to gait  Right Side Weight Bearing: As tolerated     Base of Support: Narrowed; Shift to left     Speed/Filomena: Pace decreased (<100 feet/min)               Therapeutic Exercises:     Pain Rating:  Patient with minimal complaints of pain during therapy      Activity Tolerance:   Good and tolerates ADLs without rest breaks    After treatment patient left in no apparent distress:   Sitting in chair, Call bell within reach, Bed / chair alarm activated, and Caregiver / family present    COMMUNICATION/COLLABORATION:   The patients plan of care was discussed with: Occupational therapist and Registered nurse.    Calvin Cole PT, DPT   Time Calculation: 31 mins

## 2022-11-22 NOTE — PROGRESS NOTES
11/22/2022 5:09 PM EMR reviewed, noted recommendations for rehab vs home with home ailyn. Met with pt and pt's wife, they do not feel pt will be able to discharge home right away from the hospital due to pt's mobility limitations and stairs within the home. Pt and pt's wife prefer rehab placement. CM discussed IPR vs SNF. Pt selected [facility] as preference with [facility] and [facility] as back ups. Choice letter completed. CM sent referrals to all preferred facilities via [employer]. CM will follow. Archibald Homans, BSW     Care Management Progress Note      ICD-10-CM ICD-9-CM    1. Cellulitis of right lower extremity  L03.115 682.6       2. Sepsis, due to unspecified organism, unspecified whether acute organ dysfunction present ([facility] [address]  A41.9 038.9      995.91       3. Clot  I82.90 453.9 INVASIVE VASCULAR PROCEDURE      INVASIVE VASCULAR PROCEDURE      4. Subacute osteomyelitis of foot, unspecified laterality ([facility] [address]  M86.279 730.07       5. Peripheral vascular disease of extremity (HCC)  I73.9 443.9       6. Peripheral polyneuropathy  G62.9 356.9           RUR:  13%  Risk Level: [x]Low []Moderate []High  Value-based purchasing: [] Yes [x] No  Bundle patient: [] Yes [x] No   Specify:     Transition of care plan:  Ongoing medical management, pt to have tibial revascularization on 11/23, then Podiatry plan to follow  Rehab placement being pursued at discharge, referrals have been sent to: [facility]  Outpatient follow-up. TBD on transport  Care Management Interventions  PCP Verified by CM: Yes (within the last year)  Mode of Transport at Discharge:  Other (see comment) (family will transport at [facility])  Transition of Care Consult (CM Consult): Discharge Planning  Discharge Durable Medical Equipment: No  Physical Therapy Consult: Yes  Occupational Therapy Consult: Yes  Speech Therapy Consult: No  Support Systems: Spouse/Significant Other  Confirm Follow Up Transport: Family  The Plan for Transition of Care is Related to the Following Treatment Goals : IPR vs SNF  The Patient and/or Patient Representative was Provided with a Choice of Provider and Agrees with the Discharge Plan?: Yes  Freedom of Choice List was Provided with Basic Dialogue that Supports the Patient's Individualized Plan of Care/Goals, Treatment Preferences and Shares the Quality Data Associated with the Providers?: Yes  Discharge Location  Patient Expects to be Discharged to[de-identified] Rehab hospital/unit acute

## 2022-11-22 NOTE — PROGRESS NOTES
Problem: Pressure Injury - Risk of  Goal: *Prevention of pressure injury  Description: Document Vitaly Scale and appropriate interventions in the flowsheet. Outcome: Progressing Towards Goal  Note: Pressure Injury Interventions:  Sensory Interventions: Maintain/enhance activity level, Keep linens dry and wrinkle-free    Moisture Interventions: Absorbent underpads    Activity Interventions: PT/OT evaluation    Mobility Interventions: HOB 30 degrees or less, Float heels    Nutrition Interventions: Document food/fluid/supplement intake    Friction and Shear Interventions: HOB 30 degrees or less, Minimize layers                Problem: Patient Education: Go to Patient Education Activity  Goal: Patient/Family Education  Outcome: Progressing Towards Goal     Problem: Falls - Risk of  Goal: *Absence of Falls  Description: Document Nikolas Fall Risk and appropriate interventions in the flowsheet.   Outcome: Progressing Towards Goal  Note: Fall Risk Interventions:  Mobility Interventions: Patient to call before getting OOB         Medication Interventions: Patient to call before getting OOB, Teach patient to arise slowly    Elimination Interventions: Call light in reach, Urinal in reach              Problem: Patient Education: Go to Patient Education Activity  Goal: Patient/Family Education  Outcome: Progressing Towards Goal

## 2022-11-22 NOTE — PROGRESS NOTES
Vascular Surgery Progress Note    Admit Date: 2022    Subjective:     Resting in bed. He is upset he has had nothing to eat this morning. Continues with some leg and overall body discomfort. Son at bedside and reviewed  plan with him. Objective:     Vitals:  Blood pressure (!) 175/81, pulse 74, temperature 98 °F (36.7 °C), resp. rate 18, height 5' 11\" (1.803 m), weight 95.3 kg (210 lb), SpO2 100 %. Temp (24hrs), Av.9 °F (36.6 °C), Min:97.3 °F (36.3 °C), Max:98.4 °F (36.9 °C)      Physical Exam:    GEN: Awake, alert, NAD  NECK: No hematoma  LUNGS: Clear to auscultation bilaterally  HEART: Regular rate and rhythm  ABDOMEN: Nontender, soft   EXTREM: Warm and dry. Right foot wrapped, toes swollen. Assessment/Plan      PVD   Chronic osteomyelitis     - Arterial duplex with tibial occlusive disease. Concern for osteomyelitis of the calcaneus/talus  - Would benefit from revascularization to assist with his wound healing potential. The procedure, risks, benefits, and alternatives were discussed with him in detail and he is agreeable. - Tomorrow - Wednesday morning Peripheral angio with possible intervention with Dr. Bessie LUIS after midnight tonight.

## 2022-11-22 NOTE — PROGRESS NOTES
Pt blood pressure has been elevated. . Pt denies headaches, dizziness, palpations and chest pain. However pt is experiencing a nose bleed at the moment. Pt also had a small nose bleed on yesterday. Dr Romayne Nielsen notified. Receive new order for prn hydralazine. Bedside and Verbal shift change report given to [address] [place] [address] (oncoming nurse) by Derek Christie RN (offgoing nurse). Report included the following information SBAR, Kardex, Intake/Output, MAR, and Recent Results.

## 2022-11-22 NOTE — PROGRESS NOTES
Spiritual Care Assessment/Progress Note  [address]      NAME: Tabitha Duke      MRN: [id number]  AGE: 78 y.o.  SEX: male  [facility] Aff[facility]   Language: English     11/22/2022     Total Time (in minutes): 48     Spiritual Assessment begun in [facility] 4M POST SURG ORT 2 through conversation with:         [x]Patient        [x] Family    [] Friend(s)        Reason for Consult: Initial/Spiritual assessment, patient floor     Spiritual beliefs: (Please include comment if needed)     [x] Identifies with a areli tradition:    Presbyterian      [] Supported by a areli community:       none      [] Claims no spiritual orientation:           [] Seeking spiritual identity:                [] Adheres to an individual form of spirituality:           [] Not able to assess:                           Identified resources for coping:      [x] Prayer                               [] Music                  [] Guided Imagery     [x] Family/friends                 [] Pet visits     [] Devotional reading                         [] Unknown     [] Other:                                               Interventions offered during this visit: (See comments for more details)    Patient Interventions: Affirmation of emotions/emotional suffering, Affirmation of areli, Catharsis/review of pertinent events in supportive environment, Coping skills reviewed/reinforced, Initial/Spiritual assessment, patient floor, Life review/legacy, Normalization of emotional/spiritual concerns, Prayer (assurance of), Cheondoism beliefs/image of God discussed           Plan of Care:     [] Support spiritual and/or cultural needs    [] Support AMD and/or advance care planning process      [] Support grieving process   [] Coordinate Rites and/or Rituals    [] Coordination with community clergy   [] No spiritual needs identified at this time   [] Detailed Plan of Care below (See Comments)  [] Make referral to Music Therapy  [] Make referral to Pet Therapy     [] Make referral to Addiction services  [] Make referral to [facility]  [] Make referral to Spiritual Care Partner  [] No future visits requested        [x] Contact Spiritual Care for further referrals     Comments:   Spiritual care assessment with Mr. Joana Beltran on 4M post surg unit. Mr Sarika Seay greeted [address] [place] [address] Road, and shared right away that the past few days have been the worst he has ever experienced. He shared his frustration with his physical condition in general and on top of that, the communication breakdown between providers. Some medical staff thought he was having surgery today and withheld food in prep for the surgery that is not scheduled until tomorrow. He shared that he has 2 grown sons, both live out of state, 1 present now and the other on the way from [place]. He donovan by the support of his family and personal resilience. He holds a Amish areli, he expressed, that he is [place] but has not been practicing for a very long time, he shared. He shared that he lost his 1st wife to cancer after 40 yrs of marriage. He is happy to say that he has a wonderful wife now of 12 years and is a good support to him and family. His wife's family is all  local and supportive, and thus, where he is able to gain coping support. Other providers needed to see him and his son expressed that his wife is an [employer] , and they both expressed much appreciation for the caring visit of support. Mr Sarika Seay asked [address] [place] [address] Road to come back when he feels better, and  shared that we are here for him when you're feeling good or bad, and would be happy to.      Chaplain Yehuda Gagr M.Div.  Sarai Green ([id number])

## 2022-11-23 LAB
BACTERIA SPEC CULT: ABNORMAL
BACTERIA SPEC CULT: ABNORMAL
GRAM STN SPEC: ABNORMAL
GRAM STN SPEC: ABNORMAL
SERVICE CMNT-IMP: ABNORMAL

## 2022-11-23 PROCEDURE — 77030013715 HC INFL SYS MRTM -B: Performed by: SURGERY

## 2022-11-23 PROCEDURE — C1760 CLOSURE DEV, VASC: HCPCS | Performed by: SURGERY

## 2022-11-23 PROCEDURE — 2709999900 HC NON-CHARGEABLE SUPPLY

## 2022-11-23 PROCEDURE — 37232 HC PTA TIB/PER UNI ADDL: CPT | Performed by: SURGERY

## 2022-11-23 PROCEDURE — 2709999900 HC NON-CHARGEABLE SUPPLY: Performed by: SURGERY

## 2022-11-23 PROCEDURE — 37228 HC PTA TIB/PER UNI INIT: CPT | Performed by: SURGERY

## 2022-11-23 PROCEDURE — C1894 INTRO/SHEATH, NON-LASER: HCPCS | Performed by: SURGERY

## 2022-11-23 PROCEDURE — 99232 SBSQ HOSP IP/OBS MODERATE 35: CPT | Performed by: INTERNAL MEDICINE

## 2022-11-23 PROCEDURE — 74011250636 HC RX REV CODE- 250/636: Performed by: SURGERY

## 2022-11-23 PROCEDURE — 74011250636 HC RX REV CODE- 250/636: Performed by: INTERNAL MEDICINE

## 2022-11-23 PROCEDURE — 74011000258 HC RX REV CODE- 258: Performed by: INTERNAL MEDICINE

## 2022-11-23 PROCEDURE — 74011000250 HC RX REV CODE- 250: Performed by: SURGERY

## 2022-11-23 PROCEDURE — 75625 CONTRAST EXAM ABDOMINL AORTA: CPT | Performed by: SURGERY

## 2022-11-23 PROCEDURE — C1769 GUIDE WIRE: HCPCS | Performed by: SURGERY

## 2022-11-23 PROCEDURE — 74011000636 HC RX REV CODE- 636: Performed by: SURGERY

## 2022-11-23 PROCEDURE — 76937 US GUIDE VASCULAR ACCESS: CPT | Performed by: SURGERY

## 2022-11-23 PROCEDURE — 74011000250 HC RX REV CODE- 250: Performed by: STUDENT IN AN ORGANIZED HEALTH CARE EDUCATION/TRAINING PROGRAM

## 2022-11-23 PROCEDURE — 77030029065 HC DRSG HEMO QCLOT ZMED -B: Performed by: SURGERY

## 2022-11-23 PROCEDURE — 74011250637 HC RX REV CODE- 250/637: Performed by: STUDENT IN AN ORGANIZED HEALTH CARE EDUCATION/TRAINING PROGRAM

## 2022-11-23 PROCEDURE — 75710 ARTERY X-RAYS ARM/LEG: CPT | Performed by: SURGERY

## 2022-11-23 PROCEDURE — 74011250636 HC RX REV CODE- 250/636: Performed by: HOSPITALIST

## 2022-11-23 PROCEDURE — 65270000046 HC RM TELEMETRY

## 2022-11-23 PROCEDURE — 74011250637 HC RX REV CODE- 250/637: Performed by: INTERNAL MEDICINE

## 2022-11-23 PROCEDURE — C1887 CATHETER, GUIDING: HCPCS | Performed by: SURGERY

## 2022-11-23 PROCEDURE — 99152 MOD SED SAME PHYS/QHP 5/>YRS: CPT | Performed by: SURGERY

## 2022-11-23 PROCEDURE — 99153 MOD SED SAME PHYS/QHP EA: CPT | Performed by: SURGERY

## 2022-11-23 RX ORDER — LIDOCAINE HYDROCHLORIDE 10 MG/ML
INJECTION INFILTRATION; PERINEURAL AS NEEDED
Status: DISCONTINUED | OUTPATIENT
Start: 2022-11-23 | End: 2022-11-23 | Stop reason: HOSPADM

## 2022-11-23 RX ORDER — HEPARIN SODIUM 200 [USP'U]/100ML
INJECTION, SOLUTION INTRAVENOUS
Status: COMPLETED | OUTPATIENT
Start: 2022-11-23 | End: 2022-11-23

## 2022-11-23 RX ORDER — CIPROFLOXACIN 500 MG/1
500 TABLET ORAL EVERY 12 HOURS
Status: DISCONTINUED | OUTPATIENT
Start: 2022-11-23 | End: 2022-11-23

## 2022-11-23 RX ORDER — FENTANYL CITRATE 50 UG/ML
INJECTION, SOLUTION INTRAMUSCULAR; INTRAVENOUS AS NEEDED
Status: DISCONTINUED | OUTPATIENT
Start: 2022-11-23 | End: 2022-11-23 | Stop reason: HOSPADM

## 2022-11-23 RX ORDER — HEPARIN SODIUM 1000 [USP'U]/ML
INJECTION, SOLUTION INTRAVENOUS; SUBCUTANEOUS AS NEEDED
Status: DISCONTINUED | OUTPATIENT
Start: 2022-11-23 | End: 2022-11-23 | Stop reason: HOSPADM

## 2022-11-23 RX ORDER — MIDAZOLAM HYDROCHLORIDE 1 MG/ML
INJECTION, SOLUTION INTRAMUSCULAR; INTRAVENOUS AS NEEDED
Status: DISCONTINUED | OUTPATIENT
Start: 2022-11-23 | End: 2022-11-23 | Stop reason: HOSPADM

## 2022-11-23 RX ADMIN — METRONIDAZOLE 500 MG: 250 TABLET ORAL at 21:45

## 2022-11-23 RX ADMIN — ENOXAPARIN SODIUM 100 MG: 100 INJECTION SUBCUTANEOUS at 17:20

## 2022-11-23 RX ADMIN — METRONIDAZOLE 500 MG: 250 TABLET ORAL at 08:32

## 2022-11-23 RX ADMIN — ENOXAPARIN SODIUM 100 MG: 100 INJECTION SUBCUTANEOUS at 06:20

## 2022-11-23 RX ADMIN — HYDROCHLOROTHIAZIDE: 25 TABLET ORAL at 08:32

## 2022-11-23 RX ADMIN — CEFEPIME 2 G: 20 INJECTION, POWDER, FOR SOLUTION INTRAVENOUS at 15:07

## 2022-11-23 RX ADMIN — SODIUM CHLORIDE, PRESERVATIVE FREE 10 ML: 5 INJECTION INTRAVENOUS at 21:46

## 2022-11-23 RX ADMIN — SODIUM CHLORIDE, PRESERVATIVE FREE 10 ML: 5 INJECTION INTRAVENOUS at 06:19

## 2022-11-23 RX ADMIN — SODIUM CHLORIDE, PRESERVATIVE FREE 10 ML: 5 INJECTION INTRAVENOUS at 17:23

## 2022-11-23 RX ADMIN — CEFEPIME 2 G: 20 INJECTION, POWDER, FOR SOLUTION INTRAVENOUS at 21:46

## 2022-11-23 RX ADMIN — CEFEPIME 2 G: 20 INJECTION, POWDER, FOR SOLUTION INTRAVENOUS at 06:20

## 2022-11-23 NOTE — PROGRESS NOTES
[facility]  [address] [facility] [address]  [phone]         Hospitalist Progress Note        NAME:  Leila Valentine   :  1940   MRN:  [id number]    Date/Time:  2022     Patient PCP:  Alexandra Jovel MD    Emergency Contact:    Extended Emergency Contact Information  Primary Emergency Contact: [address] Phone: [phone]  Relation: Spouse      Code: Full Code     Isolation Precautions: There are currently no Active Isolations        Subjective:     REASON FOR VISIT:  Recheck Foot Ulcer     HPI & INTERVAL HISTORY:     Mr. Chia Massey is a 78 y.o. male with history that includes HTN, AFIB on Xarelto, and OLEG on CPAP reports right heel ulcer about 1 week ago which then spread up leg with erythema resulting in cellulitis. : No acute events overnight. Tolerated vascular procedure well today. : No acute events overnight. Feels fine, awaiting procedure. : Resting comfortably in bed no acute events overnight or new complaints but another purulent blister found yesterday and found to have arterial occlusions. Vascular likely to take to OR tomorrow, then podiatry surgery after later this week. : Patient seen and examined. Denies pain of the right foot or right lower extremity. Erythema improving. Was sitting up on side of the bed with no issues such as chest pain shortness of breath or acute issues overnight. Telemetry is on be discontinued at this time. : Seen and examined with wife at bedside. Cellulitis left lower extremity improving with no acute events overnight. However appears to have borderline thickened PAD right lower extremity. : Patient seen and examined alongside a podiatrist..  No acute events overnight. Erythema from cellulitis improving. : Seen and examined. Has no pain. Wound and erythema unchanged per wife.   Debrided by podiatry with a purulent discharge noted. ALLERGIES  Allergies   Allergen Reactions    Ace Inhibitors Cough     Patients states he is not allergic    Nsaids (Non-Steroidal Anti-Inflammatory Drug) Other (comments)     \"causes blood in urine\"    Pcn [Penicillins] Rash    Percocet [Oxycodone-Acetaminophen] Other (comments)     constipation         ROS:  Gen:   Negative  Resp:  negative  CVS:   negative  GI:       negative  Chance:    negative except for peripheral neuropathy           Objective:      Visit Vitals  BP (!) 158/82 (BP 1 Location: Right upper arm, BP Patient Position: Lying; At rest)   Pulse 69   Temp 98.2 °F (36.8 °C)   Resp 20   Ht 5' 11\" (1.803 m)   Wt 95.3 kg (210 lb)   SpO2 96%   BMI 29.29 kg/m²       Physical Exam:  General: Not in distress  Head: Normocephalic, without obvious abnormality, atraumatic  Eyes: anicteric sclerae and conjuntiva clear  ENT: lips, mucosa, and tongue normal  Neck: normal, supple, and no tenderness  Lungs: CTA  Heart: S1, S2 normal, regular rate, and regular rhythm  Abd: not distended, soft, nontender, BS present and normactive  Ext: no cyanosis and right lower extremity 4+ edema  Skin: erythema RLE continues improving  Neuro:  alert, oriented, no defects noted in general exam.  Psych: not anxious, cooperative, appropriate affect      Medications:  Current Facility-Administered Medications   Medication Dose Route Frequency    hydrALAZINE (APRESOLINE) 20 mg/mL injection 20 mg  20 mg IntraVENous Q6H PRN    cefepime (MAXIPIME) 2 g in 0.9% sodium chloride (MBP/ADV) 100 mL MBP  2 g IntraVENous Q8H    metroNIDAZOLE (FLAGYL) tablet 500 mg  500 mg Oral Q12H    cetirizine (ZYRTEC) tablet 10 mg  10 mg Oral DAILY    enoxaparin (LOVENOX) injection 100 mg  1 mg/kg SubCUTAneous Q12H    guaiFENesin-dextromethorphan (ROBITUSSIN DM) 100-10 mg/5 mL syrup 10 mL  10 mL Oral Q6H PRN    [Held by provider] rivaroxaban (XARELTO) tablet 20 mg  20 mg Oral DAILY WITH DINNER    losartan/hydroCHLOROthiazide (HYZAAR) 100/25 mg Oral DAILY    sodium chloride (NS) flush 5-40 mL  5-40 mL IntraVENous Q8H    sodium chloride (NS) flush 5-40 mL  5-40 mL IntraVENous PRN    acetaminophen (TYLENOL) tablet 650 mg  650 mg Oral Q6H PRN    Or    acetaminophen (TYLENOL) suppository 650 mg  650 mg Rectal Q6H PRN    polyethylene glycol (MIRALAX) packet 17 g  17 g Oral DAILY PRN    ondansetron (ZOFRAN ODT) tablet 4 mg  4 mg Oral Q8H PRN    Or    ondansetron (ZOFRAN) injection 4 mg  4 mg IntraVENous Q6H PRN    aspirin delayed-release tablet 81 mg  81 mg Oral DAILY        Labs:  Recent Labs     11/22/22  0545   WBC 10.4   HGB 10.2*   HCT 31.4*          No results for input(s): NA, K, CL, CO2, GLU, BUN, CREA, CA, MG, PHOS, ALB, TBIL, TBILI, ALT in the last 72 hours. No lab exists for component: SGOT      Radiology:  No results found. 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    Electrolyte imbalances with:  Hypocalcemia / Hypophosphatemia / Hypokalemia  Replace and monitor    Atrial fibrillation  Stable on xarelto; hold ac tmr     Hypertension  Continue home Hyzaar     OLEG  CPAP nightly     Obesity  Counseled on weight loss, dieting and exercise    Body mass index is 29.29 kg/m².:  25.0 - 29.9:  Overweight    F: Discontinue IV fluids  E: Hypok, hypocal, hypophos  N: DIET ONE TIME MESSAGE  DIET ONE TIME MESSAGE  ADULT DIET Regular  ADULT ORAL NUTRITION SUPPLEMENT Dinner;  Low Calorie/High Protein  DIET NPO       Risk of deterioration: high      Discussed:  Pt's condition, Imaging findings, Lab findings, Assessment, and Care Plan discussed with: Patient, Spouse at bedside , RN, and Care Manager    Prophylaxis:  Lovenox SQ - hold    Anticipated discharge disposition:  [facility] Barriers: Currently not medically stable for discharge      Total time: 30 minutes **I personally saw and examined the patient during this time period**        Date of service:    11/23/2022                  ___________________________________________________    Admitting Physician: Benson Mao MD

## 2022-11-23 NOTE — PROGRESS NOTES
RLE peroneal providing inline flow onto the foot. The AT is occluded and the DP recanalizes via collaterals. The PT is patent proximally. Balloon angioplasty performed to the level of distal calf branch point. The PT is then densely occluded distal calf and recanalizes via collaterals off the peroneal at the level of the ankle. Patient has inline flow onto the foot. Should be OK to proceed with intervention. Will closely monitor his wound healing.

## 2022-11-23 NOTE — PROGRESS NOTES
12:55 PM  TRANSFER - IN REPORT:    Verbal report received from [facility]) on [employer]  being received from IR(unit) for routine post - op      Report consisted of patients Situation, Background, Assessment and   Recommendations(SBAR). Information from the following report(s) SBAR and Procedure Summary was reviewed with the receiving nurse. Opportunity for questions and clarification was provided. Assessment completed upon patients arrival to unit and care assumed. 1:30 PM  Patient transported back to room 422. Site check performed with Zo Woods RN. Site presents as clean dry and intact, soft to palpitation.

## 2022-11-23 NOTE — PROGRESS NOTES
[facility] Podiatric Surgery  Progress Note  Subjective:  Sunitha Diego has chronic osteomyelitis of his right fifth metatarsal head and a right heel ulceration with concern for osteomyelitis of his calcaneus/talus/tibia. Patient is doing better with improvement in cellulitis and swelling. Wounds appear stable. Had vascular procedure today and went well. ROS:   Constitutional: Negative for fever, chills, weight loss and malaise/fatigue. HENT: Negative for nosebleeds. Eyes: Negative for blurred vision. Respiratory: Negative for cough, shortness of breath and wheezing. Cardiovascular: Negative for chest pain, palpitations. Gastrointestinal: Negative for heartburn, nausea, vomiting, abdominal pain. Genitourinary: Negative for dysuria and urgency. Musculoskeletal: Right heel ulcer and forefoot ulcer. Skin: Negative for rash. Neurological: Negative for dizziness, focal weakness and headaches. Endo/Heme/Allergies: Negative for environmental allergies. Psychiatric/Behavioral: Negative for depression and suicidal ideas. Objective:  Blood pressure (!) 158/82, pulse 69, temperature 98.2 °F (36.8 °C), resp. rate 20, height 5' 11\" (1.803 m), weight 95.3 kg (210 lb), SpO2 96 %. Intake/Output Summary (Last 24 hours) at 11/23/2022 1701  Last data filed at 11/23/2022 1026  Gross per 24 hour   Intake 400 ml   Output 1150 ml   Net -750 ml       Lower Extremity Exam:  Vascular:    Dorsalis Pedis Pulse: absent  Posterior Tibialis Pulse: absent  Skin Temp: warm to touch  Extremity Edema: 2+ pitting edema to the right lower extremity and 2+ to the left lower extremity. Edema to the right side appears to be improving. Varicosities: present  Capillary refill time is sluggish but present.      Neurological:  Epicritic and Protective Sensations: Absent  Deep Pain Response: Diminished     Dermatological:  Ulceration noted to the lateral aspect of the right fifth metatarsal head down to subcutaneous layer and probe to bone positive. No drainage or overt periwound erythema/cellulitis noted. Right lower extremity cellulitis is greatly improved. The right heel ulcer is stable with no purulence noted. Myonecrosis noted from the stab incision plantarly. Ulcer to the medial right hindfoot is stable with no further purulence observed. No signs of further necrosis. 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 No pain with calf compression. Labs:  Lab Results   Component Value Date/Time    WBC 10.4 11/22/2022 05:45 AM    HGB 10.2 (L) 11/22/2022 05:45 AM    HCT 31.4 (L) 11/22/2022 05:45 AM    MCV 99.4 (H) 11/22/2022 05:45 AM    PLATELET 651 62/27/0536 05:45 AM     Lab Results   Component Value Date/Time    Sodium 135 (L) 11/20/2022 06:06 AM    Potassium 4.3 11/20/2022 06:06 AM    Chloride 106 11/20/2022 06:06 AM    CO2 24 11/20/2022 06:06 AM    BUN 25 (H) 11/20/2022 06:06 AM    Glucose 105 (H) 11/20/2022 06:06 AM     Lab Results   Component Value Date/Time    INR 1.3 (H) 11/22/2022 05:45 AM       Current Medications:  Reviewed. Impression  Chronic osteomyelitis, right fifth metatarsal  2. Peripheral neuropathy  3. Peripheral arterial disease s/p angioplasty of PT and Peroneal  4. Right heel ulceration with concern for osteomyelitis    5. Ulceration to right medial hindfoot      Plan:  Patient seen and evaluated with his wife and son at bedside. Patient had endovascular procedure today. Appreciate recommendations, will continue with planned Foot and Ankle procedure. Plan for right fifth metatarsal head resection, bone biopsy of the talus, calcaneus, and tibia on Friday, November 25, 2022. Please make NPO at midnight and obtain DOS labs (CBC, BMP, and coag studies). Continue to hold Xarelto, keep Lovenox. Continue IV antibiotics per ID recommendations and trend labs. Wound culture finalized as Enterobacter cloacae.   WBAT for transfers only to the E until we rule out osteomyelitis in order to prevent a pathological fracture of the calcaneus. Daily dressings with betadine soaked gauze and dry sterile dressings.       Yohan Welch DPM

## 2022-11-23 NOTE — PROGRESS NOTES
11/23/2022 3:14 PM  Met with pt's wife and relayed referrals for rehab remains pending pt's progress with PT and OT following Vascular and Podiatry procedures. YEN Martinez   Care Management Progress Note         ICD-10-CM ICD-9-CM     1. Cellulitis of right lower extremity  L03.115 682.6         2. Sepsis, due to unspecified organism, unspecified whether acute organ dysfunction present ([facility]  A41.9 038.9         995.91         3. Clot  I82.90 453.9 INVASIVE VASCULAR PROCEDURE         INVASIVE VASCULAR PROCEDURE       4. Subacute osteomyelitis of foot, unspecified laterality ([facility]  M86.279 730.07         5. Peripheral vascular disease of extremity (HCC)  I73.9 443.9         6. Peripheral polyneuropathy  G62.9 356.9               RUR:  13%  Risk Level: [x]Low []Moderate []High  Value-based purchasing: [] Yes [x] No  Bundle patient: [] Yes [x] No              Specify:      Transition of care plan:  Ongoing medical management, pt had vascular procedure today, Podiatry plan to follow  PT and OT following  Rehab placement being pursued at discharge, referrals have been sent to, referrals are pending pt's progress with therapy after Vascular and Podiatry procedures:  [facility]  [facility]  Outpatient follow-up.   TBD on transport

## 2022-11-23 NOTE — PROGRESS NOTES
Problem: Pressure Injury - Risk of  Goal: *Prevention of pressure injury  Description: Document Vitaly Scale and appropriate interventions in the flowsheet. Outcome: Progressing Towards Goal  Note: Pressure Injury Interventions:  Sensory Interventions: Assess changes in LOC, Discuss PT/OT consult with provider, Maintain/enhance activity level, Keep linens dry and wrinkle-free, Minimize linen layers, Monitor skin under medical devices    Moisture Interventions: Absorbent underpads, Check for incontinence Q2 hours and as needed, Limit adult briefs, Maintain skin hydration (lotion/cream), Minimize layers, Moisture barrier    Activity Interventions: Chair cushion, Increase time out of bed, Pressure redistribution bed/mattress(bed type), PT/OT evaluation    Mobility Interventions: Pressure redistribution bed/mattress (bed type), PT/OT evaluation, Suspension boots, Trapeze to reposition    Nutrition Interventions: Document food/fluid/supplement intake    Friction and Shear Interventions: Feet elevated on foot rest, Foam dressings/transparent film/skin sealants, Minimize layers, Lift team/patient mobility team, Sit at 90-degree angle                Problem: Patient Education: Go to Patient Education Activity  Goal: Patient/Family Education  Outcome: Progressing Towards Goal     Problem: Falls - Risk of  Goal: *Absence of Falls  Description: Document Tonicelsoceci Bradley Fall Risk and appropriate interventions in the flowsheet.   Outcome: Progressing Towards Goal  Note: Fall Risk Interventions:  Mobility Interventions: Bed/chair exit alarm, Patient to call before getting OOB, PT Consult for mobility concerns, PT Consult for assist device competence         Medication Interventions: Bed/chair exit alarm, Evaluate medications/consider consulting pharmacy, Patient to call before getting OOB, Assess postural VS orthostatic hypotension    Elimination Interventions: Bed/chair exit alarm, Call light in reach, Elevated toilet seat, Patient to call for help with toileting needs, Urinal in reach              Problem: Patient Education: Go to Patient Education Activity  Goal: Patient/Family Education  Outcome: Progressing Towards Goal     Problem: Cellulitis Care Plan (Adult)  Goal: *Absence of infection signs and symptoms  Outcome: Progressing Towards Goal     Problem: Patient Education: Go to Patient Education Activity  Goal: Patient/Family Education  Outcome: Progressing Towards Goal     Problem: Patient Education: Go to Patient Education Activity  Goal: Patient/Family Education  Outcome: Progressing Towards Goal     Problem: Nutrition Deficit  Goal: *Optimize nutritional status  Outcome: Progressing Towards Goal

## 2022-11-23 NOTE — PROGRESS NOTES
[facility]ious Disease Specialists Progress Note           Naty Guadalupe DO    [phone] Office  [phone]  Fax    2022      Assessment & Plan:     Chronic osteomyelitis of the right fifth metatarsal head and suspected osteomyelitis of the talus and calcaneus. Podiatry planning for resection of the right fifth metatarsal head and bone biopsy of the talus and calcaneus to evaluate for osteomyelitis once patient undergoes revascularization. .Cultures from blister which was lanced at bedside by podiatry growing Enterobacter cloacae and stenotrophomonas. I suspect the stenotrophomonas represents colonization. We will hold off on treatment as I would like to see if this organism grows from surgical cultures once podiatry performs debridement. If patient decompensates may add Cipro in the interim however I would like to avoid long-term treatment with the stenotrophomonas drugs of choice (which would be TMP-SMX or a quinolone) due to concern for developing toxicity with long-term use of either of these medications. Discussed with microbiology lab. Continue cefepime and metronidazole. Awaiting surgical cultures from podiatry once I&D complete to make discharge recommendations  Peripheral arterial disease. Revascularization planned for . Peripheral neuropathy. Chronic atrial fibrillation. On anticoagulation with Xarelto          Subjective:     No new complaints. Tolerating antibiotics    Objective:     Vitals: Visit Vitals  BP (!) 169/84 (BP 1 Location: Right upper arm, BP Patient Position: Lying; At rest)   Pulse 71   Temp 98.3 °F (36.8 °C)   Resp 18   Ht 5' 11\" (1.803 m)   Wt 210 lb (95.3 kg)   SpO2 98%   BMI 29.29 kg/m²        Tmax:  Temp (24hrs), Av °F (36.7 °C), Min:97.3 °F (36.3 °C), Max:98.7 °F (37.1 °C)      Exam:   Patient is intubated:  no    Physical Examination:   General:  Alert, cooperative, no distress   Head:  Normocephalic, atraumatic.    Eyes:  Conjunctivae clear Neck: Supple       Lungs:   No distress. Chest wall:     Heart:     Abdomen:   non-distended   Extremities: Moves all. Foot dressed   Skin: No acute rash on exposed skin. Neurologic:      Labs:        No lab exists for component: ITNL   No results for input(s): CPK, CKMB, TROIQ in the last 72 hours. Recent Labs     11/22/22  0545 11/21/22  0942   WBC 10.4 10.1   HGB 10.2* 10.1*   HCT 31.4* 31.3*    328     Recent Labs     11/22/22  0545 11/21/22  0942   INR 1.3* 1.4*   PTP 13.6* 13.9*   APTT 34.7* 37.3*     Needs: urine analysis, urine sodium, protein and creatinine  No results found for: MARTIN, CREAU      Cultures:     Lab Results   Component Value Date/Time    Specimen Description: BLOOD 06/01/2013 12:15 PM    Specimen Description: URINE 06/01/2013 11:15 AM     Lab Results   Component Value Date/Time    Culture result: MRSA NOT PRESENT 11/21/2022 12:21 PM    Culture result:  11/21/2022 12:21 PM     Screening of patient nares for MRSA is for surveillance purposes and, if positive, to facilitate isolation considerations in high risk settings. It is not intended for automatic decolonization interventions per se as regimens are not sufficiently effective to warrant routine use.       Culture result: LIGHT GRAM NEGATIVE RODS (A) 11/20/2022 12:40 PM    Culture result: LIGHT POSSIBLE 2ND GRAM NEGATIVE MARLENI (A) 11/20/2022 12:40 PM       Radiology:     Medications       Current Facility-Administered Medications   Medication Dose Route Frequency Last Admin    hydrALAZINE (APRESOLINE) 20 mg/mL injection 20 mg  20 mg IntraVENous Q6H PRN      cefepime (MAXIPIME) 2 g in 0.9% sodium chloride (MBP/ADV) 100 mL MBP  2 g IntraVENous Q8H 2 g at 11/23/22 0620    metroNIDAZOLE (FLAGYL) tablet 500 mg  500 mg Oral Q12H 500 mg at 11/23/22 0832    cetirizine (ZYRTEC) tablet 10 mg  10 mg Oral DAILY 10 mg at 11/22/22 0802    enoxaparin (LOVENOX) injection 100 mg  1 mg/kg SubCUTAneous Q12H 100 mg at 11/23/22 9097 guaiFENesin-dextromethorphan (ROBITUSSIN DM) 100-10 mg/5 mL syrup 10 mL  10 mL Oral Q6H PRN 10 mL at 11/20/22 1724    [Held by provider] rivaroxaban (XARELTO) tablet 20 mg  20 mg Oral DAILY WITH DINNER 20 mg at 11/19/22 1714    losartan/hydroCHLOROthiazide (HYZAAR) 100/25 mg   Oral DAILY Given at 11/23/22 9301    sodium chloride (NS) flush 5-40 mL  5-40 mL IntraVENous Q8H 10 mL at 11/23/22 0619    sodium chloride (NS) flush 5-40 mL  5-40 mL IntraVENous PRN      acetaminophen (TYLENOL) tablet 650 mg  650 mg Oral Q6H  mg at 11/22/22 0181    Or    acetaminophen (TYLENOL) suppository 650 mg  650 mg Rectal Q6H PRN      polyethylene glycol (MIRALAX) packet 17 g  17 g Oral DAILY PRN      ondansetron (ZOFRAN ODT) tablet 4 mg  4 mg Oral Q8H PRN      Or    ondansetron (ZOFRAN) injection 4 mg  4 mg IntraVENous Q6H PRN 4 mg at 11/22/22 1236    aspirin delayed-release tablet 81 mg  81 mg Oral DAILY 81 mg at 11/21/22 0850           Case discussed with:      Jeb Chen DO

## 2022-11-23 NOTE — PROGRESS NOTES
Initiated PT at 8:44 this AM. Patient requesting deferral secondary to upcoming procedure. 12:36 update for PT and OT: Patient off of the floor for vascular surgery.

## 2022-11-23 NOTE — OP NOTES
Operative Note    Patient: Joseline Melendrez  YOB: 1940  MRN: [id number]    Date of Procedure: 11/23/2022     Pre-Op Diagnosis: Right lower extremity foot gangrene    Post-Op Diagnosis: Same as preoperative diagnosis. Procedure(s):  Ultrasound guided left common femoral artery access  Nonselective catheter placement aorta with aortogram  Right lower extremity angiogram with runoff to the foot including selective catheter placement into the PT and peroneal   Right posterior tibial artery angioplasty  Right peroneal artery angioplasty    Surgeon(s):  Vipul Gómez MD    Surgical Assistant: None    Anesthesia: Con-Sed     Estimated Blood Loss (mL):  Minimal    Complications: None    Specimens: * No specimens in log *     Implants: * No implants in log *    Drains: * No LDAs found *    Findings: Aorta BL iliacs patent. R CFA widely patent. R profunda patent. R SFA and pop patent. Below knee the peroneal is large and matches the size of the pop. Provides inline flow to the ankle then is extensively collateralized and feeds onto the foot. The PT is patent proximally then occludes. The very distal PT recanalizes at the level of the ankle. The AT is occluded throughout its entire length. The DP returns via collaterals. After intervention the RLE Is warm and appears well perfused. There is inline flow onto the foot via a large peroneal artery. Flow should be adequate for wound healing. If wound healing not progressing appropriately there may be an option for retrograde PT cross although I suspect this would be unsuccessful or possible target for bypass to distal PT at level of the ankle     Detailed Description of Procedure: The patient was transported to the operating room. The patient was placed supine on the operating room table. Conscious sedation provided. The patient was prepped and draped in a standard fashion.  An appropriate surgical timeout was performed with all members of the team present. The left common femoral artery was closely examined under ultrasound. The takeoff of the profunda was noted. An appropriate cannulation site was selected and the artery was accessed under direct ultrasound visualization. Access was then sequentially up-sized to a 5Fr sheath. A wire was advanced into the paravisceral aorta followed by an omniflush catheter. An aortogram and bilateral iliac angiogram was performed. Aorta and BL iliacs are patent. Using the same wire and catheter combination the omniflush catheter was advanced to the level of the contralateral femoral head. A lower extremity angiogram was then performed with runoff to the foot. CFA, profunda and SFA patent. Pop patent. Below knee peroneal is robust and widely patent. PT open proximally then occludes. AT occluded throughout. Patient heparinized. Sheath upsized to a 6x45cm sheath. Catheter positioned in the below knee pop and a selective angiogram was performed. This re-demonstrated the above findings and shows recanalization of the PT at the level of the ankle. AT occluded throughout. DP reconstitutes via collaterals. Using a combination wires and catheters the PT was selected. Selective angigogram performed. An effort was made to cross antegrade and this was successful to the level of the distal calf. Using a 2.5mm balloon angioplasty was performed. The very distal PT could not be crossed but the PT terminates into extensive collaterals. Flow into this improved. Next the peroneal was selected and a selective catheter angiogram was performed. Wire access was established into the proximal DP via the peroneal and angioplasty was performed also using a 2.5mm balloon. The length of the peroneal was treated. Completion angiogram demonstrates maintained flow onto the foot via the peroneal. The catheter was withdrawn. The sheath was backed out over the wire and the wire was redirected into the aorta.  The sheath was then removed and the access site was closed using an Angioseal device. This deployed successfully and the groin was noted to be appropriately hemostatic with an appropriate distal pulse. The incision was dressed with a 4x4 and Tegaderm. The catheter was withdrawn. The sheath was backed out over the wire and the wire was redirected into the aorta. The sheath was then removed and the access site was closed using an Angioseal device. This deployed successfully and the groin was noted to be appropriately hemostatic with an appropriate distal pulse. The incision was dressed with a 4x4 and Tegaderm. The patient tolerated the procedure very well. He was successfully awoken and transported to the recovery room in stable condition.           Electronically Signed by Niraj Wasserman MD on 11/23/2022 at 1:07 PM

## 2022-11-24 PROCEDURE — 74011000250 HC RX REV CODE- 250: Performed by: STUDENT IN AN ORGANIZED HEALTH CARE EDUCATION/TRAINING PROGRAM

## 2022-11-24 PROCEDURE — 74011250637 HC RX REV CODE- 250/637: Performed by: HOSPITALIST

## 2022-11-24 PROCEDURE — 74011250637 HC RX REV CODE- 250/637: Performed by: INTERNAL MEDICINE

## 2022-11-24 PROCEDURE — 74011250636 HC RX REV CODE- 250/636: Performed by: STUDENT IN AN ORGANIZED HEALTH CARE EDUCATION/TRAINING PROGRAM

## 2022-11-24 PROCEDURE — 74011250637 HC RX REV CODE- 250/637: Performed by: STUDENT IN AN ORGANIZED HEALTH CARE EDUCATION/TRAINING PROGRAM

## 2022-11-24 PROCEDURE — 74011000258 HC RX REV CODE- 258: Performed by: INTERNAL MEDICINE

## 2022-11-24 PROCEDURE — 65270000046 HC RM TELEMETRY

## 2022-11-24 PROCEDURE — 74011250636 HC RX REV CODE- 250/636: Performed by: INTERNAL MEDICINE

## 2022-11-24 RX ORDER — TRAMADOL HYDROCHLORIDE 50 MG/1
25 TABLET ORAL
Status: COMPLETED | OUTPATIENT
Start: 2022-11-24 | End: 2022-11-24

## 2022-11-24 RX ADMIN — ACETAMINOPHEN 650 MG: 325 TABLET ORAL at 14:56

## 2022-11-24 RX ADMIN — SODIUM CHLORIDE, PRESERVATIVE FREE 10 ML: 5 INJECTION INTRAVENOUS at 15:01

## 2022-11-24 RX ADMIN — ASPIRIN 81 MG: 81 TABLET, COATED ORAL at 08:57

## 2022-11-24 RX ADMIN — ACETAMINOPHEN 650 MG: 325 TABLET ORAL at 21:13

## 2022-11-24 RX ADMIN — METRONIDAZOLE 500 MG: 250 TABLET ORAL at 08:57

## 2022-11-24 RX ADMIN — HYDROCHLOROTHIAZIDE: 25 TABLET ORAL at 08:57

## 2022-11-24 RX ADMIN — CETIRIZINE HYDROCHLORIDE 10 MG: 10 TABLET, FILM COATED ORAL at 08:57

## 2022-11-24 RX ADMIN — METRONIDAZOLE 500 MG: 250 TABLET ORAL at 21:05

## 2022-11-24 RX ADMIN — CEFEPIME 2 G: 20 INJECTION, POWDER, FOR SOLUTION INTRAVENOUS at 21:06

## 2022-11-24 RX ADMIN — SODIUM CHLORIDE, PRESERVATIVE FREE 10 ML: 5 INJECTION INTRAVENOUS at 05:16

## 2022-11-24 RX ADMIN — CEFEPIME 2 G: 20 INJECTION, POWDER, FOR SOLUTION INTRAVENOUS at 14:54

## 2022-11-24 RX ADMIN — ONDANSETRON 4 MG: 2 INJECTION INTRAMUSCULAR; INTRAVENOUS at 08:59

## 2022-11-24 RX ADMIN — SODIUM CHLORIDE, PRESERVATIVE FREE 10 ML: 5 INJECTION INTRAVENOUS at 21:06

## 2022-11-24 RX ADMIN — CEFEPIME 2 G: 20 INJECTION, POWDER, FOR SOLUTION INTRAVENOUS at 04:53

## 2022-11-24 RX ADMIN — ACETAMINOPHEN 650 MG: 325 TABLET ORAL at 03:10

## 2022-11-24 RX ADMIN — TRAMADOL HYDROCHLORIDE 25 MG: 50 TABLET ORAL at 11:06

## 2022-11-24 NOTE — PROGRESS NOTES
[facility]  [address], [facility] [address]  [phone]         Hospitalist Progress Note        NAME:  Georgiana Paz   :  1940   MRN:  [id number]    Date/Time:  2022     Patient PCP:  Shalini Rosenberg MD    Emergency Contact:    Extended Emergency Contact Information  Primary Emergency Contact: [address] Phone: [phone]  Relation: Spouse      Code: Full Code     Isolation Precautions: There are currently no Active Isolations        Subjective:     REASON FOR VISIT:  Recheck Foot Ulcer     HPI & INTERVAL HISTORY:     Mr. Richard Hwang is a 78 y.o. male with history that includes HTN, AFIB on Xarelto, and OLEG on CPAP reports right heel ulcer about 1 week ago which then spread up leg with erythema resulting in cellulitis. : No acute events overnight. Feels well; some R leg pain. Otherwise doing well    : No acute events overnight. Tolerated vascular procedure well today. : No acute events overnight. Feels fine, awaiting procedure. : Resting comfortably in bed no acute events overnight or new complaints but another purulent blister found yesterday and found to have arterial occlusions. Vascular likely to take to OR tomorrow, then podiatry surgery after later this week. : Patient seen and examined. Denies pain of the right foot or right lower extremity. Erythema improving. Was sitting up on side of the bed with no issues such as chest pain shortness of breath or acute issues overnight. Telemetry is on be discontinued at this time. : Seen and examined with wife at bedside. Cellulitis left lower extremity improving with no acute events overnight. However appears to have borderline thickened PAD right lower extremity. : Patient seen and examined alongside a podiatrist..  No acute events overnight. Erythema from cellulitis improving. : Seen and examined.  Has no pain. Wound and erythema unchanged per wife. Debrided by podiatry with a purulent discharge noted.       ALLERGIES  Allergies   Allergen Reactions    Ace Inhibitors Cough     Patients states he is not allergic    Nsaids (Non-Steroidal Anti-Inflammatory Drug) Other (comments)     \"causes blood in urine\"    Pcn [Penicillins] Rash    Percocet [Oxycodone-Acetaminophen] Other (comments)     constipation         ROS:  Gen:   Negative  Resp:  negative  CVS:   negative  GI:       negative  Chance:    negative except for peripheral neuropathy           Objective:      Visit Vitals  /68 (BP 1 Location: Left upper arm, BP Patient Position: Sitting)   Pulse 70   Temp 98.3 °F (36.8 °C)   Resp 18   Ht 5' 11\" (1.803 m)   Wt 95.3 kg (210 lb)   SpO2 99%   BMI 29.29 kg/m²       Physical Exam:  General: Not in distress  Head: Normocephalic, without obvious abnormality, atraumatic  Eyes: anicteric sclerae and conjuntiva clear  ENT: lips, mucosa, and tongue normal  Neck: normal, supple, and no tenderness  Lungs: CTA  Heart: S1, S2 normal, regular rate, and regular rhythm  Abd: not distended, soft, nontender, BS present and normactive  Ext: no cyanosis and right lower extremity 4+ edema  Skin: erythema RLE continues improving  Neuro:  alert, oriented, no defects noted in general exam.  Psych: not anxious, cooperative, appropriate affect      Medications:  Current Facility-Administered Medications   Medication Dose Route Frequency    hydrALAZINE (APRESOLINE) 20 mg/mL injection 20 mg  20 mg IntraVENous Q6H PRN    cefepime (MAXIPIME) 2 g in 0.9% sodium chloride (MBP/ADV) 100 mL MBP  2 g IntraVENous Q8H    metroNIDAZOLE (FLAGYL) tablet 500 mg  500 mg Oral Q12H    cetirizine (ZYRTEC) tablet 10 mg  10 mg Oral DAILY    guaiFENesin-dextromethorphan (ROBITUSSIN DM) 100-10 mg/5 mL syrup 10 mL  10 mL Oral Q6H PRN    [Held by provider] rivaroxaban (XARELTO) tablet 20 mg  20 mg Oral DAILY WITH DINNER    losartan/hydroCHLOROthiazide (HYZAAR) 100/25 mg   Oral DAILY    sodium chloride (NS) flush 5-40 mL  5-40 mL IntraVENous Q8H    sodium chloride (NS) flush 5-40 mL  5-40 mL IntraVENous PRN    acetaminophen (TYLENOL) tablet 650 mg  650 mg Oral Q6H PRN    Or    acetaminophen (TYLENOL) suppository 650 mg  650 mg Rectal Q6H PRN    polyethylene glycol (MIRALAX) packet 17 g  17 g Oral DAILY PRN    ondansetron (ZOFRAN ODT) tablet 4 mg  4 mg Oral Q8H PRN    Or    ondansetron (ZOFRAN) injection 4 mg  4 mg IntraVENous Q6H PRN    aspirin delayed-release tablet 81 mg  81 mg Oral DAILY        Labs:  Recent Labs     11/22/22  0545   WBC 10.4   HGB 10.2*   HCT 31.4*          No results for input(s): NA, K, CL, CO2, GLU, BUN, CREA, CA, MG, PHOS, ALB, TBIL, TBILI, ALT in the last 72 hours. No lab exists for component: SGOT      Radiology:  No results found. 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    Electrolyte imbalances with:  Hypocalcemia / Hypophosphatemia / Hypokalemia  Replace and monitor    Atrial fibrillation  Stable on xarelto; hold ac tmr     Hypertension  Continue home Hyzaar OLEG  CPAP nightly     Obesity  Counseled on weight loss, dieting and exercise    Body mass index is 29.29 kg/m².:  25.0 - 29.9:  Overweight    F: Discontinue IV fluids  E: Hypok, hypocal, hypophos  N: DIET ONE TIME MESSAGE  DIET ONE TIME MESSAGE  ADULT DIET Regular  ADULT ORAL NUTRITION SUPPLEMENT Dinner;  Low Calorie/High Protein  DIET NPO       Risk of deterioration: high      Discussed:  Pt's condition, Imaging findings, Lab findings, Assessment, and Care Plan discussed with: Patient, Spouse at bedside , RN, and Care Manager    Prophylaxis:  Lovenox SQ - hold    Anticipated discharge disposition:  [facility] Barriers: Currently not medically stable for discharge      Total time: 30 minutes **I personally saw and examined the patient during this time period**        Date of service:    11/24/2022                  ___________________________________________________    Admitting Physician: Maciel Blake MD

## 2022-11-24 NOTE — PROGRESS NOTES
Problem: Pressure Injury - Risk of  Goal: *Prevention of pressure injury  Description: Document Vitaly Scale and appropriate interventions in the flowsheet.   Outcome: Progressing Towards Goal  Note: Pressure Injury Interventions:  Sensory Interventions: Discuss PT/OT consult with provider    Moisture Interventions: Absorbent underpads    Activity Interventions: Increase time out of bed    Mobility Interventions: PT/OT evaluation    Nutrition Interventions: Document food/fluid/supplement intake    Friction and Shear Interventions: Lift sheet, Lift team/patient mobility team, Minimize layers, Sit at 90-degree angle                Problem: Patient Education: Go to Patient Education Activity  Goal: Patient/Family Education  Outcome: Progressing Towards Goal

## 2022-11-25 ENCOUNTER — ANESTHESIA (OUTPATIENT)
Dept: SURGERY | Age: 82
DRG: 477 | End: 2022-11-25
Payer: MEDICARE

## 2022-11-25 ENCOUNTER — ANESTHESIA EVENT (OUTPATIENT)
Dept: SURGERY | Age: 82
DRG: 477 | End: 2022-11-25
Payer: MEDICARE

## 2022-11-25 ENCOUNTER — APPOINTMENT (OUTPATIENT)
Dept: GENERAL RADIOLOGY | Age: 82
DRG: 477 | End: 2022-11-25
Attending: STUDENT IN AN ORGANIZED HEALTH CARE EDUCATION/TRAINING PROGRAM
Payer: MEDICARE

## 2022-11-25 LAB
CALCULATED R AXIS, ECG10: -7 DEGREES
CALCULATED T AXIS, ECG11: 25 DEGREES
DIAGNOSIS, 93000: NORMAL
Q-T INTERVAL, ECG07: 450 MS
QRS DURATION, ECG06: 88 MS
QTC CALCULATION (BEZET), ECG08: 471 MS
VENTRICULAR RATE, ECG03: 66 BPM

## 2022-11-25 PROCEDURE — 76210000006 HC OR PH I REC 0.5 TO 1 HR: Performed by: STUDENT IN AN ORGANIZED HEALTH CARE EDUCATION/TRAINING PROGRAM

## 2022-11-25 PROCEDURE — 74011250637 HC RX REV CODE- 250/637: Performed by: STUDENT IN AN ORGANIZED HEALTH CARE EDUCATION/TRAINING PROGRAM

## 2022-11-25 PROCEDURE — 74011000250 HC RX REV CODE- 250: Performed by: STUDENT IN AN ORGANIZED HEALTH CARE EDUCATION/TRAINING PROGRAM

## 2022-11-25 PROCEDURE — 74011000250 HC RX REV CODE- 250: Performed by: NURSE ANESTHETIST, CERTIFIED REGISTERED

## 2022-11-25 PROCEDURE — 88307 TISSUE EXAM BY PATHOLOGIST: CPT

## 2022-11-25 PROCEDURE — 2709999900 HC NON-CHARGEABLE SUPPLY: Performed by: STUDENT IN AN ORGANIZED HEALTH CARE EDUCATION/TRAINING PROGRAM

## 2022-11-25 PROCEDURE — 74011250636 HC RX REV CODE- 250/636: Performed by: INTERNAL MEDICINE

## 2022-11-25 PROCEDURE — 76060000034 HC ANESTHESIA 1.5 TO 2 HR: Performed by: STUDENT IN AN ORGANIZED HEALTH CARE EDUCATION/TRAINING PROGRAM

## 2022-11-25 PROCEDURE — 93005 ELECTROCARDIOGRAM TRACING: CPT

## 2022-11-25 PROCEDURE — 74011250636 HC RX REV CODE- 250/636: Performed by: NURSE ANESTHETIST, CERTIFIED REGISTERED

## 2022-11-25 PROCEDURE — 74011000258 HC RX REV CODE- 258: Performed by: STUDENT IN AN ORGANIZED HEALTH CARE EDUCATION/TRAINING PROGRAM

## 2022-11-25 PROCEDURE — 88311 DECALCIFY TISSUE: CPT

## 2022-11-25 PROCEDURE — 65270000046 HC RM TELEMETRY

## 2022-11-25 PROCEDURE — 77030006773 HC BLD SAW OSC BRSM -A: Performed by: STUDENT IN AN ORGANIZED HEALTH CARE EDUCATION/TRAINING PROGRAM

## 2022-11-25 PROCEDURE — 77030040361 HC SLV COMPR DVT MDII -B

## 2022-11-25 PROCEDURE — 76010000153 HC OR TIME 1.5 TO 2 HR: Performed by: STUDENT IN AN ORGANIZED HEALTH CARE EDUCATION/TRAINING PROGRAM

## 2022-11-25 PROCEDURE — 77030035129: Performed by: STUDENT IN AN ORGANIZED HEALTH CARE EDUCATION/TRAINING PROGRAM

## 2022-11-25 PROCEDURE — 77030040922 HC BLNKT HYPOTHRM STRY -A

## 2022-11-25 PROCEDURE — 74011250636 HC RX REV CODE- 250/636: Performed by: STUDENT IN AN ORGANIZED HEALTH CARE EDUCATION/TRAINING PROGRAM

## 2022-11-25 PROCEDURE — 74011250636 HC RX REV CODE- 250/636: Performed by: ANESTHESIOLOGY

## 2022-11-25 PROCEDURE — 74011000258 HC RX REV CODE- 258: Performed by: INTERNAL MEDICINE

## 2022-11-25 PROCEDURE — 77030002916 HC SUT ETHLN J&J -A: Performed by: STUDENT IN AN ORGANIZED HEALTH CARE EDUCATION/TRAINING PROGRAM

## 2022-11-25 PROCEDURE — 73630 X-RAY EXAM OF FOOT: CPT

## 2022-11-25 PROCEDURE — 77030002933 HC SUT MCRYL J&J -A: Performed by: STUDENT IN AN ORGANIZED HEALTH CARE EDUCATION/TRAINING PROGRAM

## 2022-11-25 RX ORDER — SODIUM CHLORIDE 0.9 % (FLUSH) 0.9 %
5-40 SYRINGE (ML) INJECTION EVERY 8 HOURS
Status: DISCONTINUED | OUTPATIENT
Start: 2022-11-25 | End: 2022-11-25 | Stop reason: HOSPADM

## 2022-11-25 RX ORDER — ONDANSETRON 2 MG/ML
4 INJECTION INTRAMUSCULAR; INTRAVENOUS AS NEEDED
Status: DISCONTINUED | OUTPATIENT
Start: 2022-11-25 | End: 2022-11-25 | Stop reason: HOSPADM

## 2022-11-25 RX ORDER — FENTANYL CITRATE 50 UG/ML
25 INJECTION, SOLUTION INTRAMUSCULAR; INTRAVENOUS
Status: DISCONTINUED | OUTPATIENT
Start: 2022-11-25 | End: 2022-11-25 | Stop reason: HOSPADM

## 2022-11-25 RX ORDER — ALBUTEROL SULFATE 0.83 MG/ML
2.5 SOLUTION RESPIRATORY (INHALATION) AS NEEDED
Status: DISCONTINUED | OUTPATIENT
Start: 2022-11-25 | End: 2022-11-25 | Stop reason: HOSPADM

## 2022-11-25 RX ORDER — SODIUM CHLORIDE 0.9 % (FLUSH) 0.9 %
5-40 SYRINGE (ML) INJECTION AS NEEDED
Status: DISCONTINUED | OUTPATIENT
Start: 2022-11-25 | End: 2022-11-25

## 2022-11-25 RX ORDER — FENTANYL CITRATE 50 UG/ML
INJECTION, SOLUTION INTRAMUSCULAR; INTRAVENOUS AS NEEDED
Status: DISCONTINUED | OUTPATIENT
Start: 2022-11-25 | End: 2022-11-25 | Stop reason: HOSPADM

## 2022-11-25 RX ORDER — NALOXONE HYDROCHLORIDE 0.4 MG/ML
0.04 INJECTION, SOLUTION INTRAMUSCULAR; INTRAVENOUS; SUBCUTANEOUS
Status: DISCONTINUED | OUTPATIENT
Start: 2022-11-25 | End: 2022-11-25

## 2022-11-25 RX ORDER — LIDOCAINE HYDROCHLORIDE 10 MG/ML
0.1 INJECTION, SOLUTION EPIDURAL; INFILTRATION; INTRACAUDAL; PERINEURAL AS NEEDED
Status: DISCONTINUED | OUTPATIENT
Start: 2022-11-25 | End: 2022-11-25

## 2022-11-25 RX ORDER — SODIUM CHLORIDE 0.9 % (FLUSH) 0.9 %
5-40 SYRINGE (ML) INJECTION AS NEEDED
Status: DISCONTINUED | OUTPATIENT
Start: 2022-11-25 | End: 2022-11-25 | Stop reason: HOSPADM

## 2022-11-25 RX ORDER — METRONIDAZOLE 500 MG/100ML
500 INJECTION, SOLUTION INTRAVENOUS ONCE
Status: COMPLETED | OUTPATIENT
Start: 2022-11-25 | End: 2022-11-25

## 2022-11-25 RX ORDER — HYDROMORPHONE HYDROCHLORIDE 1 MG/ML
.25-1 INJECTION, SOLUTION INTRAMUSCULAR; INTRAVENOUS; SUBCUTANEOUS
Status: DISCONTINUED | OUTPATIENT
Start: 2022-11-25 | End: 2022-11-25 | Stop reason: HOSPADM

## 2022-11-25 RX ORDER — SODIUM CHLORIDE, SODIUM LACTATE, POTASSIUM CHLORIDE, CALCIUM CHLORIDE 600; 310; 30; 20 MG/100ML; MG/100ML; MG/100ML; MG/100ML
125 INJECTION, SOLUTION INTRAVENOUS CONTINUOUS
Status: DISCONTINUED | OUTPATIENT
Start: 2022-11-25 | End: 2022-11-25 | Stop reason: HOSPADM

## 2022-11-25 RX ORDER — FLUMAZENIL 0.1 MG/ML
0.2 INJECTION INTRAVENOUS
Status: DISCONTINUED | OUTPATIENT
Start: 2022-11-25 | End: 2022-11-25

## 2022-11-25 RX ORDER — PROPOFOL 10 MG/ML
INJECTION, EMULSION INTRAVENOUS
Status: DISCONTINUED | OUTPATIENT
Start: 2022-11-25 | End: 2022-11-25 | Stop reason: HOSPADM

## 2022-11-25 RX ORDER — PROPOFOL 10 MG/ML
INJECTION, EMULSION INTRAVENOUS AS NEEDED
Status: DISCONTINUED | OUTPATIENT
Start: 2022-11-25 | End: 2022-11-25 | Stop reason: HOSPADM

## 2022-11-25 RX ORDER — TRAMADOL HYDROCHLORIDE 50 MG/1
50 TABLET ORAL
Status: DISCONTINUED | OUTPATIENT
Start: 2022-11-25 | End: 2022-12-02 | Stop reason: HOSPADM

## 2022-11-25 RX ORDER — LIDOCAINE HYDROCHLORIDE 20 MG/ML
INJECTION, SOLUTION INFILTRATION; PERINEURAL AS NEEDED
Status: DISCONTINUED | OUTPATIENT
Start: 2022-11-25 | End: 2022-11-25 | Stop reason: HOSPADM

## 2022-11-25 RX ORDER — SODIUM CHLORIDE 0.9 % (FLUSH) 0.9 %
5-40 SYRINGE (ML) INJECTION EVERY 8 HOURS
Status: DISCONTINUED | OUTPATIENT
Start: 2022-11-25 | End: 2022-11-25

## 2022-11-25 RX ORDER — DIPHENHYDRAMINE HYDROCHLORIDE 50 MG/ML
12.5 INJECTION, SOLUTION INTRAMUSCULAR; INTRAVENOUS AS NEEDED
Status: DISCONTINUED | OUTPATIENT
Start: 2022-11-25 | End: 2022-11-25 | Stop reason: HOSPADM

## 2022-11-25 RX ADMIN — METRONIDAZOLE 500 MG: 250 TABLET ORAL at 20:39

## 2022-11-25 RX ADMIN — LIDOCAINE HYDROCHLORIDE 100 MG: 20 INJECTION, SOLUTION INFILTRATION; PERINEURAL at 12:44

## 2022-11-25 RX ADMIN — CEFEPIME 2 G: 20 INJECTION, POWDER, FOR SOLUTION INTRAVENOUS at 06:28

## 2022-11-25 RX ADMIN — METRONIDAZOLE 500 MG: 500 INJECTION, SOLUTION INTRAVENOUS at 11:58

## 2022-11-25 RX ADMIN — SODIUM CHLORIDE, PRESERVATIVE FREE 10 ML: 5 INJECTION INTRAVENOUS at 20:39

## 2022-11-25 RX ADMIN — PROPOFOL 130 MCG/KG/MIN: 10 INJECTION, EMULSION INTRAVENOUS at 12:45

## 2022-11-25 RX ADMIN — PROPOFOL 30 MG: 10 INJECTION, EMULSION INTRAVENOUS at 12:45

## 2022-11-25 RX ADMIN — CEFEPIME 2 G: 20 INJECTION, POWDER, FOR SOLUTION INTRAVENOUS at 15:32

## 2022-11-25 RX ADMIN — FENTANYL CITRATE 50 MCG: 50 INJECTION, SOLUTION INTRAMUSCULAR; INTRAVENOUS at 12:35

## 2022-11-25 RX ADMIN — SODIUM CHLORIDE, POTASSIUM CHLORIDE, SODIUM LACTATE AND CALCIUM CHLORIDE: 600; 310; 30; 20 INJECTION, SOLUTION INTRAVENOUS at 12:35

## 2022-11-25 RX ADMIN — CEFEPIME 2 G: 20 INJECTION, POWDER, FOR SOLUTION INTRAVENOUS at 22:43

## 2022-11-25 RX ADMIN — TRAMADOL HYDROCHLORIDE 50 MG: 50 TABLET ORAL at 19:50

## 2022-11-25 RX ADMIN — SODIUM CHLORIDE, PRESERVATIVE FREE 10 ML: 5 INJECTION INTRAVENOUS at 06:30

## 2022-11-25 NOTE — PROGRESS NOTES
Bedside shift change report given to Jasper Arthur (oncoming nurse) by Iveth Gongora (offgoing nurse). Report included the following information SBAR, Kardex, MAR, and Recent Results.

## 2022-11-25 NOTE — PROGRESS NOTES
[address]  [phone]         Hospitalist Progress Note        NAME:  Sabina Dove   :  1940   MRN:  [id number]    Date/Time:  2022     Patient PCP:  Mila Saldana MD    Emergency Contact:    Extended Emergency Contact Information  Primary Emergency Contact: [address] Phone: [phone]  Relation: Spouse      Code: Full Code     Isolation Precautions: There are currently no Active Isolations        Subjective:     REASON FOR VISIT:  Recheck Foot Ulcer     HPI & INTERVAL HISTORY:     Mr. Daigle Postal is a 78 y.o. male with history that includes HTN, AFIB on Xarelto, and OLEG on CPAP reports right heel ulcer about 1 week ago which then spread up leg with erythema resulting in cellulitis. : Feels fine, no complaints. To OR today    : No acute events overnight. Feels well; some R leg pain. Otherwise doing well    : No acute events overnight. Tolerated vascular procedure well today. : No acute events overnight. Feels fine, awaiting procedure. : Resting comfortably in bed no acute events overnight or new complaints but another purulent blister found yesterday and found to have arterial occlusions. Vascular likely to take to OR tomorrow, then podiatry surgery after later this week. : Patient seen and examined. Denies pain of the right foot or right lower extremity. Erythema improving. Was sitting up on side of the bed with no issues such as chest pain shortness of breath or acute issues overnight. Telemetry is on be discontinued at this time. : Seen and examined with wife at bedside. Cellulitis left lower extremity improving with no acute events overnight. However appears to have borderline thickened PAD right lower extremity. : Patient seen and examined alongside a podiatrist..  No acute events overnight.   Erythema from cellulitis improving. 11/17: Seen and examined. Has no pain. Wound and erythema unchanged per wife. Debrided by podiatry with a purulent discharge noted.       ALLERGIES  Allergies   Allergen Reactions    Ace Inhibitors Cough     Patients states he is not allergic    Nsaids (Non-Steroidal Anti-Inflammatory Drug) Other (comments)     \"causes blood in urine\"    Pcn [Penicillins] Rash    Percocet [Oxycodone-Acetaminophen] Other (comments)     constipation         ROS:  Gen:   Negative  Resp:  negative  CVS:   negative  GI:       negative  Chance:    negative except for peripheral neuropathy           Objective:      Visit Vitals  BP (!) 156/87 (BP 1 Location: Right upper arm, BP Patient Position: At rest;Lying)   Pulse 68   Temp 98.1 °F (36.7 °C)   Resp 16   Ht 5' 11\" (1.803 m)   Wt 95.3 kg (210 lb)   SpO2 97%   BMI 29.29 kg/m²       Physical Exam:  General: Not in distress  Head: Normocephalic, without obvious abnormality, atraumatic  Eyes: anicteric sclerae and conjuntiva clear  ENT: lips, mucosa, and tongue normal  Neck: normal, supple, and no tenderness  Lungs: CTA  Heart: S1, S2 normal, regular rate, and regular rhythm  Abd: not distended, soft, nontender, BS present and normactive  Ext: no cyanosis and right lower extremity 4+ edema  Skin: erythema RLE continues improving  Neuro:  alert, oriented, no defects noted in general exam.  Psych: not anxious, cooperative, appropriate affect      Medications:  Current Facility-Administered Medications   Medication Dose Route Frequency    lidocaine (PF) (XYLOCAINE) 10 mg/mL (1 %) injection 0.1 mL  0.1 mL SubCUTAneous PRN    lactated Ringers infusion  125 mL/hr IntraVENous CONTINUOUS    sodium chloride (NS) flush 5-40 mL  5-40 mL IntraVENous Q8H    sodium chloride (NS) flush 5-40 mL  5-40 mL IntraVENous PRN    naloxone (NARCAN) injection 0.04 mg  0.04 mg IntraVENous Multiple    flumazeniL (ROMAZICON) 0.1 mg/mL injection 0.2 mg  0.2 mg IntraVENous Multiple    hydrALAZINE (APRESOLINE) 20 mg/mL injection 20 mg  20 mg IntraVENous Q6H PRN    cefepime (MAXIPIME) 2 g in 0.9% sodium chloride (MBP/ADV) 100 mL MBP  2 g IntraVENous Q8H    metroNIDAZOLE (FLAGYL) tablet 500 mg  500 mg Oral Q12H    cetirizine (ZYRTEC) tablet 10 mg  10 mg Oral DAILY    guaiFENesin-dextromethorphan (ROBITUSSIN DM) 100-10 mg/5 mL syrup 10 mL  10 mL Oral Q6H PRN    [Held by provider] rivaroxaban (XARELTO) tablet 20 mg  20 mg Oral DAILY WITH DINNER    losartan/hydroCHLOROthiazide (HYZAAR) 100/25 mg   Oral DAILY    sodium chloride (NS) flush 5-40 mL  5-40 mL IntraVENous Q8H    sodium chloride (NS) flush 5-40 mL  5-40 mL IntraVENous PRN    acetaminophen (TYLENOL) tablet 650 mg  650 mg Oral Q6H PRN    Or    acetaminophen (TYLENOL) suppository 650 mg  650 mg Rectal Q6H PRN    polyethylene glycol (MIRALAX) packet 17 g  17 g Oral DAILY PRN    ondansetron (ZOFRAN ODT) tablet 4 mg  4 mg Oral Q8H PRN    Or    ondansetron (ZOFRAN) injection 4 mg  4 mg IntraVENous Q6H PRN    aspirin delayed-release tablet 81 mg  81 mg Oral DAILY     Facility-Administered Medications Ordered in Other Encounters   Medication Dose Route Frequency    propofoL (DIPRIVAN) 10 mg/mL injection   IntraVENous PRN    propofoL (DIPRIVAN) 10 mg/mL injection   IntraVENous CONTINUOUS    fentaNYL citrate (PF) injection   IntraVENous PRN    lidocaine (XYLOCAINE) 20 mg/mL (2 %) injection   IntraVENous PRN        Labs:  No results for input(s): WBC, HGB, HCT, PLT, HGBEXT, HCTEXT, PLTEXT, HGBEXT, HCTEXT, PLTEXT in the last 72 hours. No results for input(s): NA, K, CL, CO2, GLU, BUN, CREA, CA, MG, PHOS, ALB, TBIL, TBILI, ALT in the last 72 hours. No lab exists for component: SGOT      Radiology:  No results found.   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    Electrolyte imbalances with:  Hypocalcemia / Hypophosphatemia / Hypokalemia  Replace and monitor    Atrial fibrillation  Stable on xarelto; hold ac today; resume tmr     Hypertension  Continue home Hyzaar     OLEG  CPAP nightly     Obesity  Counseled on weight loss, dieting and exercise    Body mass index is 29.29 kg/m².:  25.0 - 29.9:  Overweight    F: Discontinue IV fluids  E: Hypok, hypocal, hypophos  N: DIET ONE TIME MESSAGE  DIET ONE TIME MESSAGE  ADULT ORAL NUTRITION SUPPLEMENT Dinner;  Low Calorie/High Protein  DIET NPO       Risk of deterioration: high      Discussed:  Pt's condition, Imaging findings, Lab findings, Assessment, and Care Plan discussed with: Patient, Spouse at bedside , RN, and Care Manager    Prophylaxis:  Lovenox SQ - hold    Anticipated discharge disposition:  [facility] Barriers: Currently not medically stable for discharge      Total time: 30 minutes **I personally saw and examined the patient during this time period**        Date of service:    11/25/2022                  ___________________________________________________    Admitting Physician: Omaira Juan MD

## 2022-11-25 NOTE — PROGRESS NOTES
Attempted to work with the patient for Physical Therapy, chart reviewed and discussed with nurse patient off the floor to OR. We will continue to follow up with the patient for therapy once cleared to resume with weight bearing precautions instructions. Thank you.

## 2022-11-25 NOTE — PROGRESS NOTES
Foot and Ankle Surgery Treatment Plan:    Mr. Jasmeet Garcia is now s/p Right heel debridement, fifth metatarsal head resection, and bone biopsy of the talus/calcaneus/tibia. Adequate bleeding was appreciated in the OR.    - Strict NWB to RLE.  - Next dressing change will be on Sunday. - Continue IV antibiotics. - Follow up Pathology results.       Yari Corona DPM

## 2022-11-25 NOTE — BRIEF OP NOTE
Brief Postoperative Note    Patient: Ginna Hunter  YOB: 1940  MRN: [id number]    Date of Procedure: 11/25/2022     Pre-Op Diagnosis: OSTEOMYELITIS, RIGHT FOOT INFECTION2    Post-Op Diagnosis: SAME    Procedure(s):  RIGHT FOOT 5TH METATARSAL HEAD RESECTION  BONE BIOPSY CALCANEUS, TALUS, AND TIBIA    Surgeon(s):  Yashira Guthrie DPM    Surgical Assistant: Surg Asst-1: Ivonne Ann LPN    Anesthesia: MAC     Estimated Blood Loss (mL): 50 mL    Complications: None    Specimens:   ID Type Source Tests Collected by Time Destination   1 : Right Fifth Metatarsal Head Preservative Foot, Right  Yashira Guthrie, [facility] 11/25/2022 1310 Pathology   2 : Right Fifth Metatarsal Clearing Fragment Preservative Foot, Right  Yashira Guthrie, [facility]KEL 11/25/2022 1326 Pathology   3 : Right Foot Calcaneus Preservative Foot, Right  Yashira Jerri, MIKEL 11/25/2022 1327 Pathology   4 : Right Foot Talas Preservative Foot, Right  Yashira Jerri, MIKEL 11/25/2022 1329 Pathology   5 : Right Foot Tibia Preservative Foot, Right  Yashira , [facility] 11/25/2022 1339 Pathology        Implants: None     Drains: None    Findings: Significant myonecrosis noted to the right plantar heel extending to the calcaneus. Medial hindfoot wound communicated with the plantar wound. No purulence noted. Right fifth metatarsal head was eroded and soft.      Electronically Signed by Venessa Davis DPM on 11/25/2022 at 2:03 PM

## 2022-11-25 NOTE — OP NOTES
Operative Note    Patient: Leila Valentine  YOB: 1940  MRN: [id number]    Date of Procedure: 11/25/2022     Pre-Op Diagnosis: OSTEOMYELITIS, RIGHT FOOT INFECTION    Post-Op Diagnosis: SAME AS PRE-OPERATIVE    Procedure(s):  RIGHT FOOT 5TH METATARSAL HEAD RESECTION  BONE BIOPSY CALCANEUS, TALUS, AND TIBIA    Surgeon(s):  Marina Winchester DPM    Surgical Assistant: Surg Asst-1: Oscar Kingsley LPN    Anesthesia: MAC     Estimated Blood Loss (mL):  50 ML     Complications: NONE    Specimens:   ID Type Source Tests Collected by Time Destination   1 : Right Fifth Metatarsal Head Preservative Foot, Right  Marina Annabella, [facility] 11/25/2022 1310 Pathology   2 : Right Fifth Metatarsal Clearing Fragment Preservative Foot, Right  Marina Annabella, [facility] 11/25/2022 1326 Pathology   3 : Right Foot Calcaneus Preservative Foot, Right  Marina [place], [facility] 11/25/2022 1327 Pathology   4 : Right Foot Talas Preservative Foot, Right  Marina Annabella, [facility] 11/25/2022 1329 Pathology   5 : Right Foot Tibia Preservative Foot, Right  Marina Winchester, [place] 11/25/2022 1339 Pathology        Implants: NONE    Drains: NONE    Materials: 3-0 Monocryl and 4-0 Nylon    Findings: Significant myonecrosis noted to the right plantar heel extending to the calcaneus. Medial hindfoot wound communicated with the plantar wound. No purulence noted. Right fifth metatarsal head was eroded and soft. Indications for Procedure:  Leroy Whitman is an 78year old male who presented to [facility] ED on 11/16/2022 with a right foot infection. Patient is known to our practice for which he was being treated for a right fifth metatarsal wound with chronic osteomyelitis. Patient most recently went to [place] and a week prior to admission at [facility] he noticed a blister to his right heel. Advanced imaging showed no acute osteomyelitis but concern for septic or inflammatory tibiotalar arthritis.  Patient developed a new blister to the medial hindfoot a few days after admission which was debrided bedside and cultured. It was deemed appropriate for the patient to undergo a right heel debridement, resection of the fifth metatarsal head, and bone biopsy of the talus, calcaneus, and tibia in the operating room to remove the infection/myonecrotic tissue and evaluate for osteomyelitis. Detailed Description of Procedure:   Tiffanie Robbins was identified in the pre-operative holding are by myself. I reviewed the consent with the patient and answered all his questions. Discussed the post operative course with the patient as well in detail. The patient was then taken into the operating room and transferred to the bed and placed in a supine position. A surgical timeout was performed. Current intravenous antibiotics were administered as recommended by Infectious disease team. Anesthesia was induced without any complications. An ankle nerve block was performed with 1.0% lidocaine plain. The right lower extremity was prepped and draped in a sterile fashion with betadine. Attention was paid to the lateral aspect of the right foot. Utilizing a #15 blade, the lateral fifth metatarsal head wound was excised down to bone via a racquet type incision. The fifth metatarsal was freed from all ligamentous attachments and was noted to be soft and eroded consistent with osteomyelitis. Dissection was performed proximally about 2 centimeters until the fifth metatarsal was noted to be hard and healthy. The fifth metatarsal was resected at this level. A clearing fragment was obtained as well. Bone specimens were sent for pathological analysis. Next attention was paid to the plantar aspect of the right heel where a circumferential incision was made around the necrotic wound down to subcutaneous tissue. Extensive myonecrosis was noted, which was debrided. The wound was down to the plantar fascia and the periosteum of the calcaneus was exposed. The bone was noted to be hard and healthy.  The wound was flushed with copious amount of normal sterile saline. A bone biopsy of the calcaneus was obtained and sent for pathology. Next attention was paid to the medial hindfoot where a circumferential incision around the wound down to subcutaneous tissue. This medial hindfoot wound did communicate with the plantar heel wound. No purulence was noted. There was not any signs of myonecrosis as well. Next attention was paid to the ankle joint whereby under fluoroscopic guidance two stab incisions were made over the talus and the tibia just a few millimeter away from the ankle joint and avoid the cartilaginous surfaces. Blunt dissection was performed, retracting all vital neurovascular structures out of harms way. A bone biopsy was obtained of the talus and tibia and sent for pathology. Both bones appeared hard and healthy. All surgical sites were flushed with normal sterile saline. Hemostasis was obtained. Closure was initiated with 3-0 Monocryl and 4-0 Nylon. Xeroform was placed over the surgical incisions and Dakins soaked gauze to the two open wound sites, covered by 4x4 gauze, ABD pads, Kerlix, and Ace with light compression. Sponge and needle counts were correct. The patient was transported to PACU with stable vitals. Post operative course:  Patient is to be non weight bearing to the right foot. He is to continue IV antibiotics per ID recommendations. PT/OT follow up. A wound vac will be applied in two days if the wound remains clean. Patient will most likely need rehab placement.     Electronically Signed by Evangelista Monte DPM on 11/25/2022 at 2:08 PM

## 2022-11-25 NOTE — PROGRESS NOTES
Occupational Therapy  Chart reviewed this morning an again this afternoon, patient remains off the floor in [facility] [address].    Melquiades Yates, OTR/BENJIE

## 2022-11-25 NOTE — PERIOP NOTES
TRANSFER - OUT REPORT:    Verbal report given to RN Alan(name) on Zaheerttanthony Shock  being transferred to [facility](unit) for routine post - op       Report consisted of patients Situation, Background, Assessment and   Recommendations(SBAR). Information from the following report(s) SBAR, OR Summary, Procedure Summary, MAR, and Cardiac Rhythm afib  was reviewed with the receiving nurse. Lines:   Peripheral IV 11/23/22 Right Wrist (Active)   Site Assessment Ecchymotic (bruised) 11/25/22 1418   Phlebitis Assessment 0 11/25/22 1418   Infiltration Assessment 0 11/25/22 1418   Dressing Status Old drainage 11/25/22 1418   Dressing Type Tape;Transparent 11/25/22 1418   Hub Color/Line Status Blue; Infusing 11/25/22 1418       Peripheral IV 11/25/22 Hand (Active)   Site Assessment Clean, dry, & intact 11/25/22 1418   Phlebitis Assessment 0 11/25/22 1418   Infiltration Assessment 0 11/25/22 1418   Dressing Status Clean, dry, & intact; Occlusive 11/25/22 1418   Dressing Type Tape;Transparent 11/25/22 1418   Hub Color/Line Status Green; Infusing 11/25/22 1418        Opportunity for questions and clarification was provided.       Patient transported with:   Registered Nurse

## 2022-11-25 NOTE — PROGRESS NOTES
1130: Assumed care of patient; Patient is currently off floor to surgery. 1530: Pt back to floor from surgery. 1900: Bedside shift change report given to [address]   (oncoming nurse) by Yvette Corral (offgoing nurse). Report included the following information SBAR, Kardex, Intake/Output, MAR, and Recent Results.

## 2022-11-25 NOTE — PROGRESS NOTES
Bedside shift change report given to Dante Julian  (oncoming nurse) by  Catia Lee RN (offgoing nurse). Report included the following information Kardex.

## 2022-11-25 NOTE — PROGRESS NOTES
Patient off unit to OR at this time.   Patient Vitals for the past 4 hrs:   Temp Pulse Resp BP SpO2   11/25/22 1008 98.1 °F (36.7 °C) 68 16 (!) 156/87 97 %   11/25/22 0910 97.9 °F (36.6 °C) 83 16 (!) 156/81 97 %

## 2022-11-25 NOTE — PROGRESS NOTES
[facility] Podiatric Surgery  Progress Note  Subjective:  Macie Castrejon has chronic osteomyelitis of his right fifth metatarsal head and a right heel ulceration with concern for osteomyelitis of his calcaneus/talus/tibia. Plan for OR today for procedure. ROS:   Constitutional: Negative for fever, chills, weight loss and malaise/fatigue. HENT: Negative for nosebleeds. Eyes: Negative for blurred vision. Respiratory: Negative for cough, shortness of breath and wheezing. Cardiovascular: Negative for chest pain, palpitations. Gastrointestinal: Negative for heartburn, nausea, vomiting, abdominal pain. Genitourinary: Negative for dysuria and urgency. Musculoskeletal: Right heel ulcer and forefoot ulcer. Skin: Negative for rash. Neurological: Negative for dizziness, focal weakness and headaches. Endo/Heme/Allergies: Negative for environmental allergies. Psychiatric/Behavioral: Negative for depression and suicidal ideas. Objective:  Blood pressure (!) 156/87, pulse 68, temperature 98.1 °F (36.7 °C), resp. rate 16, height 5' 11\" (1.803 m), weight 95.3 kg (210 lb), SpO2 97 %. Intake/Output Summary (Last 24 hours) at 11/25/2022 1158  Last data filed at 11/24/2022 1502  Gross per 24 hour   Intake --   Output 200 ml   Net -200 ml       Lower Extremity Exam:  Vascular:    Dorsalis Pedis Pulse: absent  Posterior Tibialis Pulse: absent  Skin Temp: warm to touch  Extremity Edema: 2+ pitting edema to the right lower extremity and 2+ to the left lower extremity. Edema to the right side appears to be improving. Varicosities: present  Capillary refill time is sluggish but present. Neurological:  Epicritic and Protective Sensations: Absent  Deep Pain Response: Diminished     Dermatological:  Ulceration noted to the lateral aspect of the right fifth metatarsal head down to subcutaneous layer and probe to bone positive. No drainage or overt periwound erythema/cellulitis noted.      Right lower extremity cellulitis is greatly improved. The right heel ulcer is stable with no purulence noted. Myonecrosis noted from the stab incision plantarly. Ulcer to the medial right hindfoot is stable with no further purulence observed. No signs of further necrosis. 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 No pain with calf compression. Labs:  Lab Results   Component Value Date/Time    WBC 10.4 11/22/2022 05:45 AM    HGB 10.2 (L) 11/22/2022 05:45 AM    HCT 31.4 (L) 11/22/2022 05:45 AM    MCV 99.4 (H) 11/22/2022 05:45 AM    PLATELET 786 82/23/5752 05:45 AM     Lab Results   Component Value Date/Time    Sodium 135 (L) 11/20/2022 06:06 AM    Potassium 4.3 11/20/2022 06:06 AM    Chloride 106 11/20/2022 06:06 AM    CO2 24 11/20/2022 06:06 AM    BUN 25 (H) 11/20/2022 06:06 AM    Glucose 105 (H) 11/20/2022 06:06 AM     Lab Results   Component Value Date/Time    INR 1.3 (H) 11/22/2022 05:45 AM       Current Medications:  Reviewed. Impression  Chronic osteomyelitis, right fifth metatarsal  2. Peripheral neuropathy  3. Peripheral arterial disease s/p angioplasty of PT and Peroneal  4. Right heel ulceration with concern for osteomyelitis    5. Ulceration to right medial hindfoot      Plan:  Patient seen and evaluated at bedside in pre-op. .  NPO confirmed, leg marked, medications and notes reviewed. Discussed with patient operative plan for limb salvage and answered all questions.         Xenia Crocker DPM

## 2022-11-25 NOTE — ANESTHESIA POSTPROCEDURE EVALUATION
Procedure(s):  RIGHT FOOT 5TH METATARSAL HEAD RESECTION, BONE BIOPSY CALCANEUS, TALAS, AND TIBIA. MAC    Anesthesia Post Evaluation      Multimodal analgesia: multimodal analgesia not used between 6 hours prior to anesthesia start to PACU discharge  Patient location during evaluation: PACU  Patient participation: complete - patient participated  Level of consciousness: awake  Pain management: adequate  Airway patency: patent  Anesthetic complications: no  Cardiovascular status: acceptable, blood pressure returned to baseline and hemodynamically stable  Respiratory status: acceptable  Hydration status: acceptable  Post anesthesia nausea and vomiting:  controlled  Final Post Anesthesia Temperature Assessment:  Normothermia (36.0-37.5 degrees C)      INITIAL Post-op Vital signs:   Vitals Value Taken Time   /59 11/25/22 1450   Temp 36.7 °C (98 °F) 11/25/22 1420   Pulse 61 11/25/22 1455   Resp 24 11/25/22 1455   SpO2 100 % 11/25/22 1455   Vitals shown include unvalidated device data.

## 2022-11-26 LAB
ALBUMIN SERPL-MCNC: 2.2 G/DL (ref 3.5–5)
ALBUMIN/GLOB SERPL: 0.6 {RATIO} (ref 1.1–2.2)
ALP SERPL-CCNC: 95 U/L (ref 45–117)
ALT SERPL-CCNC: 62 U/L (ref 12–78)
ANION GAP SERPL CALC-SCNC: 5 MMOL/L (ref 5–15)
AST SERPL-CCNC: 58 U/L (ref 15–37)
BASOPHILS # BLD: 0 K/UL (ref 0–0.1)
BASOPHILS NFR BLD: 0 % (ref 0–1)
BILIRUB SERPL-MCNC: 0.4 MG/DL (ref 0.2–1)
BUN SERPL-MCNC: 28 MG/DL (ref 6–20)
BUN/CREAT SERPL: 30 (ref 12–20)
CALCIUM SERPL-MCNC: 8.1 MG/DL (ref 8.5–10.1)
CHLORIDE SERPL-SCNC: 104 MMOL/L (ref 97–108)
CO2 SERPL-SCNC: 28 MMOL/L (ref 21–32)
CREAT SERPL-MCNC: 0.92 MG/DL (ref 0.7–1.3)
DIFFERENTIAL METHOD BLD: ABNORMAL
EOSINOPHIL # BLD: 0.3 K/UL (ref 0–0.4)
EOSINOPHIL NFR BLD: 2 % (ref 0–7)
ERYTHROCYTE [DISTWIDTH] IN BLOOD BY AUTOMATED COUNT: 14.8 % (ref 11.5–14.5)
GLOBULIN SER CALC-MCNC: 3.8 G/DL (ref 2–4)
GLUCOSE SERPL-MCNC: 112 MG/DL (ref 65–100)
HCT VFR BLD AUTO: 31.1 % (ref 36.6–50.3)
HGB BLD-MCNC: 9.7 G/DL (ref 12.1–17)
IMM GRANULOCYTES # BLD AUTO: 0 K/UL
IMM GRANULOCYTES NFR BLD AUTO: 0 %
LYMPHOCYTES # BLD: 2.2 K/UL (ref 0.8–3.5)
LYMPHOCYTES NFR BLD: 13 % (ref 12–49)
MCH RBC QN AUTO: 31.5 PG (ref 26–34)
MCHC RBC AUTO-ENTMCNC: 31.2 G/DL (ref 30–36.5)
MCV RBC AUTO: 101 FL (ref 80–99)
METAMYELOCYTES NFR BLD MANUAL: 1 %
MONOCYTES # BLD: 1 K/UL (ref 0–1)
MONOCYTES NFR BLD: 6 % (ref 5–13)
MYELOCYTES NFR BLD MANUAL: 3 %
NEUTS SEG # BLD: 12.8 K/UL (ref 1.8–8)
NEUTS SEG NFR BLD: 75 % (ref 32–75)
NRBC # BLD: 0.02 K/UL (ref 0–0.01)
NRBC BLD-RTO: 0.1 PER 100 WBC
PLATELET # BLD AUTO: 436 K/UL (ref 150–400)
PMV BLD AUTO: 9.5 FL (ref 8.9–12.9)
POTASSIUM SERPL-SCNC: 4.7 MMOL/L (ref 3.5–5.1)
PROT SERPL-MCNC: 6 G/DL (ref 6.4–8.2)
RBC # BLD AUTO: 3.08 M/UL (ref 4.1–5.7)
RBC MORPH BLD: ABNORMAL
SODIUM SERPL-SCNC: 137 MMOL/L (ref 136–145)
WBC # BLD AUTO: 17 K/UL (ref 4.1–11.1)

## 2022-11-26 PROCEDURE — 74011250636 HC RX REV CODE- 250/636: Performed by: STUDENT IN AN ORGANIZED HEALTH CARE EDUCATION/TRAINING PROGRAM

## 2022-11-26 PROCEDURE — 74011250637 HC RX REV CODE- 250/637: Performed by: STUDENT IN AN ORGANIZED HEALTH CARE EDUCATION/TRAINING PROGRAM

## 2022-11-26 PROCEDURE — 94761 N-INVAS EAR/PLS OXIMETRY MLT: CPT

## 2022-11-26 PROCEDURE — 65270000046 HC RM TELEMETRY

## 2022-11-26 PROCEDURE — 97168 OT RE-EVAL EST PLAN CARE: CPT

## 2022-11-26 PROCEDURE — 74011000250 HC RX REV CODE- 250: Performed by: STUDENT IN AN ORGANIZED HEALTH CARE EDUCATION/TRAINING PROGRAM

## 2022-11-26 PROCEDURE — 85025 COMPLETE CBC W/AUTO DIFF WBC: CPT

## 2022-11-26 PROCEDURE — 80053 COMPREHEN METABOLIC PANEL: CPT

## 2022-11-26 PROCEDURE — 97535 SELF CARE MNGMENT TRAINING: CPT

## 2022-11-26 PROCEDURE — 36415 COLL VENOUS BLD VENIPUNCTURE: CPT

## 2022-11-26 PROCEDURE — 74011000258 HC RX REV CODE- 258: Performed by: STUDENT IN AN ORGANIZED HEALTH CARE EDUCATION/TRAINING PROGRAM

## 2022-11-26 RX ORDER — ATORVASTATIN CALCIUM 10 MG/1
10 TABLET, FILM COATED ORAL DAILY
Status: DISCONTINUED | OUTPATIENT
Start: 2022-11-27 | End: 2022-12-02 | Stop reason: HOSPADM

## 2022-11-26 RX ADMIN — SODIUM CHLORIDE, PRESERVATIVE FREE 10 ML: 5 INJECTION INTRAVENOUS at 06:01

## 2022-11-26 RX ADMIN — SODIUM CHLORIDE, PRESERVATIVE FREE 10 ML: 5 INJECTION INTRAVENOUS at 21:25

## 2022-11-26 RX ADMIN — TRAMADOL HYDROCHLORIDE 50 MG: 50 TABLET ORAL at 21:25

## 2022-11-26 RX ADMIN — SODIUM CHLORIDE, PRESERVATIVE FREE 10 ML: 5 INJECTION INTRAVENOUS at 14:58

## 2022-11-26 RX ADMIN — CETIRIZINE HYDROCHLORIDE 10 MG: 10 TABLET, FILM COATED ORAL at 10:56

## 2022-11-26 RX ADMIN — TRAMADOL HYDROCHLORIDE 50 MG: 50 TABLET ORAL at 07:08

## 2022-11-26 RX ADMIN — HYDROCHLOROTHIAZIDE: 25 TABLET ORAL at 10:55

## 2022-11-26 RX ADMIN — CEFEPIME 2 G: 20 INJECTION, POWDER, FOR SOLUTION INTRAVENOUS at 06:09

## 2022-11-26 RX ADMIN — CEFEPIME 2 G: 20 INJECTION, POWDER, FOR SOLUTION INTRAVENOUS at 17:18

## 2022-11-26 RX ADMIN — RIVAROXABAN 20 MG: 10 TABLET, FILM COATED ORAL at 17:23

## 2022-11-26 RX ADMIN — CEFEPIME 2 G: 20 INJECTION, POWDER, FOR SOLUTION INTRAVENOUS at 23:58

## 2022-11-26 RX ADMIN — ASPIRIN 81 MG: 81 TABLET, COATED ORAL at 10:56

## 2022-11-26 RX ADMIN — TRAMADOL HYDROCHLORIDE 50 MG: 50 TABLET ORAL at 13:12

## 2022-11-26 RX ADMIN — POLYETHYLENE GLYCOL 3350 17 G: 17 POWDER, FOR SOLUTION ORAL at 13:22

## 2022-11-26 RX ADMIN — METRONIDAZOLE 500 MG: 250 TABLET ORAL at 21:25

## 2022-11-26 RX ADMIN — METRONIDAZOLE 500 MG: 250 TABLET ORAL at 10:55

## 2022-11-26 NOTE — PROGRESS NOTES
Problem: Pressure Injury - Risk of  Goal: *Prevention of pressure injury  Description: Document Vitaly Scale and appropriate interventions in the flowsheet. Outcome: Progressing Towards Goal  Note: Pressure Injury Interventions:  Sensory Interventions: Assess changes in LOC    Moisture Interventions: Absorbent underpads    Activity Interventions: Increase time out of bed    Mobility Interventions: HOB 30 degrees or less    Nutrition Interventions: Document food/fluid/supplement intake, Discuss nutritional consult with provider, Offer support with meals,snacks and hydration    Friction and Shear Interventions: Lift sheet, Minimize layers, Apply protective barrier, creams and emollients                Problem: Patient Education: Go to Patient Education Activity  Goal: Patient/Family Education  Outcome: Progressing Towards Goal     Problem: Falls - Risk of  Goal: *Absence of Falls  Description: Document Nikolas Fall Risk and appropriate interventions in the flowsheet.   Outcome: Progressing Towards Goal  Note: Fall Risk Interventions:  Mobility Interventions: Patient to call before getting OOB, Bed/chair exit alarm         Medication Interventions: Bed/chair exit alarm, Patient to call before getting OOB, Teach patient to arise slowly    Elimination Interventions: Bed/chair exit alarm, Call light in reach              Problem: Patient Education: Go to Patient Education Activity  Goal: Patient/Family Education  Outcome: Progressing Towards Goal     Problem: Cellulitis Care Plan (Adult)  Goal: *Absence of infection signs and symptoms  Outcome: Progressing Towards Goal     Problem: Patient Education: Go to Patient Education Activity  Goal: Patient/Family Education  Outcome: Progressing Towards Goal     Problem: Patient Education: Go to Patient Education Activity  Goal: Patient/Family Education  Outcome: Progressing Towards Goal     Problem: Nutrition Deficit  Goal: *Optimize nutritional status  Outcome: Progressing Towards Goal

## 2022-11-26 NOTE — PROGRESS NOTES
[facility] Podiatric Surgery  Progress Note  Subjective:  Khadar Padron has chronic osteomyelitis of his right fifth metatarsal head and a right heel ulceration with concern for osteomyelitis of his calcaneus/talus/tibia. He is now s/p Right heel debridement with bone biopsy of the calcaneus, talus, and tibia with resection of the fifth metatarsal head, DOS: 11/25/2022. Fells well, no pain in the right foot, no F/C/CP/SOB/N.     ROS:   Constitutional: Negative for fever, chills, weight loss and malaise/fatigue. HENT: Negative for nosebleeds. Eyes: Negative for blurred vision. Respiratory: Negative for cough, shortness of breath and wheezing. Cardiovascular: Negative for chest pain, palpitations. Gastrointestinal: Negative for heartburn, nausea, vomiting, abdominal pain. Genitourinary: Negative for dysuria and urgency. Musculoskeletal: Right heel ulcer and forefoot ulcer. Skin: Negative for rash. Neurological: Negative for dizziness, focal weakness and headaches. Endo/Heme/Allergies: Negative for environmental allergies. Psychiatric/Behavioral: Negative for depression and suicidal ideas. Objective:  Blood pressure (!) 144/69, pulse 72, temperature 97.5 °F (36.4 °C), resp. rate 18, height 5' 11\" (1.803 m), weight 95.3 kg (210 lb), SpO2 97 %. Intake/Output Summary (Last 24 hours) at 11/26/2022 1328  Last data filed at 11/26/2022 1102  Gross per 24 hour   Intake 525 ml   Output 1000 ml   Net -475 ml       Lower Extremity Exam:  Vascular:    Dorsalis Pedis Pulse: absent  Posterior Tibialis Pulse: absent  Skin Temp: warm to touch  Extremity Edema: Edema to the right side appears to be improving. Varicosities: present  Capillary refill time is sluggish but present. Neurological:  Epicritic and Protective Sensations: Absent  Deep Pain Response: Diminished     Dermatological:  Right plantar heel ulceration is about 2.5 cm in diameter and about 1.2 cm in depth. Plantar fascia is exposed. All margins appear clean with no signs of infection noted. Right medial hind foot incision is about 1.0 cm in diameter and 0.3 cm in depth. All margins are clean with no signs of infection. 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 No pain with calf compression. Labs:  Lab Results   Component Value Date/Time    WBC 17.0 (H) 11/26/2022 03:21 AM    HGB 9.7 (L) 11/26/2022 03:21 AM    HCT 31.1 (L) 11/26/2022 03:21 AM    .0 (H) 11/26/2022 03:21 AM    PLATELET 487 (H) 61/95/4208 03:21 AM     Lab Results   Component Value Date/Time    Sodium 137 11/26/2022 03:21 AM    Potassium 4.7 11/26/2022 03:21 AM    Chloride 104 11/26/2022 03:21 AM    CO2 28 11/26/2022 03:21 AM    BUN 28 (H) 11/26/2022 03:21 AM    Glucose 112 (H) 11/26/2022 03:21 AM     Lab Results   Component Value Date/Time    INR 1.3 (H) 11/22/2022 05:45 AM       Current Medications:  Reviewed. Impression  Chronic osteomyelitis, right fifth metatarsal, s/p resection  2. Peripheral neuropathy  3. Peripheral arterial disease s/p angioplasty of PT and Peroneal  4. Right heel ulceration with concern for osteomyelitis s/p bone biopsy of the tibia/calcaneus/and talus     Plan:  Patient seen and evaluated at bedside with his wife. He is now s/p Right foot debridement, resection of fifth metatarsal head, and bone biopsy of the tibia, calcaneus, and talus, DOS: 11/25/2022. Dressing was changed today and all wounds are clean with no residual infection. Most likely apply wound vac on Monday if wound remains stable. Continue IV antibiotics per ID recommendations. Follow up on pathology from OR. Wound culture from 11/20 with Enterobacter cloacae and Stenotrophomonas maltophilia. Strict NWB to the RLE. Trend WBC.       Yohan Welch DPM  Cell# [phone]

## 2022-11-26 NOTE — PROGRESS NOTES
Problem: Self Care Deficits Care Plan (Adult)  Goal: *Acute Goals and Plan of Care (Insert Text)  Description: FUNCTIONAL STATUS PRIOR TO ADMISSION: Patient was modified independent using a single point cane for functional mobility. 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 Owns post op shoe. HOME SUPPORT: The patient lived with wife but did not require assist.    Occupational Therapy Goals  Re-evaluation 11/26/22  1. Patient will perform sit <> stand to prepare for LB dressing tasks with RLE NWB with minimal assistance within 7 day(s). 2.  Patient will perform lower body dressing with AD with moderate assistance  within 7 day(s). 3.  Patient will perform toilet transfers with RLE NWB with moderate assistance  within 7 day(s). 4.  Patient will perform all aspects of toileting with RLE NWB with moderate assistance  within 7 day(s). 5.  Patient will participate in upper extremity therapeutic exercise/activities with supervision/set-up for 8 minutes within 7 day(s). 6.  Patient will utilize energy conservation techniques during functional activities with verbal cues within 7 day(s). Initiated 11/18/2022  1. Patient will perform bathing maintaining RLE NWB with supervision/set-up within 7 day(s). 2.  Patient will perform LB dressing with AD with supervision/set-up within 7 day(s). 3.  Patient will perform toilet transfers while maintaining RLE NWB with supervision/set-up within 7 day(s). 5.  Patient will perform all aspects of toileting with supervision/set-up within 7 day(s). 6.  Patient will participate in upper extremity therapeutic exercise/activities with supervision for 6 minutes within 7 day(s). 7.  Patient will utilize energy conservation techniques during functional activities with verbal cues within 7 day(s).    Outcome: Progressing Towards Goal   OCCUPATIONAL THERAPY RE-EVALUATION  Patient: Lianna Salgado (14 y.o. male)  Date: 11/26/2022  Diagnosis: Cellulitis of right lower limb [L03.115] <principal problem not specified>  Procedure(s) (LRB):  RIGHT FOOT 5TH METATARSAL HEAD RESECTION, BONE BIOPSY CALCANEUS, TALAS, AND TIBIA (Right) 1 Day Post-Op  Precautions: RLE NWB, Fall  Chart, occupational therapy assessment, plan of care, and goals were reviewed. ASSESSMENT  Based on the objective data described below, impaired activity tolerance with orthostatic hypotension, RLE NWB, standing balance, mobility and generalized strength necessary in ADL tasks s/p R foot 5th metatarsal head resection, bone biopsy of calcaneus, talas and tibia POD 1. Nursing cleared for mobility and patient had received pain medication prior to session. Patient admitted 11/16/2022 and PTA he was indep with ADL tasks. Received with supportive wife who asked appropriate questions regarding disposition. Patient initially in agreement to achieve EOB where he completed shaving. PT joined and patient agreeable to attempt OOB with encouragement and education regarding of importance of OOB for GI, pulmonary and skin. Ed on RLE NWB which patient was able to achieve to hop in bathroom at [address] [place] [address] on 11/18. Sit > stand with mod A x 2 with ability to maintain RLE NWB with static standing at RW level however difficult with pivoting to chair with x2 assist.  Once in chair patient reports feeling hot then a cold sweat with decrease in BP that resolved with BLE elevated. PT provided ed on knee scooter, patient resistant at this time. Provided ed on lateral transfer from drop recliner to bed with mod AX2 and additional time, good adherence to RLE NWB. Recommend nursing completing toileting tasks at bed level secondary to difficulty with maintaining RLE NWB with SPT.       Temp Pulse Resp BP SpO2   11/26/22 1440 -- (!) 59 -- (!) 153/71  Sitting in chair with BLE elevated --   11/26/22 1431 -- (P) 92 -- (P) 114/64  Sitting in chair (P) 98 %   11/26/22 1310 97.5 °F (36.4 °C) 72 18 (!) 144/69   pre activity bed level 97 %          Current Level of Function Impacting Discharge (ADLs): UB care set up and supervision, LB care total A, self care transfer mod Ax2    Other factors to consider for discharge: Patient is below baseline for ADL tasks and is limited with RLE NWB. Potential plan for wound vac. Recommend rehab IPR vs SNF pending progression. PLAN :  Recommendations and Planned Interventions: self care training, functional mobility training, therapeutic exercise, balance training, therapeutic activities, endurance activities, patient education, home safety training, and family training/education    Frequency/Duration: Patient will be followed by occupational therapy 5 times a week to address goals. Recommend with staff: bed level toileting, bed in chair for meals    Recommend next OT session: progression POC    Recommendation for discharge: (in order for the patient to meet his/her long term goals)  To be determined: IPR vs SNF    This discharge recommendation:  Has not yet been discussed the attending provider and/or case management    Equipment recommendations for successful discharge (if) home: wheelchair and sliding/transfer board       SUBJECTIVE:   Patient stated My wife would agree with you two. She says I need to do it.     OBJECTIVE DATA SUMMARY:   Hospital course since last seen and reason for reevaluation: POD 1 R foot surgery    Cognitive/Behavioral Status:  Neurologic State: Alert; Appropriate for age  Orientation Level: Oriented X4  Cognition: Appropriate decision making; Follows commands     Hearing: Auditory  Auditory Impairment: Hearing aid(s), Hard of hearing, bilateral              Range of Motion:  AROM: Generally decreased, functional        Strength:  Strength: Generally decreased, functional      Coordination:  Coordination: Generally decreased, functional  Fine Motor Skills-Upper: Left Intact; Right Intact    Gross Motor Skills-Upper: Left Intact; Right Intact    Tone & Sensation:  Tone: Normal  Sensation: Impaired         Functional Mobility and Transfers for ADLs:  Bed Mobility:  Supine to Sit: Moderate assistance  Sit to Supine: Moderate assistance;Assist x2  Scooting: Moderate assistance;Assist x2 (lateral scoot transfer)    Transfers:  Sit to Stand: Moderate assistance;Assist x2 (elevated height)          Balance:  Sitting: Intact; Without support  Standing: Impaired  Standing - Static: Constant support; Fair (difficulty with RLE NEB)  Standing - Dynamic : Fair (difficulty with RLE NWB)    ADL Assessment:  Feeding: Independent    Oral Facial Hygiene/Grooming: Supervision;Setup (seated)    Bathing: Moderate assistance  Upper Body Dressing: Setup;Supervision    Lower Body Dressing: Total assistance    Toileting: Maximum assistance       ADL Intervention and task modifications:   Patient instructed and indicated understanding the benefits of maintaining activity tolerance, functional mobility, and independence with self care tasks during acute stay  to ensure safe return home and to baseline. Encouraged patient to increase frequency and duration OOB, be out of bed for all meals, perform daily ADLs (as approved by RN/MD regarding bathing etc), and performing functional mobility to/from bathroom. Provided education through multi-modal cues for RW safety including proper positioning, hand placement,  and safety. Patient able to perform sit <> stand with mod AX2 assistance. Mod Ax2 SPT at RW level to chair, difficulty adhering to RLE NWB. Ed on lateral transfer back to bed with mod Ax2 with additional time. Grooming  Position Performed: Seated edge of bed  Shaving: Set-up; Supervision    Lower Body Dressing Assistance  Shoes with Cloth Laces:  Total assistance (dependent) (L shoe)       Functional Measure:    Barthel Index:  Bathin  Bladder: 5  Bowels: 10  Groomin  Dressin  Feeding: 10  Mobility: 0  Stairs: 0  Toilet Use: 0  Transfer (Bed to Chair and Back): 5  Total: 40/100      The Barthel ADL Index: Guidelines  1. The index should be used as a record of what a patient does, not as a record of what a patient could do. 2. The main aim is to establish degree of independence from any help, physical or verbal, however minor and for whatever reason. 3. The need for supervision renders the patient not independent. 4. A patient's performance should be established using the best available evidence. Asking the patient, friends/relatives and nurses are the usual sources, but direct observation and common sense are also important. However direct testing is not needed. 5. Usually the patient's performance over the preceding 24-48 hours is important, but occasionally longer periods will be relevant. 6. Middle categories imply that the patient supplies over 50 per cent of the effort. 7. Use of aids to be independent is allowed. Score Interpretation (from [address] [facility] 83)    Independent   60-79 Minimally independent   40-59 Partially dependent   20-39 Very dependent   <20 Totally dependent     -Joi Franz., Barthel, D.W. (1965). Functional evaluation: the Barthel Index. [address] [facility] ([address] [facility] [address] [place] [address] (1997). The Barthel activities of daily living index: self-reporting versus actual performance in the old (> or = 75 years). Journal [address] 45(7), 14 [facility], ...F, Saturnino Patel., Lacey John. (1999). Measuring the change in disability after inpatient rehabilitation; comparison of the responsiveness of the Barthel Index and Functional [place] Measure. Journal of Neurology, Neurosurgery, and Psychiatry, 66(4), 144-123. Lou Young, N.J.A, JORGE A Hoyos, & Xenia Harman, MNOE. (2004) Assessment of post-stroke quality of life in cost-effectiveness studies: The usefulness of the Barthel Index and the EuroQoL-5D.  Quality of Life Research, 13, 427-43       Pain:  No c/o pain, impaired sensation in BLE    Activity Tolerance:   Fair, Poor, and signs and symptoms of orthostatic hypotension    After treatment patient left in no apparent distress:   Supine in bed, Heels elevated for pressure relief, Call bell within reach, Bed / chair alarm activated, Caregiver / family present, Side rails x 3, and RLE elevated  PT discussed with this writer to mild drainage on R heel aspect of bandage and PT discussed with nursing. COMMUNICATION/COLLABORATION:   The patients plan of care was discussed with: Physical therapist who spoke to Registered nurse.      Roxanna Acuna OT  Time Calculation: 54 mins

## 2022-11-26 NOTE — PROGRESS NOTES
[facility]  [address] [facility], [facility] [address]  [phone]    Hospitalist Progress Note      NAME: Lianna Salgado   :  1940  MRM:  [id number]    Date/Time of service 2022  3:25 PM          Assessment and Plan:     Right Heel Ulcer / Right lower extremity cellulitis / Acute osteomyelitis (OM) of fifth metarsal head / Septic tibiotalar arthritis - POA, on  had right fifth metatarsal head resection, bone biopsy of the talus, calcaneus, and tibia. Follow cx and path. Blister cx enterobacter and stenotrophomonas. Continue cefepime, flagyl and clindamycin. NWB on foot. Pain control with prn ultram.  Likely Wound vac in 2-3 days, and then to SNF    PAD / Occlusion of the right mid-distal posterior tibial artery and right mid-distal anterior tibial artery - Angio  with balloon angioplasty alone. Continue ASA, resume xarelto ASAP. Add statin     Paroxysmal Atrial fibrillation - Had been stable on xarelto; held for surgery, resume when surgery clears him. Debilitated patient - POA due to foot issues and will be NWB. Will need SNF. Anemia - Check serologies and hemoccult. Hypocalcemia / Hypophosphatemia / Hypokalemia - Replace and monitor     Hypertension - Continue home Hyzaar     OLEG / obese - Advise weight los and CPAP nightly       Subjective:     Chief Complaint:  tolerated surgery    ROS:  (bold if positive, if negative)    Tolerating minimal  PT  Tolerating Diet        Objective:     Last 24hrs VS reviewed since prior progress note.  Most recent are:    Visit Vitals  BP (!) 160/72 (BP 1 Location: Left upper arm, BP Patient Position: At rest)   Pulse 67   Temp 97.7 °F (36.5 °C)   Resp 16   Ht 5' 11\" (1.803 m)   Wt 95.3 kg (210 lb)   SpO2 96%   BMI 29.29 kg/m²     SpO2 Readings from Last 6 Encounters:   22 96%   22 98%   20 98%   20 97%   20 96%   19 97%    O2 Flow Rate (L/min): 21 l/min     Intake/Output Summary (Last 24 hours) at 11/26/2022 1205  Last data filed at 11/26/2022 1102  Gross per 24 hour   Intake 525 ml   Output 1000 ml   Net -475 ml        Physical Exam:    Gen:  Well-developed, well-nourished, in no acute distress  HEENT:  Pink conjunctivae, PERRL, hearing intact to voice, moist mucous membranes  Neck:  Supple, without masses, thyroid non-tender  Resp:  No accessory muscle use, clear breath sounds without wheezes rales or rhonchi  Card:  No murmurs, normal S1, S2 without thrills, bruits or peripheral edema  Abd:  Soft, non-tender, non-distended, normoactive bowel sounds are present, no mass  Lymph:  No cervical or inguinal adenopathy  Musc:  No cyanosis or clubbing  Skin:  Foor in dressing, skin turgor is good  Neuro:  Cranial nerves are grossly intact, post op pain and motor weakness, follows commands appropriately  Psych:  Good insight, oriented to person, place and time, alert    Telemetry reviewed:   normal sinus rhythm  __________________________________________________________________  Medications Reviewed: (see below)  Medications:     Current Facility-Administered Medications   Medication Dose Route Frequency    traMADoL (ULTRAM) tablet 50 mg  50 mg Oral Q6H PRN    hydrALAZINE (APRESOLINE) 20 mg/mL injection 20 mg  20 mg IntraVENous Q6H PRN    cefepime (MAXIPIME) 2 g in 0.9% sodium chloride (MBP/ADV) 100 mL MBP  2 g IntraVENous Q8H    metroNIDAZOLE (FLAGYL) tablet 500 mg  500 mg Oral Q12H    cetirizine (ZYRTEC) tablet 10 mg  10 mg Oral DAILY    guaiFENesin-dextromethorphan (ROBITUSSIN DM) 100-10 mg/5 mL syrup 10 mL  10 mL Oral Q6H PRN    [Held by provider] rivaroxaban (XARELTO) tablet 20 mg  20 mg Oral DAILY WITH DINNER    losartan/hydroCHLOROthiazide (HYZAAR) 100/25 mg   Oral DAILY    sodium chloride (NS) flush 5-40 mL  5-40 mL IntraVENous Q8H    sodium chloride (NS) flush 5-40 mL  5-40 mL IntraVENous PRN    acetaminophen (TYLENOL) tablet 650 mg  650 mg Oral Q6H PRN    Or    acetaminophen (TYLENOL) suppository 650 mg  650 mg Rectal Q6H PRN    polyethylene glycol (MIRALAX) packet 17 g  17 g Oral DAILY PRN    ondansetron (ZOFRAN ODT) tablet 4 mg  4 mg Oral Q8H PRN    Or    ondansetron (ZOFRAN) injection 4 mg  4 mg IntraVENous Q6H PRN    aspirin delayed-release tablet 81 mg  81 mg Oral DAILY        Lab Data Reviewed: (see below)  Lab Review:     Recent Labs     11/26/22 0321   WBC 17.0*   HGB 9.7*   HCT 31.1*   *     Recent Labs     11/26/22  0321      K 4.7      CO2 28   *   BUN 28*   CREA 0.92   CA 8.1*   ALB 2.2*   TBILI 0.4   ALT 62     Lab Results   Component Value Date/Time    Glucose (POC) 97 11/22/2022 01:30 PM    Glucose (POC) 140 (H) 06/03/2013 08:05 PM    Glucose (POC) 112 (H) 05/31/2013 06:45 PM     No results for input(s): PH, PCO2, PO2, HCO3, FIO2 in the last 72 hours. No results for input(s): INR, INREXT in the last 72 hours. All Micro Results       Procedure Component Value Units Date/Time    CULTURE, Chiquita Giron STAIN [[id number]]  (Abnormal)  (Susceptibility) Collected: 11/20/22 1239    Order Status: Completed Specimen: Wound from Ulcer Updated: 11/23/22 1041     Special Requests: NO SPECIAL REQUESTS        GRAM STAIN OCCASIONAL WBCS SEEN         FEW GRAM NEGATIVE RODS        Culture result:       LIGHT ENTEROBACTER CLOACAE COMPLEX                  LIGHT Stenotrophomonas (Xantho.) maltophilia CLSI GUIDELINES DO NOT RECOMMEND REPORTING SUSCEPTIBILITIES FOR S. MALTOPHILIA. TRIMETHOPRIM/SULFAMETHOXAZOLE IS THE DRUG OF CHOICE. CULTURE, MRSA [[id number]] Collected: 11/21/22 1221    Order Status: Completed Specimen: Nasal from Nares Updated: 11/22/22 1923     Special Requests: NO SPECIAL REQUESTS        Culture result: MRSA NOT PRESENT               Screening of patient nares for MRSA is for surveillance purposes and, if positive, to facilitate isolation considerations in high risk settings.  It is not intended for automatic decolonization interventions per se as regimens are not sufficiently effective to warrant routine use. CULTURE, BLOOD [[id number]] Collected: 11/16/22 1412    Order Status: Completed Specimen: Blood Updated: 11/22/22 0430     Special Requests: NO SPECIAL REQUESTS        Culture result: NO GROWTH 6 DAYS       COVID-19 RAPID TEST [[id number]] Collected: 11/16/22 1406    Order Status: Completed Specimen: Nasopharyngeal Updated: 11/16/22 1457     Specimen source Nasopharyngeal        COVID-19 rapid test Not detected        Comment: Rapid Abbott ID Now       Rapid NAAT:  The specimen is NEGATIVE for SARS-CoV-2, the novel coronavirus associated with COVID-19. Negative results should be treated as presumptive and, if inconsistent with clinical signs and symptoms or necessary for patient management, should be tested with an alternative molecular assay. Negative results do not preclude SARS-CoV-2 infection and should not be used as the sole basis for patient management decisions. This test has been authorized by the FDA under an Emergency Use Authorization (EUA) for use by authorized laboratories. Fact sheet for Healthcare Providers:  http://www.singh.dunia/  Fact sheet for Patients: http://www.attila-mildred.dunia/       Methodology: Isothermal Nucleic Acid Amplification         CULTURE, BLOOD, PAIRED [[id number]] Collected: 11/16/22 1400    Order Status: Canceled Specimen: Blood             Other pertinent lab: none    Total time spent with patient: 30 Minutes I personally reviewed chart, notes, data and current medications in the medical record. I have personally examined and treated the patient at bedside during this period. To assist coordination of care and communication with nursing and staff, this note may be preliminary early in the day, but finalized by end of the day.                  Care Plan discussed with: Patient, Care Manager, Nursing Staff, Consultant/Specialist, and >50% of time spent in counseling and coordination of care    Discussed:  Care Plan and D/C Planning    Prophylaxis:  H2B/PPI    Disposition:  SNF/LTC           ___________________________________________________    Attending Physician: Le Fierro MD

## 2022-11-26 NOTE — PROGRESS NOTES
Problem: Mobility Impaired (Adult and Pediatric)  Goal: *Acute Goals and Plan of Care (Insert Text)  Description: FUNCTIONAL STATUS PRIOR TO ADMISSION: Patient was modified independent using a single point cane for functional mobility. HOME SUPPORT PRIOR TO ADMISSION: The patient lived with wife but did not require assist.    Physical Therapy Goals  Initiated 11/18/2022, Reassessed 11/26 and continue goals next 7 days  1. Patient will move from supine to sit and sit to supine  in bed with modified independence within 7 day(s). 2.  Patient will transfer from bed to chair and chair to bed with minimal assistance/contact guard assist using the least restrictive device strict NWB within 7 day(s). 3.  Patient will perform sit < > stand with minimal assistance/contact guard assist within 7 day(s). 4.  Patient will ambulate with minimal assistance/contact guard assist for 5'x2 feet with the least restrictive device strict NWB RLE within 7 day(s). 5. Patient will perform lateral chair to chair/bed transfer NWB RLE with increased time and min A x 1 within 7 days. Note:   PHYSICAL THERAPY REEVALUATION  Patient: Lucille Ramirez (25 y.o. male)  Date: 11/26/2022  Primary Diagnosis: Cellulitis of right lower limb [L03.115]  Procedure(s) (LRB):  RIGHT FOOT 5TH METATARSAL HEAD RESECTION, BONE BIOPSY CALCANEUS, TALAS, AND TIBIA (Right) 1 Day Post-Op   Precautions: strict   NWB RLE    ASSESSMENT  Based on the objective data described below, the patient presents with decreased ability to maintain strict NWB RLE during SPT bed to chair. Patient with symptomatic orthostatic hypotension initially but stabilized. However because he is a heavy 2 person assist transfer, assisted BTB with therapy. He presents today for first attempt OOB following additional foot surgery on 11/25 and with increased pain and worse functional mobility than when previously up with OT and PT over this 10 day admission.  He attempted to decline OOB with therapy and somewhat irritable but wife insisted he attempt treatment. Placed back to bed. Shown a knee scooter which he declined to attempt. Because of strict NWB orders and because patient twice touched down softly to floor, recommend continued work on sit<>stands but focus more on lateral transfers since he may be safest at w/c <>bed level until further healing occurs. He is far from his baseline and has long course of rehab ahead. He is facing potential amputation of foot per patient's report so NWB crucial. Per notes possible wound vac 11/28. At high risk for decompensation if not active with PT/OT and nursing daily. At this time unsafe to attempt stance with nursing staff so bedpan advised. Current Level of Function Impacting Discharge (mobility/balance): Mod A x 2 for SPTs and failed to adhere ~50% of attempts (steps with LLE) to strict NWB RLE     Other factors to consider for discharge: long LOS, Advanced age, supportive wife , multiple sxs and pain management     Patient will benefit from skilled therapy intervention to address the above noted impairments. PLAN :  Recommendations and Planned Interventions: bed mobility training, transfer training, gait training, therapeutic exercises, patient and family training/education, and therapeutic activities      Frequency/Duration: Patient will be followed by physical therapy:  daily to address goals.     Recommendation for discharge: (in order for the patient to meet his/her long term goals)  Therapy 3 hours per day 5-7 days per week    This discharge recommendation:  Has not yet been discussed the attending provider and/or case management    Equipment recommendations for successful discharge (if) home: pending disposition- does not have DME at home          SUBJECTIVE:   Patient stated .    OBJECTIVE DATA SUMMARY:   HISTORY:    Past Medical History:   Diagnosis Date    Arthritis     Back, knees, shoulders    Atrial fibrillation ([address]     BCC (basal cell carcinoma of skin)     Benign hypertensive heart disease without heart failure     Diverticulosis     DJD (degenerative joint disease) of knee     right    Hematuria     due to certain medications    Hypertension     OLEG on CPAP 04/01/2011    Varicose vein of leg 1/12/2012     Past Surgical History:   Procedure Laterality Date    HX CATARACT REMOVAL Bilateral     HX COLONOSCOPY  2019    Removed polyps    HX HERNIA REPAIR      HX HIP REPLACEMENT Left 2016    HX KNEE REPLACEMENT Right 2019    HX MOHS PROCEDURES Left     x 3 Forhead and ear    HX TONSILLECTOMY  1943    HX VEIN STRIPPING Bilateral 2015     Hospital course since last seen and reason for reevaluation: additional foot surgery on 11/25 with more extensive excisions      Personal factors and/or comorbidities impacting plan of care: advanaced age     Home Situation  Home Environment: Private residence  # Steps to Enter: 6  Rails to Enter: Yes  Hand Rails : Bilateral  One/Two Story Residence: Two story  # of Interior Steps: 15  Interior Rails: Left  Lift Chair Available: No  Living Alone: No  Support Systems: Spouse/Significant Other, Other Family Member(s)  Patient Expects to be Discharged to[de-identified] Rehab hospital/unit acute  Current DME Used/Available at Home: [address], [place], straight, Walker, rolling, Grab bars, Shower chair, Raised toilet seat  Tub or Shower Type: Shower    EXAMINATION/PRESENTATION/DECISION MAKING:   Critical Behavior:  Neurologic State: Alert, Appropriate for age  Orientation Level: Oriented X4  Cognition: Appropriate decision making, Follows commands  Safety/Judgement: Awareness of environment, Fall prevention, Good awareness of safety precautions, Home safety  Hearing:   Auditory  Auditory Impairment: Hearing aid(s), Hard of hearing, bilateral  Skin:  foot acewrapped and break thru blood on heel through bandage  Edema:   Range Of Motion:  AROM: Generally decreased, functional                       Strength:    Strength: Generally decreased, functional                    Tone & Sensation:   Tone: Normal              Sensation: Impaired               Coordination:  Coordination: Generally decreased, functional  Vision:      Functional Mobility:  Bed Mobility:     Supine to Sit: Moderate assistance  Sit to Supine: Moderate assistance;Assist x2  Scooting: Moderate assistance;Assist x2 (lateral scoot transfer)  Transfers:  Sit to Stand: Moderate assistance;Assist x2 (elevated height)  Stand to Sit: Minimum assistance;Assist x2                       Balance:   Sitting: Intact; Without support  Standing: Impaired  Standing - Static: Constant support; Fair (difficulty with RLE NEB)  Standing - Dynamic : Fair (difficulty with RLE NWB)  Ambulation/Gait Training:  Distance (ft): 2 Feet (ft)  Assistive Device: Gait belt;Walker, rolling  Ambulation - Level of Assistance: Moderate assistance;Assist x2; Additional time        Gait Abnormalities: Antalgic;Decreased step clearance      Pain Ratin/10    Activity Tolerance:   Poor, SpO2 stable on RA, requires rest breaks, observed SOB with activity, and signs and symptoms of orthostatic hypotension    After treatment patient left in no apparent distress:   Supine in bed, Heels elevated for pressure relief, Call bell within reach, Bed / chair alarm activated, and Caregiver / family present    COMMUNICATION/EDUCATION:   The patients plan of care was discussed with: Occupational therapist and Registered nurse. Fall prevention education was provided and the patient/caregiver indicated understanding. and Patient understands intent and goals of therapy, but is neutral about his/her participation.     Thank you for this referral.  Jennifer Holloway, PT, DPT

## 2022-11-27 LAB
ALBUMIN SERPL-MCNC: 2.1 G/DL (ref 3.5–5)
ALBUMIN/GLOB SERPL: 0.6 {RATIO} (ref 1.1–2.2)
ALP SERPL-CCNC: 87 U/L (ref 45–117)
ALT SERPL-CCNC: 53 U/L (ref 12–78)
ANION GAP SERPL CALC-SCNC: 5 MMOL/L (ref 5–15)
AST SERPL-CCNC: 44 U/L (ref 15–37)
BASOPHILS # BLD: 0 K/UL (ref 0–0.1)
BASOPHILS NFR BLD: 0 % (ref 0–1)
BILIRUB SERPL-MCNC: 0.5 MG/DL (ref 0.2–1)
BUN SERPL-MCNC: 24 MG/DL (ref 6–20)
BUN/CREAT SERPL: 29 (ref 12–20)
CALCIUM SERPL-MCNC: 8.1 MG/DL (ref 8.5–10.1)
CHLORIDE SERPL-SCNC: 102 MMOL/L (ref 97–108)
CO2 SERPL-SCNC: 27 MMOL/L (ref 21–32)
COMMENT, HOLDF: NORMAL
CREAT SERPL-MCNC: 0.82 MG/DL (ref 0.7–1.3)
DIFFERENTIAL METHOD BLD: ABNORMAL
EOSINOPHIL # BLD: 0.2 K/UL (ref 0–0.4)
EOSINOPHIL NFR BLD: 1 % (ref 0–7)
ERYTHROCYTE [DISTWIDTH] IN BLOOD BY AUTOMATED COUNT: 14.8 % (ref 11.5–14.5)
FERRITIN SERPL-MCNC: 126 NG/ML (ref 26–388)
FOLATE SERPL-MCNC: 12.6 NG/ML (ref 5–21)
GLOBULIN SER CALC-MCNC: 3.7 G/DL (ref 2–4)
GLUCOSE SERPL-MCNC: 105 MG/DL (ref 65–100)
HAPTOGLOB SERPL-MCNC: 244 MG/DL (ref 30–200)
HCT VFR BLD AUTO: 30.4 % (ref 36.6–50.3)
HEMOCCULT STL QL: NEGATIVE
HGB BLD-MCNC: 9.6 G/DL (ref 12.1–17)
IMM GRANULOCYTES # BLD AUTO: 0 K/UL
IMM GRANULOCYTES NFR BLD AUTO: 0 %
IRON SATN MFR SERPL: 25 % (ref 20–50)
IRON SERPL-MCNC: 55 UG/DL (ref 35–150)
LDH SERPL L TO P-CCNC: 189 U/L (ref 85–241)
LYMPHOCYTES # BLD: 2.7 K/UL (ref 0.8–3.5)
LYMPHOCYTES NFR BLD: 17 % (ref 12–49)
MAGNESIUM SERPL-MCNC: 1.9 MG/DL (ref 1.6–2.4)
MCH RBC QN AUTO: 31.5 PG (ref 26–34)
MCHC RBC AUTO-ENTMCNC: 31.6 G/DL (ref 30–36.5)
MCV RBC AUTO: 99.7 FL (ref 80–99)
MONOCYTES # BLD: 2.1 K/UL (ref 0–1)
MONOCYTES NFR BLD: 13 % (ref 5–13)
NEUTS SEG # BLD: 10.9 K/UL (ref 1.8–8)
NEUTS SEG NFR BLD: 69 % (ref 32–75)
NRBC # BLD: 0 K/UL (ref 0–0.01)
NRBC BLD-RTO: 0 PER 100 WBC
PHOSPHATE SERPL-MCNC: 2.7 MG/DL (ref 2.6–4.7)
PLATELET # BLD AUTO: 435 K/UL (ref 150–400)
PMV BLD AUTO: 9.2 FL (ref 8.9–12.9)
POTASSIUM SERPL-SCNC: 4.3 MMOL/L (ref 3.5–5.1)
PROT SERPL-MCNC: 5.8 G/DL (ref 6.4–8.2)
RBC # BLD AUTO: 3.05 M/UL (ref 4.1–5.7)
RBC MORPH BLD: ABNORMAL
RETICS # AUTO: 0.09 M/UL (ref 0.03–0.1)
RETICS/RBC NFR AUTO: 3 % (ref 0.7–2.1)
SAMPLES BEING HELD,HOLD: NORMAL
SODIUM SERPL-SCNC: 134 MMOL/L (ref 136–145)
TIBC SERPL-MCNC: 223 UG/DL (ref 250–450)
VIT B12 SERPL-MCNC: 513 PG/ML (ref 193–986)
WBC # BLD AUTO: 15.9 K/UL (ref 4.1–11.1)

## 2022-11-27 PROCEDURE — 82607 VITAMIN B-12: CPT

## 2022-11-27 PROCEDURE — 85045 AUTOMATED RETICULOCYTE COUNT: CPT

## 2022-11-27 PROCEDURE — 82272 OCCULT BLD FECES 1-3 TESTS: CPT

## 2022-11-27 PROCEDURE — 74011250636 HC RX REV CODE- 250/636: Performed by: STUDENT IN AN ORGANIZED HEALTH CARE EDUCATION/TRAINING PROGRAM

## 2022-11-27 PROCEDURE — 83615 LACTATE (LD) (LDH) ENZYME: CPT

## 2022-11-27 PROCEDURE — 36415 COLL VENOUS BLD VENIPUNCTURE: CPT

## 2022-11-27 PROCEDURE — 83735 ASSAY OF MAGNESIUM: CPT

## 2022-11-27 PROCEDURE — 85025 COMPLETE CBC W/AUTO DIFF WBC: CPT

## 2022-11-27 PROCEDURE — 74011000250 HC RX REV CODE- 250: Performed by: STUDENT IN AN ORGANIZED HEALTH CARE EDUCATION/TRAINING PROGRAM

## 2022-11-27 PROCEDURE — 74011250637 HC RX REV CODE- 250/637: Performed by: STUDENT IN AN ORGANIZED HEALTH CARE EDUCATION/TRAINING PROGRAM

## 2022-11-27 PROCEDURE — 83010 ASSAY OF HAPTOGLOBIN QUANT: CPT

## 2022-11-27 PROCEDURE — 65270000046 HC RM TELEMETRY

## 2022-11-27 PROCEDURE — 82746 ASSAY OF FOLIC ACID SERUM: CPT

## 2022-11-27 PROCEDURE — 83540 ASSAY OF IRON: CPT

## 2022-11-27 PROCEDURE — 84100 ASSAY OF PHOSPHORUS: CPT

## 2022-11-27 PROCEDURE — 80053 COMPREHEN METABOLIC PANEL: CPT

## 2022-11-27 PROCEDURE — 74011000258 HC RX REV CODE- 258: Performed by: STUDENT IN AN ORGANIZED HEALTH CARE EDUCATION/TRAINING PROGRAM

## 2022-11-27 PROCEDURE — 82728 ASSAY OF FERRITIN: CPT

## 2022-11-27 PROCEDURE — 74011250637 HC RX REV CODE- 250/637: Performed by: INTERNAL MEDICINE

## 2022-11-27 RX ORDER — AMLODIPINE BESYLATE 5 MG/1
10 TABLET ORAL DAILY
Status: DISCONTINUED | OUTPATIENT
Start: 2022-11-27 | End: 2022-12-01

## 2022-11-27 RX ADMIN — POLYETHYLENE GLYCOL 3350 17 G: 17 POWDER, FOR SOLUTION ORAL at 16:30

## 2022-11-27 RX ADMIN — TRAMADOL HYDROCHLORIDE 50 MG: 50 TABLET ORAL at 16:30

## 2022-11-27 RX ADMIN — CEFEPIME 2 G: 20 INJECTION, POWDER, FOR SOLUTION INTRAVENOUS at 06:51

## 2022-11-27 RX ADMIN — METRONIDAZOLE 500 MG: 250 TABLET ORAL at 10:46

## 2022-11-27 RX ADMIN — SODIUM CHLORIDE, PRESERVATIVE FREE 10 ML: 5 INJECTION INTRAVENOUS at 16:39

## 2022-11-27 RX ADMIN — SODIUM CHLORIDE, PRESERVATIVE FREE 10 ML: 5 INJECTION INTRAVENOUS at 05:15

## 2022-11-27 RX ADMIN — TRAMADOL HYDROCHLORIDE 50 MG: 50 TABLET ORAL at 22:57

## 2022-11-27 RX ADMIN — CEFEPIME 2 G: 20 INJECTION, POWDER, FOR SOLUTION INTRAVENOUS at 22:46

## 2022-11-27 RX ADMIN — CEFEPIME 2 G: 20 INJECTION, POWDER, FOR SOLUTION INTRAVENOUS at 16:30

## 2022-11-27 RX ADMIN — HYDROCHLOROTHIAZIDE: 25 TABLET ORAL at 10:46

## 2022-11-27 RX ADMIN — ASPIRIN 81 MG: 81 TABLET, COATED ORAL at 10:46

## 2022-11-27 RX ADMIN — AMLODIPINE BESYLATE 10 MG: 5 TABLET ORAL at 10:46

## 2022-11-27 RX ADMIN — RIVAROXABAN 20 MG: 10 TABLET, FILM COATED ORAL at 16:30

## 2022-11-27 RX ADMIN — CETIRIZINE HYDROCHLORIDE 10 MG: 10 TABLET, FILM COATED ORAL at 10:47

## 2022-11-27 RX ADMIN — SODIUM CHLORIDE, PRESERVATIVE FREE 10 ML: 5 INJECTION INTRAVENOUS at 21:13

## 2022-11-27 RX ADMIN — METRONIDAZOLE 500 MG: 250 TABLET ORAL at 21:11

## 2022-11-27 NOTE — PROGRESS NOTES
Bedside shift change report given to Ramana Sun RN (oncoming nurse) by Zhao Moses RN (offgoing nurse). Report included the following information SBAR, Kardex, Procedure Summary, Intake/Output, and MAR.

## 2022-11-27 NOTE — PROGRESS NOTES
Bedside shift change report given to Alan RN (oncoming nurse) by Renny Ashton RN (offgoing nurse). Report included the following information SBAR, Kardex, OR Summary, Procedure Summary, and MAR.

## 2022-11-27 NOTE — PROGRESS NOTES
Problem: Pressure Injury - Risk of  Goal: *Prevention of pressure injury  Description: Document Vitaly Scale and appropriate interventions in the flowsheet. Outcome: Progressing Towards Goal  Note: Pressure Injury Interventions:  Sensory Interventions: Minimize linen layers, Pad between skin to skin    Moisture Interventions: Absorbent underpads    Activity Interventions: Increase time out of bed, Pressure redistribution bed/mattress(bed type), PT/OT evaluation    Mobility Interventions: HOB 30 degrees or less, Pressure redistribution bed/mattress (bed type), PT/OT evaluation    Nutrition Interventions: Document food/fluid/supplement intake, Offer support with meals,snacks and hydration    Friction and Shear Interventions: Minimize layers                Problem: Patient Education: Go to Patient Education Activity  Goal: Patient/Family Education  Outcome: Progressing Towards Goal     Problem: Falls - Risk of  Goal: *Absence of Falls  Description: Document Nikolas Fall Risk and appropriate interventions in the flowsheet.   Outcome: Progressing Towards Goal  Note: Fall Risk Interventions:  Mobility Interventions: Communicate number of staff needed for ambulation/transfer, Patient to call before getting OOB, PT Consult for mobility concerns, OT consult for ADLs, PT Consult for assist device competence, Strengthening exercises (ROM-active/passive)         Medication Interventions: Teach patient to arise slowly, Patient to call before getting OOB    Elimination Interventions: Call light in reach, Patient to call for help with toileting needs, Urinal in reach              Problem: Patient Education: Go to Patient Education Activity  Goal: Patient/Family Education  Outcome: Progressing Towards Goal     Problem: Cellulitis Care Plan (Adult)  Goal: *Absence of infection signs and symptoms  Outcome: Progressing Towards Goal     Problem: Patient Education: Go to Patient Education Activity  Goal: Patient/Family Education  Outcome: Progressing Towards Goal     Problem: Patient Education: Go to Patient Education Activity  Goal: Patient/Family Education  Outcome: Progressing Towards Goal     Problem: Nutrition Deficit  Goal: *Optimize nutritional status  Outcome: Progressing Towards Goal

## 2022-11-27 NOTE — PROGRESS NOTES
[facility]  [address], [facility] [address]  [phone]    Hospitalist Progress Note      NAME: Clare Graff   :  1940  MRM:  [id number]    Date/Time of service 2022  8:09 AM         Assessment and Plan:     Right Heel Ulcer / Right lower extremity cellulitis / Acute osteomyelitis (OM) of fifth metarsal head / Septic tibiotalar arthritis - POA, on  had right fifth metatarsal head resection, bone biopsy of the talus, calcaneus, and tibia. Follow cx and path. Blister cx enterobacter and stenotrophomonas. Continue cefepime, flagyl and clindamycin. NWB on foot. Pain control with prn ultram.  Likely Wound vac in 1-2 days, and then to SNF vs IPR    PAD / Occlusion of the right mid-distal posterior tibial artery and right mid-distal anterior tibial artery - Angio  with balloon angioplasty alone. Continue ASA, resume xarelto ASAP. Added statin     Paroxysmal Atrial fibrillation - Had been stable on xarelto; held for surgery, resume now that surgery clears him. He has not required rate control    Debilitated patient - POA due to foot issues and will be NWB. Will need SNF vs IPR    Anemia - Mild and stable. Check serologies and hemoccult, since he is on xarelto    Hypocalcemia / Hypophosphatemia / Hypokalemia - Replace and monitor     Hypertension - Continue home Hyzaar, add Norvasc     OLEG / obese - Advise weight los and CPAP nightly       Subjective:     Chief Complaint:  Pain tolerable. Awaiting podiatry plans    ROS:  (bold if positive, if negative)    Tolerating minimal  PT  Tolerating Diet        Objective:     Last 24hrs VS reviewed since prior progress note.  Most recent are:    Visit Vitals  BP (!) 151/81 (BP 1 Location: Left upper arm, BP Patient Position: At rest)   Pulse 67   Temp 98.4 °F (36.9 °C)   Resp 16   Ht 5' 11\" (1.803 m)   Wt 95.3 kg (210 lb)   SpO2 99%   BMI 29.29 kg/m²     SpO2 Readings from Last 6 Encounters:   22 99% 09/11/22 98%   09/22/20 98%   09/09/20 97%   06/04/20 96%   12/23/19 97%    O2 Flow Rate (L/min): 21 l/min     Intake/Output Summary (Last 24 hours) at 11/27/2022 0809  Last data filed at 11/26/2022 2236  Gross per 24 hour   Intake 201 ml   Output 1125 ml   Net -924 ml          Physical Exam:    Gen:  Well-developed, well-nourished, in no acute distress  HEENT:  Pink conjunctivae, PERRL, hearing intact to voice, moist mucous membranes  Neck:  Supple, without masses, thyroid non-tender  Resp:  No accessory muscle use, clear breath sounds without wheezes rales or rhonchi  Card:  No murmurs, normal S1, S2 without thrills, bruits or peripheral edema  Abd:  Soft, non-tender, non-distended, normoactive bowel sounds are present, no mass  Lymph:  No cervical or inguinal adenopathy  Musc:  No cyanosis or clubbing  Skin:  Foor in dressing, skin turgor is good  Neuro:  Cranial nerves are grossly intact, post op pain and motor weakness, follows commands appropriately  Psych:  Good insight, oriented to person, place and time, alert    Telemetry reviewed:   normal sinus rhythm  __________________________________________________________________  Medications Reviewed: (see below)  Medications:     Current Facility-Administered Medications   Medication Dose Route Frequency    amLODIPine (NORVASC) tablet 10 mg  10 mg Oral DAILY    atorvastatin (LIPITOR) tablet 10 mg  10 mg Oral DAILY    traMADoL (ULTRAM) tablet 50 mg  50 mg Oral Q6H PRN    hydrALAZINE (APRESOLINE) 20 mg/mL injection 20 mg  20 mg IntraVENous Q6H PRN    cefepime (MAXIPIME) 2 g in 0.9% sodium chloride (MBP/ADV) 100 mL MBP  2 g IntraVENous Q8H    metroNIDAZOLE (FLAGYL) tablet 500 mg  500 mg Oral Q12H    cetirizine (ZYRTEC) tablet 10 mg  10 mg Oral DAILY    guaiFENesin-dextromethorphan (ROBITUSSIN DM) 100-10 mg/5 mL syrup 10 mL  10 mL Oral Q6H PRN    rivaroxaban (XARELTO) tablet 20 mg  20 mg Oral DAILY WITH DINNER    losartan/hydroCHLOROthiazide (HYZAAR) 100/25 mg Oral DAILY    sodium chloride (NS) flush 5-40 mL  5-40 mL IntraVENous Q8H    sodium chloride (NS) flush 5-40 mL  5-40 mL IntraVENous PRN    acetaminophen (TYLENOL) tablet 650 mg  650 mg Oral Q6H PRN    polyethylene glycol (MIRALAX) packet 17 g  17 g Oral DAILY PRN    ondansetron (ZOFRAN ODT) tablet 4 mg  4 mg Oral Q8H PRN    Or    ondansetron (ZOFRAN) injection 4 mg  4 mg IntraVENous Q6H PRN    aspirin delayed-release tablet 81 mg  81 mg Oral DAILY        Lab Data Reviewed: (see below)  Lab Review:     Recent Labs     11/27/22 0359 11/26/22 0321   WBC 15.9* 17.0*   HGB 9.6* 9.7*   HCT 30.4* 31.1*   * 436*       Recent Labs     11/27/22 0359 11/26/22 0321   * 137   K 4.3 4.7    104   CO2 27 28   * 112*   BUN 24* 28*   CREA 0.82 0.92   CA 8.1* 8.1*   MG 1.9  --    PHOS 2.7  --    ALB 2.1* 2.2*   TBILI 0.5 0.4   ALT 53 62       Lab Results   Component Value Date/Time    Glucose (POC) 97 11/22/2022 01:30 PM    Glucose (POC) 140 (H) 06/03/2013 08:05 PM    Glucose (POC) 112 (H) 05/31/2013 06:45 PM     No results for input(s): PH, PCO2, PO2, HCO3, FIO2 in the last 72 hours. No results for input(s): INR, INREXT, INREXT in the last 72 hours. All Micro Results       Procedure Component Value Units Date/Time    CULTURE, Devora Pecos STAIN [[id number]]  (Abnormal)  (Susceptibility) Collected: 11/20/22 1239    Order Status: Completed Specimen: Wound from Ulcer Updated: 11/23/22 1041     Special Requests: NO SPECIAL REQUESTS        GRAM STAIN OCCASIONAL WBCS SEEN         FEW GRAM NEGATIVE RODS        Culture result:       LIGHT ENTEROBACTER CLOACAE COMPLEX                  LIGHT Stenotrophomonas (Xantho.) maltophilia CLSI GUIDELINES DO NOT RECOMMEND REPORTING SUSCEPTIBILITIES FOR S. MALTOPHILIA. TRIMETHOPRIM/SULFAMETHOXAZOLE IS THE DRUG OF CHOICE.           CULTURE, MRSA [[id number]] Collected: 11/21/22 1221    Order Status: Completed Specimen: Nasal from Nares Updated: 11/22/22 1923     Special Requests: NO SPECIAL REQUESTS        Culture result: MRSA NOT PRESENT               Screening of patient nares for MRSA is for surveillance purposes and, if positive, to facilitate isolation considerations in high risk settings. It is not intended for automatic decolonization interventions per se as regimens are not sufficiently effective to warrant routine use. CULTURE, BLOOD [[id number]] Collected: 11/16/22 1412    Order Status: Completed Specimen: Blood Updated: 11/22/22 0430     Special Requests: NO SPECIAL REQUESTS        Culture result: NO GROWTH 6 DAYS       COVID-19 RAPID TEST [[id number]] Collected: 11/16/22 1406    Order Status: Completed Specimen: Nasopharyngeal Updated: 11/16/22 1457     Specimen source Nasopharyngeal        COVID-19 rapid test Not detected        Comment: Rapid Abbott ID Now       Rapid NAAT:  The specimen is NEGATIVE for SARS-CoV-2, the novel coronavirus associated with COVID-19. Negative results should be treated as presumptive and, if inconsistent with clinical signs and symptoms or necessary for patient management, should be tested with an alternative molecular assay. Negative results do not preclude SARS-CoV-2 infection and should not be used as the sole basis for patient management decisions. This test has been authorized by the FDA under an Emergency Use Authorization (EUA) for use by authorized laboratories. Fact sheet for Healthcare Providers:  [employer]za  Fact sheet for Patients: [employer]       Methodology: Isothermal Nucleic Acid Amplification         CULTURE, BLOOD, PAIRED [[id number]] Collected: 11/16/22 1400    Order Status: Canceled Specimen: Blood             Other pertinent lab: none    Total time spent with patient: 30 Minutes I personally reviewed chart, notes, data and current medications in the medical record.   I have personally examined and treated the patient at bedside during this period. To assist coordination of care and communication with nursing and staff, this note may be preliminary early in the day, but finalized by end of the day.                  Care Plan discussed with: Patient, Care Manager, Nursing Staff, Consultant/Specialist, and >50% of time spent in counseling and coordination of care    Discussed:  Care Plan and D/C Planning    Prophylaxis:  H2B/PPI    Disposition:  SNF/LTC           ___________________________________________________    Attending Physician: Celsa Araiza MD

## 2022-11-28 LAB
ERYTHROCYTE [DISTWIDTH] IN BLOOD BY AUTOMATED COUNT: 15.1 % (ref 11.5–14.5)
HCT VFR BLD AUTO: 30.9 % (ref 36.6–50.3)
HGB BLD-MCNC: 9.9 G/DL (ref 12.1–17)
MCH RBC QN AUTO: 31.6 PG (ref 26–34)
MCHC RBC AUTO-ENTMCNC: 32 G/DL (ref 30–36.5)
MCV RBC AUTO: 98.7 FL (ref 80–99)
NRBC # BLD: 0 K/UL (ref 0–0.01)
NRBC BLD-RTO: 0 PER 100 WBC
PLATELET # BLD AUTO: 547 K/UL (ref 150–400)
PMV BLD AUTO: 9.1 FL (ref 8.9–12.9)
RBC # BLD AUTO: 3.13 M/UL (ref 4.1–5.7)
SARS-COV-2, XPLCVT: NOT DETECTED
SOURCE, COVRS: NORMAL
WBC # BLD AUTO: 15.9 K/UL (ref 4.1–11.1)

## 2022-11-28 PROCEDURE — 85027 COMPLETE CBC AUTOMATED: CPT

## 2022-11-28 PROCEDURE — 77030018717 HC DRSG GRNUFM KCON -B

## 2022-11-28 PROCEDURE — 74011250637 HC RX REV CODE- 250/637: Performed by: STUDENT IN AN ORGANIZED HEALTH CARE EDUCATION/TRAINING PROGRAM

## 2022-11-28 PROCEDURE — 97110 THERAPEUTIC EXERCISES: CPT

## 2022-11-28 PROCEDURE — 74011250636 HC RX REV CODE- 250/636: Performed by: STUDENT IN AN ORGANIZED HEALTH CARE EDUCATION/TRAINING PROGRAM

## 2022-11-28 PROCEDURE — 36415 COLL VENOUS BLD VENIPUNCTURE: CPT

## 2022-11-28 PROCEDURE — 74011000258 HC RX REV CODE- 258: Performed by: STUDENT IN AN ORGANIZED HEALTH CARE EDUCATION/TRAINING PROGRAM

## 2022-11-28 PROCEDURE — 97530 THERAPEUTIC ACTIVITIES: CPT

## 2022-11-28 PROCEDURE — U0005 INFEC AGEN DETEC AMPLI PROBE: HCPCS

## 2022-11-28 PROCEDURE — 97535 SELF CARE MNGMENT TRAINING: CPT

## 2022-11-28 PROCEDURE — 2709999900 HC NON-CHARGEABLE SUPPLY

## 2022-11-28 PROCEDURE — 74011250637 HC RX REV CODE- 250/637: Performed by: INTERNAL MEDICINE

## 2022-11-28 PROCEDURE — 65270000046 HC RM TELEMETRY

## 2022-11-28 PROCEDURE — 74011000250 HC RX REV CODE- 250: Performed by: STUDENT IN AN ORGANIZED HEALTH CARE EDUCATION/TRAINING PROGRAM

## 2022-11-28 RX ORDER — PANTOPRAZOLE SODIUM 40 MG/1
40 TABLET, DELAYED RELEASE ORAL
Qty: 30 TABLET | Refills: 0 | Status: SHIPPED | OUTPATIENT
Start: 2022-11-29 | End: 2022-12-29

## 2022-11-28 RX ORDER — ASPIRIN 81 MG/1
81 TABLET ORAL DAILY
Qty: 30 TABLET | Refills: 0 | Status: SHIPPED | OUTPATIENT
Start: 2022-11-29 | End: 2022-12-02

## 2022-11-28 RX ORDER — AMLODIPINE BESYLATE 10 MG/1
10 TABLET ORAL DAILY
Qty: 30 TABLET | Refills: 0 | Status: SHIPPED | OUTPATIENT
Start: 2022-11-29 | End: 2022-12-29

## 2022-11-28 RX ORDER — ATORVASTATIN CALCIUM 10 MG/1
10 TABLET, FILM COATED ORAL DAILY
Qty: 30 TABLET | Refills: 0 | Status: SHIPPED | OUTPATIENT
Start: 2022-11-29 | End: 2022-12-29

## 2022-11-28 RX ORDER — TRAMADOL HYDROCHLORIDE 50 MG/1
50 TABLET ORAL
Qty: 10 TABLET | Refills: 0 | Status: SHIPPED | OUTPATIENT
Start: 2022-11-28 | End: 2022-12-01

## 2022-11-28 RX ORDER — PANTOPRAZOLE SODIUM 40 MG/1
40 TABLET, DELAYED RELEASE ORAL
Status: DISCONTINUED | OUTPATIENT
Start: 2022-11-29 | End: 2022-12-02 | Stop reason: HOSPADM

## 2022-11-28 RX ADMIN — SODIUM CHLORIDE, PRESERVATIVE FREE 10 ML: 5 INJECTION INTRAVENOUS at 15:31

## 2022-11-28 RX ADMIN — SODIUM CHLORIDE, PRESERVATIVE FREE 10 ML: 5 INJECTION INTRAVENOUS at 22:01

## 2022-11-28 RX ADMIN — CETIRIZINE HYDROCHLORIDE 10 MG: 10 TABLET, FILM COATED ORAL at 09:19

## 2022-11-28 RX ADMIN — HYDROCHLOROTHIAZIDE: 25 TABLET ORAL at 09:19

## 2022-11-28 RX ADMIN — ATORVASTATIN CALCIUM 10 MG: 10 TABLET, FILM COATED ORAL at 09:19

## 2022-11-28 RX ADMIN — METRONIDAZOLE 500 MG: 250 TABLET ORAL at 20:39

## 2022-11-28 RX ADMIN — AMLODIPINE BESYLATE 10 MG: 5 TABLET ORAL at 09:19

## 2022-11-28 RX ADMIN — CEFEPIME 2 G: 20 INJECTION, POWDER, FOR SOLUTION INTRAVENOUS at 06:43

## 2022-11-28 RX ADMIN — SODIUM CHLORIDE, PRESERVATIVE FREE 10 ML: 5 INJECTION INTRAVENOUS at 06:44

## 2022-11-28 RX ADMIN — ASPIRIN 81 MG: 81 TABLET, COATED ORAL at 09:19

## 2022-11-28 RX ADMIN — CEFEPIME 2 G: 20 INJECTION, POWDER, FOR SOLUTION INTRAVENOUS at 15:29

## 2022-11-28 RX ADMIN — CEFEPIME 2 G: 20 INJECTION, POWDER, FOR SOLUTION INTRAVENOUS at 22:01

## 2022-11-28 RX ADMIN — METRONIDAZOLE 500 MG: 250 TABLET ORAL at 09:19

## 2022-11-28 RX ADMIN — RIVAROXABAN 20 MG: 10 TABLET, FILM COATED ORAL at 17:45

## 2022-11-28 NOTE — WOUND CARE
Wound Consult:  new consult Visit. Chart reviewed. Consulted for [facility] management. Spoke with patients nurse,  Pratima Adair RN. Patient is sitting in chair with legs elevated on pillows  Patient is awake, cooperative, wife at bedside  Dr. Arlene Bueno at bedside to do [facility] placement  Assessment:  Right heel- surgical wound [id number]- red/pink moist, serosanguinous drainage. Teagan-wound - no redness.     Right medial foot 3x1.4x0.6cm- surgical wound, moist pink/red, serosanguinous drainage, no surrounding redness    Dr. Arlene Bueno, placed [facility] with 3 pieces of black foam and bridge between wounds and occlusive dressing at 125mmhg,  Recommendations    Will follow on Thursday  Naveen Florez RN  [facility], Wound / [address] [facility] [phone]

## 2022-11-28 NOTE — DISCHARGE SUMMARY
Physician Discharge Summary     Patient ID:  Kait Edgecomb  [id number]  78 y.o.  1940    Admit date: 11/16/2022    Discharge date of service and time: 11/29/2022  Greater than 30 minutes were spent providing discharge related services for this patient    Admission Diagnoses: Cellulitis of right lower limb [L03.115]    Discharge Diagnoses:    Principal Diagnosis     Right Heel Ulcer                                             Hospital Course and other diagnoses  Right Heel Ulcer / Right lower extremity cellulitis / Acute osteomyelitis (OM) of fifth metarsal head / Septic tibiotalar arthritis - POA, on 11/25 had right fifth metatarsal head resection, bone biopsy of the talus, calcaneus, and tibia. Follow cx and path. Blister cx enterobacter and stenotrophomonas. Improved cefepime, flagyl, stopped clindamycin. ID has determined he can go on 10d PO levaquin. NWB on foot. Pain control with prn ultram.  Got Wound vac 11/28. He can go to SNF vs [facility]PR now that he is cleared by podiatry     PAD / Occlusion of the right mid-distal posterior tibial artery and right mid-distal anterior tibial artery - Angio 11/23 with balloon angioplasty alone. Continue ASA, resumed xarelto. Added statin     Paroxysmal Atrial fibrillation - Had been stable on xarelto; held for surgery. He has not required rate control     Debilitated patient - POA due to foot issues and will be NWB. Will need SNF vs IPR     Anemia - Mild and stable. Unremarkable serologies. Hypocalcemia / Hypophosphatemia / Hypokalemia - Replace and monitor     Hypertension - Continue home Hyzaar, added Norvasc     OLEG / obese - Advise weight los and CPAP nightly     Insomnia - add low dose ambien prn     PCP: Alfonso Lim MD    Consults: Cardiology, ID, Vascular Surgery, and podiatry    Significant Diagnostic Studies: See Hospital Course    Discharged home in improved condition.     Discharge Exam:  BP (!) 149/70 (BP 1 Location: Right upper arm, BP Patient Position: Semi fowlers)   Pulse 80   Temp 98.4 °F (36.9 °C)   Resp 17   Ht 5' 11\" (1.803 m)   Wt 95.3 kg (210 lb)   SpO2 97%   BMI 29.29 kg/m²      Gen:  Well-developed, well-nourished, in no acute distress  HEENT:  Pink conjunctivae, PERRL, hearing intact to voice, moist mucous membranes  Neck:  Supple, without masses, thyroid non-tender  Resp:  No accessory muscle use, clear breath sounds without wheezes rales or rhonchi  Card:  No murmurs, normal S1, S2 without thrills, bruits or peripheral edema  Abd:  Soft, non-tender, non-distended, normoactive bowel sounds are present, no mass  Lymph:  No cervical or inguinal adenopathy  Musc:  No cyanosis or clubbing  Skin:  Foor in dressing, skin turgor is good  Neuro:  Cranial nerves are grossly intact, post op pain and motor weakness, follows commands appropriately  Psych:  Good insight, oriented to person, place and time, alert    Patient Instructions:   Current Discharge Medication List        START taking these medications    Details   zolpidem (AMBIEN) 5 mg tablet Take 1 Tablet by mouth nightly as needed for Sleep for up to 30 days. Max Daily Amount: 5 mg. Qty: 20 Tablet, Refills: 0    Associated Diagnoses: Primary insomnia      levoFLOXacin (LEVAQUIN) 750 mg tablet Take 1 Tablet by mouth every twenty-four (24) hours for 10 days. Qty: 10 Tablet, Refills: 0      amLODIPine (NORVASC) 10 mg tablet Take 1 Tablet by mouth daily for 30 days. Qty: 30 Tablet, Refills: 0      atorvastatin (LIPITOR) 10 mg tablet Take 1 Tablet by mouth daily for 30 days. Qty: 30 Tablet, Refills: 0      traMADoL (ULTRAM) 50 mg tablet Take 1 Tablet by mouth every six (6) hours as needed for Pain for up to 3 days. Max Daily Amount: 200 mg. Qty: 10 Tablet, Refills: 0    Associated Diagnoses: Cellulitis of right lower extremity      pantoprazole (PROTONIX) 40 mg tablet Take 1 Tablet by mouth Daily (before breakfast) for 30 days.   Qty: 30 Tablet, Refills: 0           CONTINUE these medications which have CHANGED    Details   aspirin delayed-release 81 mg tablet Take 1 Tablet by mouth daily for 30 days. Qty: 30 Tablet, Refills: 0           CONTINUE these medications which have NOT CHANGED    Details   losartan-hydroCHLOROthiazide (HYZAAR) 100-25 mg per tablet TAKE ONE TABLET BY MOUTH DAILY  Qty: 90 Tab, Refills: 0      acetaminophen (Tylenol Extra Strength) 500 mg tablet Take 1-2 Tabs by mouth every six (6) hours as needed for Pain. Not to exceed 4,000mg in any 24 hour period  Indications: pain  Qty: 60 Tab, Refills: 0      ondansetron (ZOFRAN ODT) 8 mg disintegrating tablet Take 0.5 Tabs by mouth every eight (8) hours as needed for Nausea. Qty: 30 Tab, Refills: 0      rivaroxaban (XARELTO) 20 mg tab tablet Take 1 Tab by mouth daily (with dinner). Indications: prevention of thromboembolism in paroxysmal atrial fibrillation  Qty: 90 Tab, Refills: 3      VIT C/E/ZN/COPPR/LUTEIN/ZEAXAN (PRESERVISION AREDS 2 PO) Take  by mouth two (2) times a day. fish oil-omega-3 fatty acids 340-1,000 mg capsule Take 1 Cap by mouth daily (after breakfast). magnesium 250 mg tab Take 400 mg by mouth daily (after dinner). Associated Diagnoses: Atrial fibrillation (HCC)      multivitamin (ONE A DAY) tablet Take 1 Tab by mouth daily (after breakfast). ASCORBATE CALCIUM (VITAMIN C PO) Take 500 mg by mouth daily (after breakfast). cholecalciferol (VITAMIN D3) (2,000 UNITS /50 MCG) cap capsule Take 2,000 Units by mouth daily (after dinner). gabapentin (NEURONTIN) 100 mg capsule T1 tablet at breakfast and 3 tablets at night. Qty: 120 Cap, Refills: 1    Comments: Not to exceed 2000mg  Associated Diagnoses: Arthritis of right hip      fluticasone propionate (FLONASE) 50 mcg/actuation nasal spray 2 Sprays by Both Nostrils route as needed. loratadine (CLARITIN) 10 mg tablet loratadine 10 mg tablet   Take 1 tablet every day by oral route as directed. UBIQUINONE PO Take 100 mg by mouth daily. psyllium (METAMUCIL) powd Take  by mouth daily. ipratropium (ATROVENT) 0.03 % nasal spray by Both Nostrils route as needed. Activity: Activity as tolerated, PT/OT per Home Health, and See surgical instructions  Diet: Cardiac Diet and Low fat, Low cholesterol  Wound Care: Keep wound clean and dry, Reinforce dressing PRN, and As directed    Follow-up with your PCP in a few weeks.   Follow-up tests/labs - none    Signed:  Steven Rose MD  11/29/2022  11:34 AM

## 2022-11-28 NOTE — DISCHARGE INSTRUCTIONS
Patient Discharge Instructions    Lianna Salgado / [id number] : 1940    Admitted 2022 Discharged: 2022     Primary Diagnoses  @Rprob@    Take Home Medications     It is important that you take the medication exactly as they are prescribed. Keep your medication in the bottles provided by the pharmacist and keep a list of the medication names, dosages, and times to be taken in your wallet. Do not take other medications without consulting your doctor. What to do at Home    Recommended diet: Cardiac Diet and Low fat, Low cholesterol    Recommended activity: Activity as tolerated and PT/OT Eval and Treat    If you experience worse wound, please follow up with Dr Harry Mae. Follow-up with your PCP in a few weeks    [unfilled]     Information obtained by :  I understand that if any problems occur once I am at home I am to contact my physician. I understand and acknowledge receipt of the instructions indicated above.                                                                                                                                            Physician's or R.N.'s Signature                                                                  Date/Time                                                                                                                                              Patient or Representative Signature                                                          Date/Time

## 2022-11-28 NOTE — PROGRESS NOTES
4:02 pm:  CM met with patient and his wife, Mily Huang. Additional SNF choices obtained and referrals sent to [facility], [facility] and [facility]. Transition of care plan - RUR 14%:  Patient continues to require medical management  Podiatry following - s/p right heel debridement with bone biopsy of the calcaneus, talus, and tibia with resection of the fifth metatarsal head, 11/25/2022; continue IV abx, await pathology   Wound care following  PT and OT following  Dispo: Rehab - Referrals pending with [facility], [facility], and [facility]. Updated clinicals have been sent to .     6.   [facility] transport    Parul Sharma LCSW

## 2022-11-28 NOTE — PROGRESS NOTES
Problem: Pressure Injury - Risk of  Goal: *Prevention of pressure injury  Description: Document Vitaly Scale and appropriate interventions in the flowsheet. Outcome: Progressing Towards Goal  Note: Pressure Injury Interventions:  Sensory Interventions: Keep linens dry and wrinkle-free, Maintain/enhance activity level, Minimize linen layers    Moisture Interventions: Absorbent underpads    Activity Interventions: Increase time out of bed    Mobility Interventions: HOB 30 degrees or less    Nutrition Interventions: Document food/fluid/supplement intake    Friction and Shear Interventions: HOB 30 degrees or less                Problem: Patient Education: Go to Patient Education Activity  Goal: Patient/Family Education  Outcome: Progressing Towards Goal     Problem: Falls - Risk of  Goal: *Absence of Falls  Description: Document Nikolas Fall Risk and appropriate interventions in the flowsheet.   Outcome: Progressing Towards Goal  Note: Fall Risk Interventions:  Mobility Interventions: Bed/chair exit alarm, Patient to call before getting OOB         Medication Interventions: Bed/chair exit alarm, Patient to call before getting OOB, Teach patient to arise slowly    Elimination Interventions: Bed/chair exit alarm, Call light in reach              Problem: Patient Education: Go to Patient Education Activity  Goal: Patient/Family Education  Outcome: Progressing Towards Goal     Problem: Cellulitis Care Plan (Adult)  Goal: *Absence of infection signs and symptoms  Outcome: Progressing Towards Goal     Problem: Patient Education: Go to Patient Education Activity  Goal: Patient/Family Education  Outcome: Progressing Towards Goal     Problem: Patient Education: Go to Patient Education Activity  Goal: Patient/Family Education  Outcome: Progressing Towards Goal     Problem: Nutrition Deficit  Goal: *Optimize nutritional status  Outcome: Progressing Towards Goal

## 2022-11-28 NOTE — PROGRESS NOTES
Problem: Mobility Impaired (Adult and Pediatric)  Goal: *Acute Goals and Plan of Care (Insert Text)  Description: FUNCTIONAL STATUS PRIOR TO ADMISSION: Patient was modified independent using a single point cane for functional mobility. HOME SUPPORT PRIOR TO ADMISSION: The patient lived with wife but did not require assist.    Physical Therapy Goals  Initiated 11/18/2022, Reassessed 11/26 and continue goals next 7 days  1. Patient will move from supine to sit and sit to supine  in bed with modified independence within 7 day(s). 2.  Patient will transfer from bed to chair and chair to bed with minimal assistance/contact guard assist using the least restrictive device strict NWB within 7 day(s). 3.  Patient will perform sit < > stand with minimal assistance/contact guard assist within 7 day(s). 4.  Patient will ambulate with minimal assistance/contact guard assist for 5'x2 feet with the least restrictive device strict NWB RLE within 7 day(s). 5. Patient will perform lateral chair to chair/bed transfer NWB RLE with increased time and min A x 1 within 7 days. Outcome: Progressing Towards Goal  Note:   PHYSICAL THERAPY TREATMENT  Patient: Mena Murray (48 y.o. male)  Date: 11/28/2022  Diagnosis: Cellulitis of right lower limb [L03.115] <principal problem not specified>  Procedure(s) (LRB):  RIGHT FOOT 5TH METATARSAL HEAD RESECTION, BONE BIOPSY CALCANEUS, TALAS, AND TIBIA (Right) 3 Days Post-Op  Precautions: NWB  Chart, physical therapy assessment, plan of care and goals were reviewed. ASSESSMENT  Patient continues with skilled PT services and progressing towards goals. Encouragement from wife for OOB to chair. Limited by impaired balance, decreased strength. Assist x 2 to transfer to chair with verbal cues for sequencing to maintain NWB on RLE. BUE weakness noted.  Will need rehab     Current Level of Function Impacting Discharge (mobility/balance): assist x 2    Other factors to consider for discharge: PLAN :  Patient continues to benefit from skilled intervention to address the above impairments. Continue treatment per established plan of care. to address goals. Recommendation for discharge: (in order for the patient to meet his/her long term goals)  Therapy up to 5 days/week in rehab setting    This discharge recommendation:  Has been made in collaboration with the attending provider and/or case management    IF patient discharges home will need the following DME: to be determined (TBD)       SUBJECTIVE:   Patient stated I don't how this all happened.     OBJECTIVE DATA SUMMARY:   Critical Behavior:  Neurologic State: Alert  Orientation Level: Oriented X4  Cognition: Follows commands  Safety/Judgement: Awareness of environment, Fall prevention, Good awareness of safety precautions, Home safety  Functional Mobility Training:  Bed Mobility:     Supine to Sit: Stand-by assistance     Scooting: Stand-by assistance; Additional time        Transfers:  Sit to Stand: Minimum assistance; Additional time;Assist x2  Stand to Sit: Minimum assistance; Additional time;Assist x2        Bed to Chair: Minimum assistance; Additional time;Assist x2; Moderate assistance                    Balance:  Sitting: Intact  Standing: Impaired; With support  Standing - Static: Constant support; Fair  Standing - Dynamic : Constant support; Fair  Ambulation/Gait Training:                                                        Stairs: Therapeutic Exercises:     Pain Rating:  Denies pain    Activity Tolerance:   Good    After treatment patient left in no apparent distress:   Sitting in chair, Call bell within reach, Bed / chair alarm activated, and Caregiver / family present    COMMUNICATION/COLLABORATION:   The patients plan of care was discussed with: Registered nurseAnn Mon   Time Calculation: 25 mins

## 2022-11-28 NOTE — PROGRESS NOTES
Problem: Self Care Deficits Care Plan (Adult)  Goal: *Acute Goals and Plan of Care (Insert Text)  Description: FUNCTIONAL STATUS PRIOR TO ADMISSION: Patient was modified independent using a single point cane for functional mobility. 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 Owns post op shoe. HOME SUPPORT: The patient lived with wife but did not require assist.    Occupational Therapy Goals  Re-evaluation 11/26/22  1. Patient will perform sit <> stand to prepare for LB dressing tasks with RLE NWB with minimal assistance within 7 day(s). 2.  Patient will perform lower body dressing with AD with moderate assistance  within 7 day(s). 3.  Patient will perform toilet transfers with RLE NWB with moderate assistance  within 7 day(s). 4.  Patient will perform all aspects of toileting with RLE NWB with moderate assistance  within 7 day(s). 5.  Patient will participate in upper extremity therapeutic exercise/activities with supervision/set-up for 8 minutes within 7 day(s). 6.  Patient will utilize energy conservation techniques during functional activities with verbal cues within 7 day(s). Initiated 11/18/2022  1. Patient will perform bathing maintaining RLE NWB with supervision/set-up within 7 day(s). 2.  Patient will perform LB dressing with AD with supervision/set-up within 7 day(s). 3.  Patient will perform toilet transfers while maintaining RLE NWB with supervision/set-up within 7 day(s). 5.  Patient will perform all aspects of toileting with supervision/set-up within 7 day(s). 6.  Patient will participate in upper extremity therapeutic exercise/activities with supervision for 6 minutes within 7 day(s). 7.  Patient will utilize energy conservation techniques during functional activities with verbal cues within 7 day(s).    Outcome: Progressing Towards Goal   OCCUPATIONAL THERAPY TREATMENT  Patient: Ijeoma Inman (46 y.o. male)  Date: 11/28/2022  Diagnosis: Cellulitis of right lower limb [E07.197] <principal problem not specified>  Procedure(s) (LRB):  RIGHT FOOT 5TH METATARSAL HEAD RESECTION, BONE BIOPSY CALCANEUS, TALAS, AND TIBIA (Right) 3 Days Post-Op  Precautions: NWB  Chart, occupational therapy assessment, plan of care, and goals were reviewed. ASSESSMENT  Patient continues with skilled OT services and is progressing towards goals. Pt received in chair set-up for grooming brushing teeth, shaving. Min assist UB dress, painful R shoulder. He was educated as to red thera band exercises. Current Level of Function Impacting Discharge (ADLs): set-up grooming, min assist UB dress    Other factors to consider for discharge:          PLAN :  Patient continues to benefit from skilled intervention to address the above impairments. Continue treatment per established plan of care to address goals. Recommend with staff: ADLs, there ex, there act    Recommend next OT session: cont towards goals    Recommendation for discharge: (in order for the patient to meet his/her long term goals)  Therapy 5x a week in a rehab setting    This discharge recommendation:  Has not yet been discussed the attending provider and/or case management    IF patient discharges home will need the following DME:        SUBJECTIVE:   Patient stated Kit Yomaira.     OBJECTIVE DATA SUMMARY:   Cognitive/Behavioral Status:  Neurologic State: Alert; Appropriate for age  Orientation Level: Oriented to place;Oriented to person  Cognition: Follows commands             Functional Mobility and Transfers for ADLs:  Bed Mobility:  Supine to Sit: Stand-by assistance  Scooting: Stand-by assistance; Additional time    Transfers:  Sit to Stand: Minimum assistance; Additional time;Assist x2     Bed to Chair: Minimum assistance; Additional time;Assist x2; Moderate assistance    Balance:  Sitting: Intact  Standing: Impaired; With support  Standing - Static: Constant support; Fair  Standing - Dynamic : Constant support; Fair    ADL Intervention:       Grooming  Grooming Assistance: Set-up  Position Performed: Seated in chair  Brushing Teeth: Set-up  Brushing/Combing Hair: Set-up         Type of Bath: Bath Pack;Partial         Upper Body Dressing Assistance  Dressing Assistance: Minimum assistance  Hospital Gown: Minimum  assistance                   Therapeutic Exercises:   Pt used red thera band for shoulder ab/add, flex/ext, elbow flex/ext 10 x.    Activity Tolerance:   Fair    After treatment patient left in no apparent distress:   Sitting in chair and Call bell within reach    COMMUNICATION/COLLABORATION:   The patients plan of care was discussed with: Occupational therapist.     GARTH Castro  Time Calculation: 23 mins

## 2022-11-28 NOTE — PROGRESS NOTES
[facility] Podiatric Surgery  Progress Note    Subjective:  Santana Raymundo has chronic osteomyelitis of his right fifth metatarsal head and a right heel ulceration with concern for osteomyelitis of his calcaneus/talus/tibia. He is now s/p Right heel debridement with bone biopsy of the calcaneus, talus, and tibia with resection of the fifth metatarsal head, DOS: 11/25/2022. He feels well with no acute symptoms. ROS:   Constitutional: Negative for fever, chills, weight loss and malaise/fatigue. HENT: Negative for nosebleeds. Eyes: Negative for blurred vision. Respiratory: Negative for cough, shortness of breath and wheezing. Cardiovascular: Negative for chest pain, palpitations. Gastrointestinal: Negative for heartburn, nausea, vomiting, abdominal pain. Genitourinary: Negative for dysuria and urgency. Musculoskeletal: Right heel ulcer and forefoot ulcer. Skin: Negative for rash. Neurological: Negative for dizziness, focal weakness and headaches. Endo/Heme/Allergies: Negative for environmental allergies. Psychiatric/Behavioral: Negative for depression and suicidal ideas. Objective:  Blood pressure 130/62, pulse 69, temperature 99.2 °F (37.3 °C), resp. rate 16, height 5' 11\" (1.803 m), weight 95.3 kg (210 lb), SpO2 97 %. Intake/Output Summary (Last 24 hours) at 11/28/2022 1425  Last data filed at 11/28/2022 1008  Gross per 24 hour   Intake 300 ml   Output 1325 ml   Net -1025 ml       Lower Extremity Exam:  Vascular:    Dorsalis Pedis Pulse: absent  Posterior Tibialis Pulse: absent  Skin Temp: warm to touch  Extremity Edema: Edema to the right side appears to be improving. Varicosities: present  Capillary refill time is sluggish but present. Neurological:  Epicritic and Protective Sensations: Absent  Deep Pain Response: Diminished     Dermatological:  Right plantar heel wound measures 3 cm x 3 cm x 1 cm and is down to the plantar fascia. No myonecrosis or purulence is noted.  No local signs of infection. Right medial hindfoot wound measures 3 cm x 1.4 cm x 0.6 cm and is down to subcutaneous fascia. No local signs of infection or purulence noted. Pictures are in media tab. 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 No pain with calf compression. Labs:  Lab Results   Component Value Date/Time    WBC 15.9 (H) 11/28/2022 11:23 AM    HGB 9.9 (L) 11/28/2022 11:23 AM    HCT 30.9 (L) 11/28/2022 11:23 AM    MCV 98.7 11/28/2022 11:23 AM    PLATELET 680 (H) 83/41/1146 11:23 AM     Lab Results   Component Value Date/Time    Sodium 134 (L) 11/27/2022 03:59 AM    Potassium 4.3 11/27/2022 03:59 AM    Chloride 102 11/27/2022 03:59 AM    CO2 27 11/27/2022 03:59 AM    BUN 24 (H) 11/27/2022 03:59 AM    Glucose 105 (H) 11/27/2022 03:59 AM     Lab Results   Component Value Date/Time    INR 1.3 (H) 11/22/2022 05:45 AM       Current Medications:  Reviewed. Impression  Chronic osteomyelitis, right fifth metatarsal, s/p resection  2. Peripheral neuropathy  3. Peripheral arterial disease s/p angioplasty of PT and Peroneal  4. Right heel ulceration with concern for osteomyelitis s/p bone biopsy of the tibia/calcaneus/and talus     Plan:  Patient seen and evaluated at bedside with his wife. He is now s/p Right foot debridement, resection of fifth metatarsal head, and bone biopsy of the tibia, calcaneus, and talus, DOS: 11/25/2022. Both wounds were evaluated today and are stable with no local signs of infection. Wound vac was applied. Will change on Thursday. Continue IV antibiotics per ID recommendations. Follow up on pathology from OR. Wound culture from 11/20 with Enterobacter cloacae and Stenotrophomonas maltophilia. Strict NWB to the RLE. Patient will require SNF on discharge. Continue to trend WBC. Slightly increased which could be from the surgery. The wounds appear stable with granular wound beds.           Yari Corona DPM  Cell# [phone]

## 2022-11-29 LAB
ERYTHROCYTE [DISTWIDTH] IN BLOOD BY AUTOMATED COUNT: 15.1 % (ref 11.5–14.5)
HCT VFR BLD AUTO: 30.8 % (ref 36.6–50.3)
HGB BLD-MCNC: 10.1 G/DL (ref 12.1–17)
MCH RBC QN AUTO: 32.2 PG (ref 26–34)
MCHC RBC AUTO-ENTMCNC: 32.8 G/DL (ref 30–36.5)
MCV RBC AUTO: 98.1 FL (ref 80–99)
NRBC # BLD: 0 K/UL (ref 0–0.01)
NRBC BLD-RTO: 0 PER 100 WBC
PLATELET # BLD AUTO: 518 K/UL (ref 150–400)
PMV BLD AUTO: 9.1 FL (ref 8.9–12.9)
RBC # BLD AUTO: 3.14 M/UL (ref 4.1–5.7)
WBC # BLD AUTO: 15.3 K/UL (ref 4.1–11.1)

## 2022-11-29 PROCEDURE — 74011250637 HC RX REV CODE- 250/637: Performed by: INTERNAL MEDICINE

## 2022-11-29 PROCEDURE — 97530 THERAPEUTIC ACTIVITIES: CPT

## 2022-11-29 PROCEDURE — 97535 SELF CARE MNGMENT TRAINING: CPT

## 2022-11-29 PROCEDURE — 85027 COMPLETE CBC AUTOMATED: CPT

## 2022-11-29 PROCEDURE — 74011250637 HC RX REV CODE- 250/637: Performed by: STUDENT IN AN ORGANIZED HEALTH CARE EDUCATION/TRAINING PROGRAM

## 2022-11-29 PROCEDURE — 74011250636 HC RX REV CODE- 250/636: Performed by: STUDENT IN AN ORGANIZED HEALTH CARE EDUCATION/TRAINING PROGRAM

## 2022-11-29 PROCEDURE — 74011000258 HC RX REV CODE- 258: Performed by: STUDENT IN AN ORGANIZED HEALTH CARE EDUCATION/TRAINING PROGRAM

## 2022-11-29 PROCEDURE — 74011000250 HC RX REV CODE- 250: Performed by: STUDENT IN AN ORGANIZED HEALTH CARE EDUCATION/TRAINING PROGRAM

## 2022-11-29 PROCEDURE — 65270000046 HC RM TELEMETRY

## 2022-11-29 PROCEDURE — 36415 COLL VENOUS BLD VENIPUNCTURE: CPT

## 2022-11-29 PROCEDURE — 99232 SBSQ HOSP IP/OBS MODERATE 35: CPT | Performed by: INTERNAL MEDICINE

## 2022-11-29 RX ORDER — ZOLPIDEM TARTRATE 5 MG/1
5 TABLET ORAL
Qty: 20 TABLET | Refills: 0 | Status: SHIPPED | OUTPATIENT
Start: 2022-11-29 | End: 2022-12-29

## 2022-11-29 RX ORDER — ZOLPIDEM TARTRATE 5 MG/1
5 TABLET ORAL
Status: DISCONTINUED | OUTPATIENT
Start: 2022-11-29 | End: 2022-12-02 | Stop reason: HOSPADM

## 2022-11-29 RX ORDER — LEVOFLOXACIN 750 MG/1
750 TABLET ORAL EVERY 24 HOURS
Qty: 10 TABLET | Refills: 0 | Status: SHIPPED | OUTPATIENT
Start: 2022-11-29 | End: 2022-12-09

## 2022-11-29 RX ADMIN — CEFEPIME 2 G: 20 INJECTION, POWDER, FOR SOLUTION INTRAVENOUS at 09:50

## 2022-11-29 RX ADMIN — HYDROCHLOROTHIAZIDE: 25 TABLET ORAL at 09:50

## 2022-11-29 RX ADMIN — AMLODIPINE BESYLATE 10 MG: 5 TABLET ORAL at 09:50

## 2022-11-29 RX ADMIN — SODIUM CHLORIDE, PRESERVATIVE FREE 10 ML: 5 INJECTION INTRAVENOUS at 21:39

## 2022-11-29 RX ADMIN — METRONIDAZOLE 500 MG: 250 TABLET ORAL at 09:50

## 2022-11-29 RX ADMIN — ASPIRIN 81 MG: 81 TABLET, COATED ORAL at 09:50

## 2022-11-29 RX ADMIN — ATORVASTATIN CALCIUM 10 MG: 10 TABLET, FILM COATED ORAL at 09:50

## 2022-11-29 RX ADMIN — PANTOPRAZOLE SODIUM 40 MG: 40 TABLET, DELAYED RELEASE ORAL at 09:50

## 2022-11-29 RX ADMIN — SODIUM CHLORIDE, PRESERVATIVE FREE 10 ML: 5 INJECTION INTRAVENOUS at 15:28

## 2022-11-29 RX ADMIN — LEVOFLOXACIN 750 MG: 500 TABLET, FILM COATED ORAL at 12:38

## 2022-11-29 RX ADMIN — ZOLPIDEM TARTRATE 5 MG: 5 TABLET ORAL at 21:39

## 2022-11-29 RX ADMIN — CETIRIZINE HYDROCHLORIDE 10 MG: 10 TABLET, FILM COATED ORAL at 09:50

## 2022-11-29 NOTE — PROGRESS NOTES
Medicare pt has received, reviewed, and signed 2nd IM letter informing them of their right to appeal the discharge. Signed copy has been placed on pt bedside chart.   Eliezer Husain CMS

## 2022-11-29 NOTE — PROGRESS NOTES
Problem: Self Care Deficits Care Plan (Adult)  Goal: *Acute Goals and Plan of Care (Insert Text)  Description: FUNCTIONAL STATUS PRIOR TO ADMISSION: Patient was modified independent using a single point cane for functional mobility. Recently returned from [place] where they own a house in [facility] [facility]. Owns post op shoe. HOME SUPPORT: The patient lived with wife but did not require assist.    Occupational Therapy Goals  Re-evaluation 11/26/22  1. Patient will perform sit <> stand to prepare for LB dressing tasks with RLE NWB with minimal assistance within 7 day(s). 2.  Patient will perform lower body dressing with AD with moderate assistance  within 7 day(s). 3.  Patient will perform toilet transfers with RLE NWB with moderate assistance  within 7 day(s). 4.  Patient will perform all aspects of toileting with RLE NWB with moderate assistance  within 7 day(s). 5.  Patient will participate in upper extremity therapeutic exercise/activities with supervision/set-up for 8 minutes within 7 day(s). 6.  Patient will utilize energy conservation techniques during functional activities with verbal cues within 7 day(s). Initiated 11/18/2022  1. Patient will perform bathing maintaining RLE NWB with supervision/set-up within 7 day(s). 2.  Patient will perform LB dressing with AD with supervision/set-up within 7 day(s). 3.  Patient will perform toilet transfers while maintaining RLE NWB with supervision/set-up within 7 day(s). 5.  Patient will perform all aspects of toileting with supervision/set-up within 7 day(s). 6.  Patient will participate in upper extremity therapeutic exercise/activities with supervision for 6 minutes within 7 day(s). 7.  Patient will utilize energy conservation techniques during functional activities with verbal cues within 7 day(s).    Outcome: Progressing Towards Goal   OCCUPATIONAL THERAPY TREATMENT  Patient: Oniel Knox (72 y.o. male)  Date: 11/29/2022  Diagnosis: Cellulitis of right lower limb [Q69.799] <principal problem not specified>  Procedure(s) (LRB):  RIGHT FOOT 5TH METATARSAL HEAD RESECTION, BONE BIOPSY CALCANEUS, TALAS, AND TIBIA (Right) 4 Days Post-Op  Precautions: NWB  Chart, occupational therapy assessment, plan of care, and goals were reviewed. ASSESSMENT  Patient continues with skilled OT services and is progressing towards goals. Pt transfers to chair in prep for ADL's with cueing to keep R foot NWB. Pt bathes UB contact guard, dresses UB min assist. He can use bath cloths for both LE's. Pt is set-up for grooming. Pt fatigued at end of session but wanted to have red thera band in close proximity to work on exercises later today. Current Level of Function Impacting Discharge (ADLs): UB bathe contact guard, UB dress min assist    Other factors to consider for discharge:          PLAN :  Patient continues to benefit from skilled intervention to address the above impairments. Continue treatment per established plan of care to address goals. Recommend with staff: out of bed for ADL's, there ex, there act,     Recommend next OT session: cont towards goals    Recommendation for discharge: (in order for the patient to meet his/her long term goals)  Therapy up to 5 days/week in SNF setting    This discharge recommendation:  Has not yet been discussed the attending provider and/or case management    IF patient discharges home will need the following DME:        SUBJECTIVE:   Patient stated ok.     OBJECTIVE DATA SUMMARY:   Cognitive/Behavioral Status:   Alert and oriented x 4                   Functional Mobility and Transfers for ADLs:  Bed Mobility:  Supine to Sit: Contact guard assistance  Scooting: Contact guard assistance;Assist x1    Transfers:  Sit to Stand: Minimum assistance; Additional time          Balance:  Sitting: Intact  Standing: Impaired; With support  Standing - Static: Constant support; Fair  Standing - Dynamic : Constant support; Fair    ADL Intervention:       Grooming  Grooming Assistance: Set-up  Position Performed: Seated in chair  Washing Face: Set-up  Brushing Teeth: Set-up    Upper Body Bathing  Bathing Assistance: Contact guard assistance  Position Performed: Seated in chair         Lower Body Bathing  Lower Body : Minimum assistance  Position Performed: Seated in chair    Upper [address]: Contact guard assistance  Hospital Gown: Contact guard assistance    Lower [place] with Cloth Laces: Maximum assistance              Therapeutic Exercises:   Pt encouraged to work on red thera band exercises later today    Activity Tolerance:   Fair    After treatment patient left in no apparent distress:   Sitting in chair and Call bell within reach    COMMUNICATION/COLLABORATION:   The patients plan of care was discussed with: Physical therapy assistant, Occupational therapist, and Registered nurse.      JOEL Estrada/L  Time Calculation: 35 mins

## 2022-11-29 NOTE — PROGRESS NOTES
Comprehensive Nutrition Assessment    Type and Reason for Visit: Reassess    Nutrition Recommendations/Plan:   Continue regular diet as tolerated     Malnutrition Assessment:  Malnutrition Status:  No malnutrition (11/29/22 1418)    Context:  Acute illness     Findings of the 6 clinical characteristics of malnutrition:   Energy Intake:  No significant decrease in energy intake  Weight Loss:  No significant weight loss     Body Fat Loss:  No significant body fat loss,     Muscle Mass Loss:  No significant muscle mass loss,    Fluid Accumulation:  Mild, Extremities   Strength:  Not performed     Nutrition Assessment:     11/29: Follow up. Patient reports good appetite with no recent nausea/vomiting/diarrhea. No chewing/swallowing problems. Noted ONS had been discontinued - patient stated he was not a big fan of this. Declines further trials of ONS. Edema greatly improved from previous RD encounter. Patient now with wound vac and surgical wounds on feet. No complaints at time of RD encounter. Documented Meal intake:  Patient Vitals for the past 168 hrs:   % Diet Eaten   11/29/22 1337 76 - 100%   11/29/22 0920 76 - 100%   11/28/22 1846 76 - 100%   11/28/22 1209 51 - 75%   11/28/22 0847 51 - 75%   11/27/22 2321 0%   11/27/22 0900 76 - 100%   11/26/22 2236 0%   11/26/22 1800 26 - 50%   11/26/22 1300 26 - 50%   11/26/22 0830 26 - 50%   11/26/22 0038 0%   11/23/22 1026 0%       Nutrition Related Findings:      Wound Type: Multiple - see wound care note for details, surgical wounds  Last Bowel Movement Date: 11/29/22  Stool Appearance: Soft, Bloody  Abdominal Assessment: Soft  Appetite: Fair  Bowel Sounds: Active   Edema:RLE: 2+; Non-pitting (11/29/2022 12:30 AM)      Nutr.  Labs:    Lab Results   Component Value Date/Time    GFR est AA >60 09/11/2022 06:06 PM    GFR est non-AA >60 09/11/2022 06:06 PM    Creatinine (POC) 1.6 (H) 05/31/2013 06:45 PM    Creatinine 0.82 11/27/2022 03:59 AM    BUN 24 (H) 11/27/2022 03:59 AM    BUN (POC) 45 (H) 05/31/2013 06:45 PM    Sodium (POC) 128 (L) 05/31/2013 06:45 PM    Sodium 134 (L) 11/27/2022 03:59 AM    Potassium 4.3 11/27/2022 03:59 AM    Potassium (POC) 4.3 05/31/2013 06:45 PM    Chloride (POC) 97 (L) 05/31/2013 06:45 PM    Chloride 102 11/27/2022 03:59 AM    CO2 27 11/27/2022 03:59 AM       Lab Results   Component Value Date/Time    Glucose 105 (H) 11/27/2022 03:59 AM    Glucose (POC) 97 11/22/2022 01:30 PM       Lab Results   Component Value Date/Time    Hemoglobin A1c 5.9 (H) 09/09/2020 09:55 AM       Nutr. Meds:  Norvasc, Lipitor, hydralazine PRN, zofran PRN, Protonix, miralax PRN      Current Nutrition Intake & Therapies:  Average Meal Intake: %  Average Supplement Intake: None ordered  DIET ONE TIME MESSAGE  DIET ONE TIME MESSAGE  ADULT DIET Regular    Anthropometric Measures:  Height: 5' 11\" (180.3 cm)  Ideal Body Weight (IBW): 172 lbs (78 kg)     Current Body Wt:  95.3 kg (210 lb 1.6 oz), 122.2 % IBW. Current BMI (kg/m2): 29.3  Usual Body Weight: 99.8 kg (220 lb)  % Weight Change (Calculated): -4.5  Weight Adjustment: No adjustment                 BMI Category: Overweight (BMI 25.0-29. 9)    Estimated Daily Nutrient Needs:  Energy Requirements Based On: Formula  Weight Used for Energy Requirements: Current  Energy (kcal/day): 2166 (MSJ x 1.2 x 1.1)  Weight Used for Protein Requirements: Current  Protein (g/day):  (1.0-1.2 g/kg)  Method Used for Fluid Requirements: 1 ml/kcal  Fluid (ml/day): 2166    Nutrition Diagnosis:   Increased nutrient needs related to acute injury/trauma as evidenced by wounds    Nutrition Interventions:   Food and/or Nutrient Delivery: Continue current diet  Nutrition Education/Counseling: Survival skills/brief education completed  Coordination of Nutrition Care: Continue to monitor while inpatient  Plan of Care discussed with: patient    Goals:  Previous Goal Met: Goal(s) achieved  Goals: Meet at least 75% of estimated needs, by next RD assessment       Nutrition Monitoring and Evaluation:   Behavioral-Environmental Outcomes: None identified  Food/Nutrient Intake Outcomes: Food and nutrient intake  Physical Signs/Symptoms Outcomes: Biochemical data, Weight, Skin    Discharge Planning:    Continue current diet    Jocelin Whitten MS, RD  Contact: Ext: [phone], or via [employer]

## 2022-11-29 NOTE — PROGRESS NOTES
11/29/2022   Care Management Progress Note         ICD-10-CM ICD-9-CM     1. Cellulitis of right lower extremity  L03.115 682.6         2. Sepsis, due to unspecified organism, unspecified whether acute organ dysfunction present ([facility]  A41.9 038.9         995.91         3. Clot  I82.90 453.9 INVASIVE VASCULAR PROCEDURE         INVASIVE VASCULAR PROCEDURE       4. Subacute osteomyelitis of foot, unspecified laterality ([facility]  M86.279 730.07         5. Peripheral vascular disease of extremity (HCC)  I73.9 443.9         6. Peripheral polyneuropathy  G62.9 356.9               RUR:  13%  Risk Level: [x]Low []Moderate []High  Value-based purchasing: [] Yes [x] No  Bundle patient: [] Yes [x] No              Specify:      Transition of care plan:  Ongoing medical management, pt will need wound vac and po abx  PT and OT following  Rehab placement being pursued at discharge, referrals have been sent to,  [facility]- denied  [facility]- pending  [facility]- denied  [facility]- pending  [facility]- pending   [facility]- denied  Outpatient follow-up. TBD on transport    4:37 PM CM received call from admissions at [facility]. they are reviewing referral but will not have an admission decision until tomorrow. CM will follow up.     4:04 PM CM called and lvm with [facility] again. CM spoke with pt's wife regarding placement. Pt's wife requested [place] be taken off preferences after reading reviewing. CM discussed alternative of [facility]. Pt's wife confirmed pt is a  but is not service connected. CM sent referral to [facility] and [facility] via [employer]. CM called and lvm with admissions at [facility] and [facility]. CM will follow up.    11/29/2022 2:24  [facility] has accepted pt. [facility] referral remains pending. CM called and lvm with admissions at [facility].   CM spoke with Kristie Pan at [facility] who reported she is reviewing pt's referral.   [facility] has reported they cannot accept pt due to costs. Pt and pt's wife were updated on the above. 11/29/2022 9:36 AM [facility] has denied pt due to pt being NWB and limited ambulation. CM spoke with Jennifer at [facility], referral  remains pending. SNF placement also being pursued, [facility] requesting to know iv abx orders prior to making an admission decision, [facility] pending, [facility] pending and, [facility] pending. CM met with pt at bedside and relayed the above. CM also called and spoke with pt's wife, and relayed the above. Pt's wife was understanding. CM will follow.  NICOL LongW

## 2022-11-29 NOTE — PROGRESS NOTES
..Bedside shift change report given to Tyshawn Oates RN (oncoming nurse) by Rene Subramanian RN (offgoing nurse). Report included the following information SBAR, Kardex, Intake/Output, and MAR.

## 2022-11-29 NOTE — ROUTINE PROCESS
Bedside shift change report given to Goyo Faria (oncoming nurse) by Davide Pace (offgoing nurse). Report included the following information SBAR, Kardex, Intake/Output, MAR, Accordion, and Recent Results.

## 2022-11-29 NOTE — PROGRESS NOTES
[facility]  [address], [facility]  [phone]    Hospitalist Progress Note      NAME: Tabitha Duke   :  1940  MRM:  [id number]    Date/Time of service 2022  11:29 AM         Assessment and Plan:     Right Heel Ulcer / Right lower extremity cellulitis / Acute osteomyelitis (OM) of fifth metarsal head / Septic tibiotalar arthritis - POA, on  had right fifth metatarsal head resection, bone biopsy of the talus, calcaneus, and tibia. Follow cx and path. Blister cx enterobacter and stenotrophomonas. Improved cefepime, flagyl, stopped clindamycin. ID has determined he can go on 10d PO levaquin. NWB on foot. Pain control with prn ultram.  Got Wound vac . He can go to SNF vs IPR now that he is cleared by podiatry    PAD / Occlusion of the right mid-distal posterior tibial artery and right mid-distal anterior tibial artery - Angio  with balloon angioplasty alone. Continue ASA, resumed xarelto. Added statin     Paroxysmal Atrial fibrillation - Had been stable on xarelto; held for surgery. He has not required rate control    Debilitated patient - POA due to foot issues and will be NWB. Will need SNF vs IPR    Anemia - Mild and stable. Unremarkable serologies. Addendum - Nursing notes bloody stool on . I reached out to vascular surgery for opinion on risk/benefit for continued xarelto    Hypocalcemia / Hypophosphatemia / Hypokalemia - Replace and monitor     Hypertension - Continue home Hyzaar, added Norvasc     OLEG / obese - Advise weight los and CPAP nightly    Insomnia - add low dose ambien prn       Subjective:     Chief Complaint:  Pain tolerable. Appreciate podiatry plans and then discharge    ROS:  (bold if positive, if negative)    Tolerating minimal  PT  Tolerating Diet        Objective:     Last 24hrs VS reviewed since prior progress note.  Most recent are:    Visit Vitals  BP (!) 149/70 (BP 1 Location: Right upper arm, BP Patient Position: Semi fowlers)   Pulse 80   Temp 98.4 °F (36.9 °C)   Resp 17   Ht 5' 11\" (1.803 m)   Wt 95.3 kg (210 lb)   SpO2 97%   BMI 29.29 kg/m²     SpO2 Readings from Last 6 Encounters:   11/29/22 97%   09/11/22 98%   09/22/20 98%   09/09/20 97%   06/04/20 96%   12/23/19 97%    O2 Flow Rate (L/min): 21 l/min     Intake/Output Summary (Last 24 hours) at 11/29/2022 1129  Last data filed at 11/29/2022 0920  Gross per 24 hour   Intake 980 ml   Output 2350 ml   Net -1370 ml          Physical Exam:    Gen:  Well-developed, well-nourished, in no acute distress  HEENT:  Pink conjunctivae, PERRL, hearing intact to voice, moist mucous membranes  Neck:  Supple, without masses, thyroid non-tender  Resp:  No accessory muscle use, clear breath sounds without wheezes rales or rhonchi  Card:  No murmurs, normal S1, S2 without thrills, bruits or peripheral edema  Abd:  Soft, non-tender, non-distended, normoactive bowel sounds are present, no mass  Lymph:  No cervical or inguinal adenopathy  Musc:  No cyanosis or clubbing  Skin:  Foor in dressing, skin turgor is good  Neuro:  Cranial nerves are grossly intact, post op pain and motor weakness, follows commands appropriately  Psych:  Good insight, oriented to person, place and time, alert    Telemetry reviewed:   normal sinus rhythm  __________________________________________________________________  Medications Reviewed: (see below)  Medications:     Current Facility-Administered Medications   Medication Dose Route Frequency    zolpidem (AMBIEN) tablet 5 mg  5 mg Oral QHS PRN    pantoprazole (PROTONIX) tablet 40 mg  40 mg Oral ACB    amLODIPine (NORVASC) tablet 10 mg  10 mg Oral DAILY    atorvastatin (LIPITOR) tablet 10 mg  10 mg Oral DAILY    traMADoL (ULTRAM) tablet 50 mg  50 mg Oral Q6H PRN    hydrALAZINE (APRESOLINE) 20 mg/mL injection 20 mg  20 mg IntraVENous Q6H PRN    cefepime (MAXIPIME) 2 g in 0.9% sodium chloride (MBP/ADV) 100 mL MBP  2 g IntraVENous Q8H metroNIDAZOLE (FLAGYL) tablet 500 mg  500 mg Oral Q12H    cetirizine (ZYRTEC) tablet 10 mg  10 mg Oral DAILY    guaiFENesin-dextromethorphan (ROBITUSSIN DM) 100-10 mg/5 mL syrup 10 mL  10 mL Oral Q6H PRN    rivaroxaban (XARELTO) tablet 20 mg  20 mg Oral DAILY WITH DINNER    losartan/hydroCHLOROthiazide (HYZAAR) 100/25 mg   Oral DAILY    sodium chloride (NS) flush 5-40 mL  5-40 mL IntraVENous Q8H    sodium chloride (NS) flush 5-40 mL  5-40 mL IntraVENous PRN    acetaminophen (TYLENOL) tablet 650 mg  650 mg Oral Q6H PRN    polyethylene glycol (MIRALAX) packet 17 g  17 g Oral DAILY PRN    ondansetron (ZOFRAN ODT) tablet 4 mg  4 mg Oral Q8H PRN    Or    ondansetron (ZOFRAN) injection 4 mg  4 mg IntraVENous Q6H PRN    aspirin delayed-release tablet 81 mg  81 mg Oral DAILY        Lab Data Reviewed: (see below)  Lab Review:     Recent Labs     11/29/22 0358 11/28/22  1123 11/27/22 0359   WBC 15.3* 15.9* 15.9*   HGB 10.1* 9.9* 9.6*   HCT 30.8* 30.9* 30.4*   * 547* 435*       Recent Labs     11/27/22  0359   *   K 4.3      CO2 27   *   BUN 24*   CREA 0.82   CA 8.1*   MG 1.9   PHOS 2.7   ALB 2.1*   TBILI 0.5   ALT 53       Lab Results   Component Value Date/Time    Glucose (POC) 97 11/22/2022 01:30 PM    Glucose (POC) 140 (H) 06/03/2013 08:05 PM    Glucose (POC) 112 (H) 05/31/2013 06:45 PM     No results for input(s): PH, PCO2, PO2, HCO3, FIO2 in the last 72 hours. No results for input(s): INR, INREXT, INREXT in the last 72 hours.   All Micro Results       Procedure Component Value Units Date/Time    CULTURE, Audiriedanthony Ivans STAIN [[id number]]  (Abnormal)  (Susceptibility) Collected: 11/20/22 1239    Order Status: Completed Specimen: Wound from Ulcer Updated: 11/23/22 1041     Special Requests: NO SPECIAL REQUESTS        GRAM STAIN OCCASIONAL WBCS SEEN         FEW GRAM NEGATIVE RODS        Culture result:       LIGHT ENTEROBACTER CLOACAE COMPLEX                  LIGHT Stenotrophomonas (Noa Ruggiero.) maltophilia CLSI GUIDELINES DO NOT RECOMMEND REPORTING SUSCEPTIBILITIES FOR S. MALTOPHILIA. TRIMETHOPRIM/SULFAMETHOXAZOLE IS THE DRUG OF CHOICE. CULTURE, MRSA [[id number]] Collected: 11/21/22 1221    Order Status: Completed Specimen: Nasal from Nares Updated: 11/22/22 1923     Special Requests: NO SPECIAL REQUESTS        Culture result: MRSA NOT PRESENT               Screening of patient nares for MRSA is for surveillance purposes and, if positive, to facilitate isolation considerations in high risk settings. It is not intended for automatic decolonization interventions per se as regimens are not sufficiently effective to warrant routine use. CULTURE, BLOOD [[id number]] Collected: 11/16/22 1412    Order Status: Completed Specimen: Blood Updated: 11/22/22 0430     Special Requests: NO SPECIAL REQUESTS        Culture result: NO GROWTH 6 DAYS       COVID-19 RAPID TEST [[id number]] Collected: 11/16/22 1406    Order Status: Completed Specimen: Nasopharyngeal Updated: 11/16/22 1457     Specimen source Nasopharyngeal        COVID-19 rapid test Not detected        Comment: Rapid Abbott ID Now       Rapid NAAT:  The specimen is NEGATIVE for SARS-CoV-2, the novel coronavirus associated with COVID-19. Negative results should be treated as presumptive and, if inconsistent with clinical signs and symptoms or necessary for patient management, should be tested with an alternative molecular assay. Negative results do not preclude SARS-CoV-2 infection and should not be used as the sole basis for patient management decisions. This test has been authorized by the FDA under an Emergency Use Authorization (EUA) for use by authorized laboratories.    Fact sheet for Healthcare Providers:  New Screens.josh  Fact sheet for Patients: New Screens.josh       Methodology: Isothermal Nucleic Acid Amplification         CULTURE, BLOOD, PAIRED [[id number]] Collected: 11/16/22 1400    Order Status: Canceled Specimen: Blood             Other pertinent lab: none    Total time spent with patient: 30 Minutes I personally reviewed chart, notes, data and current medications in the medical record. I have personally examined and treated the patient at bedside during this period. To assist coordination of care and communication with nursing and staff, this note may be preliminary early in the day, but finalized by end of the day.                  Care Plan discussed with: Patient, Care Manager, Nursing Staff, Consultant/Specialist, and >50% of time spent in counseling and coordination of care    Discussed:  Care Plan and D/C Planning    Prophylaxis:  H2B/PPI    Disposition:  SNF/LTC           ___________________________________________________    Attending Physician: Meagan Gaspar MD

## 2022-11-29 NOTE — PROGRESS NOTES
Problem: Falls - Risk of  Goal: *Absence of Falls  Description: Document Jennifer Cohenter Fall Risk and appropriate interventions in the flowsheet.   Outcome: Progressing Towards Goal  Note: Fall Risk Interventions:  Mobility Interventions: Bed/chair exit alarm, Patient to call before getting OOB         Medication Interventions: Bed/chair exit alarm, Patient to call before getting OOB, Teach patient to arise slowly    Elimination Interventions: Bed/chair exit alarm, Call light in reach, Patient to call for help with toileting needs

## 2022-11-29 NOTE — PROGRESS NOTES
Problem: Mobility Impaired (Adult and Pediatric)  Goal: *Acute Goals and Plan of Care (Insert Text)  Description: FUNCTIONAL STATUS PRIOR TO ADMISSION: Patient was modified independent using a single point cane for functional mobility. HOME SUPPORT PRIOR TO ADMISSION: The patient lived with wife but did not require assist.    Physical Therapy Goals  Initiated 11/18/2022, Reassessed 11/26 and continue goals next 7 days  1. Patient will move from supine to sit and sit to supine  in bed with modified independence within 7 day(s). 2.  Patient will transfer from bed to chair and chair to bed with minimal assistance/contact guard assist using the least restrictive device strict NWB within 7 day(s). 3.  Patient will perform sit < > stand with minimal assistance/contact guard assist within 7 day(s). 4.  Patient will ambulate with minimal assistance/contact guard assist for 5'x2 feet with the least restrictive device strict NWB RLE within 7 day(s). 5. Patient will perform lateral chair to chair/bed transfer NWB RLE with increased time and min A x 1 within 7 days. Outcome: Progressing Towards Goal  Note:   PHYSICAL THERAPY TREATMENT  Patient: Nathalia Chaidez (34 y.o. male)  Date: 11/29/2022  Diagnosis: Cellulitis of right lower limb [L03.115] <principal problem not specified>  Procedure(s) (LRB):  RIGHT FOOT 5TH METATARSAL HEAD RESECTION, BONE BIOPSY CALCANEUS, TALAS, AND TIBIA (Right) 4 Days Post-Op  Precautions: NWB  Chart, physical therapy assessment, plan of care and goals were reviewed. ASSESSMENT  Patient continues with skilled PT services and  progressing towards goals. Increased time for mobility task with max verbal cues for WB thru BUE to maintain NWB on RLE for SPT to chair. Has bee unableot sequence fwd gait d/t weakness. Denies increased pain.  Recommend rehab      Current Level of Function Impacting Discharge (mobility/balance): up to Mod  A x 1for trasnfers    Other factors to consider for discharge:          PLAN :  Patient continues to benefit from skilled intervention to address the above impairments. Continue treatment per established plan of care. to address goals. Recommendation for discharge: (in order for the patient to meet his/her long term goals)  Therapy 3 hours per day 5-7 days per week    This discharge recommendation:  Has been made in collaboration with the attending provider and/or case management    IF patient discharges home will need the following DME: to be determined (TBD)       SUBJECTIVE:   Patient stated .    OBJECTIVE DATA SUMMARY:   Critical Behavior:  Neurologic State: Alert  Orientation Level: Oriented X4  Cognition: Follows commands  Safety/Judgement: Awareness of environment, Fall prevention, Good awareness of safety precautions, Home safety  Functional Mobility Training:  Bed Mobility:     Supine to Sit: Contact guard assistance     Scooting: Contact guard assistance;Assist x1        Transfers:  Sit to Stand: Minimum assistance; Additional time  Stand to Sit: Minimum assistance;Assist x1;Additional time                             Balance:  Sitting: Intact  Standing: Impaired; With support  Standing - Static: Constant support; Fair  Standing - Dynamic : Constant support; Fair  Ambulation/Gait Training:  Distance (ft): 2 Feet (ft)  Assistive Device: Walker, rolling;Gait belt  Ambulation - Level of Assistance: Moderate assistance;Assist x1        Gait Abnormalities: Antalgic;Decreased step clearance                                      Stairs: Therapeutic Exercises:     Pain Ratin/10    Activity Tolerance:   Good    After treatment patient left in no apparent distress:   Sitting in chair and Call bell within reach    COMMUNICATION/COLLABORATION:   The patients plan of care was discussed with: Occupational therapy assistant and Registered nurseAnn Blackwell   Time Calculation: 20 mins

## 2022-11-29 NOTE — PROGRESS NOTES
[facility] Progress Note           Brian Rivera DO    [phone] Office  [phone]  Fax    2022      Assessment & Plan:     Chronic osteomyelitis of the right fifth metatarsal head and suspected osteomyelitis of the talus and calcaneus. Status post right fifth metatarsal head resection and bone biopsy of the calcaneus, talus, and tibia 2022. Metatarsal head resection pathology reveals clear margins. No evidence of osteomyelitis on bone biopsies of the heel. We will plan discharge on 10 days of levofloxacin which will cover both Enterobacter and stenotrophomonas isolated from culture. Leukocytosis. Suspect reactive. No evidence of ongoing infection involving the foot per discussion with podiatry. Follow trend  Peripheral arterial disease. Revascularization planned for . Peripheral neuropathy. Chronic atrial fibrillation. On anticoagulation with Xarelto          Subjective:     No new complaints. Tolerating antibiotics    Objective:     Vitals: Visit Vitals  BP (!) 111/57 (BP 1 Location: Right upper arm, BP Patient Position: Reclining)   Pulse 66   Temp 97.6 °F (36.4 °C)   Resp 18   Ht 5' 11\" (1.803 m)   Wt 210 lb (95.3 kg)   SpO2 97%   BMI 29.29 kg/m²          Tmax:  Temp (24hrs), Av.1 °F (36.7 °C), Min:97.3 °F (36.3 °C), Max:99.2 °F (37.3 °C)      Exam:   Patient is intubated:  no    Physical Examination:           General:  Alert, cooperative, no distress   Head:  Normocephalic, atraumatic. Eyes:  Conjunctivae clear   Neck: Supple       Lungs:   No distress. Chest wall:     Heart:     Abdomen:   non-distended   Extremities: Moves all. Wound VAC in place. See image above   Skin: No acute rash on exposed skin. Neurologic:      Labs:        No lab exists for component: ITNL   No results for input(s): CPK, CKMB, TROIQ in the last 72 hours.   Recent Labs     22  0358 22  1123 22  0359   NA  --   --  134*   K  --   --  4.3 CL  --   --  102   CO2  --   --  27   BUN  --   --  24*   CREA  --   --  0.82   GLU  --   --  105*   PHOS  --   --  2.7   MG  --   --  1.9   ALB  --   --  2.1*   WBC 15.3* 15.9* 15.9*   HGB 10.1* 9.9* 9.6*   HCT 30.8* 30.9* 30.4*   * 547* 435*       No results for input(s): INR, PTP, APTT, INREXT, INREXT in the last 72 hours. Needs: urine analysis, urine sodium, protein and creatinine  No results found for: MARTIN, CREAU      Cultures:     Lab Results   Component Value Date/Time    Specimen Description: BLOOD 06/01/2013 12:15 PM    Specimen Description: URINE 06/01/2013 11:15 AM     Lab Results   Component Value Date/Time    Culture result: MRSA NOT PRESENT 11/21/2022 12:21 PM    Culture result:  11/21/2022 12:21 PM     Screening of patient nares for MRSA is for surveillance purposes and, if positive, to facilitate isolation considerations in high risk settings. It is not intended for automatic decolonization interventions per se as regimens are not sufficiently effective to warrant routine use. Culture result: LIGHT ENTEROBACTER CLOACAE COMPLEX (A) 11/20/2022 12:39 PM    Culture result: (A) 11/20/2022 12:39 PM     LIGHT Stenotrophomonas (Xantho.) maltophilia CLSI GUIDELINES DO NOT RECOMMEND REPORTING SUSCEPTIBILITIES FOR S. MALTOPHILIA. TRIMETHOPRIM/SULFAMETHOXAZOLE IS THE DRUG OF CHOICE.        Radiology:     Medications       Current Facility-Administered Medications   Medication Dose Route Frequency Last Admin    zolpidem (AMBIEN) tablet 5 mg  5 mg Oral QHS PRN      levoFLOXacin (LEVAQUIN) tablet 750 mg  750 mg Oral Q24H 750 mg at 11/29/22 1238    pantoprazole (PROTONIX) tablet 40 mg  40 mg Oral ACB 40 mg at 11/29/22 0950    amLODIPine (NORVASC) tablet 10 mg  10 mg Oral DAILY 10 mg at 11/29/22 0950    atorvastatin (LIPITOR) tablet 10 mg  10 mg Oral DAILY 10 mg at 11/29/22 0950    traMADoL (ULTRAM) tablet 50 mg  50 mg Oral Q6H PRN 50 mg at 11/27/22 2257    hydrALAZINE (APRESOLINE) 20 mg/mL injection 20 mg  20 mg IntraVENous Q6H PRN      cetirizine (ZYRTEC) tablet 10 mg  10 mg Oral DAILY 10 mg at 11/29/22 0950    guaiFENesin-dextromethorphan (ROBITUSSIN DM) 100-10 mg/5 mL syrup 10 mL  10 mL Oral Q6H PRN 10 mL at 11/20/22 1724    rivaroxaban (XARELTO) tablet 20 mg  20 mg Oral DAILY WITH DINNER 20 mg at 11/28/22 1745    losartan/hydroCHLOROthiazide (HYZAAR) 100/25 mg   Oral DAILY Given at 11/29/22 0950    sodium chloride (NS) flush 5-40 mL  5-40 mL IntraVENous Q8H 10 mL at 11/28/22 2201    sodium chloride (NS) flush 5-40 mL  5-40 mL IntraVENous PRN      acetaminophen (TYLENOL) tablet 650 mg  650 mg Oral Q6H  mg at 11/24/22 2113    polyethylene glycol (MIRALAX) packet 17 g  17 g Oral DAILY PRN 17 g at 11/27/22 1630    ondansetron (ZOFRAN ODT) tablet 4 mg  4 mg Oral Q8H PRN      Or    ondansetron (ZOFRAN) injection 4 mg  4 mg IntraVENous Q6H PRN 4 mg at 11/24/22 0859    aspirin delayed-release tablet 81 mg  81 mg Oral DAILY 81 mg at 11/29/22 0950           Case discussed with: Hospitalist, podiatry      Bert Carrillo DO

## 2022-11-30 PROCEDURE — 74011250637 HC RX REV CODE- 250/637: Performed by: STUDENT IN AN ORGANIZED HEALTH CARE EDUCATION/TRAINING PROGRAM

## 2022-11-30 PROCEDURE — 74011250637 HC RX REV CODE- 250/637: Performed by: INTERNAL MEDICINE

## 2022-11-30 PROCEDURE — 97530 THERAPEUTIC ACTIVITIES: CPT

## 2022-11-30 PROCEDURE — 65270000046 HC RM TELEMETRY

## 2022-11-30 PROCEDURE — 97535 SELF CARE MNGMENT TRAINING: CPT

## 2022-11-30 PROCEDURE — 2709999900 HC NON-CHARGEABLE SUPPLY

## 2022-11-30 PROCEDURE — 99232 SBSQ HOSP IP/OBS MODERATE 35: CPT | Performed by: INTERNAL MEDICINE

## 2022-11-30 PROCEDURE — 74011250637 HC RX REV CODE- 250/637: Performed by: HOSPITALIST

## 2022-11-30 PROCEDURE — 74011000250 HC RX REV CODE- 250: Performed by: STUDENT IN AN ORGANIZED HEALTH CARE EDUCATION/TRAINING PROGRAM

## 2022-11-30 PROCEDURE — 74011250636 HC RX REV CODE- 250/636: Performed by: HOSPITALIST

## 2022-11-30 RX ADMIN — LEVOFLOXACIN 750 MG: 500 TABLET, FILM COATED ORAL at 12:07

## 2022-11-30 RX ADMIN — SODIUM CHLORIDE 250 ML: 9 INJECTION, SOLUTION INTRAVENOUS at 19:44

## 2022-11-30 RX ADMIN — SODIUM CHLORIDE, PRESERVATIVE FREE 10 ML: 5 INJECTION INTRAVENOUS at 19:48

## 2022-11-30 RX ADMIN — HYDROCHLOROTHIAZIDE: 25 TABLET ORAL at 09:54

## 2022-11-30 RX ADMIN — ZOLPIDEM TARTRATE 5 MG: 5 TABLET ORAL at 20:18

## 2022-11-30 RX ADMIN — SODIUM CHLORIDE, PRESERVATIVE FREE 10 ML: 5 INJECTION INTRAVENOUS at 15:38

## 2022-11-30 RX ADMIN — AMLODIPINE BESYLATE 10 MG: 5 TABLET ORAL at 09:55

## 2022-11-30 RX ADMIN — PANTOPRAZOLE SODIUM 40 MG: 40 TABLET, DELAYED RELEASE ORAL at 06:35

## 2022-11-30 RX ADMIN — ASPIRIN 81 MG: 81 TABLET, COATED ORAL at 09:55

## 2022-11-30 RX ADMIN — SODIUM CHLORIDE 250 ML: 9 INJECTION, SOLUTION INTRAVENOUS at 17:06

## 2022-11-30 RX ADMIN — SODIUM CHLORIDE, PRESERVATIVE FREE 10 ML: 5 INJECTION INTRAVENOUS at 06:35

## 2022-11-30 RX ADMIN — ATORVASTATIN CALCIUM 10 MG: 10 TABLET, FILM COATED ORAL at 09:55

## 2022-11-30 RX ADMIN — CETIRIZINE HYDROCHLORIDE 10 MG: 10 TABLET, FILM COATED ORAL at 09:55

## 2022-11-30 RX ADMIN — RIVAROXABAN 20 MG: 10 TABLET, FILM COATED ORAL at 12:07

## 2022-11-30 NOTE — PROGRESS NOTES
[facility]  [address] [facility], [facility] [address]  [phone]         Hospitalist Progress Note        NAME:  Ginna Hunter   :  1940   MRN:  [id number]    Date/Time:  2022     Patient PCP:  Vonnie Skinner MD    Emergency Contact:    Extended Emergency Contact Information  Primary Emergency Contact: [address] Phone: [phone]  Relation: Spouse      Code: Full Code     Isolation Precautions: There are currently no Active Isolations        Subjective:     REASON FOR VISIT:  Recheck foot wounds     HPI & INTERVAL HISTORY:     Mr. Dell Pineda is a 78 y.o. male with history that includes HTN, AFIB on Xarelto, and OLEG on CPAP reports right heel ulcer about 1 week ago which then spread up leg with erythema resulting in cellulitis. : Seen and examined. No new complaints however appears to have had some blood in his stools for which Xarelto was held. Patient was upset about this stating that he has been on this medication for years and that he has had hemorrhoids for years and occasionally does have bleeding. Once this Xarelto restarted and understands the risks.       ALLERGIES  Allergies   Allergen Reactions    Ace Inhibitors Cough     Patients states he is not allergic    Nsaids (Non-Steroidal Anti-Inflammatory Drug) Other (comments)     \"causes blood in urine\"    Pcn [Penicillins] Rash    Percocet [Oxycodone-Acetaminophen] Other (comments)     constipation         ROS:  Gen:   Negative  Resp:  negative  CVS:   negative  GI:       negative           Objective:      Visit Vitals  /73 (BP 1 Location: Left upper arm, BP Patient Position: At rest)   Pulse 66   Temp 97.5 °F (36.4 °C)   Resp 18   Ht 5' 11\" (1.803 m)   Wt 95.3 kg (210 lb)   SpO2 97%   BMI 29.29 kg/m²       Physical Exam:  General: alert and no distress  Head: Normocephalic, without obvious abnormality, atraumatic  Eyes: PERRL, EOMI, anicteric sclerae, and conjuntiva clear  ENT: lips, mucosa, and tongue normal  Neck: normal, supple, and no tenderness  Lungs: clear to auscultation with good breath sounds and normal respiratory effort  Heart: S1, S2 normal, regular rate, and regular rhythm  Abd: not distended, soft, nontender, BS present and normactive  Ext: no cyanosis and no edema  Skin: normal skin color, no rashes, and texture normal  Neuro:  alert, oriented x 3, no defects noted in general exam.  Psych: not anxious, cooperative, affect somewhat angry      Medications:  Current Facility-Administered Medications   Medication Dose Route Frequency    zolpidem (AMBIEN) tablet 5 mg  5 mg Oral QHS PRN    levoFLOXacin (LEVAQUIN) tablet 750 mg  750 mg Oral Q24H    pantoprazole (PROTONIX) tablet 40 mg  40 mg Oral ACB    amLODIPine (NORVASC) tablet 10 mg  10 mg Oral DAILY    atorvastatin (LIPITOR) tablet 10 mg  10 mg Oral DAILY    traMADoL (ULTRAM) tablet 50 mg  50 mg Oral Q6H PRN    hydrALAZINE (APRESOLINE) 20 mg/mL injection 20 mg  20 mg IntraVENous Q6H PRN    cetirizine (ZYRTEC) tablet 10 mg  10 mg Oral DAILY    guaiFENesin-dextromethorphan (ROBITUSSIN DM) 100-10 mg/5 mL syrup 10 mL  10 mL Oral Q6H PRN    losartan/hydroCHLOROthiazide (HYZAAR) 100/25 mg   Oral DAILY    sodium chloride (NS) flush 5-40 mL  5-40 mL IntraVENous Q8H    sodium chloride (NS) flush 5-40 mL  5-40 mL IntraVENous PRN    acetaminophen (TYLENOL) tablet 650 mg  650 mg Oral Q6H PRN    polyethylene glycol (MIRALAX) packet 17 g  17 g Oral DAILY PRN    ondansetron (ZOFRAN ODT) tablet 4 mg  4 mg Oral Q8H PRN    Or    ondansetron (ZOFRAN) injection 4 mg  4 mg IntraVENous Q6H PRN    aspirin delayed-release tablet 81 mg  81 mg Oral DAILY        Labs:  Recent Labs     11/29/22  0358   WBC 15.3*   HGB 10.1*   HCT 30.8*   *       No results for input(s): NA, K, CL, CO2, GLU, BUN, CREA, CA, MG, PHOS, ALB, TBIL, TBILI, ALT in the last 72 hours.     No lab exists for component: SGOT        Radiology:  No results found. The labs, imaging studies, medications, and consultants notes was reviewed by me on: November 30, 2022         Assessment/Plan:      Right Heel Ulcer / Right lower extremity cellulitis / Acute osteomyelitis (OM) of fifth metarsal head / Septic tibiotalar arthritis - POA, on 11/25 had right fifth metatarsal head resection, bone biopsy of the talus, calcaneus, and tibia. Follow cx and path. Blister cx enterobacter and stenotrophomonas. Improved cefepime, flagyl, stopped clindamycin. ID has determined he can go on 10d PO levaquin. NWB on foot. Pain control with prn ultram.  Got Wound vac 11/28. He can go to SNF vs IPR now that he is cleared by podiatry. Waiting on placement. PAD / Occlusion of the right mid-distal posterior tibial artery and right mid-distal anterior tibial artery - Angio 11/23 with balloon angioplasty alone. Continue ASA, resumed xarelto. Continue statin. Paroxysmal Atrial fibrillation - Had been stable on xarelto; held for surgery. He has not required rate control. Xarelto was held due to blood in stools. As mentioned above patient wants to be restarted on medication states this is happened in the past because of hemorrhoids and it resolved on his own. He understands the risks of restarting the medication and at this point hemoglobin was stable we will go ahead and restart and continue to monitor. Debilitated patient - POA due to foot issues and will be NWB. Will need SNF vs IPR     Anemia - Mild and stable. Unremarkable serologies.        Hypocalcemia / Hypophosphatemia / Hypokalemia - Replace and monitor     Hypertension - Continue home Hyzaar, added Norvasc     OLEG - CPAP nightly     Insomnia - add low dose ambien prn    Body mass index is 29.29 kg/m².:  25.0 - 29.9:  Overweight and Weight loss advised    F: None  E: Stable and Monitor  N: DIET ONE TIME MESSAGE  DIET ONE TIME MESSAGE  ADULT DIET Regular       Discussed:  Pt's condition, Imaging findings, Lab findings, Assessment, Care Plan, and D/C Planning discussed with: Patient, RN, and Care Manager    Prophylaxis:  Xarelto    Anticipated discharge disposition:  IPR    HR DC Barriers: Awaiting placement      Total time: -25- minutes **I personally saw and examined the patient during this time period**      Date of service:    11/30/2022                ___________________________________________________    Admitting Physician: Marika Saavedra MD

## 2022-11-30 NOTE — PROGRESS NOTES
[facility] Podiatric Surgery  Progress Note  Subjective:  Freda Pineda has chronic osteomyelitis of his right fifth metatarsal head and a right heel ulceration with concern for osteomyelitis of his calcaneus/talus/tibia. He is now s/p Right heel debridement with bone biopsy of the calcaneus, talus, and tibia with resection of the fifth metatarsal head, DOS: 11/25/2022. Had an episode of blood in his stools. Otherwise not complaints. No fevers, chills, or shortness of breath. ROS:   Constitutional: Negative for fever, chills, weight loss and malaise/fatigue. HENT: Negative for nosebleeds. Eyes: Negative for blurred vision. Respiratory: Negative for cough, shortness of breath and wheezing. Cardiovascular: Negative for chest pain, palpitations. Gastrointestinal: Negative for heartburn, nausea, vomiting, abdominal pain. Genitourinary: Negative for dysuria and urgency. Musculoskeletal: Right heel ulcer and forefoot ulcer. Skin: Negative for rash. Neurological: Negative for dizziness, focal weakness and headaches. Endo/Heme/Allergies: Negative for environmental allergies. Psychiatric/Behavioral: Negative for depression and suicidal ideas. Objective:  Blood pressure (!) 115/58, pulse 66, temperature 98.2 °F (36.8 °C), resp. rate 18, height 5' 11\" (1.803 m), weight 95.3 kg (210 lb), SpO2 96 %. Intake/Output Summary (Last 24 hours) at 11/30/2022 1203  Last data filed at 11/30/2022 0850  Gross per 24 hour   Intake 940 ml   Output 1400 ml   Net -460 ml       Physical Lower Extremity Exam:  Capillary refill time intact to all digits on right foot. Can move all toes. No pain with calf compression. Negative Yecenia's. Wound vac in place and running at 125 mmHg continuous.      Labs:  Lab Results   Component Value Date/Time    WBC 15.3 (H) 11/29/2022 03:58 AM    HGB 10.1 (L) 11/29/2022 03:58 AM    HCT 30.8 (L) 11/29/2022 03:58 AM    MCV 98.1 11/29/2022 03:58 AM    PLATELET 898 (H) 20/81/3560 03:58 AM     Lab Results   Component Value Date/Time    Sodium 134 (L) 11/27/2022 03:59 AM    Potassium 4.3 11/27/2022 03:59 AM    Chloride 102 11/27/2022 03:59 AM    CO2 27 11/27/2022 03:59 AM    BUN 24 (H) 11/27/2022 03:59 AM    Glucose 105 (H) 11/27/2022 03:59 AM     Lab Results   Component Value Date/Time    INR 1.3 (H) 11/22/2022 05:45 AM       Current Medications:  Reviewed. Impression  Chronic osteomyelitis, right fifth metatarsal, s/p resection  2. Peripheral neuropathy  3. Peripheral arterial disease s/p angioplasty of PT and Peroneal  4. Right heel ulceration with concern for osteomyelitis s/p bone biopsy of the tibia/calcaneus/and talus     Plan:  Patient seen and evaluated at bedside. He is now s/p Right foot debridement, resection of fifth metatarsal head, and bone biopsy of the tibia, calcaneus, and talus, DOS: 11/25/2022. Appreciate Dr. Neelima Fernandez recommendations. Pathology for clearing margin of fifth metatarsal, tibia, calcaneus, and talus were negative. Strict NWB to the RLE. Placement pending. Twice weekly wound vac changes. Next change will be tomorrow by wound care team if patient is still here. Please bridge both wounds with sponge and cover all areas of healthy skin with tegaderm. Patient will follow up in clinic 1 week from discharge. My office will set up the appointment.       Cate Valente DPM

## 2022-11-30 NOTE — PROGRESS NOTES
11/30/2022 6:53 PM UAI completed in LTSS system. 11/30/2022 4:49 PM CM spoke with Saint Haymaker at [facility] and [address] [place] [address], they have accepted pt for admission on 12/1 vs 12/2 pending bed availability, wound vac being delivered, and meds being available for pt. Saint Haymaker requested pt's chest xray and specific wound care orders, CM sent via [employer]. Saint Haymaker reported she will contact pt's wife for pt's [phone] and for her to complete paperwork. Saint Haymaker reported pt will need a rapid COVID completed within 24 hours of discharge and also a UAI to be completed. CM met with pt and pt's wife, relayed the above, both were agreeable. CM will follow up.     11/30/2022 2:35 PM CM called back to Saint Haymaker at [facility] and [address] [place] [address], referral remains pending with their Director of Nursing. Saint Haymaker confirmed she will let CM know asap. CM called and updated pt's wife on the above and denial from [facility]. CM will follow. 11/30/2022 11:55 AM CM received denial from [facility] due to wound vac. CM called and spoke with Saint Haymaker in admissions at [place] [address]. who reported pt's referral remains pending with nursing. Saint Haymaker did relay if pt is accepted they wouldn't be able to admit pt until 12/1 due to pt's medications will need 24 hours to allow delivery from their pharmacy. CM will follow up.    11/30/2022  11:10 AM CM relayed to pt's wife, [facility] denial and [facility] and [address] [place] [address] remains pending. Pt's wife requested CM send referral to [facility]. CM sent referral to [facility] via [facility]ink. CM called and lvm with Zoe Stern in admissions at [facility] relayed referral and pt's readiness for discharge. CM will follow up.     11/30/2022 10:36 AM CM called to [facility], spoke with [address] [place] [address], they do not have a bed available nor can they accept pt due to wound vac.   CM called [facility] and [address] [facility], spoke with admissions who reported pt's referral remains pending and they will contact CM back once an admission decision has been made. CM will follow up. YEN Ramos     Care Management Interventions  PCP Verified by CM: Yes (within the last year)  Mode of Transport at Discharge:  Other (see comment) (family will transport at [facility])  Transition of Care Consult (CM Consult): Discharge Planning  Discharge Durable Medical Equipment: No  Physical Therapy Consult: Yes  Occupational Therapy Consult: Yes  Speech Therapy Consult: No  Support Systems: Spouse/Significant Other, Other Family Member(s)  Confirm Follow Up Transport: Family  The Plan for Transition of Care is Related to the Following Treatment Goals : IPR vs SNF  The Patient and/or Patient Representative was Provided with a Choice of Provider and Agrees with the Discharge Plan?: Yes  Freedom of Choice List was Provided with Basic Dialogue that Supports the Patient's Individualized Plan of Care/Goals, Treatment Preferences and Shares the Quality Data Associated with the Providers?: Yes  Discharge Location  Patient Expects to be Discharged to[de-identified] Skilled nursing facility

## 2022-11-30 NOTE — PROGRESS NOTES
RRT RN rounded on pt at primary nurse request due to soft BP. Pt had prescribed norvasc and Losartan/HCTZ at 1000. MD notified of decreased BP. No new orders at this time.        1630: 250cc NS bolus ordered

## 2022-11-30 NOTE — PROGRESS NOTES
Problem: Mobility Impaired (Adult and Pediatric)  Goal: *Acute Goals and Plan of Care (Insert Text)  Description: FUNCTIONAL STATUS PRIOR TO ADMISSION: Patient was modified independent using a single point cane for functional mobility. HOME SUPPORT PRIOR TO ADMISSION: The patient lived with wife but did not require assist.    Physical Therapy Goals  Initiated 11/18/2022, Reassessed 11/26 and continue goals next 7 days  1. Patient will move from supine to sit and sit to supine  in bed with modified independence within 7 day(s). 2.  Patient will transfer from bed to chair and chair to bed with minimal assistance/contact guard assist using the least restrictive device strict NWB within 7 day(s). 3.  Patient will perform sit < > stand with minimal assistance/contact guard assist within 7 day(s). 4.  Patient will ambulate with minimal assistance/contact guard assist for 5'x2 feet with the least restrictive device strict NWB RLE within 7 day(s). 5. Patient will perform lateral chair to chair/bed transfer NWB RLE with increased time and min A x 1 within 7 days. Outcome: Progressing Towards Goal  Note:   PHYSICAL THERAPY TREATMENT  Patient: Macie Castrejon (48 y.o. male)  Date: 11/30/2022  Diagnosis: Cellulitis of right lower limb [L03.115] <principal problem not specified>  Procedure(s) (LRB):  RIGHT FOOT 5TH METATARSAL HEAD RESECTION, BONE BIOPSY CALCANEUS, TALAS, AND TIBIA (Right) 5 Days Post-Op  Precautions: NWB  Chart, physical therapy assessment, plan of care and goals were reviewed. ASSESSMENT  Patient continues with skilled PT services and progressing towards goals. Min A x 1 for sit<>stand and SPT using RW for steadying. More difficulty today maintaining NWB on RLE - patient fatigues quickly with transfer to [facility] then to chair. Dependent for bowel hygiene, bright red blood noted in stool, RN made aware. Will need rehab    Current Level of Function Impacting Discharge (mobility/balance): Min A with increased time    Other factors to consider for discharge:          PLAN :  Patient continues to benefit from skilled intervention to address the above impairments. Continue treatment per established plan of care. to address goals. Recommendation for discharge: (in order for the patient to meet his/her long term goals)  Therapy up to 5 days/week in rehab setting    This discharge recommendation:  Has been made in collaboration with the attending provider and/or case management    IF patient discharges home will need the following DME: to be determined (TBD)       SUBJECTIVE:   Patient stated I am frustrated with the whole situation.     OBJECTIVE DATA SUMMARY:   Critical Behavior:  Neurologic State: Alert  Orientation Level: Oriented X4  Cognition: Follows commands  Safety/Judgement: Awareness of environment, Fall prevention, Good awareness of safety precautions, Home safety  Functional Mobility Training:  Bed Mobility:     Supine to Sit: Contact guard assistance     Scooting: Contact guard assistance; Additional time        Transfers:  Sit to Stand: Minimum assistance; Additional time  Stand to Sit: Minimum assistance; Additional time;Assist x1                             Balance:  Sitting: Intact  Standing: Impaired; With support  Standing - Static: Constant support; Fair  Standing - Dynamic : Constant support; Fair  Ambulation/Gait Training:  Distance (ft): 5 Feet (ft)  Assistive Device: Walker, rolling;Gait belt  Ambulation - Level of Assistance: Minimal assistance; Additional time;Assist x1        Gait Abnormalities: Decreased step clearance; Step to gait; Antalgic        Base of Support: Narrowed; Center of gravity altered;Shift to left     Speed/Filomena: Slow;Shuffled                       Stairs:               Therapeutic Exercises:   AA SLR on R , modified bridging on LLE, SLR, ankle pumps  Pain Rating:      Activity Tolerance:   Good and observed SOB with activity    After treatment patient left in no apparent distress:   Sitting in chair, Call bell within reach, and Caregiver / family present    COMMUNICATION/COLLABORATION:   The patients plan of care was discussed with: Occupational therapy assistant and Registered nurse.      Reece Butterfield   Time Calculation: 32 mins

## 2022-11-30 NOTE — PROGRESS NOTES
Problem: Self Care Deficits Care Plan (Adult)  Goal: *Acute Goals and Plan of Care (Insert Text)  Description: FUNCTIONAL STATUS PRIOR TO ADMISSION: Patient was modified independent using a single point cane for functional mobility. 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 Owns post op shoe. HOME SUPPORT: The patient lived with wife but did not require assist.    Occupational Therapy Goals  Re-evaluation 11/26/22  1. Patient will perform sit <> stand to prepare for LB dressing tasks with RLE NWB with minimal assistance within 7 day(s). 2.  Patient will perform lower body dressing with AD with moderate assistance  within 7 day(s). 3.  Patient will perform toilet transfers with RLE NWB with moderate assistance  within 7 day(s). 4.  Patient will perform all aspects of toileting with RLE NWB with moderate assistance  within 7 day(s). 5.  Patient will participate in upper extremity therapeutic exercise/activities with supervision/set-up for 8 minutes within 7 day(s). 6.  Patient will utilize energy conservation techniques during functional activities with verbal cues within 7 day(s). Initiated 11/18/2022  1. Patient will perform bathing maintaining RLE NWB with supervision/set-up within 7 day(s). 2.  Patient will perform LB dressing with AD with supervision/set-up within 7 day(s). 3.  Patient will perform toilet transfers while maintaining RLE NWB with supervision/set-up within 7 day(s). 5.  Patient will perform all aspects of toileting with supervision/set-up within 7 day(s). 6.  Patient will participate in upper extremity therapeutic exercise/activities with supervision for 6 minutes within 7 day(s). 7.  Patient will utilize energy conservation techniques during functional activities with verbal cues within 7 day(s).    Outcome: Progressing Towards Goal   OCCUPATIONAL THERAPY TREATMENT  Patient: Nathalia Chaidez (18 y.o. male)  Date: 11/30/2022  Diagnosis: Cellulitis of right lower limb [M59.206] <principal problem not specified>  Procedure(s) (LRB):  RIGHT FOOT 5TH METATARSAL HEAD RESECTION, BONE BIOPSY CALCANEUS, TALAS, AND TIBIA (Right) 5 Days Post-Op  Precautions: NWB  Chart, occupational therapy assessment, plan of care, and goals were reviewed. ASSESSMENT  Patient continues with skilled OT services and is progressing towards goals. Pt transfers to [facility] min assist x 2, dep hygiene in standing, cues to maintain NWB R LE. Pt returned to sit in chair. Current Level of Function Impacting Discharge (ADLs): min assist x 2 transfer to [facility], dep hygiene    Other factors to consider for discharge:          PLAN :  Patient continues to benefit from skilled intervention to address the above impairments. Continue treatment per established plan of care to address goals. Recommend with staff: ADL's, there ex, there act    Recommend next OT session: cont towards goals    Recommendation for discharge: (in order for the patient to meet his/her long term goals)  Therapy up to 5 days/week in SNF setting    This discharge recommendation:  Has not yet been discussed the attending provider and/or case management    IF patient discharges home will need the following DME:        SUBJECTIVE:   Patient stated I can't believe I was in [place] 3 weeks ago.     OBJECTIVE DATA SUMMARY:   Cognitive/Behavioral Status:  Neurologic State: Alert  Orientation Level: Oriented X4  Cognition: Follows commands             Functional Mobility and Transfers for ADLs:  Bed Mobility:  Supine to Sit: Contact guard assistance  Scooting: Contact guard assistance; Additional time    Transfers:  Sit to Stand: Minimum assistance; Additional time  Functional Transfers  Toilet Transfer : Minimum assistance;Assist x2  Adaptive Equipment: Bedside commode;Walker (comment)       Balance:  Sitting: Intact  Standing: Impaired; With support  Standing - Static: Constant support; Fair  Standing - Dynamic : Constant support; Fair    ADL Intervention:                 Type of Bath: Chlorhexidine (CHG); Bath Pack                   Toileting  Toileting Assistance: Maximum assistance  Bowel Hygiene: Maximum assistance  Clothing Management: Maximum assistance         Activity Tolerance:   Fair    After treatment patient left in no apparent distress:   Sitting in chair and Call bell within reach    COMMUNICATION/COLLABORATION:   The patients plan of care was discussed with: Physical therapy assistant, Occupational therapist, and Registered nurse.      JOEL Estrada/L  Time Calculation: 32 mins

## 2022-11-30 NOTE — PROGRESS NOTES
Bedside shift change report given to Oleg (oncoming nurse) by Brunilda Holbrook (offgoing nurse). Report included the following information SBAR, Kardex, Intake/Output, MAR, and Recent Results.

## 2022-11-30 NOTE — PROGRESS NOTES
[facility] Progress Note           Angella Oliver DO    [phone] Office  [phone]  Fax    2022      Assessment & Plan:     Chronic osteomyelitis of the right fifth metatarsal head and suspected osteomyelitis of the talus and calcaneus. Status post right fifth metatarsal head resection and bone biopsy of the calcaneus, talus, and tibia 2022. Metatarsal head resection pathology reveals clear margins. No evidence of osteomyelitis on bone biopsies of the heel. We will plan discharge on 10 days of levofloxacin which will cover both Enterobacter and stenotrophomonas isolated from culture. Leukocytosis. Suspect reactive. Beginning to trend down. Remains afebrile. No evidence of ongoing infection involving the foot. Follow trend. Check CRP in a.m. Peripheral arterial disease. Status post right lower extremity angioplasty   Peripheral neuropathy. Monitor on quinolone. Discussed with patient  Chronic atrial fibrillation. On anticoagulation with Xarelto          Subjective:     No new complaints. Tolerating antibiotics    Objective:     Vitals: Visit Vitals  BP (!) 98/48   Pulse 65   Temp 98 °F (36.7 °C)   Resp 18   Ht 5' 11\" (1.803 m)   Wt 210 lb (95.3 kg)   SpO2 97%   BMI 29.29 kg/m²          Tmax:  Temp (24hrs), Av.9 °F (36.6 °C), Min:97.5 °F (36.4 °C), Max:98.2 °F (36.8 °C)      Exam:   Patient is intubated:  no    Physical Examination:         General:  Alert, cooperative, no distress   Head:  Normocephalic, atraumatic. Eyes:  Conjunctivae clear   Neck: Supple       Lungs:   No distress. Chest wall:     Heart:     Abdomen:   non-distended   Extremities: Moves all. Wound VAC in place. Skin: No acute rash on exposed skin. Neurologic:      Labs:        No lab exists for component: ITNL   No results for input(s): CPK, CKMB, TROIQ in the last 72 hours.   Recent Labs     22  0358 22  1123   WBC 15.3* 15.9*   HGB 10.1* 9.9*   HCT 30.8* 30.9*   * 547*       No results for input(s): INR, PTP, APTT, INREXT, INREXT in the last 72 hours. Needs: urine analysis, urine sodium, protein and creatinine  No results found for: MARTIN, CREAU      Cultures:     Lab Results   Component Value Date/Time    Specimen Description: BLOOD 06/01/2013 12:15 PM    Specimen Description: URINE 06/01/2013 11:15 AM     Lab Results   Component Value Date/Time    Culture result: MRSA NOT PRESENT 11/21/2022 12:21 PM    Culture result:  11/21/2022 12:21 PM     Screening of patient nares for MRSA is for surveillance purposes and, if positive, to facilitate isolation considerations in high risk settings. It is not intended for automatic decolonization interventions per se as regimens are not sufficiently effective to warrant routine use. Culture result: LIGHT ENTEROBACTER CLOACAE COMPLEX (A) 11/20/2022 12:39 PM    Culture result: (A) 11/20/2022 12:39 PM     LIGHT Stenotrophomonas (Xantho.) maltophilia CLSI GUIDELINES DO NOT RECOMMEND REPORTING SUSCEPTIBILITIES FOR S. MALTOPHILIA. TRIMETHOPRIM/SULFAMETHOXAZOLE IS THE DRUG OF CHOICE.        Radiology:     Medications       Current Facility-Administered Medications   Medication Dose Route Frequency Last Admin    rivaroxaban (XARELTO) tablet 20 mg  20 mg Oral DAILY 20 mg at 11/30/22 1207    zolpidem (AMBIEN) tablet 5 mg  5 mg Oral QHS PRN 5 mg at 11/29/22 2139    levoFLOXacin (LEVAQUIN) tablet 750 mg  750 mg Oral Q24H 750 mg at 11/30/22 1207    pantoprazole (PROTONIX) tablet 40 mg  40 mg Oral ACB 40 mg at 11/30/22 0635    amLODIPine (NORVASC) tablet 10 mg  10 mg Oral DAILY 10 mg at 11/30/22 0955    atorvastatin (LIPITOR) tablet 10 mg  10 mg Oral DAILY 10 mg at 11/30/22 0955    traMADoL (ULTRAM) tablet 50 mg  50 mg Oral Q6H PRN 50 mg at 11/27/22 7170    hydrALAZINE (APRESOLINE) 20 mg/mL injection 20 mg  20 mg IntraVENous Q6H PRN      cetirizine (ZYRTEC) tablet 10 mg  10 mg Oral DAILY 10 mg at 11/30/22 0846 guaiFENesin-dextromethorphan (ROBITUSSIN DM) 100-10 mg/5 mL syrup 10 mL  10 mL Oral Q6H PRN 10 mL at 11/20/22 1724    losartan/hydroCHLOROthiazide (HYZAAR) 100/25 mg   Oral DAILY Given at 11/30/22 0954    sodium chloride (NS) flush 5-40 mL  5-40 mL IntraVENous Q8H 10 mL at 11/30/22 1538    sodium chloride (NS) flush 5-40 mL  5-40 mL IntraVENous PRN      acetaminophen (TYLENOL) tablet 650 mg  650 mg Oral Q6H  mg at 11/24/22 2113    polyethylene glycol (MIRALAX) packet 17 g  17 g Oral DAILY PRN 17 g at 11/27/22 1630    ondansetron (ZOFRAN ODT) tablet 4 mg  4 mg Oral Q8H PRN      Or    ondansetron (ZOFRAN) injection 4 mg  4 mg IntraVENous Q6H PRN 4 mg at 11/24/22 0859    aspirin delayed-release tablet 81 mg  81 mg Oral DAILY 81 mg at 11/30/22 8250           Case discussed with:       Naz Flor DO

## 2022-11-30 NOTE — PROGRESS NOTES
1515 Physical therapy reports bright red blood in stool . MD made aware. Hemoglobin 10.1 on 11/29/2022.    1600 BP 97/53 & 98/48  RRT nurse aware also MD made aware, awaiting response     1855 BP 99/47after 250 ml bolus. MD made aware.      1910 New order for 250 ml bolus

## 2022-11-30 NOTE — PROGRESS NOTES
Problem: Pressure Injury - Risk of  Goal: *Prevention of pressure injury  Description: Document Vitaly Scale and appropriate interventions in the flowsheet. Outcome: Progressing Towards Goal  Note: Pressure Injury Interventions:  Sensory Interventions: Assess changes in LOC    Moisture Interventions: Absorbent underpads    Activity Interventions: Increase time out of bed    Mobility Interventions: Float heels    Nutrition Interventions: Document food/fluid/supplement intake    Friction and Shear Interventions: Minimize layers                Problem: Patient Education: Go to Patient Education Activity  Goal: Patient/Family Education  Outcome: Progressing Towards Goal     Problem: Falls - Risk of  Goal: *Absence of Falls  Description: Document Nikolas Fall Risk and appropriate interventions in the flowsheet.   Outcome: Progressing Towards Goal  Note: Fall Risk Interventions:  Mobility Interventions: Bed/chair exit alarm, Patient to call before getting OOB         Medication Interventions: Bed/chair exit alarm, Patient to call before getting OOB, Teach patient to arise slowly    Elimination Interventions: Bed/chair exit alarm, Call light in reach, Patient to call for help with toileting needs

## 2022-12-01 LAB
COVID-19 RAPID TEST, COVR: NOT DETECTED
CRP SERPL-MCNC: 1.28 MG/DL (ref 0–0.6)
SOURCE, COVRS: NORMAL

## 2022-12-01 PROCEDURE — 65270000046 HC RM TELEMETRY

## 2022-12-01 PROCEDURE — 74011250637 HC RX REV CODE- 250/637: Performed by: INTERNAL MEDICINE

## 2022-12-01 PROCEDURE — 86140 C-REACTIVE PROTEIN: CPT

## 2022-12-01 PROCEDURE — 87635 SARS-COV-2 COVID-19 AMP PRB: CPT

## 2022-12-01 PROCEDURE — 74011250637 HC RX REV CODE- 250/637: Performed by: STUDENT IN AN ORGANIZED HEALTH CARE EDUCATION/TRAINING PROGRAM

## 2022-12-01 PROCEDURE — 74011000250 HC RX REV CODE- 250: Performed by: STUDENT IN AN ORGANIZED HEALTH CARE EDUCATION/TRAINING PROGRAM

## 2022-12-01 PROCEDURE — 36415 COLL VENOUS BLD VENIPUNCTURE: CPT

## 2022-12-01 PROCEDURE — 74011250637 HC RX REV CODE- 250/637: Performed by: HOSPITALIST

## 2022-12-01 PROCEDURE — 97110 THERAPEUTIC EXERCISES: CPT

## 2022-12-01 RX ORDER — HYDROCORTISONE ACETATE 25 MG/1
25 SUPPOSITORY RECTAL EVERY 12 HOURS
Status: DISCONTINUED | OUTPATIENT
Start: 2022-12-01 | End: 2022-12-02 | Stop reason: HOSPADM

## 2022-12-01 RX ORDER — AMLODIPINE BESYLATE 5 MG/1
5 TABLET ORAL DAILY
Status: DISCONTINUED | OUTPATIENT
Start: 2022-12-01 | End: 2022-12-02 | Stop reason: HOSPADM

## 2022-12-01 RX ADMIN — SODIUM CHLORIDE, PRESERVATIVE FREE 10 ML: 5 INJECTION INTRAVENOUS at 20:13

## 2022-12-01 RX ADMIN — ATORVASTATIN CALCIUM 10 MG: 10 TABLET, FILM COATED ORAL at 09:42

## 2022-12-01 RX ADMIN — PANTOPRAZOLE SODIUM 40 MG: 40 TABLET, DELAYED RELEASE ORAL at 06:07

## 2022-12-01 RX ADMIN — HYDROCORTISONE ACETATE 25 MG: 25 SUPPOSITORY RECTAL at 20:09

## 2022-12-01 RX ADMIN — RIVAROXABAN 20 MG: 10 TABLET, FILM COATED ORAL at 09:41

## 2022-12-01 RX ADMIN — CETIRIZINE HYDROCHLORIDE 10 MG: 10 TABLET, FILM COATED ORAL at 09:00

## 2022-12-01 RX ADMIN — ASPIRIN 81 MG: 81 TABLET, COATED ORAL at 09:41

## 2022-12-01 RX ADMIN — LEVOFLOXACIN 750 MG: 500 TABLET, FILM COATED ORAL at 12:20

## 2022-12-01 RX ADMIN — HYDROCHLOROTHIAZIDE: 25 TABLET ORAL at 09:41

## 2022-12-01 RX ADMIN — SODIUM CHLORIDE, PRESERVATIVE FREE 10 ML: 5 INJECTION INTRAVENOUS at 06:01

## 2022-12-01 NOTE — PROGRESS NOTES
Problem: Self Care Deficits Care Plan (Adult)  Goal: *Acute Goals and Plan of Care (Insert Text)  Description: FUNCTIONAL STATUS PRIOR TO ADMISSION: Patient was modified independent using a single point cane for functional mobility. 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 Owns post op shoe. HOME SUPPORT: The patient lived with wife but did not require assist.    Occupational Therapy Goals  Re-evaluation 11/26/22  1. Patient will perform sit <> stand to prepare for LB dressing tasks with RLE NWB with minimal assistance within 7 day(s). 2.  Patient will perform lower body dressing with AD with moderate assistance  within 7 day(s). 3.  Patient will perform toilet transfers with RLE NWB with moderate assistance  within 7 day(s). 4.  Patient will perform all aspects of toileting with RLE NWB with moderate assistance  within 7 day(s). 5.  Patient will participate in upper extremity therapeutic exercise/activities with supervision/set-up for 8 minutes within 7 day(s). 6.  Patient will utilize energy conservation techniques during functional activities with verbal cues within 7 day(s). Initiated 11/18/2022  1. Patient will perform bathing maintaining RLE NWB with supervision/set-up within 7 day(s). 2.  Patient will perform LB dressing with AD with supervision/set-up within 7 day(s). 3.  Patient will perform toilet transfers while maintaining RLE NWB with supervision/set-up within 7 day(s). 5.  Patient will perform all aspects of toileting with supervision/set-up within 7 day(s). 6.  Patient will participate in upper extremity therapeutic exercise/activities with supervision for 6 minutes within 7 day(s). 7.  Patient will utilize energy conservation techniques during functional activities with verbal cues within 7 day(s).    Outcome: Progressing Towards Goal   OCCUPATIONAL THERAPY TREATMENT  Patient: Royal Rojas (39 y.o. male)  Date: 12/1/2022  Diagnosis: Cellulitis of right lower limb [U70.383] <principal problem not specified>  Procedure(s) (LRB):  RIGHT FOOT 5TH METATARSAL HEAD RESECTION, BONE BIOPSY CALCANEUS, TALAS, AND TIBIA (Right) 6 Days Post-Op  Precautions: NWB  Chart, occupational therapy assessment, plan of care, and goals were reviewed. ASSESSMENT  Patient continues with skilled OT services and is progressing towards goals. Pt engaged with UE exercises bed level today with use of red thera band. Education as to correct form for reps of 10 for shoulders, chest presses. Current Level of Function Impacting Discharge (ADLs): max assist LB dress, min/mod assist x 2 ADL related transfers    Other factors to consider for discharge:          PLAN :  Patient continues to benefit from skilled intervention to address the above impairments. Continue treatment per established plan of care to address goals. Recommend with staff: ADL's, there ex, there act    Recommend next OT session: cont towards goals    Recommendation for discharge: (in order for the patient to meet his/her long term goals)  Therapy up to 5 days/week in SNF setting    This discharge recommendation:  Has not yet been discussed the attending provider and/or case management    IF patient discharges home will need the following DME:        SUBJECTIVE:   Patient stated I just want to get out of here.     OBJECTIVE DATA SUMMARY:   Cognitive/Behavioral Status:  Neurologic State: Alert  Orientation Level: Oriented X4  Cognition: Follows commands             Functional Mobility and Transfers for ADLs:  Bed Mobility:   Min assist    Transfers:   Min to mod assist  2          Balance: intact sitting balance       ADL Intervention:     Set-up grooming  Stand by assist UB bathe/dress  Max assist LB bathe/dress  Mod assist x 2 BSC transfers       Therapeutic Exercises:     EXERCISE   Sets   Reps   Active Active Assist   Passive   Comments   Shoulder flex/ext 2 10 [x]           []           []           Red band   Elbow flex/ext 2 10 [x]           []           []           Red band      []           []           []                 []           []           []                 []           []           []                 []           []           []                 []           []           []                 []           []           []                 []           []           []                 []           []           []                 []           []           []                 Activity Tolerance:   Fair    After treatment patient left in no apparent distress:   Supine in bed and Call bell within reach    COMMUNICATION/COLLABORATION:   The patients plan of care was discussed with: Occupational therapist and Registered nurse.      JOEL Estrada/L  Time Calculation: 10 mins

## 2022-12-01 NOTE — PROGRESS NOTES
Medicare pt has received, reviewed, and signed 2nd IM letter informing them of their right to appeal the discharge. Signed copy has been placed on pt bedside chart.   Angel Paez CMS

## 2022-12-01 NOTE — PROGRESS NOTES
Bedside shift change report given to Rosemary Ruiz (oncoming nurse) by Jovon Roa (offgoing nurse). Report included the following information SBAR, Kardex, MAR, and Recent Results.

## 2022-12-01 NOTE — WOUND CARE
Wound Consult: Follow Up Patient Visit. Consulted for: [facility] management right foot ulcers. Patient resting on Midway bed; Acacia system in place. Vitaly score: 17.  Patient is alert and oriented x 4. Assessment:  Right medial foot - 2 x 1.3 x 1 cm ulcer with moist red wound bed, granulation forming, debo-wound with maceration and some extension of injury noted with darkened skin under callus at posterior edge of wound, no odor or purulence, drainage scant serosanguinous. POA injury. Right plantar heel - 3,2 x 2.8 x 1.8 cm, full thickness injury with red moist base 70%, 30% pale white / yellow over bony heel area. No odor or purulence, some maceration of callus surrounding, some lifts off. Full thickness injury POA. \  Incisonal areas on medial and lateral foot well approximated with sutures, dry. Treatment:  Cleansed with 1/4 S Dakins solution. Washed foot, moisturized skin. Packed wounds with 1/4 S Dakins moistened gauze, then dry gauze, ABD pads, kerlex, ACE wrap. Wound Recommendations:  Plan is for discharge to SNF tomorrow for continued VAC and antibiotic therapy. Removed VAC late in day and placed traditional dressing for discharge tomorrow. Able to moisturize and care for skin on foot. Plan:  Discussed with patient's nurse. Will check on discharge tomorrow. Wound VAC to continue at SNF; if not discharging, will reapply [facility] for inpatient use. Flor Jain RN, ON        Wound and Ostomy Department.   [phone] wound nurse or Perfect Serve

## 2022-12-01 NOTE — PROGRESS NOTES
12/1/2022 4:51 PM Discussed timing of discharge tomorrow with Kishan Fleming at [facility] [address] requested 11AM transport. CM scheduled [facility] wheelchair transport for 11AM on 12/2. [facility] to call pt's wife for payment. Pt and pt's wife are aware of timing and agreeable. Manuelito at [facility] confirmed a rapid COVID can be completed tonight. CM requested rapid COIVD test from pt's Nurse, Eduarda Bonilla to be completed tonight in preparation of discharge tomorrow AM.   Pt's MD also aware of timing of discharge tomorrow. 12/1/2022 4:18 PM CM received voicemail from Kishan Fleming at [facility] [employer] who reviewed paperwork and confirmed no additional information is needed at this time. CM provided pt a copy of paperwork sent to [facility]. 12/1/2022 2:42 PM CM sent pt's completed admission packet along with dd214, COVID vaccination card, copies of insurance cards, and  ID to [facility] via [employer]. CM called and lvm with Manuelito at [facility] and relayed admission packet has been sent via [employer]. 12/1/2022 12:00 PM Approved UAI sent to [facility] and [facility] via [employer]. Admission paperwork received from Kishan Fleming at [facility], [address] printed paperwork and left in pt's window in room. CM has spoken with pt's wife 2x, she is aware paperwork is printed and needing to be filled out. CM confirmed with Manuelito at [facility] they can accept pt on 12/2. Manuelito reported they will need a rapid COVID test done on day of discharge. CM will follow up. YEN Barajas    Care Management Progress Note      ICD-10-CM ICD-9-CM    1. Cellulitis of right lower extremity  L03.115 682.6 traMADoL (ULTRAM) 50 mg tablet      2. Sepsis, due to unspecified organism, unspecified whether acute organ dysfunction present ([facility] [address]  A41.9 038.9      995.91       3. Clot  I82.90 453.9 INVASIVE VASCULAR PROCEDURE      INVASIVE VASCULAR PROCEDURE      4.  Subacute osteomyelitis of foot, unspecified laterality ([facility] [address]  M86.279 730.07       5. Peripheral vascular disease of extremity (HCC)  I73.9 443.9       6. Peripheral polyneuropathy  G62.9 356.9       7. Primary insomnia  F51.01 307.42 zolpidem (AMBIEN) 5 mg tablet          RUR:  13%  Risk Level: [x]Low []Moderate []High  Value-based purchasing: [] Yes [x] No  Bundle patient: [] Yes [x] No   Specify:     Transition of care plan:  Ongoing medical management, pt will need wound vac at [place], [place] [facility] aware need for wound vac  [facility] for [facility]F placement anticipating admission on 12/2  [facility] has been sent competed admission packet  UAI completed and sent to [facility] via [employer]  Pt will need a rapid COVID prior to discharge  Outpatient follow-up with Podiatry.    [facility] wheelchair transport scheduled for 11AM on 12/2

## 2022-12-01 NOTE — PROGRESS NOTES
[facility]  [address] [facility], [facility]  [phone]         Hospitalist Progress Note        NAME:  Raghavendra Suárez   :  1940   MRN:  [id number]    Date/Time:  2022     Patient PCP:  Tye Sarabia MD    Emergency Contact:    Extended Emergency Contact Information  Primary Emergency Contact: [address] Phone: [phone]  Relation: Spouse      Code: Full Code     Isolation Precautions: There are currently no Active Isolations        Subjective:     REASON FOR VISIT:  Recheck foot wounds     HPI & INTERVAL HISTORY:     Mr. Sarika Phan is a 78 y.o. male with history that includes HTN, AFIB on Xarelto, and OLEG on CPAP reports right heel ulcer about 1 week ago which then spread up leg with erythema resulting in cellulitis. : Seen and examined. Doing well no complaints. Jose Angel Amend BPs have been soft requiring 250 ml boluses x 2 yesterday. BP ok today. : Seen and examined. No new complaints however appears to have had some blood in his stools for which Xarelto was held. Patient was upset about this stating that he has been on this medication for years and that he has had hemorrhoids for years and occasionally does have bleeding. Once this Xarelto restarted and understands the risks.       ALLERGIES  Allergies   Allergen Reactions    Ace Inhibitors Cough     Patients states he is not allergic    Nsaids (Non-Steroidal Anti-Inflammatory Drug) Other (comments)     \"causes blood in urine\"    Pcn [Penicillins] Rash    Percocet [Oxycodone-Acetaminophen] Other (comments)     constipation         ROS:  Gen:   Negative  Resp:  negative  CVS:   negative  GI:       negative           Objective:      Visit Vitals  /63 (BP 1 Location: Left upper arm, BP Patient Position: At rest;Lying)   Pulse 66   Temp 98 °F (36.7 °C)   Resp 16   Ht 5' 11\" (1.803 m)   Wt 95.3 kg (210 lb)   SpO2 97%   BMI 29.29 kg/m²       Physical Exam:  General: no distress  Head: Normocephalic, without obvious abnormality, atraumatic  Eyes: anicteric sclerae and conjuntiva clear  ENT: lips, mucosa, and tongue normal  Neck: normal, supple, and no tenderness  Lungs: clear to auscultation with good breath sounds and normal respiratory effort  Heart: S1, S2 normal, regular rate, and regular rhythm  Abd: not distended, soft, nontender, BS present and normactive  Ext: no cyanosis and no edema  Skin: normal skin color, no rashes, and texture normal  Neuro:  alert, oriented, no defects noted in general exam.  Psych: not anxious, cooperative, affect somewhat angry      Medications:  Current Facility-Administered Medications   Medication Dose Route Frequency    rivaroxaban (XARELTO) tablet 20 mg  20 mg Oral DAILY    zolpidem (AMBIEN) tablet 5 mg  5 mg Oral QHS PRN    levoFLOXacin (LEVAQUIN) tablet 750 mg  750 mg Oral Q24H    pantoprazole (PROTONIX) tablet 40 mg  40 mg Oral ACB    amLODIPine (NORVASC) tablet 10 mg  10 mg Oral DAILY    atorvastatin (LIPITOR) tablet 10 mg  10 mg Oral DAILY    traMADoL (ULTRAM) tablet 50 mg  50 mg Oral Q6H PRN    hydrALAZINE (APRESOLINE) 20 mg/mL injection 20 mg  20 mg IntraVENous Q6H PRN    cetirizine (ZYRTEC) tablet 10 mg  10 mg Oral DAILY    guaiFENesin-dextromethorphan (ROBITUSSIN DM) 100-10 mg/5 mL syrup 10 mL  10 mL Oral Q6H PRN    losartan/hydroCHLOROthiazide (HYZAAR) 100/25 mg   Oral DAILY    sodium chloride (NS) flush 5-40 mL  5-40 mL IntraVENous Q8H    sodium chloride (NS) flush 5-40 mL  5-40 mL IntraVENous PRN    acetaminophen (TYLENOL) tablet 650 mg  650 mg Oral Q6H PRN    polyethylene glycol (MIRALAX) packet 17 g  17 g Oral DAILY PRN    ondansetron (ZOFRAN ODT) tablet 4 mg  4 mg Oral Q8H PRN    Or    ondansetron (ZOFRAN) injection 4 mg  4 mg IntraVENous Q6H PRN    aspirin delayed-release tablet 81 mg  81 mg Oral DAILY        Labs:  Recent Labs     11/29/22  0358   WBC 15.3*   HGB 10.1*   HCT 30.8*   *       No results for input(s): NA, K, CL, CO2, GLU, BUN, CREA, CA, MG, PHOS, ALB, TBIL, TBILI, ALT in the last 72 hours. No lab exists for component: SGOT        Radiology:  No results found. The labs, imaging studies, medications, and consultants notes was reviewed by me on: December 1, 2022         Assessment/Plan:      Right Heel Ulcer / Right lower extremity cellulitis / Acute osteomyelitis (OM) of fifth metarsal head / Septic tibiotalar arthritis - POA, on 11/25 had right fifth metatarsal head resection, bone biopsy of the talus, calcaneus, and tibia. Follow cx and path. Blister cx enterobacter and stenotrophomonas. Improved cefepime, flagyl, stopped clindamycin. ID has determined he can go on 10d PO levaquin. NWB on foot. Pain control with prn ultram.  Got Wound vac 11/28. He can go to SNF vs IPR now that he is cleared by podiatry. Awaiting discharge to [facility] likely tomorrow. PAD / Occlusion of the right mid-distal posterior tibial artery and right mid-distal anterior tibial artery - Angio 11/23 with balloon angioplasty alone. Continue ASA, resumed xarelto. Continue statin. Hypotension - I have cut back on norvasc    Paroxysmal Atrial fibrillation - Had been stable on xarelto; held for surgery. He has not required rate control. Xarelto was held due to blood in stools. As mentioned above patient wants to be restarted on medication states this is happened in the past because of hemorrhoids and it resolved on his own. He understands the risks of restarting the medication and at this point hemoglobin was stable we will go ahead and restart and continue to monitor. Hematochezia -likely from hemorrhoid will order suppository for his history. Refused GI evaluation. States that he has colonoscopy pending few months and politely declined. Debilitated patient - POA due to foot issues and will be NWB. Will need SNF vs IPR     Anemia - Mild and stable. Unremarkable serologies. Hypocalcemia / Hypophosphatemia / Hypokalemia - Replace and monitor     Hypertension / Hypotension - Continue home Hyzaar and cut back on Norvasc to 5 mg d/t hypotension     OLEG - CPAP nightly     Insomnia - low dose ambien prn    Body mass index is 29.29 kg/m².:  25.0 - 29.9:  Overweight and Weight loss advised    F: None  E: Stable and Monitor  N: DIET ONE TIME MESSAGE  DIET ONE TIME MESSAGE  ADULT DIET Regular       Discussed:  Pt's condition, Imaging findings, Lab findings, Assessment, Care Plan, and D/C Planning discussed with: Patient, RN, and Care Manager    Prophylaxis:  Xarelto    Anticipated discharge disposition:  IPR    HR DC Barriers: Awaiting placement      Total time: -25- minutes **I personally saw and examined the patient during this time period**      Date of service:    12/1/2022                ___________________________________________________    Admitting Physician: Jacob Trevino MD

## 2022-12-02 VITALS
RESPIRATION RATE: 18 BRPM | TEMPERATURE: 98.3 F | HEART RATE: 60 BPM | WEIGHT: 210 LBS | BODY MASS INDEX: 29.4 KG/M2 | SYSTOLIC BLOOD PRESSURE: 122 MMHG | DIASTOLIC BLOOD PRESSURE: 70 MMHG | OXYGEN SATURATION: 97 % | HEIGHT: 71 IN

## 2022-12-02 PROCEDURE — 74011250637 HC RX REV CODE- 250/637: Performed by: HOSPITALIST

## 2022-12-02 PROCEDURE — 74011250637 HC RX REV CODE- 250/637: Performed by: STUDENT IN AN ORGANIZED HEALTH CARE EDUCATION/TRAINING PROGRAM

## 2022-12-02 PROCEDURE — 74011250637 HC RX REV CODE- 250/637: Performed by: INTERNAL MEDICINE

## 2022-12-02 RX ORDER — HYDROCORTISONE ACETATE 25 MG/1
25 SUPPOSITORY RECTAL EVERY 12 HOURS
Qty: 30 SUPPOSITORY | Refills: 0 | Status: SHIPPED
Start: 2022-12-02

## 2022-12-02 RX ADMIN — RIVAROXABAN 20 MG: 10 TABLET, FILM COATED ORAL at 09:10

## 2022-12-02 RX ADMIN — HYDROCHLOROTHIAZIDE: 25 TABLET ORAL at 09:13

## 2022-12-02 RX ADMIN — CETIRIZINE HYDROCHLORIDE 10 MG: 10 TABLET, FILM COATED ORAL at 09:13

## 2022-12-02 RX ADMIN — AMLODIPINE BESYLATE 5 MG: 5 TABLET ORAL at 09:12

## 2022-12-02 RX ADMIN — ATORVASTATIN CALCIUM 10 MG: 10 TABLET, FILM COATED ORAL at 09:10

## 2022-12-02 RX ADMIN — PANTOPRAZOLE SODIUM 40 MG: 40 TABLET, DELAYED RELEASE ORAL at 07:07

## 2022-12-02 NOTE — PROGRESS NOTES
Problem: Pressure Injury - Risk of  Goal: *Prevention of pressure injury  Description: Document Vitaly Scale and appropriate interventions in the flowsheet. Outcome: Progressing Towards Goal  Note: Pressure Injury Interventions:  Sensory Interventions: Assess changes in LOC    Moisture Interventions: Absorbent underpads    Activity Interventions: Increase time out of bed    Mobility Interventions: Float heels    Nutrition Interventions: Document food/fluid/supplement intake    Friction and Shear Interventions: Minimize layers                Problem: Patient Education: Go to Patient Education Activity  Goal: Patient/Family Education  Outcome: Progressing Towards Goal     Problem: Falls - Risk of  Goal: *Absence of Falls  Description: Document Nikolas Fall Risk and appropriate interventions in the flowsheet.   Outcome: Progressing Towards Goal  Note: Fall Risk Interventions:  Mobility Interventions: Bed/chair exit alarm         Medication Interventions: Bed/chair exit alarm    Elimination Interventions: Call light in reach              Problem: Patient Education: Go to Patient Education Activity  Goal: Patient/Family Education  Outcome: Progressing Towards Goal     Problem: Cellulitis Care Plan (Adult)  Goal: *Absence of infection signs and symptoms  Outcome: Progressing Towards Goal     Problem: Patient Education: Go to Patient Education Activity  Goal: Patient/Family Education  Outcome: Progressing Towards Goal     Problem: Patient Education: Go to Patient Education Activity  Goal: Patient/Family Education  Outcome: Progressing Towards Goal     Problem: Nutrition Deficit  Goal: *Optimize nutritional status  Outcome: Progressing Towards Goal     Problem: Infection - Risk of, Surgical Site Infection  Goal: *Absence of surgical site infection signs and symptoms  Outcome: Progressing Towards Goal     Problem: Patient Education: Go to Patient Education Activity  Goal: Patient/Family Education  Outcome: Progressing Towards Goal

## 2022-12-02 NOTE — PROGRESS NOTES
LEONEL:   1) SNF placement  2) Risk of readmission- 11% LOW     Hospital Day 15:  8:45 AM- Pt remains on Ortho- stable for d/c. CM spoke with Manuelito at [address]- they just need the rapid and the D/C Summary- CM faxed to [phone]. CM spoke with pt's wife- she is aware and agreeable- RN updated, see LEONEL note for more details. Care Management Interventions  PCP Verified by CM:  Yes  Mode of Transport at Discharge: Self  Transition of Care Consult (CM Consult): Discharge Planning  MyChart Signup: No  Discharge Durable Medical Equipment: No  Health Maintenance Reviewed: Yes  Physical Therapy Consult: Yes  Occupational Therapy Consult: Yes  Speech Therapy Consult: No  Support Systems: Spouse/Significant Other, Other Family Member(s)  Confirm Follow Up Transport: Family  The Plan for Transition of Care is Related to the Following Treatment Goals : IPR vs SNF  The Patient and/or Patient Representative was Provided with a Choice of Provider and Agrees with the Discharge Plan?: Yes  Name of the Patient Representative Who was Provided with a Choice of Provider and Agrees with the Discharge Plan: Spoke with pt's wife  Freedom of Choice List was Provided with Basic Dialogue that Supports the Patient's Individualized Plan of Care/Goals, Treatment Preferences and Shares the Quality Data Associated with the Providers?: Yes  [employer] Information Provided?: Yes  Discharge Location  Patient Expects to be Discharged to[de-identified] [address] [place] [address], [facility], [address]

## 2022-12-02 NOTE — DISCHARGE SUMMARY
[facility]  [address] [place] [address] 19  [phone] [id number] SUMMARY        Name:  Jeffery Stanley, 78 y.o.  :    1940  MRN:    [id number]  PCP:    Avis Baltazar MD    Code: Full Code    Date of Admission: 2022  Date of Discharge:   2022 9:44 PM  Disposition:  [facility]  Condition:  improved, stable, and good    Consultations:  IP CONSULT TO PODIATRY  IP CONSULT TO INFECTIOUS DISEASES  IP CONSULT TO VASCULAR SURGERY    Reason for Admission:   Cellulitis of right lower limb [L03.115]    Discharge Diagnoses: Active Problems:    Cellulitis of right lower limb (2022)        Excerpted HPI from H&P of Waleska Voss MD:  Jeffery Stanley is a 78 y.o. male with htn, afib on xarelto, yovany on cpap who presents to hospital with complaints of right leg pain. Patient and wife at bedside state they noted a right heel ulcer about 1 week ago. 2 days after noticing ulcer they have noticed progressive erythema, warmth and redness creeping up his right lower extremity. They returned from [place] yesterday and proceeded to come to hospital for further evaluation today. Has chronic history of bilateral lower extremity neuropathy and a right anterior forefoot ulcer for which she is followed by podiatry. The patient denies any fever, chills, chest or abdominal pain, nausea, vomiting, cough, congestion, recent illness, palpitations, or dysuria. Remarkable vitals on ER Presentations: vss  Labs Remarkable for: wbc 15.4, glu 158, cr 1.72  ER Images: None. ER treatment: Cefepime      Brief Hospital Course:  Right Heel Ulcer / Right lower extremity cellulitis / Acute osteomyelitis (OM) of fifth metarsal head / Septic tibiotalar arthritis - POA, on  had right fifth metatarsal head resection, bone biopsy of the talus, calcaneus, and tibia. Follow cx and path. Blister cx enterobacter and stenotrophomonas.  Improved cefepime, flagyl, stopped clindamycin. ID has determined he can go on 10d PO levaquin. NWB on foot. Pain control with prn ultram.  Got Wound vac 11/28. He can go to SNF vs IPR now that he is cleared by podiatry     PAD / Occlusion of the right mid-distal posterior tibial artery and right mid-distal anterior tibial artery - Angio 11/23 with balloon angioplasty alone. Resumed xarelto. Added statin ASA prescribed but he has refused stating that it causes GIB . Nena Jovita Paroxysmal Atrial fibrillation - Had been stable on xarelto; held for surgery. He has not required rate control     Hematochezia -likely from hemorrhoid will order suppository for his history. Refused GI evaluation. States that he has colonoscopy pending few months and politely declined. Debilitated patient - POA due to foot issues and will be NWB. Will need SNF vs IPR     Anemia - Mild and stable. Unremarkable serologies.      Hypocalcemia / Hypophosphatemia / Hypokalemia - Replace and monitor     Hypertension - Continue home Hyzaar, added Norvasc     OLEG / obese - Advise weight los and CPAP nightly     Insomnia - add low dose ambien prn      Visit Vitals  /72 (BP 1 Location: Left upper arm, BP Patient Position: At rest;Lying)   Pulse 61   Temp 98 °F (36.7 °C)   Resp 16   Ht 5' 11\" (1.803 m)   Wt 95.3 kg (210 lb)   SpO2 96%   BMI 29.29 kg/m²       Physical Exam:  General: alert, well appearing, and no distress  Head: Normocephalic, without obvious abnormality, atraumatic  Eyes: anicteric sclerae and conjuntiva clear  ENT: lips, mucosa, and tongue normal  Neck: normal, supple, and no tenderness  Lungs: clear to auscultation with good breath sounds and normal respiratory effort  Heart: S1, S2 normal, regular rate, and regular rhythm  Abd: not distended, soft, nontender, BS present and normactive  Ext: no cyanosis and no edema  Skin: normal skin color, no rashes, and texture normal  Neuro:  alert, oriented x 3, no defects noted in general exam.  Psych: not anxious, cooperative, appropriate affect       Labs:  No results for input(s): WBC, HGB, HCT, PLT, HGBEXT, HCTEXT, PLTEXT, HGBEXT, HCTEXT, PLTEXT in the last 72 hours. No results for input(s): NA, K, CL, CO2, GLU, BUN, CREA, CA, MG, PHOS, ALB, TBIL, TBILI, ALT in the last 72 hours. No lab exists for component: SGOT  No results found. PATIENT INSTRUCTIONS       Activity: Activity as tolerated  Diet: Regular Diet    Wound Care:  See surgical/procedure care instructions  Follow-up(s): Follow-up Information       Follow up With Specialties Details Why Contact Info    Alfonso Lim MD [facility] Medicine Schedule an appointment as soon as possible for a visit in 1 week(s)  [address] [place]  Schedule an appointment as soon as possible for a visit in 1 week(s)  [address] [place] [address] [facility]  [phone]             Current Discharge Medication List        START taking these medications    Details   zolpidem (AMBIEN) 5 mg tablet Take 1 Tablet by mouth nightly as needed for Sleep for up to 30 days. Max Daily Amount: 5 mg. Qty: 20 Tablet, Refills: 0    Associated Diagnoses: Primary insomnia      levoFLOXacin (LEVAQUIN) 750 mg tablet Take 1 Tablet by mouth every twenty-four (24) hours for 10 days. Qty: 10 Tablet, Refills: 0      amLODIPine (NORVASC) 10 mg tablet Take 1 Tablet by mouth daily for 30 days. Qty: 30 Tablet, Refills: 0      atorvastatin (LIPITOR) 10 mg tablet Take 1 Tablet by mouth daily for 30 days. Qty: 30 Tablet, Refills: 0      traMADoL (ULTRAM) 50 mg tablet Take 1 Tablet by mouth every six (6) hours as needed for Pain for up to 3 days. Max Daily Amount: 200 mg. Qty: 10 Tablet, Refills: 0    Associated Diagnoses: Cellulitis of right lower extremity      pantoprazole (PROTONIX) 40 mg tablet Take 1 Tablet by mouth Daily (before breakfast) for 30 days.   Qty: 30 Tablet, Refills: 0           CONTINUE these medications which have CHANGED    Details           CONTINUE these medications which have NOT CHANGED    Details   losartan-hydroCHLOROthiazide (HYZAAR) 100-25 mg per tablet TAKE ONE TABLET BY MOUTH DAILY  Qty: 90 Tab, Refills: 0      acetaminophen (Tylenol Extra Strength) 500 mg tablet Take 1-2 Tabs by mouth every six (6) hours as needed for Pain. Not to exceed 4,000mg in any 24 hour period  Indications: pain  Qty: 60 Tab, Refills: 0      ondansetron (ZOFRAN ODT) 8 mg disintegrating tablet Take 0.5 Tabs by mouth every eight (8) hours as needed for Nausea. Qty: 30 Tab, Refills: 0      rivaroxaban (XARELTO) 20 mg tab tablet Take 1 Tab by mouth daily (with dinner). Indications: prevention of thromboembolism in paroxysmal atrial fibrillation  Qty: 90 Tab, Refills: 3      VIT C/E/ZN/COPPR/LUTEIN/ZEAXAN (PRESERVISION AREDS 2 PO) Take  by mouth two (2) times a day. fish oil-omega-3 fatty acids 340-1,000 mg capsule Take 1 Cap by mouth daily (after breakfast). magnesium 250 mg tab Take 400 mg by mouth daily (after dinner). Associated Diagnoses: Atrial fibrillation (HCC)      multivitamin (ONE A DAY) tablet Take 1 Tab by mouth daily (after breakfast). ASCORBATE CALCIUM (VITAMIN C PO) Take 500 mg by mouth daily (after breakfast). cholecalciferol (VITAMIN D3) (2,000 UNITS /50 MCG) cap capsule Take 2,000 Units by mouth daily (after dinner). gabapentin (NEURONTIN) 100 mg capsule T1 tablet at breakfast and 3 tablets at night. Qty: 120 Cap, Refills: 1    Comments: Not to exceed 2000mg  Associated Diagnoses: Arthritis of right hip      fluticasone propionate (FLONASE) 50 mcg/actuation nasal spray 2 Sprays by Both Nostrils route as needed. loratadine (CLARITIN) 10 mg tablet loratadine 10 mg tablet   Take 1 tablet every day by oral route as directed. UBIQUINONE PO Take 100 mg by mouth daily. psyllium (METAMUCIL) powd Take  by mouth daily.       ipratropium (ATROVENT) 0.03 % nasal spray by Both Nostrils route as needed.              Discharge Plan D/W:  Patient and Care Manager          **Total time spent coordinating discharge (preparing & going over instructions, follow-ups, prescriptions, and documentation):  32 minutes                 ___________________________________________________    Admitting Physician: Brooklyn Jones MD   Date of service:    12/2/2022         CC: Naima Smallwood MD

## 2022-12-02 NOTE — PROGRESS NOTES
Transition of Care Plan to SNF/Rehab    SNF/Rehab Transition:  Patient has been accepted to [facility] and meets criteria for admission. Patient will transported by Hospital to Home and expected to leave at 11:00 AM.     Communication to Patient/Family:  Met with patient and spoke with pt's wife and they are agreeable to the transition plan. Communication to SNF/Rehab:  Bedside RN has been notified to update the transition plan to the facility and call report (phone number [phone]). Discharge information has been updated on the AVS.     Discharge instructions to be fax'd to facility at (Fax #  [phone]). Nursing Please include all hard scripts for controlled substances, med rec and dc summary, and AVS in packet. SNF/Rehab Transition:    Reviewed and confirmed with facility, [facility]- they can manage the patient care needs for the following:     Kindra  with (X) only those applicable:    Medication:  [x]  Medications will be available at the facility  [x]  IV Antibiotics   [x]  Controlled Substance - hard copy to be sent with patient   [x]  Weekly Labs   Documents:  [x] Hard RX  [x] MAR  [x] Kardex  [x] AVS  [x]Transfer Summary  [x]Discharge   Equipment:  []  CPAP/BiPAP  [x]  Wound Vacuum  []  Diaz or Urinary Device  []  PICC/Central Line  []  Nebulizer  []  Ventilator   Treatment:  []Isolation (for MRSA, VRE, etc.)  []Surgical Drain Management  []Tracheostomy Care  []Dressing Changes  []Dialysis with transportation and chair time   []PEG Care  []Oxygen  []Daily Weights for Heart Failure   Dietary:  [x]Any diet limitations  []Tube Feedings   []Total Parenteral Management (TPN)   Eligible for Medicaid Long Term Services and Supports  Yes:  [x] Eligible for medical assistance or will become eligible within 180 days and UAI completed. [] Provider/Patient and/or support system has requested screening.   [] UAI copy provided to patient or responsible party  [] UAI unavailable at discharge will send once processed to SNF provider. [] UAI unavailable at discharged mailed to patient  No:   [] Private pay and is not financially eligible for Medicaid within the next 180 days. [] Reside out-of-state.   [] A residents of a state owned/operated facility that is licensed  by [address] [facility] or [facility]  [] Enrollment in [facility] hospice services  []  Medical [facility]  [] Patient /Family declines to have screening completed or provide financial information for screening     Financial Resources:  Medicaid    [] Initiated and application pending   [] Full coverage     Advanced Care Plan:  []Surrogate Decision Maker of Care  [x]POA  []Communicated Code Status- FULL CODE   Other       NOEMI Quispe, REBA Chahal

## 2022-12-02 NOTE — PROGRESS NOTES
Bedside shift change report given to Seb Colón  (oncoming nurse) by Silvia hAmadi (offgoing nurse). Report included the following information SBAR, Kardex, MAR, and Recent Results.

## 2023-05-26 RX ORDER — HYDROCORTISONE ACETATE 25 MG/1
25 SUPPOSITORY RECTAL EVERY 12 HOURS
COMMUNITY
Start: 2022-12-02

## 2023-05-26 RX ORDER — IPRATROPIUM BROMIDE 21 UG/1
SPRAY, METERED NASAL PRN
COMMUNITY
Start: 2017-08-07

## 2023-05-26 RX ORDER — LORATADINE 10 MG/1
TABLET ORAL
COMMUNITY

## 2023-05-26 RX ORDER — LOSARTAN POTASSIUM AND HYDROCHLOROTHIAZIDE 25; 100 MG/1; MG/1
1 TABLET ORAL DAILY
COMMUNITY
Start: 2020-12-28

## 2023-05-26 RX ORDER — ONDANSETRON 8 MG/1
4 TABLET, ORALLY DISINTEGRATING ORAL EVERY 8 HOURS PRN
COMMUNITY
Start: 2020-09-21

## 2023-05-26 RX ORDER — ACETAMINOPHEN 500 MG
500-1000 TABLET ORAL EVERY 6 HOURS PRN
COMMUNITY
Start: 2020-09-21

## 2023-05-26 RX ORDER — FLUTICASONE PROPIONATE 50 MCG
2 SPRAY, SUSPENSION (ML) NASAL PRN
COMMUNITY

## 2023-05-26 RX ORDER — GABAPENTIN 100 MG/1
CAPSULE ORAL
COMMUNITY
Start: 2020-09-21

## 2023-10-16 ENCOUNTER — HOSPITAL ENCOUNTER (OUTPATIENT)
Facility: HOSPITAL | Age: 83
Discharge: HOME | End: 2023-10-16
Attending: SURGERY
Payer: MEDICARE

## 2023-10-16 VITALS
TEMPERATURE: 97.2 F | DIASTOLIC BLOOD PRESSURE: 77 MMHG | SYSTOLIC BLOOD PRESSURE: 188 MMHG | RESPIRATION RATE: 18 BRPM | HEART RATE: 62 BPM

## 2023-10-16 DIAGNOSIS — I73.9 PAD (PERIPHERAL ARTERY DISEASE) (HCC): ICD-10-CM

## 2023-10-16 DIAGNOSIS — S91.301A OPEN WOUND OF RIGHT FOOT, INITIAL ENCOUNTER: ICD-10-CM

## 2023-10-16 DIAGNOSIS — G62.9 PERIPHERAL POLYNEUROPATHY: ICD-10-CM

## 2023-10-16 PROCEDURE — 99214 OFFICE O/P EST MOD 30 MIN: CPT

## 2023-10-16 PROCEDURE — 99203 OFFICE O/P NEW LOW 30 MIN: CPT | Performed by: SURGERY

## 2023-10-16 RX ORDER — GENTAMICIN SULFATE 1 MG/G
OINTMENT TOPICAL ONCE
OUTPATIENT
Start: 2023-10-16 | End: 2023-10-16

## 2023-10-16 RX ORDER — LIDOCAINE HYDROCHLORIDE 40 MG/ML
SOLUTION TOPICAL ONCE
OUTPATIENT
Start: 2023-10-16 | End: 2023-10-16

## 2023-10-16 RX ORDER — GINSENG 100 MG
CAPSULE ORAL ONCE
OUTPATIENT
Start: 2023-10-16 | End: 2023-10-16

## 2023-10-16 RX ORDER — IBUPROFEN 200 MG
TABLET ORAL ONCE
OUTPATIENT
Start: 2023-10-16 | End: 2023-10-16

## 2023-10-16 RX ORDER — TRIAMCINOLONE ACETONIDE 1 MG/G
OINTMENT TOPICAL ONCE
OUTPATIENT
Start: 2023-10-16 | End: 2023-10-16

## 2023-10-16 RX ORDER — LIDOCAINE 50 MG/G
OINTMENT TOPICAL ONCE
OUTPATIENT
Start: 2023-10-16 | End: 2023-10-16

## 2023-10-16 RX ORDER — BACITRACIN ZINC AND POLYMYXIN B SULFATE 500; 1000 [USP'U]/G; [USP'U]/G
OINTMENT TOPICAL ONCE
OUTPATIENT
Start: 2023-10-16 | End: 2023-10-16

## 2023-10-16 RX ORDER — LIDOCAINE HYDROCHLORIDE 20 MG/ML
JELLY TOPICAL ONCE
OUTPATIENT
Start: 2023-10-16 | End: 2023-10-16

## 2023-10-16 RX ORDER — LIDOCAINE 40 MG/G
CREAM TOPICAL ONCE
OUTPATIENT
Start: 2023-10-16 | End: 2023-10-16

## 2023-10-16 RX ORDER — MAGNESIUM 30 MG
30 TABLET ORAL 2 TIMES DAILY
COMMUNITY

## 2023-10-16 RX ORDER — SODIUM CHLOR/HYPOCHLOROUS ACID 0.033 %
SOLUTION, IRRIGATION IRRIGATION ONCE
OUTPATIENT
Start: 2023-10-16 | End: 2023-10-16

## 2023-10-16 RX ORDER — BETAMETHASONE DIPROPIONATE 0.05 %
OINTMENT (GRAM) TOPICAL ONCE
OUTPATIENT
Start: 2023-10-16 | End: 2023-10-16

## 2023-10-16 RX ORDER — CLOBETASOL PROPIONATE 0.5 MG/G
OINTMENT TOPICAL ONCE
OUTPATIENT
Start: 2023-10-16 | End: 2023-10-16

## 2023-10-16 NOTE — FLOWSHEET NOTE
10/16/23 1454   Anesthetic   Anesthetic 4% Lidocaine Liquid Topical   Right Leg Edema Point of Measurement   Leg circumference 43.2 cm   Ankle circumference 25.5 cm   Left Leg Edema Point of Measurement   Leg circumference 38 cm   Ankle circumference 26 cm   Peripheral Vascular   RLE Edema +2;Pitting   RLE Neurovascular Assessment   Capillary Refill Less than/Equal to 3 seconds   Color Appropriate for Ethnicity   Temperature Cool   R Pedal Pulse +1   LLE Neurovascular Assessment   Capillary Refill Less than/Equal to 3 seconds   Color Appropriate for Ethnicity   Temperature Cool   L Pedal Pulse +1   Wound 10/16/23 Foot Right;Plantar #1   Date First Assessed/Time First Assessed: 10/16/23 1454   Present on Original Admission: Yes  Location: Foot  Wound Location Orientation: Right;Plantar  Wound Description (Comments): #1   Wound Image    Wound Cleansed Cleansed with saline   Wound Length (cm) 1 cm   Wound Width (cm) 0.8 cm   Wound Depth (cm) 2.2 cm   Wound Surface Area (cm^2) 0.8 cm^2   Wound Volume (cm^3) 1.76 cm^3   Wound Assessment Other (Comment); Pale granulation tissue  (silver nitrate/gray)   Drainage Amount Moderate (25-50%)   Drainage Description Serosanguinous;Purulent   Odor None   Giselle-wound Assessment Dry/flaky   Margins Epibole (rolled edges)   Wound Thickness Description not for Pressure Injury Full thickness   Wound 10/16/23 Foot Right;Lateral #2   Date First Assessed/Time First Assessed: 10/16/23 1454   Present on Original Admission: Yes  Location: Foot  Wound Location Orientation: Right;Lateral  Wound Description (Comments): #2   Wound Image    Wound Cleansed Cleansed with saline   Wound Length (cm) 1.6 cm   Wound Width (cm) 2.4 cm   Wound Depth (cm) 1 cm   Wound Surface Area (cm^2) 3.84 cm^2   Wound Volume (cm^3) 3.84 cm^3   Distance Tunneling (cm) 1.5 cm   Tunneling Position ___ O'Clock 4   Undermining Starts ___ O'Clock 12   Undermining Ends___ O'Clock 12   Undermining Maxium Distance (cm) .8

## 2023-10-16 NOTE — PROGRESS NOTES
Wound care    The patient is an 78-year-old man who was referred to the wound care center regarding 2 wounds on the right foot. The patient was first seen in the wound care center on 10/16/2023. The patient has history of hospitalization at [facility] 11/16/2022 through 12/2/2022 for infection involving right foot and leg associated with heel ulcer and forefoot ulcer. At that hospitalization he had right fifth metatarsal head resection and also had bone biopsy of talus calcaneus and tibia. He was discharged at that time with a wound VAC. The patient also at that hospital admission was found to have severe peripheral artery disease and underwent tibial artery angioplasty. The patient shortly prior to 10/16/2023 had returned to [place] from [place]. In [place] he was treated for his plantar heel ulcer at a wound care center. He received 60 hyperbaric oxygen treatments. The patient's wife reports that more recently while in [place] he developed an ulcer on the outer aspect of his proximal right foot. The patient does not have history of diabetes mellitus. He does have history of very significant peripheral neuropathy involving both lower extremities. He reports numbness of both feet. The patient has history of atrial fibrillation and does take chronic anticoagulation. There is no history of MI or coronary intervention. The patient ambulates limited distances with a rolling walker. He does not describe shortness of breath with exertion. The patient uses a heel offloading type shoe. 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 He quit smoking in the 1980s.         Reported weight 210 pounds height 5 feet 11 inches    Physical examination    Vital signs blood pressure 188/77 pulse 62 temperature 97.2 respirations 18    The patient is alert man in no acute

## 2023-10-16 NOTE — FLOWSHEET NOTE
10/16/23 1554   Wound 10/16/23 Foot Right;Plantar #1   Date First Assessed/Time First Assessed: 10/16/23 1454   Present on Original Admission: Yes  Location: Foot  Wound Location Orientation: Right;Plantar  Wound Description (Comments): #1   Dressing/Treatment Alginate with Ag;Gauze dressing/dressing sponge;Roll gauze;Tape/Soft cloth adhesive tape   Wound 10/16/23 Foot Right;Lateral #2   Date First Assessed/Time First Assessed: 10/16/23 1454   Present on Original Admission: Yes  Location: Foot  Wound Location Orientation: Right;Lateral  Wound Description (Comments): #2   Dressing/Treatment Alginate with Ag;Gauze dressing/dressing sponge;Roll gauze;Tape/Soft cloth adhesive tape     Discharge Condition: Stable    Pain: 0    Ambulatory Status:Walking and Walker    Discharge Destination: Home    Transportation:Car    Accompanied by: Self and Family    Discharge instructions reviewed with Self and Family and copy or written instructions have been provided. All questions/concerns have been addressed at this time.

## 2023-10-16 NOTE — PROGRESS NOTES
Face to Face Documentation for [address] [place] [address] Name: Yoav Goodson    YOB: 1940    Date of Face to Face:  10/16/2023                                    Face to Face Encounter findings are related to primary reason for home care:   yes    I certify that this patient is under my care and has a Face to Face Encounter related to the primary reason for home health. 1. I certify that the patient needs intermittent skilled nursing care for wound care. 2. I certify that this patient is homebound for the following reason(s): Requires considerable and taxing effort to leave the home  and Requires the assistance of 1 or more persons to leave the home     3.  The qualifying diagnosis is  open wounds right foot (S91.301A), peripheral neuropathy (G62.9), peripheral artery disease (I73.9)        Olamide Espinal MD 10/16/2023 4:19 PM

## 2023-10-16 NOTE — PATIENT INSTRUCTIONS
Discharge Instructions for  [address]  Telephone: [phone]    NAME:  Noel Tucker OF BIRTH:  1940  ACCOUNT NUMBER :  [de-identified]  DATE:  10/16/2023       : MARCIA FLORES RN     HOME HEALTH: Will send referral    WOUND SUPPLIES:     REFERRALS or ORDERS:     Wound Cleansing:   Do not scrub or use excessive force. Cleanse wound prior to applying a clean dressing with:      [] Cleanse wound with: BABY SHAMPOO LATHER  LEAVE on 1-2 MINUTES ON WOUND THEN RINSE WITH WATER OR SALINE    [] May Shower at Discharge     [] Shower on days of dressing change, remove dressing first    [x] Keep Wound Dry in Shower (may purchase a cast cover if needed)     Topical Treatments:  Do not apply lotions, creams, or ointments to wound bed unless directed. [x] Apply moisturizing lotion A&D ointment  to skin surrounding the wound prior to dressing change.  [] Apply antifungal ointment to skin surrounding the wound prior to dressing change.  [] Apply thin film of Zinc barrier ointment to skin immediately around wound.        Dressings:           Wound Location right foot wounds   Apply Primary Dressing:       [] MediHoney Gel    [] MediHoney Alginate  [] Alginate     [x] Alginate with Silver (aquacel)      [] Collagen   [] Collagen with Silver     [] Hydrocolloid  [] Hydrofera Blue Classic (moisten with saline)  [] Hydrofera Blue Ready  [] Other:    [x] Pack wound loosely with  aquacel to heel wound    Cover and Secure with:    [x] Gauze   [] Toma   [x] Kerlix  [] Ace Wrap     [x] ABD  [] Medipore tape  [x] tape      Change dressing:   [] Daily      [] Every Other Day   [x] Three times per week  [] Once a week   [] Do Not Change Dressing     [] Other:    Home Health change  :   [] Daily      [] Every Other Day   [x] Three times a week  [] Once a week   [] Do Not Change Dressing     [] Other:      Off-Loading: [x] Surgical shoe

## 2023-10-23 ENCOUNTER — HOSPITAL ENCOUNTER (OUTPATIENT)
Facility: HOSPITAL | Age: 83
Discharge: HOME | End: 2023-10-23
Attending: SURGERY
Payer: MEDICARE

## 2023-10-23 VITALS — RESPIRATION RATE: 17 BRPM | SYSTOLIC BLOOD PRESSURE: 185 MMHG | DIASTOLIC BLOOD PRESSURE: 74 MMHG | TEMPERATURE: 98.1 F

## 2023-10-23 DIAGNOSIS — S91.301A OPEN WOUND OF RIGHT FOOT, INITIAL ENCOUNTER: Primary | ICD-10-CM

## 2023-10-23 PROCEDURE — 11042 DBRDMT SUBQ TIS 1ST 20SQCM/<: CPT

## 2023-10-23 PROCEDURE — 11043 DBRDMT MUSC&/FSCA 1ST 20/<: CPT

## 2023-10-23 RX ORDER — TRIAMCINOLONE ACETONIDE 1 MG/G
OINTMENT TOPICAL ONCE
OUTPATIENT
Start: 2023-10-23 | End: 2023-10-23

## 2023-10-23 RX ORDER — LIDOCAINE HYDROCHLORIDE 20 MG/ML
JELLY TOPICAL ONCE
OUTPATIENT
Start: 2023-10-23 | End: 2023-10-23

## 2023-10-23 RX ORDER — LIDOCAINE 40 MG/G
CREAM TOPICAL ONCE
OUTPATIENT
Start: 2023-10-23 | End: 2023-10-23

## 2023-10-23 RX ORDER — BETAMETHASONE DIPROPIONATE 0.05 %
OINTMENT (GRAM) TOPICAL ONCE
OUTPATIENT
Start: 2023-10-23 | End: 2023-10-23

## 2023-10-23 RX ORDER — BACITRACIN ZINC AND POLYMYXIN B SULFATE 500; 1000 [USP'U]/G; [USP'U]/G
OINTMENT TOPICAL ONCE
OUTPATIENT
Start: 2023-10-23 | End: 2023-10-23

## 2023-10-23 RX ORDER — LIDOCAINE HYDROCHLORIDE 40 MG/ML
SOLUTION TOPICAL ONCE
OUTPATIENT
Start: 2023-10-23 | End: 2023-10-23

## 2023-10-23 RX ORDER — GINSENG 100 MG
CAPSULE ORAL ONCE
OUTPATIENT
Start: 2023-10-23 | End: 2023-10-23

## 2023-10-23 RX ORDER — CLOBETASOL PROPIONATE 0.5 MG/G
OINTMENT TOPICAL ONCE
OUTPATIENT
Start: 2023-10-23 | End: 2023-10-23

## 2023-10-23 RX ORDER — IBUPROFEN 200 MG
TABLET ORAL ONCE
OUTPATIENT
Start: 2023-10-23 | End: 2023-10-23

## 2023-10-23 RX ORDER — SODIUM CHLOR/HYPOCHLOROUS ACID 0.033 %
SOLUTION, IRRIGATION IRRIGATION ONCE
OUTPATIENT
Start: 2023-10-23 | End: 2023-10-23

## 2023-10-23 RX ORDER — GENTAMICIN SULFATE 1 MG/G
OINTMENT TOPICAL ONCE
OUTPATIENT
Start: 2023-10-23 | End: 2023-10-23

## 2023-10-23 RX ORDER — LIDOCAINE 50 MG/G
OINTMENT TOPICAL ONCE
OUTPATIENT
Start: 2023-10-23 | End: 2023-10-23

## 2023-10-23 ASSESSMENT — PAIN DESCRIPTION - LOCATION: LOCATION: FOOT

## 2023-10-23 ASSESSMENT — PAIN DESCRIPTION - ORIENTATION: ORIENTATION: RIGHT

## 2023-10-23 ASSESSMENT — PAIN DESCRIPTION - DESCRIPTORS: DESCRIPTORS: DULL

## 2023-10-23 ASSESSMENT — PAIN SCALES - GENERAL: PAINLEVEL_OUTOF10: 3

## 2023-10-23 NOTE — PATIENT INSTRUCTIONS
Discharge Instructions for  [address]  Telephone: [phone]    NAME:  Tino Carney OF BIRTH:  1940  ACCOUNT NUMBER :  [de-identified]  DATE:  10/23/2023     : [address]: [address] [facility]     Looking into the Defender Boot    Wound Cleansing:   Do not scrub or use excessive force. Cleanse wound prior to applying a clean dressing with:    [] Cleanse wound with: BABY SHAMPOO LATHER  LEAVE on 1-2 MINUTES ON WOUND THEN RINSE WITH WATER OR SALINE    [] May Shower at Discharge     [] Shower on days of dressing change, remove dressing first    [x] Keep Wound Dry in Shower (may purchase a cast cover if needed)     Topical Treatments:  Do not apply lotions, creams, or ointments to wound bed unless directed. [x] Apply moisturizing lotion A&D ointment  to skin surrounding the wound prior to dressing change.  [] Apply antifungal ointment to skin surrounding the wound prior to dressing change.  [] Apply thin film of Zinc barrier ointment to skin immediately around wound.      Dressings:      Wound Location right foot wounds (plantar and lateral)     Apply Primary Dressing:       [x] Lateral: Alginate with Silver (aquacel ag)        [x] Plantar: Pack wound loosely with aquacel ag, betadine painted to periwound    [x] Double Layer tubi  to right leg    Cover and Secure with:    [x] Gauze    [x] Kerlix    [x] ABD  [x] tape    Change dressing:   [] Daily      [] Every Other Day   [x] Three times per week  [] Once a week   [] Do Not Change Dressing     [] Other:    Off-Loading: [x] Surgical shoe     [x] Off-loading when : [x] walking     Dietary:  [x] Increase Protein: examples ( Meat, cheese, eggs, greek yogurt, premier protein drink, fish, nuts )     Activity:  Activity as tolerated:    [x] Patient has no activity restrictions                                               Return Appointment:  [x]

## 2023-10-23 NOTE — FLOWSHEET NOTE
10/23/23 0832   Anesthetic   Anesthetic 4% Lidocaine Liquid Topical   Right Leg Edema Point of Measurement   Leg circumference 43.5 cm   Ankle circumference 26 cm   RLE Neurovascular Assessment   Capillary Refill Greater than 3 seconds   Color Appropriate for Ethnicity   Temperature Warm   R Pedal Pulse +1   Wound 10/16/23 Foot Right;Plantar #1   Date First Assessed/Time First Assessed: 10/16/23 1454   Present on Original Admission: Yes  Location: Foot  Wound Location Orientation: Right;Plantar  Wound Description (Comments): #1   Wound Image    Dressing Status Old drainage noted   Wound Length (cm) 0.7 cm   Wound Width (cm) 0.4 cm   Wound Depth (cm) 2.3 cm   Wound Surface Area (cm^2) 0.28 cm^2   Change in Wound Size % (l*w) 65   Wound Volume (cm^3) 0.644 cm^3   Wound Healing % 63   Wound Assessment Pink/red  (Probe to bone)   Drainage Amount Moderate (25-50%)   Drainage Description Serous   Odor None   Giselle-wound Assessment Maceration   Margins Epibole (rolled edges)   Wound Thickness Description not for Pressure Injury Full thickness   Wound 10/16/23 Foot Right;Lateral #2   Date First Assessed/Time First Assessed: 10/16/23 1454   Present on Original Admission: Yes  Location: Foot  Wound Location Orientation: Right;Lateral  Wound Description (Comments): #2   Wound Image    Dressing Status Old drainage noted   Wound Cleansed Cleansed with saline   Wound Length (cm) 1.4 cm   Wound Width (cm) 2 cm   Wound Depth (cm) 0.6 cm   Wound Surface Area (cm^2) 2.8 cm^2   Change in Wound Size % (l*w) 27.08   Wound Volume (cm^3) 1.68 cm^3   Wound Healing % 56   Distance Tunneling (cm) 0.9 cm   Tunneling Position ___ O'Clock 3   Wound Assessment Slough;Pink/red;Pale granulation tissue   Drainage Amount Moderate (25-50%)   Drainage Description Serosanguinous   Odor None   Giselle-wound Assessment Maceration;Blanchable erythema   Margins Epibole (rolled edges); Flat/open edges   Wound Thickness Description not for Pressure Injury Full

## 2023-10-23 NOTE — FLOWSHEET NOTE
10/23/23 0926   Right Leg Edema Point of Measurement   Compression Therapy Tubular elastic support bandage   Wound 10/16/23 Foot Right;Plantar #1   Date First Assessed/Time First Assessed: 10/16/23 1454   Present on Original Admission: Yes  Location: Foot  Wound Location Orientation: Right;Plantar  Wound Description (Comments): #1   Dressing/Treatment Alginate with Ag;Betadine swabs/povidone iodine;Gauze dressing/dressing sponge;ABD;Roll gauze;Tape/Soft cloth adhesive tape   Offloading for Diabetic Foot Ulcers Post op shoe;Walker   Wound 10/16/23 Foot Right;Lateral #2   Date First Assessed/Time First Assessed: 10/16/23 1454   Present on Original Admission: Yes  Location: Foot  Wound Location Orientation: Right;Lateral  Wound Description (Comments): #2   Dressing/Treatment Alginate with Ag;ABD;Gauze dressing/dressing sponge;Roll gauze;Tape/Soft cloth adhesive tape   Offloading for Diabetic Foot Ulcers Post op shoe;Walker     Discharge Condition: Stable    Pain: 0    Ambulatory Status: Walker    Discharge Destination: home    Transportation:car    Accompanied by: FAMILY    Discharge instructions reviewed with FAMILY and copy or written instructions have been provided. All questions/concerns have been addressed at this time.

## 2023-10-23 NOTE — WOUND CARE
Procedure Note  Indications: Based on my examination of this patient's wound(s)/ulcer(s) today, debridement is required to promote healing and evaluate the wound base. Debridement: Excisional Debridement    Using: curette and #15 surgical blade the wound(s)/ulcer(s) was/were debrided down through and including the removal of muscle/fascia.   Performed by: Felicia Jacobo DPM  Consent obtained: Yes  Time out taken: No:   Pain Control: Anesthetic  Anesthetic: 4% Lidocaine Liquid Topical       Devitalized Tissue Debrided: slough and callus    Pre Debridement Measurements:  Are located in the [facility]  Documentation Flow Sheet    Diabetic/Pressure/Non Pressure Ulcers only:  Ulcer: Non-Pressure ulcer, muscle necrosis     Wound/Ulcer #: 2  Post Debridement Measurements:  Wound/Ulcer Descriptions are Pre Debridement except measurements:  Wound 10/16/23 Foot Right;Plantar #1 (Active)   Wound Image   10/23/23 0832   Dressing Status Old drainage noted 10/23/23 0832   Wound Cleansed Cleansed with saline 10/16/23 1454   Dressing/Treatment Alginate with Ag;Betadine swabs/povidone iodine;Gauze dressing/dressing sponge;ABD;Roll gauze;Tape/Soft cloth adhesive tape 10/23/23 0926   Offloading for Diabetic Foot Ulcers Post op shoe;Walker 10/23/23 0926   Wound Length (cm) 0.7 cm 10/23/23 0832   Wound Width (cm) 0.4 cm 10/23/23 0832   Wound Depth (cm) 2.3 cm 10/23/23 0832   Wound Surface Area (cm^2) 0.28 cm^2 10/23/23 0832   Change in Wound Size % (l*w) 65 10/23/23 0832   Wound Volume (cm^3) 0.644 cm^3 10/23/23 0832   Wound Healing % 63 10/23/23 0832   Post-Procedure Length (cm) 0.7 cm 10/23/23 0847   Post-Procedure Width (cm) 0.4 cm 10/23/23 0847   Post-Procedure Depth (cm) 2.4 cm 10/23/23 0847   Post-Procedure Surface Area (cm^2) 0.28 cm^2 10/23/23 0847   Post-Procedure Volume (cm^3) 0.672 cm^3 10/23/23 0847   Wound Assessment Pink/red 10/23/23 0832   Drainage Amount Moderate (25-50%) 10/23/23 0832   Drainage Description Serous

## 2023-10-30 ENCOUNTER — HOSPITAL ENCOUNTER (OUTPATIENT)
Facility: HOSPITAL | Age: 83
Discharge: HOME | End: 2023-10-30
Attending: SURGERY
Payer: MEDICARE

## 2023-10-30 VITALS
SYSTOLIC BLOOD PRESSURE: 178 MMHG | TEMPERATURE: 98.4 F | HEART RATE: 65 BPM | DIASTOLIC BLOOD PRESSURE: 74 MMHG | RESPIRATION RATE: 18 BRPM

## 2023-10-30 DIAGNOSIS — S91.301A OPEN WOUND OF RIGHT FOOT, INITIAL ENCOUNTER: Primary | ICD-10-CM

## 2023-10-30 PROCEDURE — 11042 DBRDMT SUBQ TIS 1ST 20SQCM/<: CPT

## 2023-10-30 RX ORDER — BETAMETHASONE DIPROPIONATE 0.05 %
OINTMENT (GRAM) TOPICAL ONCE
OUTPATIENT
Start: 2023-10-30 | End: 2023-10-30

## 2023-10-30 RX ORDER — CLOBETASOL PROPIONATE 0.5 MG/G
OINTMENT TOPICAL ONCE
OUTPATIENT
Start: 2023-10-30 | End: 2023-10-30

## 2023-10-30 RX ORDER — LIDOCAINE HYDROCHLORIDE 20 MG/ML
JELLY TOPICAL ONCE
OUTPATIENT
Start: 2023-10-30 | End: 2023-10-30

## 2023-10-30 RX ORDER — GENTAMICIN SULFATE 1 MG/G
OINTMENT TOPICAL ONCE
OUTPATIENT
Start: 2023-10-30 | End: 2023-10-30

## 2023-10-30 RX ORDER — LIDOCAINE 40 MG/G
CREAM TOPICAL ONCE
OUTPATIENT
Start: 2023-10-30 | End: 2023-10-30

## 2023-10-30 RX ORDER — IBUPROFEN 200 MG
TABLET ORAL ONCE
OUTPATIENT
Start: 2023-10-30 | End: 2023-10-30

## 2023-10-30 RX ORDER — BACITRACIN ZINC AND POLYMYXIN B SULFATE 500; 1000 [USP'U]/G; [USP'U]/G
OINTMENT TOPICAL ONCE
OUTPATIENT
Start: 2023-10-30 | End: 2023-10-30

## 2023-10-30 RX ORDER — SODIUM CHLOR/HYPOCHLOROUS ACID 0.033 %
SOLUTION, IRRIGATION IRRIGATION ONCE
OUTPATIENT
Start: 2023-10-30 | End: 2023-10-30

## 2023-10-30 RX ORDER — GINSENG 100 MG
CAPSULE ORAL ONCE
OUTPATIENT
Start: 2023-10-30 | End: 2023-10-30

## 2023-10-30 RX ORDER — TRIAMCINOLONE ACETONIDE 1 MG/G
OINTMENT TOPICAL ONCE
OUTPATIENT
Start: 2023-10-30 | End: 2023-10-30

## 2023-10-30 RX ORDER — LIDOCAINE HYDROCHLORIDE 40 MG/ML
SOLUTION TOPICAL ONCE
OUTPATIENT
Start: 2023-10-30 | End: 2023-10-30

## 2023-10-30 RX ORDER — LIDOCAINE 50 MG/G
OINTMENT TOPICAL ONCE
OUTPATIENT
Start: 2023-10-30 | End: 2023-10-30

## 2023-10-30 ASSESSMENT — PAIN DESCRIPTION - ORIENTATION: ORIENTATION: RIGHT

## 2023-10-30 ASSESSMENT — PAIN DESCRIPTION - DESCRIPTORS: DESCRIPTORS: ACHING;THROBBING

## 2023-10-30 ASSESSMENT — PAIN DESCRIPTION - LOCATION: LOCATION: FOOT

## 2023-10-30 ASSESSMENT — PAIN SCALES - GENERAL: PAINLEVEL_OUTOF10: 3

## 2023-10-30 NOTE — PATIENT INSTRUCTIONS
fish, nuts )     Activity:  Activity as tolerated:    [x] Patient has no activity restrictions                                               Return Appointment:  [x] Return Appointment: With Dr. Faye Ayala  in  [facility])    [x] The physician has recommended vascular studies with Dr. Yannick Ross office:  Studies to be completed: Other reassess artery flow in right lower leg    Dr. Vipin Clark office should reach out to patient within 24 to 48 hours with information on appointment time and date  If a call has not been made within 24 to 48 hours please contact Dr. Vipin Clark office at [phone] (ADW:[phone])  Be prepared for possible wound dressing removal; if compression wrap is in place please make a nurse visit appointment with the wound care center [phone] or coordinate with home health agency to re-apply compression wrap after vascular visit. Wound Care Center Information: Should you experience any significant changes in your wound(s) or have questions about your wound care, please contact the [address] at [facility] 8:00 am - 4:30 AND Friday 8:00 AM- 12:00 PM .  If you need help with your wound outside these hours and cannot wait until we are again available, contact your PCP or go to the hospital emergency room. PLEASE NOTE: IF YOU ARE UNABLE TO OBTAIN WOUND SUPPLIES, CONTINUE TO USE THE SUPPLIES YOU HAVE AVAILABLE UNTIL YOU ARE ABLE TO REACH US. IT IS MOST IMPORTANT TO KEEP THE WOUND COVERED AT ALL TIMES.      Physician Signature:_______________________ Dr. Vito Severe

## 2023-11-03 NOTE — WOUND CARE
Jeanna Co nurse with [facility] called to inform that the patient had an area on right medial ankle with increasing drainage. She applied a dry gauze dressing and patient will be Monday 11/6/23 for wound visit.
Total Surface Area Debrided:  3.42 sq cm   Estimated Blood Loss: Minimal amount blood loss . Hemostasis Achieved:  by pressure  Procedural Pain: 1  / 10   Post Procedural Pain: 1 / 10   Response to treatment: Patient tolerated procedure well with minimal complaints of pain.

## 2023-11-06 ENCOUNTER — HOSPITAL ENCOUNTER (OUTPATIENT)
Facility: HOSPITAL | Age: 83
Discharge: HOME | End: 2023-11-06
Attending: SURGERY
Payer: MEDICARE

## 2023-11-06 VITALS — SYSTOLIC BLOOD PRESSURE: 177 MMHG | DIASTOLIC BLOOD PRESSURE: 74 MMHG | TEMPERATURE: 98.1 F | RESPIRATION RATE: 19 BRPM

## 2023-11-06 DIAGNOSIS — S91.301A OPEN WOUND OF RIGHT FOOT, INITIAL ENCOUNTER: Primary | ICD-10-CM

## 2023-11-06 PROCEDURE — 11042 DBRDMT SUBQ TIS 1ST 20SQCM/<: CPT

## 2023-11-06 RX ORDER — GENTAMICIN SULFATE 1 MG/G
OINTMENT TOPICAL ONCE
OUTPATIENT
Start: 2023-11-06 | End: 2023-11-06

## 2023-11-06 RX ORDER — TRIAMCINOLONE ACETONIDE 1 MG/G
OINTMENT TOPICAL ONCE
OUTPATIENT
Start: 2023-11-06 | End: 2023-11-06

## 2023-11-06 RX ORDER — LIDOCAINE HYDROCHLORIDE 40 MG/ML
SOLUTION TOPICAL ONCE
OUTPATIENT
Start: 2023-11-06 | End: 2023-11-06

## 2023-11-06 RX ORDER — LIDOCAINE HYDROCHLORIDE 20 MG/ML
JELLY TOPICAL ONCE
OUTPATIENT
Start: 2023-11-06 | End: 2023-11-06

## 2023-11-06 RX ORDER — CLOBETASOL PROPIONATE 0.5 MG/G
OINTMENT TOPICAL ONCE
OUTPATIENT
Start: 2023-11-06 | End: 2023-11-06

## 2023-11-06 RX ORDER — LIDOCAINE 50 MG/G
OINTMENT TOPICAL ONCE
OUTPATIENT
Start: 2023-11-06 | End: 2023-11-06

## 2023-11-06 RX ORDER — SODIUM CHLOR/HYPOCHLOROUS ACID 0.033 %
SOLUTION, IRRIGATION IRRIGATION ONCE
OUTPATIENT
Start: 2023-11-06 | End: 2023-11-06

## 2023-11-06 RX ORDER — BETAMETHASONE DIPROPIONATE 0.05 %
OINTMENT (GRAM) TOPICAL ONCE
OUTPATIENT
Start: 2023-11-06 | End: 2023-11-06

## 2023-11-06 RX ORDER — GINSENG 100 MG
CAPSULE ORAL ONCE
OUTPATIENT
Start: 2023-11-06 | End: 2023-11-06

## 2023-11-06 RX ORDER — IBUPROFEN 200 MG
TABLET ORAL ONCE
OUTPATIENT
Start: 2023-11-06 | End: 2023-11-06

## 2023-11-06 RX ORDER — LIDOCAINE 40 MG/G
CREAM TOPICAL ONCE
OUTPATIENT
Start: 2023-11-06 | End: 2023-11-06

## 2023-11-06 RX ORDER — BACITRACIN ZINC AND POLYMYXIN B SULFATE 500; 1000 [USP'U]/G; [USP'U]/G
OINTMENT TOPICAL ONCE
OUTPATIENT
Start: 2023-11-06 | End: 2023-11-06

## 2023-11-06 ASSESSMENT — PAIN SCALES - GENERAL: PAINLEVEL_OUTOF10: 2

## 2023-11-06 ASSESSMENT — PAIN DESCRIPTION - ORIENTATION: ORIENTATION: RIGHT

## 2023-11-06 ASSESSMENT — PAIN DESCRIPTION - DESCRIPTORS: DESCRIPTORS: ACHING

## 2023-11-06 ASSESSMENT — PAIN DESCRIPTION - LOCATION: LOCATION: FOOT

## 2023-11-06 NOTE — FLOWSHEET NOTE
11/06/23 0955   Anesthetic   Anesthetic 4% Lidocaine Liquid Topical   Right Leg Edema Point of Measurement   Leg circumference 42 cm   Ankle circumference 26 cm   RLE Neurovascular Assessment   Capillary Refill Less than/Equal to 3 seconds   Color Appropriate for Ethnicity   Temperature Warm   R Pedal Pulse +1   Wound 10/16/23 Foot Right;Plantar #1   Date First Assessed/Time First Assessed: 10/16/23 1454   Present on Original Admission: Yes  Location: Foot  Wound Location Orientation: Right;Plantar  Wound Description (Comments): #1   Wound Image    Wound Etiology Diabetic   Dressing Status Old drainage noted   Wound Cleansed Cleansed with saline   Offloading for Diabetic Foot Ulcers Offloading ordered   Wound Length (cm) 0.7 cm   Wound Width (cm) 0.1 cm   Wound Depth (cm) 0.7 cm   Wound Surface Area (cm^2) 0.07 cm^2   Change in Wound Size % (l*w) 91.25   Wound Volume (cm^3) 0.049 cm^3   Wound Healing % 97   Wound Assessment Pink/red   Drainage Amount Moderate (25-50%)   Drainage Description Serosanguinous   Odor None   Giselle-wound Assessment Maceration;Blanchable erythema   Margins Epibole (rolled edges)   Wound Thickness Description not for Pressure Injury Full thickness   Wound 10/16/23 Foot Right;Lateral #2   Date First Assessed/Time First Assessed: 10/16/23 1454   Present on Original Admission: Yes  Location: Foot  Wound Location Orientation: Right;Lateral  Wound Description (Comments): #2   Wound Image    Wound Etiology Diabetic   Dressing Status Old drainage noted   Wound Cleansed Cleansed with saline   Offloading for Diabetic Foot Ulcers Offloading ordered   Wound Length (cm) 1 cm   Wound Width (cm) 1.8 cm   Wound Depth (cm) 0.4 cm   Wound Surface Area (cm^2) 1.8 cm^2   Change in Wound Size % (l*w) 53.13   Wound Volume (cm^3) 0.72 cm^3   Wound Healing % 81   Wound Assessment Slough;Pink/red   Drainage Amount Moderate (25-50%)   Drainage Description Serosanguinous   Odor None   Giselle-wound Assessment

## 2023-11-06 NOTE — WOUND CARE
Procedure Note  Indications: Based on my examination of this patient's wound(s)/ulcer(s) today, debridement is required to promote healing and evaluate the wound base. Debridement: Excisional Debridement    Using: curette the wound(s)/ulcer(s) was/were debrided down through and including the removal of subcutaneous tissue.   Performed by: Leyal Burnett DPM  Consent obtained: Yes  Time out taken: No:   Pain Control: Anesthetic  Anesthetic: 4% Lidocaine Liquid Topical       Devitalized Tissue Debrided: slough and necrotic/eschar    Pre Debridement Measurements:  Are located in the [place]  Documentation Flow Sheet    Diabetic/Pressure/Non Pressure Ulcers only:  Ulcer: Diabetic ulcer, fat layer exposed     Wound/Ulcer #: 2  Post Debridement Measurements:  Wound/Ulcer Descriptions are Pre Debridement except measurements:  Wound 10/16/23 Foot Right;Plantar #1 (Active)   Wound Image   11/06/23 0955   Wound Etiology Diabetic 11/06/23 0955   Dressing Status Old drainage noted 11/06/23 0955   Wound Cleansed Cleansed with saline 11/06/23 0955   Dressing/Treatment Betadine swabs/povidone iodine;Alginate with Ag;Gauze dressing/dressing sponge;Roll gauze;Tape/Soft cloth adhesive tape 11/06/23 1024   Offloading for Diabetic Foot Ulcers Offloading ordered 11/06/23 0955   Wound Length (cm) 0.7 cm 11/06/23 0955   Wound Width (cm) 0.1 cm 11/06/23 0955   Wound Depth (cm) 0.7 cm 11/06/23 0955   Wound Surface Area (cm^2) 0.07 cm^2 11/06/23 0955   Change in Wound Size % (l*w) 91.25 11/06/23 0955   Wound Volume (cm^3) 0.049 cm^3 11/06/23 0955   Wound Healing % 97 11/06/23 0955   Post-Procedure Length (cm) 0.7 cm 11/06/23 1013   Post-Procedure Width (cm) 0.1 cm 11/06/23 1013   Post-Procedure Depth (cm) 0.8 cm 11/06/23 1013   Post-Procedure Surface Area (cm^2) 0.07 cm^2 11/06/23 1013   Post-Procedure Volume (cm^3) 0.056 cm^3 11/06/23 1013   Wound Assessment Pink/red 11/06/23 0955   Drainage Amount Moderate (25-50%) 11/06/23 0964

## 2023-11-06 NOTE — FLOWSHEET NOTE
11/06/23 1024   Wound 10/16/23 Foot Right;Plantar #1   Date First Assessed/Time First Assessed: 10/16/23 1454   Present on Original Admission: Yes  Location: Foot  Wound Location Orientation: Right;Plantar  Wound Description (Comments): #1   Dressing/Treatment Betadine swabs/povidone iodine;Alginate with Ag;Gauze dressing/dressing sponge;Roll gauze;Tape/Soft cloth adhesive tape   Wound 10/16/23 Foot Right;Lateral #2   Date First Assessed/Time First Assessed: 10/16/23 1454   Present on Original Admission: Yes  Location: Foot  Wound Location Orientation: Right;Lateral  Wound Description (Comments): #2   Dressing/Treatment Betadine swabs/povidone iodine;Gauze dressing/dressing sponge;Roll gauze;Tape/Soft cloth adhesive tape     Discharge Condition: Stable    Pain: 2    Ambulatory Status: Walker    Discharge Destination: home    Transportation:car    Accompanied by: FAMILY    Discharge instructions reviewed with FAMILY and copy or written instructions have been provided. All questions/concerns have been addressed at this time.

## 2023-11-06 NOTE — PATIENT INSTRUCTIONS
[facility]  Telephone: [phone]    NAME:  Gypsy Haas OF BIRTH:  1940  ACCOUNT NUMBER :  [de-identified]  DATE:  11/6/2023     : [address]: [facility] - please do not apply tape directly on patient's skin    Wound Cleansing:   Do not scrub or use excessive force. Cleanse wound prior to applying a clean dressing with:    [] Cleanse wound with: BABY SHAMPOO LATHER  LEAVE on 1-2 MINUTES ON WOUND THEN RINSE WITH WATER OR SALINE    [] May Shower at Discharge     [] Shower on days of dressing change, remove dressing first    [x] Keep Wound Dry in Shower (may purchase a cast cover if needed)     Topical Treatments:  Do not apply lotions, creams, or ointments to wound bed unless directed. [x] Apply moisturizing lotion A&D ointment  to skin surrounding the wound prior to dressing change.  [] Apply antifungal ointment to skin surrounding the wound prior to dressing change.  [] Apply thin film of Zinc barrier ointment to skin immediately around wound.     Dressings:                Wound Location Left Medial Foot raw area          Apply Primary Dressing:      [x] Betadine painted to raw area, let dry, and cover with foam      Change dressing:   [] Daily      [] Every Other Day   [x] Three times per week  [] Once a week  [] Other:     Dressings:                Wound Location Right Lateral Foot          Apply Primary Dressing:      [x] Betadine wet to dry    [x] Double Layer tubi  to right leg    Cover and Secure with:  [x] Gauze  [x] Kerlix            [x] Other: Defender Boot  [x] Roll Tape       Change dressing:   [x] Three times per week       Dressings:      Wound Location right plantar foot     Apply Primary Dressing:         [x] Lightly Pack wound loosely with aquacel ag, betadine painted to periwound    [x] Double Layer tubi  to right leg    Cover

## 2023-11-13 ENCOUNTER — HOSPITAL ENCOUNTER (OUTPATIENT)
Facility: HOSPITAL | Age: 83
Discharge: HOME | End: 2023-11-13
Attending: SURGERY
Payer: MEDICARE

## 2023-11-13 VITALS
RESPIRATION RATE: 18 BRPM | SYSTOLIC BLOOD PRESSURE: 180 MMHG | TEMPERATURE: 98.1 F | DIASTOLIC BLOOD PRESSURE: 82 MMHG | HEART RATE: 68 BPM

## 2023-11-13 DIAGNOSIS — S91.301A OPEN WOUND OF RIGHT FOOT, INITIAL ENCOUNTER: Primary | ICD-10-CM

## 2023-11-13 PROCEDURE — 11042 DBRDMT SUBQ TIS 1ST 20SQCM/<: CPT

## 2023-11-13 RX ORDER — IBUPROFEN 200 MG
TABLET ORAL ONCE
OUTPATIENT
Start: 2023-11-13 | End: 2023-11-13

## 2023-11-13 RX ORDER — LIDOCAINE HYDROCHLORIDE 40 MG/ML
SOLUTION TOPICAL ONCE
OUTPATIENT
Start: 2023-11-13 | End: 2023-11-13

## 2023-11-13 RX ORDER — GINSENG 100 MG
CAPSULE ORAL ONCE
OUTPATIENT
Start: 2023-11-13 | End: 2023-11-13

## 2023-11-13 RX ORDER — LIDOCAINE 40 MG/G
CREAM TOPICAL ONCE
OUTPATIENT
Start: 2023-11-13 | End: 2023-11-13

## 2023-11-13 RX ORDER — LIDOCAINE HYDROCHLORIDE 20 MG/ML
JELLY TOPICAL ONCE
OUTPATIENT
Start: 2023-11-13 | End: 2023-11-13

## 2023-11-13 RX ORDER — BACITRACIN ZINC AND POLYMYXIN B SULFATE 500; 1000 [USP'U]/G; [USP'U]/G
OINTMENT TOPICAL ONCE
OUTPATIENT
Start: 2023-11-13 | End: 2023-11-13

## 2023-11-13 RX ORDER — BETAMETHASONE DIPROPIONATE 0.05 %
OINTMENT (GRAM) TOPICAL ONCE
OUTPATIENT
Start: 2023-11-13 | End: 2023-11-13

## 2023-11-13 RX ORDER — GENTAMICIN SULFATE 1 MG/G
OINTMENT TOPICAL ONCE
OUTPATIENT
Start: 2023-11-13 | End: 2023-11-13

## 2023-11-13 RX ORDER — TRIAMCINOLONE ACETONIDE 1 MG/G
OINTMENT TOPICAL ONCE
OUTPATIENT
Start: 2023-11-13 | End: 2023-11-13

## 2023-11-13 RX ORDER — SODIUM CHLOR/HYPOCHLOROUS ACID 0.033 %
SOLUTION, IRRIGATION IRRIGATION ONCE
OUTPATIENT
Start: 2023-11-13 | End: 2023-11-13

## 2023-11-13 RX ORDER — CLOBETASOL PROPIONATE 0.5 MG/G
OINTMENT TOPICAL ONCE
OUTPATIENT
Start: 2023-11-13 | End: 2023-11-13

## 2023-11-13 RX ORDER — LIDOCAINE 50 MG/G
OINTMENT TOPICAL ONCE
OUTPATIENT
Start: 2023-11-13 | End: 2023-11-13

## 2023-11-13 ASSESSMENT — PAIN SCALES - GENERAL: PAINLEVEL_OUTOF10: 3

## 2023-11-13 ASSESSMENT — PAIN DESCRIPTION - LOCATION: LOCATION: GENERALIZED

## 2023-11-13 ASSESSMENT — PAIN DESCRIPTION - DESCRIPTORS: DESCRIPTORS: ACHING

## 2023-11-13 NOTE — PATIENT INSTRUCTIONS
Discharge Instructions for  [address] [place]  Telephone: [phone]    NAME:  Tasha Prado OF BIRTH:  1940  ACCOUNT NUMBER :  [de-identified]  DATE:  11/13/2023     : [address]: [address] [facility] - please do not apply tape directly on patient's skin    Wound Cleansing:   Do not scrub or use excessive force. Cleanse wound prior to applying a clean dressing with:    [] Cleanse wound with: BABY SHAMPOO LATHER  LEAVE on 1-2 MINUTES ON WOUND THEN RINSE WITH WATER OR SALINE    [] May Shower at Discharge     [] Shower on days of dressing change, remove dressing first    [x] Keep Wound Dry in Shower (may purchase a cast cover if needed)     Topical Treatments:  Do not apply lotions, creams, or ointments to wound bed unless directed. [x] Apply moisturizing lotion A&D ointment  to skin surrounding the wound prior to dressing change.  [] Apply antifungal ointment to skin surrounding the wound prior to dressing change.  [] Apply thin film of Zinc barrier ointment to skin immediately around wound.     Dressings:                Wound Location Left Medial Foot raw area          Apply Primary Dressing:      [x] Betadine painted to raw area, let dry, and cover with foam      Change dressing:   [] Daily      [] Every Other Day   [x] Three times per week  [] Once a week  [] Other:     Dressings:                Wound Location Right Lateral Foot          Apply Primary Dressing:      [x] Betadine wet to dry    [x] Double Layer tubi  to right leg    Cover and Secure with:  [x] Gauze  [x] Kerlix            [x] Other: Defender Boot  [x] Roll Tape       Change dressing:   [x] Three times per week       Dressings:      Wound Location right plantar foot     Apply Primary Dressing:         [x] Lightly Pack wound loosely with aquacel ag, betadine painted to periwound    [x] Double Layer tubi  to right leg    Cover

## 2023-11-13 NOTE — FLOWSHEET NOTE
11/13/23 1020   Anesthetic   Anesthetic 4% Lidocaine Liquid Topical   Right Leg Edema Point of Measurement   Leg circumference 42 cm   Ankle circumference 26 cm   RLE Neurovascular Assessment   Capillary Refill Less than/Equal to 3 seconds   Color Appropriate for Ethnicity   Temperature Warm   R Pedal Pulse +1   Wound 10/16/23 Foot Right;Plantar #1   Date First Assessed/Time First Assessed: 10/16/23 1454   Present on Original Admission: Yes  Location: Foot  Wound Location Orientation: Right;Plantar  Wound Description (Comments): #1   Wound Image    Dressing Status Old drainage noted   Wound Cleansed Cleansed with saline   Wound Length (cm) 0.7 cm   Wound Width (cm) 0.2 cm   Wound Depth (cm) 1 cm   Wound Surface Area (cm^2) 0.14 cm^2   Change in Wound Size % (l*w) 82.5   Wound Volume (cm^3) 0.14 cm^3   Wound Healing % 92   Wound Assessment Pink/red   Drainage Amount Moderate (25-50%)   Drainage Description Serosanguinous   Odor None   Giselle-wound Assessment Blanchable erythema; Maceration   Margins Epibole (rolled edges)   Wound Thickness Description not for Pressure Injury Full thickness   Wound 10/16/23 Foot Right;Lateral #2   Date First Assessed/Time First Assessed: 10/16/23 1454   Present on Original Admission: Yes  Location: Foot  Wound Location Orientation: Right;Lateral  Wound Description (Comments): #2   Wound Image    Dressing Status Old drainage noted   Wound Cleansed Cleansed with saline   Wound Length (cm) 0.6 cm   Wound Width (cm) 0.8 cm   Wound Depth (cm) 0.1 cm   Wound Surface Area (cm^2) 0.48 cm^2   Change in Wound Size % (l*w) 87.5   Wound Volume (cm^3) 0.048 cm^3   Wound Healing % 99   Wound Assessment Slough;Pink/red   Drainage Amount Moderate (25-50%)   Drainage Description Serosanguinous   Odor None   Giselle-wound Assessment Blanchable erythema;Dry/flaky   Margins Epibole (rolled edges); Flat/open edges   Wound Thickness Description not for Pressure Injury Partial thickness     BP (!) 180/82

## 2023-11-13 NOTE — WOUND CARE
Total Surface Area Debrided:  0.48 sq cm   Estimated Blood Loss: Minimal amount blood loss . Hemostasis Achieved:  by pressure  Procedural Pain: 2  / 10   Post Procedural Pain: 2 / 10   Response to treatment: Patient tolerated procedure well with minimal complaints of pain.

## 2023-11-13 NOTE — FLOWSHEET NOTE
11/13/23 1113   Right Leg Edema Point of Measurement   Compression Therapy Tubular elastic support bandage   Wound 10/16/23 Foot Right;Plantar #1   Date First Assessed/Time First Assessed: 10/16/23 1454   Present on Original Admission: Yes  Location: Foot  Wound Location Orientation: Right;Plantar  Wound Description (Comments): #1   Dressing/Treatment Betadine swabs/povidone iodine;Alginate with Ag;Gauze dressing/dressing sponge;Roll gauze;Tape/Soft cloth adhesive tape   Wound 10/16/23 Foot Right;Lateral #2   Date First Assessed/Time First Assessed: 10/16/23 1454   Present on Original Admission: Yes  Location: Foot  Wound Location Orientation: Right;Lateral  Wound Description (Comments): #2   Dressing/Treatment Betadine swabs/povidone iodine;Gauze dressing/dressing sponge;Roll gauze;Tape/Soft cloth adhesive tape     Discharge Condition: Stable    Pain: 3    Ambulatory Status: Rolling Walker    Discharge Destination: home    Transportation:car    Accompanied by: FAMILY    Discharge instructions reviewed with FAMILY and copy or written instructions have been provided. All questions/concerns have been addressed at this time.

## 2023-11-20 ENCOUNTER — HOSPITAL ENCOUNTER (OUTPATIENT)
Facility: HOSPITAL | Age: 83
Discharge: HOME | End: 2023-11-20
Attending: SURGERY
Payer: MEDICARE

## 2023-11-20 VITALS
RESPIRATION RATE: 20 BRPM | DIASTOLIC BLOOD PRESSURE: 77 MMHG | HEART RATE: 56 BPM | TEMPERATURE: 98.1 F | SYSTOLIC BLOOD PRESSURE: 155 MMHG

## 2023-11-20 DIAGNOSIS — S91.301A OPEN WOUND OF RIGHT FOOT, INITIAL ENCOUNTER: Primary | ICD-10-CM

## 2023-11-20 PROCEDURE — 11042 DBRDMT SUBQ TIS 1ST 20SQCM/<: CPT

## 2023-11-20 RX ORDER — SODIUM CHLOR/HYPOCHLOROUS ACID 0.033 %
SOLUTION, IRRIGATION IRRIGATION ONCE
OUTPATIENT
Start: 2023-11-20 | End: 2023-11-20

## 2023-11-20 RX ORDER — TRIAMCINOLONE ACETONIDE 1 MG/G
OINTMENT TOPICAL ONCE
OUTPATIENT
Start: 2023-11-20 | End: 2023-11-20

## 2023-11-20 RX ORDER — LIDOCAINE 50 MG/G
OINTMENT TOPICAL ONCE
OUTPATIENT
Start: 2023-11-20 | End: 2023-11-20

## 2023-11-20 RX ORDER — GINSENG 100 MG
CAPSULE ORAL ONCE
OUTPATIENT
Start: 2023-11-20 | End: 2023-11-20

## 2023-11-20 RX ORDER — CLOBETASOL PROPIONATE 0.5 MG/G
OINTMENT TOPICAL ONCE
OUTPATIENT
Start: 2023-11-20 | End: 2023-11-20

## 2023-11-20 RX ORDER — LIDOCAINE 40 MG/G
CREAM TOPICAL ONCE
OUTPATIENT
Start: 2023-11-20 | End: 2023-11-20

## 2023-11-20 RX ORDER — LIDOCAINE HYDROCHLORIDE 20 MG/ML
JELLY TOPICAL ONCE
OUTPATIENT
Start: 2023-11-20 | End: 2023-11-20

## 2023-11-20 RX ORDER — IBUPROFEN 200 MG
TABLET ORAL ONCE
OUTPATIENT
Start: 2023-11-20 | End: 2023-11-20

## 2023-11-20 RX ORDER — BETAMETHASONE DIPROPIONATE 0.05 %
OINTMENT (GRAM) TOPICAL ONCE
OUTPATIENT
Start: 2023-11-20 | End: 2023-11-20

## 2023-11-20 RX ORDER — BACITRACIN ZINC AND POLYMYXIN B SULFATE 500; 1000 [USP'U]/G; [USP'U]/G
OINTMENT TOPICAL ONCE
OUTPATIENT
Start: 2023-11-20 | End: 2023-11-20

## 2023-11-20 RX ORDER — LIDOCAINE HYDROCHLORIDE 40 MG/ML
SOLUTION TOPICAL ONCE
OUTPATIENT
Start: 2023-11-20 | End: 2023-11-20

## 2023-11-20 RX ORDER — GENTAMICIN SULFATE 1 MG/G
OINTMENT TOPICAL ONCE
OUTPATIENT
Start: 2023-11-20 | End: 2023-11-20

## 2023-11-20 ASSESSMENT — PAIN SCALES - GENERAL: PAINLEVEL_OUTOF10: 3

## 2023-11-20 ASSESSMENT — PAIN DESCRIPTION - LOCATION: LOCATION: FOOT

## 2023-11-20 ASSESSMENT — PAIN DESCRIPTION - DESCRIPTORS: DESCRIPTORS: ACHING

## 2023-11-20 NOTE — FLOWSHEET NOTE
11/20/23 1033   Right Leg Edema Point of Measurement   Compression Therapy Tubular elastic support bandage   Wound 10/16/23 Foot Right;Plantar #1   Date First Assessed/Time First Assessed: 10/16/23 1454   Present on Original Admission: Yes  Primary Wound Type: Neuropathic  Location: Foot  Wound Location Orientation: Right;Plantar  Wound Description (Comments): #1   Dressing/Treatment Betadine swabs/povidone iodine;Collagen with Ag;Gauze dressing/dressing sponge;Roll gauze   Wound 10/16/23 Foot Right;Lateral #2   Date First Assessed/Time First Assessed: 10/16/23 1454   Present on Original Admission: Yes  Primary Wound Type: Neuropathic  Location: Foot  Wound Location Orientation: Right;Lateral  Wound Description (Comments): #2   Dressing/Treatment Betadine swabs/povidone iodine;Gauze dressing/dressing sponge;Roll gauze     Discharge Condition: Stable    Pain: 0    Ambulatory Status: Rolling Walker    Discharge Destination: home    Transportation:car    Accompanied by: FAMILY    Discharge instructions reviewed with SELF and copy or written instructions have been provided. All questions/concerns have been addressed at this time.

## 2023-11-20 NOTE — PATIENT INSTRUCTIONS
Discharge Instructions for  [address]  Telephone: [phone]    NAME:  Megan Hastings OF BIRTH:  1940  ACCOUNT NUMBER :  [de-identified]  DATE:  11/20/2023     : [address]: [address] [facility] - please do not apply tape directly on patient's skin    Wound Cleansing:   Do not scrub or use excessive force. Cleanse wound prior to applying a clean dressing with:    [] Cleanse wound with: BABY SHAMPOO LATHER  LEAVE on 1-2 MINUTES ON WOUND THEN RINSE WITH WATER OR SALINE    [] May Shower at Discharge     [] Shower on days of dressing change, remove dressing first    [x] Keep Wound Dry in Shower (may purchase a cast cover if needed)     Topical Treatments:  Do not apply lotions, creams, or ointments to wound bed unless directed. [x] Apply moisturizing lotion A&D ointment  to skin surrounding the wound prior to dressing change.  [] Apply antifungal ointment to skin surrounding the wound prior to dressing change.  [] Apply thin film of Zinc barrier ointment to skin immediately around wound.     Dressings:                Wound Location Right Medial Foot raw area          Apply Primary Dressing:      [x] A&D ointment to raw area and cover with foam border      Change dressing:   [] Daily      [] Every Other Day   [x] Three times per week  [] Once a week  [] Other:     Dressings:                Wound Location Right Lateral Foot          Apply Primary Dressing:      [x] Betadine wet to dry    [x] Double Layer tubi  to right leg    Cover and Secure with:  [x] Gauze  [x] Kerlix            [x] Other: Defender Boot  [x] Roll Tape       Change dressing:   [x] Three times per week       Dressings:      Wound Location Right plantar foot     Apply Primary Dressing:         [x]Kacie collagen to wound base, betadine painted to periwound    [x] Double Layer tubi  to right leg    Cover and Secure with:  [x]

## 2023-11-20 NOTE — WOUND CARE
Procedure Note  Indications: Based on my examination of this patient's wound(s)/ulcer(s) today, debridement is required to promote healing and evaluate the wound base. Debridement: Excisional Debridement    Using: curette the wound(s)/ulcer(s) was/were debrided down through and including the removal of subcutaneous tissue.   Performed by: Meli Sandoval DPM  Consent obtained: Yes  Time out taken: No:   Pain Control: Anesthetic  Anesthetic: 4% Lidocaine Liquid Topical       Devitalized Tissue Debrided: slough    Pre Debridement Measurements:  Are located in the [place]  Documentation Flow Sheet    Diabetic/Pressure/Non Pressure Ulcers only:  Ulcer: Non-Pressure ulcer, fat layer exposed     Wound/Ulcer #: 2  Post Debridement Measurements:  Wound/Ulcer Descriptions are Pre Debridement except measurements:  Wound 10/16/23 Foot Right;Plantar #1 (Active)   Wound Image   11/20/23 1006   Wound Etiology Other 11/13/23 1020   Dressing Status Old drainage noted 11/20/23 1006   Wound Cleansed Soap and water 11/20/23 1006   Dressing/Treatment Betadine swabs/povidone iodine;Collagen with Ag;Gauze dressing/dressing sponge;Roll gauze 11/20/23 1033   Offloading for Diabetic Foot Ulcers Offloading ordered 11/13/23 1113   Wound Length (cm) 1 cm 11/20/23 1006   Wound Width (cm) 0.2 cm 11/20/23 1006   Wound Depth (cm) 1.4 cm 11/20/23 1024   Wound Surface Area (cm^2) 0.2 cm^2 11/20/23 1006   Change in Wound Size % (l*w) 75 11/20/23 1006   Wound Volume (cm^3) 0.5 cm^3 11/20/23 1006   Wound Healing % 72 11/20/23 1006   Post-Procedure Length (cm) 0.7 cm 11/06/23 1013   Post-Procedure Width (cm) 0.1 cm 11/06/23 1013   Post-Procedure Depth (cm) 0.8 cm 11/06/23 1013   Post-Procedure Surface Area (cm^2) 0.07 cm^2 11/06/23 1013   Post-Procedure Volume (cm^3) 0.056 cm^3 11/06/23 1013   Wound Assessment Pink/red 11/13/23 1020   Drainage Amount Moderate (25-50%) 11/20/23 1006   Drainage Description Serosanguinous 11/20/23 1006   Odor None

## 2023-11-27 ENCOUNTER — HOSPITAL ENCOUNTER (OUTPATIENT)
Facility: HOSPITAL | Age: 83
Discharge: HOME | End: 2023-11-27
Attending: SURGERY
Payer: MEDICARE

## 2023-11-27 VITALS
HEART RATE: 62 BPM | TEMPERATURE: 98.6 F | RESPIRATION RATE: 20 BRPM | DIASTOLIC BLOOD PRESSURE: 70 MMHG | SYSTOLIC BLOOD PRESSURE: 125 MMHG

## 2023-11-27 DIAGNOSIS — S91.301A OPEN WOUND OF RIGHT FOOT, INITIAL ENCOUNTER: ICD-10-CM

## 2023-11-27 DIAGNOSIS — S91.301S OPEN WOUND OF RIGHT FOOT, SEQUELA: Primary | ICD-10-CM

## 2023-11-27 PROCEDURE — 11042 DBRDMT SUBQ TIS 1ST 20SQCM/<: CPT

## 2023-11-27 RX ORDER — LIDOCAINE HYDROCHLORIDE 20 MG/ML
JELLY TOPICAL ONCE
OUTPATIENT
Start: 2023-11-27 | End: 2023-11-27

## 2023-11-27 RX ORDER — BACITRACIN ZINC AND POLYMYXIN B SULFATE 500; 1000 [USP'U]/G; [USP'U]/G
OINTMENT TOPICAL ONCE
OUTPATIENT
Start: 2023-11-27 | End: 2023-11-27

## 2023-11-27 RX ORDER — TRIAMCINOLONE ACETONIDE 1 MG/G
OINTMENT TOPICAL ONCE
OUTPATIENT
Start: 2023-11-27 | End: 2023-11-27

## 2023-11-27 RX ORDER — GINSENG 100 MG
CAPSULE ORAL ONCE
OUTPATIENT
Start: 2023-11-27 | End: 2023-11-27

## 2023-11-27 RX ORDER — SODIUM CHLOR/HYPOCHLOROUS ACID 0.033 %
SOLUTION, IRRIGATION IRRIGATION ONCE
OUTPATIENT
Start: 2023-11-27 | End: 2023-11-27

## 2023-11-27 RX ORDER — LIDOCAINE 50 MG/G
OINTMENT TOPICAL ONCE
OUTPATIENT
Start: 2023-11-27 | End: 2023-11-27

## 2023-11-27 RX ORDER — GENTAMICIN SULFATE 1 MG/G
OINTMENT TOPICAL ONCE
OUTPATIENT
Start: 2023-11-27 | End: 2023-11-27

## 2023-11-27 RX ORDER — CLOBETASOL PROPIONATE 0.5 MG/G
OINTMENT TOPICAL ONCE
OUTPATIENT
Start: 2023-11-27 | End: 2023-11-27

## 2023-11-27 RX ORDER — LIDOCAINE 40 MG/G
CREAM TOPICAL ONCE
OUTPATIENT
Start: 2023-11-27 | End: 2023-11-27

## 2023-11-27 RX ORDER — BETAMETHASONE DIPROPIONATE 0.05 %
OINTMENT (GRAM) TOPICAL ONCE
OUTPATIENT
Start: 2023-11-27 | End: 2023-11-27

## 2023-11-27 RX ORDER — IBUPROFEN 200 MG
TABLET ORAL ONCE
OUTPATIENT
Start: 2023-11-27 | End: 2023-11-27

## 2023-11-27 RX ORDER — LIDOCAINE HYDROCHLORIDE 40 MG/ML
SOLUTION TOPICAL ONCE
OUTPATIENT
Start: 2023-11-27 | End: 2023-11-27

## 2023-11-27 NOTE — FLOWSHEET NOTE
11/27/23 1015   Anesthetic   Anesthetic 4% Lidocaine Liquid Topical   Right Leg Edema Point of Measurement   Leg circumference 39 cm   Ankle circumference 25.5 cm   RLE Neurovascular Assessment   Capillary Refill Less than/Equal to 3 seconds   Color Appropriate for Ethnicity   Temperature Warm   Wound 11/27/23 Ankle Right;Medial #3   Date First Assessed/Time First Assessed: 11/27/23 1021   Location: Ankle  Wound Location Orientation: Right;Medial  Wound Description (Comments): #3   Wound Image    Wound Cleansed Soap and water   Wound Length (cm) 8 cm   Wound Width (cm) 8 cm   Wound Depth (cm) 0 cm   Wound Surface Area (cm^2) 64 cm^2   Wound Volume (cm^3) 0 cm^3   Wound Assessment Pink/red; Other (Comment)  (pinpoint petichiae)   Drainage Amount Moderate (25-50%)   Drainage Description Brown;Yellow   Odor None   Giselle-wound Assessment Blanchable erythema   Margins Undefined edges   Wound Thickness Description not for Pressure Injury Partial thickness   Wound 10/16/23 Foot Right;Plantar #1   Date First Assessed/Time First Assessed: 10/16/23 1454   Present on Original Admission: Yes  Primary Wound Type: Neuropathic  Location: Foot  Wound Location Orientation: Right;Plantar  Wound Description (Comments): #1   Wound Image    Dressing Status Old drainage noted   Wound Cleansed Soap and water   Wound Length (cm) 1 cm   Wound Width (cm) 0.2 cm   Wound Depth (cm) 2.5 cm   Wound Surface Area (cm^2) 0.2 cm^2   Change in Wound Size % (l*w) 75   Wound Volume (cm^3) 0.5 cm^3   Wound Healing % 72   Wound Assessment Other (Comment)  (unable to visualize base of wound)   Drainage Amount Moderate (25-50%)   Drainage Description Serosanguinous   Odor None   Giselle-wound Assessment Maceration; Hyperkeratosis (callous)   Margins Epibole (rolled edges)   Wound 10/16/23 Foot Right;Lateral #2   Date First Assessed/Time First Assessed: 10/16/23 1454   Present on Original Admission: Yes  Primary Wound Type: Neuropathic  Location: Foot  Wound

## 2023-11-27 NOTE — PATIENT INSTRUCTIONS
Discharge Instructions for  [address] [place]  Telephone: [phone]    NAME:  Augie Dimas OF BIRTH:  1940  ACCOUNT NUMBER :  [de-identified]  DATE:  11/27/2023     : [address]: [address] [facility] - please do not apply tape directly on patient's skin    Wound Cleansing:   Do not scrub or use excessive force. Cleanse wound prior to applying a clean dressing with:    [] Cleanse wound with: BABY SHAMPOO LATHER  LEAVE on 1-2 MINUTES ON WOUND THEN RINSE WITH WATER OR SALINE    [] May Shower at Discharge     [] Shower on days of dressing change, remove dressing first    [x] Keep Wound Dry in Shower (may purchase a cast cover if needed)     Topical Treatments:  Do not apply lotions, creams, or ointments to wound bed unless directed. [x] Apply moisturizing lotion A&D ointment  to skin surrounding the wound prior to dressing change.  [] Apply antifungal ointment to skin surrounding the wound prior to dressing change.  [] Apply thin film of Zinc barrier ointment to skin immediately around wound.     Dressings:                Wound Location Right Medial Foot raw area          Apply Primary Dressing:      [x] Betadine gauze and cover with silicone foam border      Change dressing:   [x] Three times per week      Dressings:                Wound Location Right Lateral Foot          Apply Primary Dressing:      [x] Betadine wet to dry    [x] Double Layer tubi  to right leg    Cover and Secure with:  [x] Gauze  [x] Kerlix            [x] Roll Tape       Change dressing:   [x] Three times per week       Dressings:      Wound Location Right plantar foot     Apply Primary Dressing:       [x]Kacie collagen to wound base, betadine wet to dry on top    [x] Double Layer tubi  to right leg    Cover and Secure with:  [x] Gauze    [x] Kerlix  [x] tape    Change dressing:    [x] Three times per

## 2023-11-27 NOTE — WOUND CARE
Procedure Note  Indications: Based on my examination of this patient's wound(s)/ulcer(s) today, debridement is required to promote healing and evaluate the wound base. Debridement: Excisional Debridement    Using: curette the wound(s)/ulcer(s) was/were debrided down through and including the removal of subcutaneous tissue. Performed by: Kristen Flaherty DPM  Consent obtained: Yes  Time out taken: No:   Pain Control: Anesthetic  Anesthetic: 4% Lidocaine Liquid Topical       Devitalized Tissue Debrided: slough and callus    Pre Debridement Measurements:  Are located in the [place]  Documentation Flow Sheet    Diabetic/Pressure/Non Pressure Ulcers only:  Ulcer: Non-Pressure ulcer, fat layer exposed     Wound/Ulcer #: 3  Post Debridement Measurements:  Wound/Ulcer Descriptions are Pre Debridement except measurements:  Wound 10/16/23 Foot Right;Plantar #1 (Active)   Wound Image   11/27/23 1015   Wound Etiology Other 11/13/23 1020   Dressing Status Old drainage noted 11/27/23 1015   Wound Cleansed Soap and water 11/27/23 1015   Dressing/Treatment Collagen with Ag;Betadine swabs/povidone iodine;Gauze dressing/dressing sponge; Foam 11/27/23 1049   Offloading for Diabetic Foot Ulcers Offloading ordered 11/27/23 1049   Wound Length (cm) 1 cm 11/27/23 1015   Wound Width (cm) 0.2 cm 11/27/23 1015   Wound Depth (cm) 2.5 cm 11/27/23 1015   Wound Surface Area (cm^2) 0.2 cm^2 11/27/23 1015   Change in Wound Size % (l*w) 75 11/27/23 1015   Wound Volume (cm^3) 0.5 cm^3 11/27/23 1015   Wound Healing % 72 11/27/23 1015   Post-Procedure Length (cm) 1.1 cm 11/27/23 1038   Post-Procedure Width (cm) 0.3 cm 11/27/23 1038   Post-Procedure Depth (cm) 2.5 cm 11/27/23 1038   Post-Procedure Surface Area (cm^2) 0.33 cm^2 11/27/23 1038   Post-Procedure Volume (cm^3) 0.825 cm^3 11/27/23 1038   Wound Assessment Other (Comment) 11/27/23 1015   Drainage Amount Moderate (25-50%) 11/27/23 1015   Drainage Description Serosanguinous 11/27/23 1015   Odor

## 2023-11-27 NOTE — FLOWSHEET NOTE
11/27/23 1049   Right Leg Edema Point of Measurement   Compression Therapy Tubular elastic support bandage   Wound 11/27/23 Ankle Right;Medial #3   Date First Assessed/Time First Assessed: 11/27/23 1021   Location: Ankle  Wound Location Orientation: Right;Medial  Wound Description (Comments): #3   Dressing/Treatment Betadine swabs/povidone iodine; Foam   Wound 10/16/23 Foot Right;Plantar #1   Date First Assessed/Time First Assessed: 10/16/23 1454   Present on Original Admission: Yes  Primary Wound Type: Neuropathic  Location: Foot  Wound Location Orientation: Right;Plantar  Wound Description (Comments): #1   Dressing/Treatment Collagen with Ag;Betadine swabs/povidone iodine;Gauze dressing/dressing sponge; Foam   Offloading for Diabetic Foot Ulcers Offloading ordered   Wound 10/16/23 Foot Right;Lateral #2   Date First Assessed/Time First Assessed: 10/16/23 1454   Present on Original Admission: Yes  Primary Wound Type: Neuropathic  Location: Foot  Wound Location Orientation: Right;Lateral  Wound Description (Comments): #2   Dressing/Treatment Betadine swabs/povidone iodine;Gauze dressing/dressing sponge;Roll gauze     Discharge Condition: Stable    Pain: 0    Ambulatory Status: Walker    Discharge Destination: home    Transportation:car    Accompanied by: FAMILY    Discharge instructions reviewed with FAMILY and copy or written instructions have been provided. All questions/concerns have been addressed at this time.

## 2023-12-04 ENCOUNTER — HOSPITAL ENCOUNTER (OUTPATIENT)
Facility: HOSPITAL | Age: 83
Discharge: HOME | End: 2023-12-04
Attending: SURGERY
Payer: MEDICARE

## 2023-12-04 VITALS
HEART RATE: 62 BPM | TEMPERATURE: 98.3 F | SYSTOLIC BLOOD PRESSURE: 163 MMHG | DIASTOLIC BLOOD PRESSURE: 76 MMHG | RESPIRATION RATE: 16 BRPM

## 2023-12-04 DIAGNOSIS — S91.301A OPEN WOUND OF RIGHT FOOT, INITIAL ENCOUNTER: Primary | ICD-10-CM

## 2023-12-04 PROCEDURE — 11042 DBRDMT SUBQ TIS 1ST 20SQCM/<: CPT

## 2023-12-04 RX ORDER — LIDOCAINE HYDROCHLORIDE 20 MG/ML
JELLY TOPICAL ONCE
OUTPATIENT
Start: 2023-12-04 | End: 2023-12-04

## 2023-12-04 RX ORDER — IBUPROFEN 200 MG
TABLET ORAL ONCE
OUTPATIENT
Start: 2023-12-04 | End: 2023-12-04

## 2023-12-04 RX ORDER — BACITRACIN ZINC AND POLYMYXIN B SULFATE 500; 1000 [USP'U]/G; [USP'U]/G
OINTMENT TOPICAL ONCE
OUTPATIENT
Start: 2023-12-04 | End: 2023-12-04

## 2023-12-04 RX ORDER — GENTAMICIN SULFATE 1 MG/G
OINTMENT TOPICAL ONCE
OUTPATIENT
Start: 2023-12-04 | End: 2023-12-04

## 2023-12-04 RX ORDER — BETAMETHASONE DIPROPIONATE 0.05 %
OINTMENT (GRAM) TOPICAL ONCE
OUTPATIENT
Start: 2023-12-04 | End: 2023-12-04

## 2023-12-04 RX ORDER — LIDOCAINE 40 MG/G
CREAM TOPICAL ONCE
OUTPATIENT
Start: 2023-12-04 | End: 2023-12-04

## 2023-12-04 RX ORDER — GINSENG 100 MG
CAPSULE ORAL ONCE
OUTPATIENT
Start: 2023-12-04 | End: 2023-12-04

## 2023-12-04 RX ORDER — LIDOCAINE HYDROCHLORIDE 40 MG/ML
SOLUTION TOPICAL ONCE
OUTPATIENT
Start: 2023-12-04 | End: 2023-12-04

## 2023-12-04 RX ORDER — SODIUM CHLOR/HYPOCHLOROUS ACID 0.033 %
SOLUTION, IRRIGATION IRRIGATION ONCE
OUTPATIENT
Start: 2023-12-04 | End: 2023-12-04

## 2023-12-04 RX ORDER — TRIAMCINOLONE ACETONIDE 1 MG/G
OINTMENT TOPICAL ONCE
OUTPATIENT
Start: 2023-12-04 | End: 2023-12-04

## 2023-12-04 RX ORDER — CLOBETASOL PROPIONATE 0.5 MG/G
OINTMENT TOPICAL ONCE
OUTPATIENT
Start: 2023-12-04 | End: 2023-12-04

## 2023-12-04 RX ORDER — LIDOCAINE 50 MG/G
OINTMENT TOPICAL ONCE
OUTPATIENT
Start: 2023-12-04 | End: 2023-12-04

## 2023-12-04 ASSESSMENT — PAIN DESCRIPTION - ORIENTATION: ORIENTATION: RIGHT

## 2023-12-04 ASSESSMENT — PAIN SCALES - GENERAL: PAINLEVEL_OUTOF10: 2

## 2023-12-04 ASSESSMENT — PAIN DESCRIPTION - DESCRIPTORS: DESCRIPTORS: ACHING

## 2023-12-04 NOTE — PATIENT INSTRUCTIONS
Discharge Instructions for  [address] Bernardo Chinchilla  Telephone: [phone]    NAME:  Meera Sims OF BIRTH:  1940  ACCOUNT NUMBER :  [de-identified]  DATE:  12/4/2023     : [address]: [address] [facility] - please do not apply tape directly on patient's skin    Wound Cleansing:   Do not scrub or use excessive force. Cleanse wound prior to applying a clean dressing with:    [] Cleanse wound with: BABY SHAMPOO LATHER  LEAVE on 1-2 MINUTES ON WOUND THEN RINSE WITH WATER OR SALINE    [] May Shower at Discharge     [] Shower on days of dressing change, remove dressing first    [x] Keep Wound Dry in Shower (may purchase a cast cover if needed)     Topical Treatments:  Do not apply lotions, creams, or ointments to wound bed unless directed. [x] Apply moisturizing lotion A&D ointment  to skin surrounding the wound prior to dressing change.  [] Apply antifungal ointment to skin surrounding the wound prior to dressing change.  [] Apply thin film of Zinc barrier ointment to skin immediately around wound.     Dressings:                Wound Location Right Medial Foot raw area          Apply Primary Dressing:      [x] Betadine gauze and cover with silicone foam border      Change dressing:   [x] Three times per week      Dressings:                Wound Location Right Lateral Foot          Apply Primary Dressing:      [x] Betadine wet to dry    [x] Double Layer tubi  to right leg    Cover and Secure with:  [x] Gauze  [x] Kerlix            [x] Roll Tape       Change dressing:   [x] Three times per week       Dressings:      Wound Location Right plantar foot     Apply Primary Dressing:       [x]Kacie collagen to wound base, betadine wet to dry on top    [x] Double Layer tubi  to right leg    Cover and Secure with:  [x] Gauze    [x] Kerlix  [x] tape    Change dressing:    [x] Three times per week    Off-Loading:

## 2023-12-04 NOTE — WOUND CARE
Procedure Note  Indications: Based on my examination of this patient's wound(s)/ulcer(s) today, debridement is required to promote healing and evaluate the wound base. Debridement: Excisional Debridement    Using: curette the wound(s)/ulcer(s) was/were debrided down through and including the removal of subcutaneous tissue.   Performed by: Sherwin Mirza DPM  Consent obtained: Yes  Time out taken: No:   Pain Control: Anesthetic  Anesthetic: 4% Lidocaine Liquid Topical       Devitalized Tissue Debrided: slough and callus    Pre Debridement Measurements:  Are located in the [place]  Documentation Flow Sheet    Diabetic/Pressure/Non Pressure Ulcers only:  Ulcer: Non-Pressure ulcer, fat layer exposed     Wound/Ulcer #: 2  Post Debridement Measurements:  Wound/Ulcer Descriptions are Pre Debridement except measurements:  Wound 10/16/23 Foot Right;Plantar #1 (Active)   Wound Image   12/04/23 1013   Wound Etiology Other 11/13/23 1020   Dressing Status Old drainage noted 12/04/23 1013   Wound Cleansed Soap and water 12/04/23 1013   Dressing/Treatment Collagen with Ag;ABD;Betadine swabs/povidone iodine 12/04/23 1120   Offloading for Diabetic Foot Ulcers Offloading ordered 12/04/23 1120   Wound Length (cm) 0.4 cm 12/04/23 1013   Wound Width (cm) 0.5 cm 12/04/23 1013   Wound Depth (cm) 0.9 cm 12/04/23 1013   Wound Surface Area (cm^2) 0.2 cm^2 12/04/23 1013   Change in Wound Size % (l*w) 75 12/04/23 1013   Wound Volume (cm^3) 0.18 cm^3 12/04/23 1013   Wound Healing % 90 12/04/23 1013   Post-Procedure Length (cm) 0.4 cm 12/04/23 1103   Post-Procedure Width (cm) 0.5 cm 12/04/23 1103   Post-Procedure Depth (cm) 1 cm 12/04/23 1103   Post-Procedure Surface Area (cm^2) 0.2 cm^2 12/04/23 1103   Post-Procedure Volume (cm^3) 0.2 cm^3 12/04/23 1103   Wound Assessment Other (Comment) 12/04/23 1013   Drainage Amount Moderate (25-50%) 12/04/23 1013   Drainage Description Yellow;Brown 12/04/23 1013   Odor None 12/04/23 1013   Giselle-wound

## 2023-12-11 ENCOUNTER — HOSPITAL ENCOUNTER (OUTPATIENT)
Facility: HOSPITAL | Age: 83
Discharge: HOME | End: 2023-12-11
Attending: SURGERY
Payer: MEDICARE

## 2023-12-11 VITALS
DIASTOLIC BLOOD PRESSURE: 65 MMHG | RESPIRATION RATE: 18 BRPM | SYSTOLIC BLOOD PRESSURE: 145 MMHG | TEMPERATURE: 97.5 F | HEART RATE: 57 BPM

## 2023-12-11 DIAGNOSIS — S91.301A OPEN WOUND OF RIGHT FOOT, INITIAL ENCOUNTER: Primary | ICD-10-CM

## 2023-12-11 PROCEDURE — 11042 DBRDMT SUBQ TIS 1ST 20SQCM/<: CPT

## 2023-12-11 RX ORDER — LIDOCAINE HYDROCHLORIDE 20 MG/ML
JELLY TOPICAL ONCE
OUTPATIENT
Start: 2023-12-11 | End: 2023-12-11

## 2023-12-11 RX ORDER — LIDOCAINE 40 MG/G
CREAM TOPICAL ONCE
OUTPATIENT
Start: 2023-12-11 | End: 2023-12-11

## 2023-12-11 RX ORDER — GENTAMICIN SULFATE 1 MG/G
OINTMENT TOPICAL ONCE
OUTPATIENT
Start: 2023-12-11 | End: 2023-12-11

## 2023-12-11 RX ORDER — LIDOCAINE 50 MG/G
OINTMENT TOPICAL ONCE
OUTPATIENT
Start: 2023-12-11 | End: 2023-12-11

## 2023-12-11 RX ORDER — CLOBETASOL PROPIONATE 0.5 MG/G
OINTMENT TOPICAL ONCE
OUTPATIENT
Start: 2023-12-11 | End: 2023-12-11

## 2023-12-11 RX ORDER — TRIAMCINOLONE ACETONIDE 1 MG/G
OINTMENT TOPICAL ONCE
OUTPATIENT
Start: 2023-12-11 | End: 2023-12-11

## 2023-12-11 RX ORDER — TADALAFIL 5 MG/1
5 TABLET ORAL DAILY
COMMUNITY

## 2023-12-11 RX ORDER — IBUPROFEN 200 MG
TABLET ORAL ONCE
OUTPATIENT
Start: 2023-12-11 | End: 2023-12-11

## 2023-12-11 RX ORDER — GINSENG 100 MG
CAPSULE ORAL ONCE
OUTPATIENT
Start: 2023-12-11 | End: 2023-12-11

## 2023-12-11 RX ORDER — BACITRACIN ZINC AND POLYMYXIN B SULFATE 500; 1000 [USP'U]/G; [USP'U]/G
OINTMENT TOPICAL ONCE
OUTPATIENT
Start: 2023-12-11 | End: 2023-12-11

## 2023-12-11 RX ORDER — SODIUM CHLOR/HYPOCHLOROUS ACID 0.033 %
SOLUTION, IRRIGATION IRRIGATION ONCE
OUTPATIENT
Start: 2023-12-11 | End: 2023-12-11

## 2023-12-11 RX ORDER — LIDOCAINE HYDROCHLORIDE 40 MG/ML
SOLUTION TOPICAL ONCE
OUTPATIENT
Start: 2023-12-11 | End: 2023-12-11

## 2023-12-11 RX ORDER — BETAMETHASONE DIPROPIONATE 0.05 %
OINTMENT (GRAM) TOPICAL ONCE
OUTPATIENT
Start: 2023-12-11 | End: 2023-12-11

## 2023-12-11 ASSESSMENT — PAIN DESCRIPTION - DESCRIPTORS: DESCRIPTORS: SORE

## 2023-12-11 ASSESSMENT — PAIN DESCRIPTION - LOCATION: LOCATION: FOOT

## 2023-12-11 ASSESSMENT — PAIN SCALES - GENERAL: PAINLEVEL_OUTOF10: 5

## 2023-12-11 ASSESSMENT — PAIN DESCRIPTION - ORIENTATION: ORIENTATION: RIGHT

## 2023-12-11 NOTE — PATIENT INSTRUCTIONS
week    Off-Loading: [x] Surgical shoe or Defender Boot   [x] Off-loading when : [x] walking     Dietary:  [x] Increase Protein: examples ( Meat, cheese, eggs, greek yogurt, premier protein drink, fish, nuts )     Activity:  Activity as tolerated:    [x] Patient has no activity restrictions    [x] Offload with surgical shoe or Defender boot                                          Return Appointment:  [x] Return Appointment: With Dr. David Fuentes  in  [address] [place] [address])    [facility] Main: Should you experience any significant changes in your wound(s) or have questions about your wound care, please contact the [address] at [facility] 8:00 am - 4:30 AND Friday 8:00 AM- 12:00 PM .  If you need help with your wound outside these hours and cannot wait until we are again available, contact your PCP or go to the hospital emergency room. PLEASE NOTE: IF YOU ARE UNABLE TO OBTAIN WOUND SUPPLIES, CONTINUE TO USE THE SUPPLIES YOU HAVE AVAILABLE UNTIL YOU ARE ABLE TO REACH US. IT IS MOST IMPORTANT TO KEEP THE WOUND COVERED AT ALL TIMES.      Physician Signature:_______________________ Dr. Carl Butts

## 2023-12-11 NOTE — FLOWSHEET NOTE
12/11/23 1052   Wound 11/27/23 Ankle Right;Medial #3   Date First Assessed/Time First Assessed: 11/27/23 1021   Location: Ankle  Wound Location Orientation: Right;Medial  Wound Description (Comments): #3   Dressing/Treatment Betadine swabs/povidone iodine;Petroleum gauze; Roll gauze;Tape/Soft cloth adhesive tape  (Drawtex)   Offloading for Diabetic Foot Ulcers Offloading ordered   Wound 10/16/23 Foot Right;Plantar #1   Date First Assessed/Time First Assessed: 10/16/23 1454   Present on Original Admission: Yes  Primary Wound Type: Neuropathic  Location: Foot  Wound Location Orientation: Right;Plantar  Wound Description (Comments): #1   Dressing/Treatment Gauze dressing/dressing sponge;Roll gauze;Tape/Soft cloth adhesive tape  (Ioplex)   Offloading for Diabetic Foot Ulcers Offloading ordered   Wound 10/16/23 Foot Right;Lateral #2   Date First Assessed/Time First Assessed: 10/16/23 1454   Present on Original Admission: Yes  Primary Wound Type: Neuropathic  Location: Foot  Wound Location Orientation: Right;Lateral  Wound Description (Comments): #2   Dressing/Treatment Betadine swabs/povidone iodine;Gauze dressing/dressing sponge;Roll gauze;Tape/Soft cloth adhesive tape  (drawtex)   Offloading for Diabetic Foot Ulcers Offloading ordered     Discharge Condition: Stable    Pain: 5    Ambulatory Status: Walker    Discharge Destination: home    Transportation:car    Accompanied by: SELF    Discharge instructions reviewed with SELF and copy or written instructions have been provided. All questions/concerns have been addressed at this time.

## 2023-12-11 NOTE — FLOWSHEET NOTE
12/11/23 1006   Anesthetic   Anesthetic 4% Lidocaine Liquid Topical   Right Leg Edema Point of Measurement   Leg circumference 41 cm   Ankle circumference 26.8 cm   RLE Neurovascular Assessment   Capillary Refill Less than/Equal to 3 seconds   Color Appropriate for Ethnicity   Temperature Cool   R Pedal Pulse +1   Wound 11/27/23 Ankle Right;Medial #3   Date First Assessed/Time First Assessed: 11/27/23 1021   Location: Ankle  Wound Location Orientation: Right;Medial  Wound Description (Comments): #3   Wound Image    Dressing Status Old drainage noted   Wound Cleansed Soap and water   Wound Length (cm) 0.9 cm   Wound Width (cm) 0.5 cm   Wound Depth (cm) 0.1 cm   Wound Surface Area (cm^2) 0.45 cm^2   Change in Wound Size % (l*w) 99.3   Wound Volume (cm^3) 0.045 cm^3   Wound Assessment Pink/red; Other (Comment)  (dried exudate)   Drainage Amount Small (< 25%)   Drainage Description Serous   Odor None   Giselle-wound Assessment Maceration;Dry/flaky   Margins Flat/open edges   Wound 10/16/23 Foot Right;Plantar #1   Date First Assessed/Time First Assessed: 10/16/23 1454   Present on Original Admission: Yes  Primary Wound Type: Neuropathic  Location: Foot  Wound Location Orientation: Right;Plantar  Wound Description (Comments): #1   Wound Image    Dressing Status Old drainage noted   Wound Cleansed Soap and water   Wound Length (cm) 0.5 cm   Wound Width (cm) 0.3 cm   Wound Depth (cm) 2.3 cm   Wound Surface Area (cm^2) 0.15 cm^2   Change in Wound Size % (l*w) 81.25   Wound Volume (cm^3) 0.345 cm^3   Wound Healing % 80   Wound Assessment Other (Comment);Pink/red  (unable to visualize full base of wound due to size, probbed to bone)   Drainage Amount Moderate (25-50%)   Drainage Description Serosanguinous   Odor Mild   Giselle-wound Assessment Hyperkeratosis (callous); Maceration   Margins Epibole (rolled edges)   Wound 10/16/23 Foot Right;Lateral #2   Date First Assessed/Time First Assessed: 10/16/23 1454   Present on Original

## 2023-12-11 NOTE — WOUND CARE
Procedure Note  Indications: Based on my examination of this patient's wound(s)/ulcer(s) today, debridement is required to promote healing and evaluate the wound base. Debridement: Excisional Debridement    Using: curette and #15 surgical blade the wound(s)/ulcer(s) was/were debrided down through and including the removal of subcutaneous tissue.   Performed by: Charline Bay DPM  Consent obtained: Yes  Time out taken: No:   Pain Control: Anesthetic  Anesthetic: 4% Lidocaine Liquid Topical       Devitalized Tissue Debrided: slough    Pre Debridement Measurements:  Are located in the [place]  Documentation Flow Sheet    Diabetic/Pressure/Non Pressure Ulcers only:  Ulcer: Non-Pressure ulcer, fat layer exposed     Wound/Ulcer #: 2  Post Debridement Measurements:  Wound/Ulcer Descriptions are Pre Debridement except measurements:  Wound 10/16/23 Foot Right;Plantar #1 (Active)   Wound Image   12/11/23 1006   Wound Etiology Other 11/13/23 1020   Dressing Status Old drainage noted 12/11/23 1006   Wound Cleansed Soap and water 12/11/23 1006   Dressing/Treatment Gauze dressing/dressing sponge;Roll gauze;Tape/Soft cloth adhesive tape 12/11/23 1052   Offloading for Diabetic Foot Ulcers Offloading ordered 12/11/23 1052   Wound Length (cm) 0.5 cm 12/11/23 1006   Wound Width (cm) 0.3 cm 12/11/23 1006   Wound Depth (cm) 2.3 cm 12/11/23 1006   Wound Surface Area (cm^2) 0.15 cm^2 12/11/23 1006   Change in Wound Size % (l*w) 81.25 12/11/23 1006   Wound Volume (cm^3) 0.345 cm^3 12/11/23 1006   Wound Healing % 80 12/11/23 1006   Post-Procedure Length (cm) 0.5 cm 12/11/23 1034   Post-Procedure Width (cm) 0.3 cm 12/11/23 1034   Post-Procedure Depth (cm) 2.4 cm 12/11/23 1034   Post-Procedure Surface Area (cm^2) 0.15 cm^2 12/11/23 1034   Post-Procedure Volume (cm^3) 0.36 cm^3 12/11/23 1034   Wound Assessment Other (Comment);Pink/red 12/11/23 1006   Drainage Amount Moderate (25-50%) 12/11/23 1006   Drainage Description Serosanguinous

## 2024-04-19 ENCOUNTER — OFFICE VISIT (OUTPATIENT)
Age: 84
End: 2024-04-19
Payer: MEDICARE

## 2024-04-19 VITALS
DIASTOLIC BLOOD PRESSURE: 60 MMHG | WEIGHT: 198.2 LBS | HEIGHT: 71 IN | HEART RATE: 55 BPM | BODY MASS INDEX: 27.75 KG/M2 | SYSTOLIC BLOOD PRESSURE: 126 MMHG | OXYGEN SATURATION: 99 % | RESPIRATION RATE: 20 BRPM

## 2024-04-19 DIAGNOSIS — I73.9 PAD (PERIPHERAL ARTERY DISEASE) (HCC): ICD-10-CM

## 2024-04-19 DIAGNOSIS — I11.9 BENIGN HYPERTENSIVE HEART DISEASE WITHOUT HEART FAILURE: ICD-10-CM

## 2024-04-19 DIAGNOSIS — I48.20 ATRIAL FIBRILLATION, CHRONIC (HCC): Primary | ICD-10-CM

## 2024-04-19 PROCEDURE — 1123F ACP DISCUSS/DSCN MKR DOCD: CPT | Performed by: INTERNAL MEDICINE

## 2024-04-19 PROCEDURE — 1036F TOBACCO NON-USER: CPT | Performed by: INTERNAL MEDICINE

## 2024-04-19 PROCEDURE — 93005 ELECTROCARDIOGRAM TRACING: CPT | Performed by: INTERNAL MEDICINE

## 2024-04-19 PROCEDURE — G8427 DOCREV CUR MEDS BY ELIG CLIN: HCPCS | Performed by: INTERNAL MEDICINE

## 2024-04-19 PROCEDURE — G8419 CALC BMI OUT NRM PARAM NOF/U: HCPCS | Performed by: INTERNAL MEDICINE

## 2024-04-19 PROCEDURE — 99204 OFFICE O/P NEW MOD 45 MIN: CPT | Performed by: INTERNAL MEDICINE

## 2024-04-19 PROCEDURE — 93010 ELECTROCARDIOGRAM REPORT: CPT | Performed by: INTERNAL MEDICINE

## 2024-04-19 RX ORDER — FUROSEMIDE 20 MG/1
20 TABLET ORAL EVERY OTHER DAY
COMMUNITY

## 2024-04-19 RX ORDER — ISOSORBIDE MONONITRATE 30 MG/1
30 TABLET, EXTENDED RELEASE ORAL DAILY
COMMUNITY

## 2024-04-19 NOTE — PROGRESS NOTES
Received VO from Dr. Sly Leach:  Refer to Dr. Lal for Watchman.      See Dr. Leach in 6 months.

## 2024-04-19 NOTE — PROGRESS NOTES
Reason to see patient: Afib, HTN,     Referring: Jared Valdes MD    HPI: Jasiel Jose is a 84 y.o. male with past medical history significant for atrial fibrillation, history of hypertension, sleep apnea, varicose veins of the leg, peripheral vascular disease with surgery on the right vascular system, osteomyelitis of the right foot he is here for evaluation.  He maintains dual residency including [place] as well as [place].  He was last seen in our office by Dr. Loving back in 2020.  Since then there were no follow-ups in our office.  Recently was admitted to hospital in January 2024 with [facility] and [facility].  I was able to review those records as those were electronically available.  Records indicate that he had unclear source of bleeding.  He underwent endoscopy and there was no bleeders noted at that time.  He had previous colonoscopy without any sites of bleeding.  Capsule endoscopy did not reveal any source of bleeding.  His hemoglobin has significantly declined at the time needed blood transfusion.  He was off of Xarelto for few days.  Then he was resumed Xarelto onLower dose of 50 mg p.o. daily due to CRI.  Since then he has been doing okay.  Currently denies any symptoms of chest pain, shortness of breath, palpitations, lightheadedness or dizziness.    EKG in my office today demonstrated atrial fibrillation with heart rate of 50 bpm with voltage criteria for LVH with nonspecific ST changes.    Lab results from March 28, 2024 were personally reviewed.  Lab results demonstrated LDL of 70, creatinine of 1.63, potassium 4.4.    Stress test done on March 20, 2024 reported as normal per patient.  Reports are not available for review at this time.    Plan:    1.  Chronic atrial fibrillation: He is not on any rate control medication.  His intrinsic rate is 50 bpm on today's EKG.  Currently on Xarelto 15 mg p.o. daily but had an episode of melena with significant anemia requiring blood

## 2024-04-19 NOTE — PROGRESS NOTES
had concerns including Atrial Fibrillation, Hypertension, Edema, and Other (PAD).    Vitals:    04/19/24 0859   BP: 126/60   Site: Left Upper Arm   Position: Sitting   Pulse: 55   Resp: 20   SpO2: 99%   Weight: 89.9 kg (198 lb 3.2 oz)   Height: 1.803 m (5' 11\")        Chest pain No    Refills No        1. Have you been to the ER, urgent care clinic since your last visit? No       Hospitalized since your last visit? No       Where?  [facility]  Date?

## 2024-04-22 ENCOUNTER — HOSPITAL ENCOUNTER (OUTPATIENT)
Facility: HOSPITAL | Age: 84
Discharge: HOME | End: 2024-04-22
Attending: PODIATRIST
Payer: MEDICARE

## 2024-04-22 VITALS
RESPIRATION RATE: 18 BRPM | SYSTOLIC BLOOD PRESSURE: 146 MMHG | TEMPERATURE: 97.1 F | WEIGHT: 196 LBS | BODY MASS INDEX: 27.44 KG/M2 | HEIGHT: 71 IN | DIASTOLIC BLOOD PRESSURE: 67 MMHG | HEART RATE: 51 BPM

## 2024-04-22 DIAGNOSIS — S91.301A OPEN WOUND OF RIGHT FOOT, INITIAL ENCOUNTER: ICD-10-CM

## 2024-04-22 DIAGNOSIS — S91.301D OPEN WOUND OF RIGHT FOOT, SUBSEQUENT ENCOUNTER: Primary | ICD-10-CM

## 2024-04-22 PROCEDURE — 11042 DBRDMT SUBQ TIS 1ST 20SQCM/<: CPT

## 2024-04-22 RX ORDER — CLOBETASOL PROPIONATE 0.5 MG/G
OINTMENT TOPICAL ONCE
OUTPATIENT
Start: 2024-04-22 | End: 2024-04-22

## 2024-04-22 RX ORDER — GENTAMICIN SULFATE 1 MG/G
OINTMENT TOPICAL ONCE
OUTPATIENT
Start: 2024-04-22 | End: 2024-04-22

## 2024-04-22 RX ORDER — LIDOCAINE 40 MG/G
CREAM TOPICAL ONCE
OUTPATIENT
Start: 2024-04-22 | End: 2024-04-22

## 2024-04-22 RX ORDER — IBUPROFEN 200 MG
TABLET ORAL ONCE
OUTPATIENT
Start: 2024-04-22 | End: 2024-04-22

## 2024-04-22 RX ORDER — LIDOCAINE 50 MG/G
OINTMENT TOPICAL ONCE
OUTPATIENT
Start: 2024-04-22 | End: 2024-04-22

## 2024-04-22 RX ORDER — BACITRACIN ZINC AND POLYMYXIN B SULFATE 500; 1000 [USP'U]/G; [USP'U]/G
OINTMENT TOPICAL ONCE
OUTPATIENT
Start: 2024-04-22 | End: 2024-04-22

## 2024-04-22 RX ORDER — LIDOCAINE HYDROCHLORIDE 40 MG/ML
SOLUTION TOPICAL ONCE
OUTPATIENT
Start: 2024-04-22 | End: 2024-04-22

## 2024-04-22 RX ORDER — BETAMETHASONE DIPROPIONATE 0.05 %
OINTMENT (GRAM) TOPICAL ONCE
OUTPATIENT
Start: 2024-04-22 | End: 2024-04-22

## 2024-04-22 RX ORDER — GINSENG 100 MG
CAPSULE ORAL ONCE
OUTPATIENT
Start: 2024-04-22 | End: 2024-04-22

## 2024-04-22 RX ORDER — TRIAMCINOLONE ACETONIDE 1 MG/G
OINTMENT TOPICAL ONCE
OUTPATIENT
Start: 2024-04-22 | End: 2024-04-22

## 2024-04-22 RX ORDER — LIDOCAINE HYDROCHLORIDE 20 MG/ML
JELLY TOPICAL ONCE
OUTPATIENT
Start: 2024-04-22 | End: 2024-04-22

## 2024-04-22 RX ORDER — SODIUM CHLOR/HYPOCHLOROUS ACID 0.033 %
SOLUTION, IRRIGATION IRRIGATION ONCE
OUTPATIENT
Start: 2024-04-22 | End: 2024-04-22

## 2024-04-22 ASSESSMENT — PAIN DESCRIPTION - ORIENTATION: ORIENTATION: RIGHT

## 2024-04-22 ASSESSMENT — PAIN DESCRIPTION - FREQUENCY: FREQUENCY: INTERMITTENT

## 2024-04-22 ASSESSMENT — PAIN DESCRIPTION - LOCATION: LOCATION: FOOT

## 2024-04-22 ASSESSMENT — PAIN DESCRIPTION - DESCRIPTORS: DESCRIPTORS: ACHING

## 2024-04-22 ASSESSMENT — PAIN SCALES - GENERAL: PAINLEVEL_OUTOF10: 2

## 2024-04-22 ASSESSMENT — PAIN DESCRIPTION - PAIN TYPE: TYPE: CHRONIC PAIN

## 2024-04-22 NOTE — FLOWSHEET NOTE
04/22/24 1043   Right Leg Edema Point of Measurement   Compression Therapy Tubular elastic support bandage   Left Leg Edema Point of Measurement   Compression Therapy Tubular elastic support bandage   Wound 10/16/23 Foot Right;Plantar #1   Date First Assessed/Time First Assessed: 10/16/23 1454   Present on Original Admission: Yes  Primary Wound Type: Neuropathic  Location: Foot  Wound Location Orientation: Right;Plantar  Wound Description (Comments): #1   Dressing/Treatment Packing;Alginate with Ag;Gauze dressing/dressing sponge;Roll gauze   Offloading for Diabetic Foot Ulcers Offloading ordered   Wound 10/16/23 Foot Right;Lateral #2   Date First Assessed/Time First Assessed: 10/16/23 1454   Present on Original Admission: Yes  Primary Wound Type: Neuropathic  Location: Foot  Wound Location Orientation: Right;Lateral  Wound Description (Comments): #2   Dressing/Treatment Other (comment)  (A&D ointment)   Offloading for Diabetic Foot Ulcers Offloading ordered     Discharge Condition: Stable    Pain: 2    Ambulatory Status:Walker    Discharge Destination: Home    Transportation:Car    Accompanied by: Family    Discharge instructions reviewed with Family and copy or written instructions have been provided. All questions/concerns have been addressed at this time.

## 2024-04-22 NOTE — WOUND CARE
[facility]   Wound Care and [facility] Therapy Center   Medical Staff Progress Note     Jasiel Jose  MEDICAL RECORD NUMBER:  [id number]  AGE: 84 y.o.   GENDER: male  : 1940  EPISODE DATE:  2024    Chief complaint and reason for visit:     Chief Complaint   Patient presents with    Wound Check     Right medial ankle right plantar foot right lateral foot wounds      Patient presents back for right foot wounds. He was seen here in the past. He goes to [place] every year for 4 months and gets wound care there. He states during his last stay, he was hospitalized for right foot infection and was on IV antibiotics and discharged on oral antibiotics for chronic osteomyelitis.    Medical Decision Making:     Problem List Items Addressed This Visit          Other    Open wound of right foot - Primary    Relevant Orders    Initiate Outpatient Wound Care Protocol       Wounds and Treatment Plan:        Wound 10/16/23 Foot Right;Plantar #1 (Active)   Wound Image   24 1008   Wound Etiology Other 23 1020   Dressing Status Old drainage noted 24 1008   Wound Cleansed Soap and water 24 1008   Dressing/Treatment Packing;Alginate with Ag;Gauze dressing/dressing sponge;Roll gauze 24 1043   Offloading for Diabetic Foot Ulcers Offloading ordered 24 1043   Wound Length (cm) 0.3 cm 24 1008   Wound Width (cm) 0.4 cm 24 1008   Wound Depth (cm) 1.6 cm 24 1008   Wound Surface Area (cm^2) 0.12 cm^2 24 1008   Change in Wound Size % (l*w) 85 24 1008   Wound Volume (cm^3) 0.192 cm^3 24 1008   Wound Healing % 89 24 1008   Post-Procedure Length (cm) 0.3 cm 24 1031   Post-Procedure Width (cm) 0.4 cm 24 1031   Post-Procedure Depth (cm) 1.7 cm 24 1031   Post-Procedure Surface Area (cm^2) 0.12 cm^2 24 1031   Post-Procedure Volume (cm^3) 0.204 cm^3 24 1031   Wound Assessment Pink/red;Pale granulation tissue

## 2024-04-22 NOTE — PATIENT INSTRUCTIONS
Discharge Instructions for  [facility]  [address] [place] [address]  Telephone: [phone]   FAX [phone]    NAME:  Jasiel Jose  YOB: 1940  ACCOUNT NUMBER :  [id number]  DATE:  4/22/2024     : [place]    Wound Cleansing:   Do not scrub or use excessive force.  Cleanse wound prior to applying a clean dressing with:    [] Cleanse wound with: BABY SHAMPOO LATHER  LEAVE on 1-2 MINUTES ON WOUND THEN RINSE WITH WATER OR SALINE    [] May Shower at Discharge     [] Shower on days of dressing change, remove dressing first    [x] Keep Wound Dry in Shower (may purchase a cast cover if needed)     Topical Treatments:  Do not apply lotions, creams, or ointments to wound bed unless directed.   [x] Apply moisturizing lotion A&D ointment  to skin surrounding the wound prior to dressing change.  [] Apply antifungal ointment to skin surrounding the wound prior to dressing change.  [] Apply thin film of Zinc barrier ointment to skin immediately around wound.    Dressings:              Wound Location Right Lateral Foot          Apply Primary Dressing:      [x] A&D Ointment to dry area and felt in shoe to pad rubbing area  [x] Double Layer tubi  to right leg       Dressings:      Wound Location Right plantar foot     Apply Primary Dressing:       [x] Aquacel AG packed into wound base   [x] Double Layer tubi  to right leg    Cover and Secure with:  [x] Gauze    [x] Kerlix  [x] tape    Change dressing:    [x] Every other day     Off-Loading: [x] Surgical shoe or Defender Boot   [x] Off-loading when : [x] walking     Dietary:  [x] Increase Protein: examples ( Meat, cheese, eggs, greek yogurt, premier protein drink, fish, nuts )     Activity:  Activity as tolerated:    [x] Patient has no activity restrictions    [x] Offload with surgical shoe or Defender boot                                          Return Appointment:  [x] Return Appointment: With Dr. Chisholm

## 2024-04-22 NOTE — FLOWSHEET NOTE
04/22/24 1008   Anesthetic   Anesthetic 4% Lidocaine Liquid Topical   Right Leg Edema Point of Measurement   Leg circumference 37 cm   Ankle circumference 24.8 cm   Left Leg Edema Point of Measurement   Leg circumference 37.4 cm   Ankle circumference 25 cm   RLE Neurovascular Assessment   Capillary Refill Less than/Equal to 3 seconds   Color Pink;Yellow-Brown/Hemosiderin Staining   Temperature Warm   R Pedal Pulse +1   LLE Neurovascular Assessment   Capillary Refill Less than/Equal to 3 seconds   Color Yellow-Brown/Hemosiderin Staining   Temperature Warm   L Pedal Pulse +1   Wound 10/16/23 Foot Right;Plantar #1   Date First Assessed/Time First Assessed: 10/16/23 1454   Present on Original Admission: Yes  Primary Wound Type: Neuropathic  Location: Foot  Wound Location Orientation: Right;Plantar  Wound Description (Comments): #1   Wound Image    Dressing Status Old drainage noted   Wound Cleansed Soap and water   Wound Length (cm) 0.3 cm   Wound Width (cm) 0.4 cm   Wound Depth (cm) 1.6 cm   Wound Surface Area (cm^2) 0.12 cm^2   Change in Wound Size % (l*w) 85   Wound Volume (cm^3) 0.192 cm^3   Wound Healing % 89   Wound Assessment Pink/red;Pale granulation tissue   Drainage Amount Moderate (25-50%)   Drainage Description Serous   Odor None   Giselle-wound Assessment Hyperkeratosis (callous)   Margins Epibole (rolled edges)   Wound Thickness Description not for Pressure Injury Full thickness   Wound 10/16/23 Foot Right;Lateral #2   Date First Assessed/Time First Assessed: 10/16/23 1454   Present on Original Admission: Yes  Primary Wound Type: Neuropathic  Location: Foot  Wound Location Orientation: Right;Lateral  Wound Description (Comments): #2   Wound Image    Dressing Status Old drainage noted   Wound Cleansed Soap and water   Wound Length (cm) 0.3 cm   Wound Width (cm) 0.2 cm   Wound Depth (cm) 0 cm   Wound Surface Area (cm^2) 0.06 cm^2   Change in Wound Size % (l*w) 98.44   Wound Volume (cm^3) 0 cm^3   Wound

## 2024-05-06 ENCOUNTER — HOSPITAL ENCOUNTER (OUTPATIENT)
Facility: HOSPITAL | Age: 84
Discharge: HOME | End: 2024-05-06
Attending: PODIATRIST
Payer: MEDICARE

## 2024-05-06 VITALS
TEMPERATURE: 98.1 F | SYSTOLIC BLOOD PRESSURE: 162 MMHG | HEART RATE: 60 BPM | DIASTOLIC BLOOD PRESSURE: 76 MMHG | RESPIRATION RATE: 18 BRPM

## 2024-05-06 DIAGNOSIS — S91.301A OPEN WOUND OF RIGHT FOOT, INITIAL ENCOUNTER: Primary | ICD-10-CM

## 2024-05-06 PROCEDURE — 11042 DBRDMT SUBQ TIS 1ST 20SQCM/<: CPT

## 2024-05-06 RX ORDER — LIDOCAINE HYDROCHLORIDE 20 MG/ML
JELLY TOPICAL ONCE
OUTPATIENT
Start: 2024-05-06 | End: 2024-05-06

## 2024-05-06 RX ORDER — TRIAMCINOLONE ACETONIDE 1 MG/G
OINTMENT TOPICAL ONCE
OUTPATIENT
Start: 2024-05-06 | End: 2024-05-06

## 2024-05-06 RX ORDER — LIDOCAINE HYDROCHLORIDE 40 MG/ML
SOLUTION TOPICAL ONCE
OUTPATIENT
Start: 2024-05-06 | End: 2024-05-06

## 2024-05-06 RX ORDER — GINSENG 100 MG
CAPSULE ORAL ONCE
OUTPATIENT
Start: 2024-05-06 | End: 2024-05-06

## 2024-05-06 RX ORDER — BACITRACIN ZINC AND POLYMYXIN B SULFATE 500; 1000 [USP'U]/G; [USP'U]/G
OINTMENT TOPICAL ONCE
OUTPATIENT
Start: 2024-05-06 | End: 2024-05-06

## 2024-05-06 RX ORDER — BETAMETHASONE DIPROPIONATE 0.05 %
OINTMENT (GRAM) TOPICAL ONCE
OUTPATIENT
Start: 2024-05-06 | End: 2024-05-06

## 2024-05-06 RX ORDER — SODIUM CHLOR/HYPOCHLOROUS ACID 0.033 %
SOLUTION, IRRIGATION IRRIGATION ONCE
OUTPATIENT
Start: 2024-05-06 | End: 2024-05-06

## 2024-05-06 RX ORDER — CLOBETASOL PROPIONATE 0.5 MG/G
OINTMENT TOPICAL ONCE
OUTPATIENT
Start: 2024-05-06 | End: 2024-05-06

## 2024-05-06 RX ORDER — LIDOCAINE 40 MG/G
CREAM TOPICAL ONCE
OUTPATIENT
Start: 2024-05-06 | End: 2024-05-06

## 2024-05-06 RX ORDER — GENTAMICIN SULFATE 1 MG/G
OINTMENT TOPICAL ONCE
OUTPATIENT
Start: 2024-05-06 | End: 2024-05-06

## 2024-05-06 RX ORDER — LIDOCAINE 50 MG/G
OINTMENT TOPICAL ONCE
OUTPATIENT
Start: 2024-05-06 | End: 2024-05-06

## 2024-05-06 RX ORDER — IBUPROFEN 200 MG
TABLET ORAL ONCE
OUTPATIENT
Start: 2024-05-06 | End: 2024-05-06

## 2024-05-06 ASSESSMENT — PAIN DESCRIPTION - LOCATION: LOCATION: FOOT

## 2024-05-06 ASSESSMENT — PAIN DESCRIPTION - DESCRIPTORS: DESCRIPTORS: ACHING

## 2024-05-06 ASSESSMENT — PAIN DESCRIPTION - ORIENTATION: ORIENTATION: RIGHT

## 2024-05-06 ASSESSMENT — PAIN SCALES - GENERAL: PAINLEVEL_OUTOF10: 3

## 2024-05-06 NOTE — WOUND CARE
Procedure Note  Indications: Based on my examination of this patient's wound(s)/ulcer(s) today, debridement is required to promote healing and evaluate the wound base.    Debridement: Excisional Debridement    Using: #15 surgical blade the wound(s)/ulcer(s) was/were debrided down through and including the removal of subcutaneous tissue.  Performed by: Trudy Chisholm DPM  Consent obtained: Yes  Time out taken: No:   Pain Control: Anesthetic  Anesthetic: 4% Lidocaine Liquid Topical       Devitalized Tissue Debrided: callus    Pre Debridement Measurements:  Are located in the Wound/Ulcer Documentation Flow Sheet    Diabetic/Pressure/Non Pressure Ulcers only:  Ulcer: Non-Pressure ulcer, fat layer exposed     Wound/Ulcer #: 2  Post Debridement Measurements:  Wound/Ulcer Descriptions are Pre Debridement except measurements:  Wound 10/16/23 Foot Right;Plantar #1 (Active)   Wound Image   05/06/24 0937   Wound Etiology Other 11/13/23 1020   Dressing Status New dressing applied 05/06/24 1019   Wound Cleansed Cleansed with saline 05/06/24 0937   Dressing/Treatment Alginate with Ag;Gauze dressing/dressing sponge;Roll gauze;Tape/Soft cloth adhesive tape 05/06/24 1019   Offloading for Diabetic Foot Ulcers Offloading ordered 05/06/24 1019   Wound Length (cm) 0.3 cm 05/06/24 0937   Wound Width (cm) 0.8 cm 05/06/24 0937   Wound Depth (cm) 1.5 cm 05/06/24 0937   Wound Surface Area (cm^2) 0.24 cm^2 05/06/24 0937   Change in Wound Size % (l*w) 70 05/06/24 0937   Wound Volume (cm^3) 0.36 cm^3 05/06/24 0937   Wound Healing % 80 05/06/24 0937   Post-Procedure Length (cm) 0.3 cm 05/06/24 1004   Post-Procedure Width (cm) 0.8 cm 05/06/24 1004   Post-Procedure Depth (cm) 1.6 cm 05/06/24 1004   Post-Procedure Surface Area (cm^2) 0.24 cm^2 05/06/24 1004   Post-Procedure Volume (cm^3) 0.384 cm^3 05/06/24 1004   Wound Assessment Pink/red;Slough 05/06/24 0937   Drainage Amount Small (< 25%) 05/06/24 0937   Drainage Description Serous 05/06/24 0937

## 2024-05-06 NOTE — FLOWSHEET NOTE
05/06/24 0937   Anesthetic   Anesthetic 4% Lidocaine Liquid Topical   Right Leg Edema Point of Measurement   Leg circumference 37.7 cm   Ankle circumference 26.1 cm   RLE Neurovascular Assessment   Capillary Refill Greater than 3 seconds   Color Appropriate for Ethnicity   Temperature Cool   R Pedal Pulse +1   Wound 10/16/23 Foot Right;Plantar #1   Date First Assessed/Time First Assessed: 10/16/23 1454   Present on Original Admission: Yes  Primary Wound Type: Neuropathic  Location: Foot  Wound Location Orientation: Right;Plantar  Wound Description (Comments): #1   Wound Image    Wound Cleansed Cleansed with saline   Wound Length (cm) 0.3 cm   Wound Width (cm) 0.8 cm   Wound Depth (cm) 1.5 cm   Wound Surface Area (cm^2) 0.24 cm^2   Change in Wound Size % (l*w) 70   Wound Volume (cm^3) 0.36 cm^3   Wound Healing % 80   Wound Assessment [place]/red;Slough   Drainage Amount Small (< 25%)   Drainage Description Serous   Odor None   Giselle-wound Assessment Hyperkeratosis (callous)   Margins Epibole (rolled edges)   Wound Thickness Description not for Pressure Injury Full thickness   Wound 10/16/23 Foot Right;Lateral #2   Date First Assessed/Time First Assessed: 10/16/23 1454   Present on Original Admission: Yes  Primary Wound Type: Neuropathic  Location: Foot  Wound Location Orientation: Right;Lateral  Wound Description (Comments): #2   Wound Image    Wound Cleansed Cleansed with saline   Wound Length (cm) 0.4 cm   Wound Width (cm) 0.3 cm   Wound Depth (cm) 0.4 cm   Wound Surface Area (cm^2) 0.12 cm^2   Change in Wound Size % (l*w) 96.88   Wound Volume (cm^3) 0.048 cm^3   Wound Healing % 99   Wound Assessment [place]/red;Slough   Drainage Amount Small (< 25%)   Drainage Description Serous   Odor None   Giselle-wound Assessment Blanchable erythema   Margins Epibole (rolled edges)   Wound Thickness Description not for Pressure Injury Full thickness   Pain Assessment   Pain Assessment 0-10   Pain Level 3   Pain Location Foot   Pain

## 2024-05-06 NOTE — PATIENT INSTRUCTIONS
Discharge Instructions for  [facility]  [address] [place] [address]  Telephone: [phone]   FAX [phone]    NAME:  Jasiel Jose  YOB: 1940  ACCOUNT NUMBER :  [id number]  DATE:  5/6/2024     : Meseret    Wound Cleansing:   Do not scrub or use excessive force.  Cleanse wound prior to applying a clean dressing with:    [] Cleanse wound with: BABY SHAMPOO LATHER  LEAVE on 1-2 MINUTES ON WOUND THEN RINSE WITH WATER OR SALINE    [] May Shower at Discharge     [] Shower on days of dressing change, remove dressing first    [x] Keep Wound Dry in Shower (may purchase a cast cover if needed)     Topical Treatments:  Do not apply lotions, creams, or ointments to wound bed unless directed.   [x] Apply moisturizing lotion A&D ointment  to skin surrounding the wound prior to dressing change.  [] Apply antifungal ointment to skin surrounding the wound prior to dressing change.  [] Apply thin film of Zinc barrier ointment to skin immediately around wound.    Dressings:              Wound Location Right Lateral Foot          Apply Primary Dressing:      [x] Gentian violet painted to wound and pratik-wound  [x] Aquacel AG  [x] Double Layer tubi  to right leg    Cover and Secure with:  [x] Gauze    [x] Kerlix  [x] tape    Change dressing:    [x] Every other day      Dressings:      Wound Location Right plantar foot     Apply Primary Dressing:       [x] Aquacel AG packed into wound base   [x] Double Layer tubi  to right leg    Cover and Secure with:  [x] Gauze    [x] Kerlix  [x] tape    Change dressing:    [x] Every other day     Off-Loading: [x] Surgical shoe or Defender Boot   [x] Off-loading when : [x] walking     Dietary:  [x] Increase Protein: examples ( Meat, cheese, eggs, greek yogurt, premier protein drink, fish, nuts )     Activity:  Activity as tolerated:    [x] Patient has no activity restrictions    [x] Offload with surgical shoe or Defender

## 2024-05-06 NOTE — FLOWSHEET NOTE
05/06/24 1019   Right Leg Edema Point of Measurement   Compression Therapy Tubular elastic support bandage   Tubular Elastic Support Bandage Compression Pressure Medium   Wound 10/16/23 Foot Right;Plantar #1   Date First Assessed/Time First Assessed: 10/16/23 1454   Present on Original Admission: Yes  Primary Wound Type: Neuropathic  Location: Foot  Wound Location Orientation: Right;Plantar  Wound Description (Comments): #1   Dressing Status New dressing applied   Dressing/Treatment Alginate with Ag;Gauze dressing/dressing sponge;Roll gauze;Tape/Soft cloth adhesive tape  (alginate Ag packed into wound base)   Offloading for Diabetic Foot Ulcers Offloading ordered   Wound 10/16/23 Foot Right;Lateral #2   Date First Assessed/Time First Assessed: 10/16/23 1454   Present on Original Admission: Yes  Primary Wound Type: Neuropathic  Location: Foot  Wound Location Orientation: Right;Lateral  Wound Description (Comments): #2   Dressing Status New dressing applied   Dressing/Treatment Other (comment);Alginate with Ag;Gauze dressing/dressing sponge;Roll gauze;Tape/Soft cloth adhesive tape  (gentian violet to periwound)   Offloading for Diabetic Foot Ulcers Offloading ordered     Discharge Condition: Stable    Pain: 3    Ambulatory Status:Walking and Walker    Discharge Destination: Home    Transportation:Car    Accompanied by: Self    Discharge instructions reviewed with Self and copy or written instructions have been provided. All questions/concerns have been addressed at this time.

## 2024-05-20 ENCOUNTER — HOSPITAL ENCOUNTER (OUTPATIENT)
Facility: HOSPITAL | Age: 84
Discharge: HOME | End: 2024-05-20
Attending: PODIATRIST
Payer: MEDICARE

## 2024-05-20 VITALS
DIASTOLIC BLOOD PRESSURE: 63 MMHG | HEART RATE: 58 BPM | SYSTOLIC BLOOD PRESSURE: 140 MMHG | RESPIRATION RATE: 16 BRPM | TEMPERATURE: 98.4 F

## 2024-05-20 DIAGNOSIS — S91.301A OPEN WOUND OF RIGHT FOOT, INITIAL ENCOUNTER: Primary | ICD-10-CM

## 2024-05-20 PROCEDURE — 11042 DBRDMT SUBQ TIS 1ST 20SQCM/<: CPT

## 2024-05-20 RX ORDER — GENTAMICIN SULFATE 1 MG/G
OINTMENT TOPICAL ONCE
OUTPATIENT
Start: 2024-05-20 | End: 2024-05-20

## 2024-05-20 RX ORDER — CLOBETASOL PROPIONATE 0.5 MG/G
OINTMENT TOPICAL ONCE
OUTPATIENT
Start: 2024-05-20 | End: 2024-05-20

## 2024-05-20 RX ORDER — GINSENG 100 MG
CAPSULE ORAL ONCE
OUTPATIENT
Start: 2024-05-20 | End: 2024-05-20

## 2024-05-20 RX ORDER — BETAMETHASONE DIPROPIONATE 0.05 %
OINTMENT (GRAM) TOPICAL ONCE
OUTPATIENT
Start: 2024-05-20 | End: 2024-05-20

## 2024-05-20 RX ORDER — SODIUM CHLOR/HYPOCHLOROUS ACID 0.033 %
SOLUTION, IRRIGATION IRRIGATION ONCE
OUTPATIENT
Start: 2024-05-20 | End: 2024-05-20

## 2024-05-20 RX ORDER — LIDOCAINE 40 MG/G
CREAM TOPICAL ONCE
OUTPATIENT
Start: 2024-05-20 | End: 2024-05-20

## 2024-05-20 RX ORDER — LIDOCAINE HYDROCHLORIDE 20 MG/ML
JELLY TOPICAL ONCE
OUTPATIENT
Start: 2024-05-20 | End: 2024-05-20

## 2024-05-20 RX ORDER — BACITRACIN ZINC AND POLYMYXIN B SULFATE 500; 1000 [USP'U]/G; [USP'U]/G
OINTMENT TOPICAL ONCE
OUTPATIENT
Start: 2024-05-20 | End: 2024-05-20

## 2024-05-20 RX ORDER — TRIAMCINOLONE ACETONIDE 1 MG/G
OINTMENT TOPICAL ONCE
OUTPATIENT
Start: 2024-05-20 | End: 2024-05-20

## 2024-05-20 RX ORDER — LIDOCAINE 50 MG/G
OINTMENT TOPICAL ONCE
OUTPATIENT
Start: 2024-05-20 | End: 2024-05-20

## 2024-05-20 RX ORDER — IBUPROFEN 200 MG
TABLET ORAL ONCE
OUTPATIENT
Start: 2024-05-20 | End: 2024-05-20

## 2024-05-20 RX ORDER — LIDOCAINE HYDROCHLORIDE 40 MG/ML
SOLUTION TOPICAL ONCE
OUTPATIENT
Start: 2024-05-20 | End: 2024-05-20

## 2024-05-20 ASSESSMENT — PAIN DESCRIPTION - ORIENTATION: ORIENTATION: RIGHT

## 2024-05-20 ASSESSMENT — PAIN DESCRIPTION - DESCRIPTORS: DESCRIPTORS: SORE

## 2024-05-20 ASSESSMENT — PAIN DESCRIPTION - LOCATION: LOCATION: FOOT

## 2024-05-20 ASSESSMENT — PAIN SCALES - GENERAL: PAINLEVEL_OUTOF10: 3

## 2024-05-20 ASSESSMENT — PAIN DESCRIPTION - PAIN TYPE: TYPE: CHRONIC PAIN

## 2024-05-20 ASSESSMENT — PAIN DESCRIPTION - FREQUENCY: FREQUENCY: CONTINUOUS

## 2024-05-20 NOTE — PATIENT INSTRUCTIONS
Discharge Instructions for  [facility]  [address] [place] [address]  Telephone: [phone]   FAX [phone]    NAME:  Jasiel Jose  YOB: 1940  ACCOUNT NUMBER :  [id number]  DATE:  5/20/2024     : Meseret    Wound Cleansing:   Do not scrub or use excessive force.  Cleanse wound prior to applying a clean dressing with:    [] Cleanse wound with: BABY SHAMPOO LATHER  LEAVE on 1-2 MINUTES ON WOUND THEN RINSE WITH WATER OR SALINE    [] May Shower at Discharge     [] Shower on days of dressing change, remove dressing first    [x] Keep Wound Dry in Shower (may purchase a cast cover if needed)     Topical Treatments:  Do not apply lotions, creams, or ointments to wound bed unless directed.   [x] Apply moisturizing lotion A&D ointment  to skin surrounding the wound prior to dressing change.  [] Apply antifungal ointment to skin surrounding the wound prior to dressing change.  [] Apply thin film of Zinc barrier ointment to skin immediately around wound.    Dressings:              Wound Location Right Lateral Foot          Apply Primary Dressing:      [x] Gentian violet painted to wound and pratik-wound  [x] Aquacel AG  [x] Double Layer tubi  to right leg    Cover and Secure with:  [x] Gauze    [x] Kerlix  [x] tape    Change dressing:    [x] Every other day      Dressings:      Wound Location Right plantar foot     Apply Primary Dressing:       [x] Aquacel AG packed into wound base   [x] Double Layer tubi  to right leg    Cover and Secure with:  [x] Gauze    [x] Kerlix  [x] tape    Change dressing:    [x] Every other day     Off-Loading: [x] Surgical shoe or Defender Boot   [x] Off-loading when : [x] walking     Dietary:  [x] Increase Protein: examples ( Meat, cheese, eggs, greek yogurt, premier protein drink, fish, nuts )     Activity:  Activity as tolerated:    [x] Patient has no activity restrictions    [x] Offload with surgical shoe or Defender

## 2024-05-20 NOTE — FLOWSHEET NOTE
05/20/24 0959   Right Leg Edema Point of Measurement   Compression Therapy Tubular elastic support bandage   Tubular Elastic Support Bandage Compression Pressure Medium   Wound 10/16/23 Foot Right;Plantar #1   Date First Assessed/Time First Assessed: 10/16/23 1454   Present on Original Admission: Yes  Primary Wound Type: Neuropathic  Location: Foot  Wound Location Orientation: Right;Plantar  Wound Description (Comments): #1   Dressing/Treatment Alginate with Ag;Gauze dressing/dressing sponge;Roll gauze;Tape/Soft cloth adhesive tape   Offloading for Diabetic Foot Ulcers Offloading ordered   Wound 10/16/23 Foot Right;Lateral #2   Date First Assessed/Time First Assessed: 10/16/23 1454   Present on Original Admission: Yes  Primary Wound Type: Neuropathic  Location: Foot  Wound Location Orientation: Right;Lateral  Wound Description (Comments): #2   Dressing/Treatment Alginate with Ag;Gauze dressing/dressing sponge;Roll gauze;Tape/Soft cloth adhesive tape;Other (comment)  (gentian violet painted to wound and pratik wound)   Offloading for Diabetic Foot Ulcers Offloading ordered     Discharge Condition: Stable    Pain: 4    Ambulatory Status: Rolling Walker    Discharge Destination: home    Transportation:car    Accompanied by: SELF    Discharge instructions reviewed with SELF and copy or written instructions have been provided. All questions/concerns have been addressed at this time.

## 2024-05-20 NOTE — WOUND CARE
Odor None 05/20/24 0929   Giselle-wound Assessment Hyperkeratosis (callous) 05/20/24 0929   Margins Epibole (rolled edges) 05/20/24 0929   Wound Thickness Description not for Pressure Injury Full thickness 05/06/24 0937   Number of days: 216       Wound 10/16/23 Foot Right;Lateral #2 (Active)   Wound Image   05/20/24 0929   Wound Etiology Other 05/20/24 0948   Dressing Status Old drainage noted 05/20/24 0929   Wound Cleansed Cleansed with saline 05/20/24 0929   Dressing/Treatment Alginate with Ag;Gauze dressing/dressing sponge;Roll gauze;Tape/Soft cloth adhesive tape;Other (comment) 05/20/24 0959   Offloading for Diabetic Foot Ulcers Offloading ordered 05/20/24 0959   Wound Length (cm) 0.3 cm 05/20/24 0929   Wound Width (cm) 0.2 cm 05/20/24 0929   Wound Depth (cm) 0.5 cm 05/20/24 0929   Wound Surface Area (cm^2) 0.06 cm^2 05/20/24 0929   Change in Wound Size % (l*w) 98.44 05/20/24 0929   Wound Volume (cm^3) 0.03 cm^3 05/20/24 0929   Wound Healing % 99 05/20/24 0929   Post-Procedure Length (cm) 0.4 cm 12/18/23 1030   Post-Procedure Width (cm) 0.4 cm 12/18/23 1030   Post-Procedure Depth (cm) 0.2 cm 12/18/23 1030   Post-Procedure Surface Area (cm^2) 0.16 cm^2 12/18/23 1030   Post-Procedure Volume (cm^3) 0.032 cm^3 12/18/23 1030   Distance Tunneling (cm) 0.9 cm 10/23/23 0832   Tunneling Position ___ O'Clock 3 10/23/23 0832   Undermining Starts ___ O'Clock 12 10/16/23 1454   Undermining Ends___ O'Clock 12 10/16/23 1454   Undermining Maxium Distance (cm) .8 10/16/23 1454   Wound Assessment Pink/red;Slough 05/20/24 0929   Drainage Amount Small (< 25%) 05/20/24 0929   Drainage Description Purulent 05/20/24 0929   Odor None 05/20/24 0929   Giselle-wound Assessment Maceration 05/20/24 0929   Margins Epibole (rolled edges) 05/20/24 0929   Wound Thickness Description not for Pressure Injury Full thickness 05/06/24 0937   Number of days: 216        Total Surface Area Debrided:  0.2 sq cm   Estimated Blood Loss: Minimal amount blood

## 2024-05-20 NOTE — FLOWSHEET NOTE
05/20/24 0929   Anesthetic   Anesthetic 4% Lidocaine Liquid Topical   Right Leg Edema Point of Measurement   Leg circumference 37.7 cm   Ankle circumference 25.8 cm   RLE Neurovascular Assessment   Capillary Refill Greater than 3 seconds;Less than/Equal to 3 seconds   Color Appropriate for Ethnicity   Temperature Cool   R Pedal Pulse +1   Wound 10/16/23 Foot Right;Plantar #1   Date First Assessed/Time First Assessed: 10/16/23 1454   Present on Original Admission: Yes  Primary Wound Type: Neuropathic  Location: Foot  Wound Location Orientation: Right;Plantar  Wound Description (Comments): #1   Wound Image    Dressing Status Old drainage noted   Wound Cleansed Cleansed with saline   Wound Length (cm) 0.2 cm   Wound Width (cm) 0.5 cm   Wound Depth (cm) 1.7 cm   Wound Surface Area (cm^2) 0.1 cm^2   Change in Wound Size % (l*w) 87.5   Wound Volume (cm^3) 0.17 cm^3   Wound Healing % 90   Wound Assessment Slough  (dried exudate unable to assess full base)   Drainage Amount Small (< 25%)   Drainage Description Serosanguinous   Odor None   Giselle-wound Assessment Hyperkeratosis (callous)   Margins Epibole (rolled edges)   Wound 10/16/23 Foot Right;Lateral #2   Date First Assessed/Time First Assessed: 10/16/23 1454   Present on Original Admission: Yes  Primary Wound Type: Neuropathic  Location: Foot  Wound Location Orientation: Right;Lateral  Wound Description (Comments): #2   Wound Image    Dressing Status Old drainage noted   Wound Cleansed Cleansed with saline   Wound Length (cm) 0.3 cm   Wound Width (cm) 0.2 cm   Wound Depth (cm) 0.5 cm   Wound Surface Area (cm^2) 0.06 cm^2   Change in Wound Size % (l*w) 98.44   Wound Volume (cm^3) 0.03 cm^3   Wound Healing % 99   Wound Assessment [place]/red;Slough   Drainage Amount Small (< 25%)   Drainage Description Purulent   Odor None   Giselle-wound Assessment Maceration   Margins Epibole (rolled edges)     BP (!) 140/63   Pulse 58   Temp 98.4 °F (36.9 °C) (Temporal)   Resp 16

## 2024-06-03 ENCOUNTER — HOSPITAL ENCOUNTER (OUTPATIENT)
Facility: HOSPITAL | Age: 84
Discharge: HOME | End: 2024-06-03
Attending: PODIATRIST
Payer: MEDICARE

## 2024-06-03 VITALS
DIASTOLIC BLOOD PRESSURE: 64 MMHG | TEMPERATURE: 98.6 F | RESPIRATION RATE: 17 BRPM | HEART RATE: 44 BPM | SYSTOLIC BLOOD PRESSURE: 135 MMHG

## 2024-06-03 DIAGNOSIS — S91.301A OPEN WOUND OF RIGHT FOOT, INITIAL ENCOUNTER: Primary | ICD-10-CM

## 2024-06-03 PROCEDURE — 11042 DBRDMT SUBQ TIS 1ST 20SQCM/<: CPT

## 2024-06-03 RX ORDER — TRIAMCINOLONE ACETONIDE 1 MG/G
OINTMENT TOPICAL ONCE
OUTPATIENT
Start: 2024-06-03 | End: 2024-06-03

## 2024-06-03 RX ORDER — BETAMETHASONE DIPROPIONATE 0.05 %
OINTMENT (GRAM) TOPICAL ONCE
OUTPATIENT
Start: 2024-06-03 | End: 2024-06-03

## 2024-06-03 RX ORDER — LIDOCAINE 40 MG/G
CREAM TOPICAL ONCE
OUTPATIENT
Start: 2024-06-03 | End: 2024-06-03

## 2024-06-03 RX ORDER — BACITRACIN ZINC AND POLYMYXIN B SULFATE 500; 1000 [USP'U]/G; [USP'U]/G
OINTMENT TOPICAL ONCE
OUTPATIENT
Start: 2024-06-03 | End: 2024-06-03

## 2024-06-03 RX ORDER — LIDOCAINE HYDROCHLORIDE 20 MG/ML
JELLY TOPICAL ONCE
OUTPATIENT
Start: 2024-06-03 | End: 2024-06-03

## 2024-06-03 RX ORDER — SODIUM CHLOR/HYPOCHLOROUS ACID 0.033 %
SOLUTION, IRRIGATION IRRIGATION ONCE
OUTPATIENT
Start: 2024-06-03 | End: 2024-06-03

## 2024-06-03 RX ORDER — GENTAMICIN SULFATE 1 MG/G
OINTMENT TOPICAL ONCE
OUTPATIENT
Start: 2024-06-03 | End: 2024-06-03

## 2024-06-03 RX ORDER — LIDOCAINE HYDROCHLORIDE 40 MG/ML
SOLUTION TOPICAL ONCE
OUTPATIENT
Start: 2024-06-03 | End: 2024-06-03

## 2024-06-03 RX ORDER — GINSENG 100 MG
CAPSULE ORAL ONCE
OUTPATIENT
Start: 2024-06-03 | End: 2024-06-03

## 2024-06-03 RX ORDER — CLOBETASOL PROPIONATE 0.5 MG/G
OINTMENT TOPICAL ONCE
OUTPATIENT
Start: 2024-06-03 | End: 2024-06-03

## 2024-06-03 RX ORDER — IBUPROFEN 200 MG
TABLET ORAL ONCE
OUTPATIENT
Start: 2024-06-03 | End: 2024-06-03

## 2024-06-03 RX ORDER — LIDOCAINE 50 MG/G
OINTMENT TOPICAL ONCE
OUTPATIENT
Start: 2024-06-03 | End: 2024-06-03

## 2024-06-03 ASSESSMENT — PAIN DESCRIPTION - ORIENTATION: ORIENTATION: RIGHT

## 2024-06-03 ASSESSMENT — PAIN DESCRIPTION - LOCATION: LOCATION: FOOT

## 2024-06-03 ASSESSMENT — PAIN DESCRIPTION - DESCRIPTORS: DESCRIPTORS: ACHING

## 2024-06-03 ASSESSMENT — PAIN SCALES - GENERAL: PAINLEVEL_OUTOF10: 3

## 2024-06-03 NOTE — PATIENT INSTRUCTIONS
Discharge Instructions for  [facility]  [address] [place] [address]  Telephone: [phone]   FAX [phone]    NAME:  Jasiel Jose  YOB: 1940  ACCOUNT NUMBER :  [id number]  DATE:  6/3/2024     : Meseret    Wound Cleansing:   Do not scrub or use excessive force.  Cleanse wound prior to applying a clean dressing with:    [] Cleanse wound with: BABY SHAMPOO LATHER  LEAVE on 1-2 MINUTES ON WOUND THEN RINSE WITH WATER OR SALINE    [] May Shower at Discharge     [] Shower on days of dressing change, remove dressing first    [x] Keep Wound Dry in Shower (may purchase a cast cover if needed)     Topical Treatments:  Do not apply lotions, creams, or ointments to wound bed unless directed.   [x] Apply moisturizing lotion A&D ointment  to skin surrounding the wound prior to dressing change.  [] Apply antifungal ointment to skin surrounding the wound prior to dressing change.  [] Apply thin film of Zinc barrier ointment to skin immediately around wound.    Dressings:              Wound Location Right Lateral Foot          Apply Primary Dressing:      [x] Gentian violet painted to wound and pratik-wound  [x] Aquacel AG  [x] Double Layer tubi  to right leg    Cover and Secure with:  [x] Gauze    [x] Kerlix  [x] tape    Change dressing:    [x] Every other day      Dressings:      Wound Location Right plantar foot     Apply Primary Dressing:       [x] Aquacel AG packed into wound base   [x] Double Layer tubi  to right leg    Cover and Secure with:  [x] Gauze    [x] Kerlix  [x] tape    Change dressing:    [x] Every other day     Off-Loading: [x] Surgical shoe or Defender Boot   [x] Off-loading when : [x] walking     Dietary:  [x] Increase Protein: examples ( Meat, cheese, eggs, greek yogurt, premier protein drink, fish, nuts )     Activity:  Activity as tolerated:    [x] Patient has no activity restrictions    [x] Offload with surgical shoe or Defender

## 2024-06-03 NOTE — WOUND CARE
Procedure Note  Indications: Based on my examination of this patient's wound(s)/ulcer(s) today, debridement is required to promote healing and evaluate the wound base.    Debridement: Excisional Debridement    Using: #15 surgical blade the wound(s)/ulcer(s) was/were debrided down through and including the removal of subcutaneous tissue.  Performed by: Trudy Chisholm DPM  Consent obtained: Yes  Time out taken: No:   Pain Control: Anesthetic  Anesthetic: 4% Lidocaine Liquid Topical       Devitalized Tissue Debrided: slough and callus    Pre Debridement Measurements:  Are located in the Wound/Ulcer Documentation Flow Sheet    Diabetic/Pressure/Non Pressure Ulcers only:  Ulcer: Non-Pressure ulcer, fat layer exposed     Wound/Ulcer #: 2  Post Debridement Measurements:  Wound/Ulcer Descriptions are Pre Debridement except measurements:  Wound 10/16/23 Foot Right;Plantar #1 (Active)   Wound Image   06/03/24 1000   Wound Etiology Other 05/20/24 0948   Dressing Status Old drainage noted 06/03/24 1000   Wound Cleansed Cleansed with saline 06/03/24 1000   Dressing/Treatment Alginate with Ag;Gauze dressing/dressing sponge;Roll gauze;Tape/Soft cloth adhesive tape 06/03/24 1048   Offloading for Diabetic Foot Ulcers Offloading ordered 06/03/24 1048   Wound Length (cm) 2.8 cm 06/03/24 1000   Wound Width (cm) 3 cm 06/03/24 1000   Wound Depth (cm) 1.9 cm 06/03/24 1000   Wound Surface Area (cm^2) 8.4 cm^2 06/03/24 1000   Change in Wound Size % (l*w) -950 06/03/24 1000   Wound Volume (cm^3) 15.96 cm^3 06/03/24 1000   Wound Healing % -807 06/03/24 1000   Post-Procedure Length (cm) 2.8 cm 06/03/24 1036   Post-Procedure Width (cm) 3 cm 06/03/24 1036   Post-Procedure Depth (cm) 2 cm 06/03/24 1036   Post-Procedure Surface Area (cm^2) 8.4 cm^2 06/03/24 1036   Post-Procedure Volume (cm^3) 16.8 cm^3 06/03/24 1036   Wound Assessment Other (Comment) 06/03/24 1000   Drainage Amount Scant (moist but unmeasurable) 06/03/24 1000   Drainage Description

## 2024-06-03 NOTE — FLOWSHEET NOTE
06/03/24 1000   Anesthetic   Anesthetic 4% Lidocaine Liquid Topical   Right Leg Edema Point of Measurement   Leg circumference 38.5 cm   Ankle circumference 36 cm   Compression Therapy Tubular elastic support bandage   RLE Neurovascular Assessment   Capillary Refill Less than/Equal to 3 seconds   Color Appropriate for Ethnicity   Temperature Warm   R Pedal Pulse +2   Wound 10/16/23 Foot Right;Plantar #1   Date First Assessed/Time First Assessed: 10/16/23 1454   Present on Original Admission: Yes  Primary Wound Type: Neuropathic  Location: Foot  Wound Location Orientation: Right;Plantar  Wound Description (Comments): #1   Wound Image    Dressing Status Old drainage noted   Wound Cleansed Cleansed with saline   Wound Length (cm) 1.4 cm   Wound Width (cm) 2 cm   Wound Depth (cm) 0.2 cm   Wound Surface Area (cm^2) 2.8 cm^2   Change in Wound Size % (l*w) -250   Wound Volume (cm^3) 0.56 cm^3   Wound Healing % 68   Wound Assessment Other (Comment)  (Calloused)   Drainage Amount Scant (moist but unmeasurable)   Drainage Description Serosanguinous   Odor None   Giselle-wound Assessment Hyperkeratosis (callous)   Margins Attached edges   Wound Thickness Description not for Pressure Injury Full thickness   Wound 10/16/23 Foot Right;Lateral #2   Date First Assessed/Time First Assessed: 10/16/23 1454   Present on Original Admission: Yes  Primary Wound Type: Neuropathic  Location: Foot  Wound Location Orientation: Right;Lateral  Wound Description (Comments): #2   Wound Image    Dressing Status Old drainage noted   Wound Cleansed Cleansed with saline   Wound Length (cm) 2.8 cm   Wound Width (cm) 3 cm   Wound Depth (cm) 1.9 cm   Wound Surface Area (cm^2) 8.4 cm^2   Change in Wound Size % (l*w) -118.75   Wound Volume (cm^3) 15.96 cm^3   Wound Healing % -316   Wound Assessment [place]/red;Slough   Drainage Amount Moderate (25-50%)   Drainage Description Serosanguinous   Odor None   Giselle-wound Assessment Maceration   Margins Epibole

## 2024-06-03 NOTE — FLOWSHEET NOTE
06/03/24 1048   Right Leg Edema Point of Measurement   Compression Therapy Tubular elastic support bandage   Tubular Elastic Support Bandage Compression Pressure Medium   Wound 10/16/23 Foot Right;Plantar #1   Date First Assessed/Time First Assessed: 10/16/23 1454   Present on Original Admission: Yes  Primary Wound Type: Neuropathic  Location: Foot  Wound Location Orientation: Right;Plantar  Wound Description (Comments): #1   Dressing/Treatment Alginate with Ag;Gauze dressing/dressing sponge;Roll gauze;Tape/Soft cloth adhesive tape   Offloading for Diabetic Foot Ulcers Offloading ordered   Wound 10/16/23 Foot Right;Lateral #2   Date First Assessed/Time First Assessed: 10/16/23 1454   Present on Original Admission: Yes  Primary Wound Type: Neuropathic  Location: Foot  Wound Location Orientation: Right;Lateral  Wound Description (Comments): #2   Dressing/Treatment Alginate with Ag;Gauze dressing/dressing sponge;Roll gauze;Tape/Soft cloth adhesive tape   Offloading for Diabetic Foot Ulcers Offloading ordered     Discharge Condition: Stable    Pain: 3    Ambulatory Status: Rolling Walker    Discharge Destination: home    Transportation:car    Accompanied by: SELF    Discharge instructions reviewed with SELF and copy or written instructions have been provided. All questions/concerns have been addressed at this time.

## 2024-06-10 ENCOUNTER — HOSPITAL ENCOUNTER (OUTPATIENT)
Facility: HOSPITAL | Age: 84
Discharge: HOME | End: 2024-06-10
Attending: PODIATRIST
Payer: MEDICARE

## 2024-06-10 VITALS
RESPIRATION RATE: 20 BRPM | DIASTOLIC BLOOD PRESSURE: 78 MMHG | HEART RATE: 49 BPM | SYSTOLIC BLOOD PRESSURE: 169 MMHG | TEMPERATURE: 98.2 F

## 2024-06-10 DIAGNOSIS — S91.301A OPEN WOUND OF RIGHT FOOT, INITIAL ENCOUNTER: Primary | ICD-10-CM

## 2024-06-10 PROCEDURE — 11042 DBRDMT SUBQ TIS 1ST 20SQCM/<: CPT

## 2024-06-10 RX ORDER — IBUPROFEN 200 MG
TABLET ORAL ONCE
OUTPATIENT
Start: 2024-06-10 | End: 2024-06-10

## 2024-06-10 RX ORDER — LIDOCAINE 50 MG/G
OINTMENT TOPICAL ONCE
OUTPATIENT
Start: 2024-06-10 | End: 2024-06-10

## 2024-06-10 RX ORDER — GENTAMICIN SULFATE 1 MG/G
OINTMENT TOPICAL ONCE
OUTPATIENT
Start: 2024-06-10 | End: 2024-06-10

## 2024-06-10 RX ORDER — LIDOCAINE HYDROCHLORIDE 20 MG/ML
JELLY TOPICAL ONCE
OUTPATIENT
Start: 2024-06-10 | End: 2024-06-10

## 2024-06-10 RX ORDER — BACITRACIN ZINC AND POLYMYXIN B SULFATE 500; 1000 [USP'U]/G; [USP'U]/G
OINTMENT TOPICAL ONCE
OUTPATIENT
Start: 2024-06-10 | End: 2024-06-10

## 2024-06-10 RX ORDER — LIDOCAINE HYDROCHLORIDE 40 MG/ML
SOLUTION TOPICAL ONCE
OUTPATIENT
Start: 2024-06-10 | End: 2024-06-10

## 2024-06-10 RX ORDER — GINSENG 100 MG
CAPSULE ORAL ONCE
OUTPATIENT
Start: 2024-06-10 | End: 2024-06-10

## 2024-06-10 RX ORDER — CLOBETASOL PROPIONATE 0.5 MG/G
OINTMENT TOPICAL ONCE
OUTPATIENT
Start: 2024-06-10 | End: 2024-06-10

## 2024-06-10 RX ORDER — LIDOCAINE 40 MG/G
CREAM TOPICAL ONCE
OUTPATIENT
Start: 2024-06-10 | End: 2024-06-10

## 2024-06-10 RX ORDER — SODIUM CHLOR/HYPOCHLOROUS ACID 0.033 %
SOLUTION, IRRIGATION IRRIGATION ONCE
OUTPATIENT
Start: 2024-06-10 | End: 2024-06-10

## 2024-06-10 RX ORDER — TRIAMCINOLONE ACETONIDE 1 MG/G
OINTMENT TOPICAL ONCE
OUTPATIENT
Start: 2024-06-10 | End: 2024-06-10

## 2024-06-10 RX ORDER — BETAMETHASONE DIPROPIONATE 0.05 %
OINTMENT (GRAM) TOPICAL ONCE
OUTPATIENT
Start: 2024-06-10 | End: 2024-06-10

## 2024-06-10 ASSESSMENT — PAIN DESCRIPTION - DESCRIPTORS: DESCRIPTORS: SORE

## 2024-06-10 ASSESSMENT — PAIN DESCRIPTION - ORIENTATION: ORIENTATION: RIGHT

## 2024-06-10 ASSESSMENT — PAIN SCALES - GENERAL: PAINLEVEL_OUTOF10: 3

## 2024-06-10 ASSESSMENT — PAIN DESCRIPTION - LOCATION: LOCATION: FOOT

## 2024-06-10 NOTE — PATIENT INSTRUCTIONS
Discharge Instructions for  [facility]  [address] [place] [address]  Telephone: [phone]   FAX [phone]    NAME:  Jasiel Jose  YOB: 1940  ACCOUNT NUMBER :  [id number]  DATE:  6/10/2024     : Meseret    Wound Cleansing:   Do not scrub or use excessive force.  Cleanse wound prior to applying a clean dressing with:    [] Cleanse wound with: BABY SHAMPOO LATHER  LEAVE on 1-2 MINUTES ON WOUND THEN RINSE WITH WATER OR SALINE    [] May Shower at Discharge     [] Shower on days of dressing change, remove dressing first    [x] Keep Wound Dry in Shower (may purchase a cast cover if needed)     Topical Treatments:  Do not apply lotions, creams, or ointments to wound bed unless directed.   [x] Apply moisturizing lotion A&D ointment  to skin surrounding the wound prior to dressing change.  [] Apply antifungal ointment to skin surrounding the wound prior to dressing change.  [] Apply thin film of Zinc barrier ointment to skin immediately around wound.    Dressings:              Wound Location Right Lateral Foot          Apply Primary Dressing:      [x] Gentian violet painted to wound and pratik-wound  [x] Collagen (Kacie)  [x] Aquacel AG  [x] Double Layer tubi  to right leg    Cover and Secure with:  [x] Gauze    [x] Kerlix  [x] tape    Change dressing:    [x] Every other day      Dressings:      Wound Location Right plantar foot     Apply Primary Dressing:       [x] Aquacel AG packed into wound base   [x] Double Layer tubi  to right leg    Cover and Secure with:  [x] Gauze    [x] Kerlix  [x] tape    Change dressing:    [x] Every other day     Off-Loading: [x] Surgical shoe or Defender Boot   [x] Off-loading when : [x] walking     Dietary:  [x] Increase Protein: examples ( Meat, cheese, eggs, greek yogurt, premier protein drink, fish, nuts )     Activity:  Activity as tolerated:    [x] Patient has no activity restrictions    [x] Offload with

## 2024-06-10 NOTE — FLOWSHEET NOTE
06/10/24 0934   Anesthetic   Anesthetic 4% Lidocaine Liquid Topical   Right Leg Edema Point of Measurement   Leg circumference 39 cm   Ankle circumference 25 cm   RLE Neurovascular Assessment   Capillary Refill Less than/Equal to 3 seconds   Color Appropriate for Ethnicity   Temperature Cool   R Pedal Pulse +1   Wound 10/16/23 Foot Right;Plantar #1   Date First Assessed/Time First Assessed: 10/16/23 1454   Present on Original Admission: Yes  Primary Wound Type: Neuropathic  Location: Foot  Wound Location Orientation: Right;Plantar  Wound Description (Comments): #1   Wound Image    Wound Cleansed Cleansed with saline   Wound Length (cm) 0.6 cm   Wound Width (cm) 0.5 cm   Wound Depth (cm) 1.1 cm   Wound Surface Area (cm^2) 0.3 cm^2   Change in Wound Size % (l*w) 62.5   Wound Volume (cm^3) 0.33 cm^3   Wound Healing % 81   Undermining Starts ___ O'Clock 10   Undermining Ends___ O'Clock 11   Undermining Maxium Distance (cm) 0.8   Wound Assessment Other (Comment)  (unable to visualize base-dark calloused area)   Drainage Amount Moderate (25-50%)   Drainage Description Serosanguinous   Odor None   Giselle-wound Assessment Hyperkeratosis (callous)   Margins Undefined edges   Wound Thickness Description not for Pressure Injury Full thickness   Wound 10/16/23 Foot Right;Lateral #2   Date First Assessed/Time First Assessed: 10/16/23 1454   Present on Original Admission: Yes  Primary Wound Type: Neuropathic  Location: Foot  Wound Location Orientation: Right;Lateral  Wound Description (Comments): #2   Wound Image    Wound Cleansed Cleansed with saline   Wound Length (cm) 1.3 cm   Wound Width (cm) 1.5 cm   Wound Depth (cm) 0.1 cm   Wound Surface Area (cm^2) 1.95 cm^2   Change in Wound Size % (l*w) 49.22   Wound Volume (cm^3) 0.195 cm^3   Wound Healing % 95   Wound Assessment Coarsegold/red;Slough   Drainage Amount Moderate (25-50%)   Drainage Description Serosanguinous   Odor None   Giselle-wound Assessment Maceration;Blanchable erythema

## 2024-06-10 NOTE — WOUND CARE
Thickness Description not for Pressure Injury Full thickness 06/10/24 0934   Number of days: 237        Total Surface Area Debrided:  2.4 sq cm   Estimated Blood Loss: Minimal amount blood loss .  Hemostasis Achieved:  by pressure  Procedural Pain: 3  / 10   Post Procedural Pain: 3 / 10   Response to treatment: Patient tolerated procedure well with minimal complaints of pain.

## 2024-06-10 NOTE — FLOWSHEET NOTE
06/10/24 1007   Wound 10/16/23 Foot Right;Plantar #1   Date First Assessed/Time First Assessed: 10/16/23 1454   Present on Original Admission: Yes  Primary Wound Type: Neuropathic  Location: Foot  Wound Location Orientation: Right;Plantar  Wound Description (Comments): #1   Dressing/Treatment Alginate with Ag;Gauze dressing/dressing sponge;Tape/Soft cloth adhesive tape;Roll gauze   Offloading for Diabetic Foot Ulcers Offloading ordered   Wound 10/16/23 Foot Right;Lateral #2   Date First Assessed/Time First Assessed: 10/16/23 1454   Present on Original Admission: Yes  Primary Wound Type: Neuropathic  Location: Foot  Wound Location Orientation: Right;Lateral  Wound Description (Comments): #2   Dressing/Treatment Alginate with Ag;Collagen with Ag;Gauze dressing/dressing sponge;Roll gauze;Tape change  (gentian violet)   Offloading for Diabetic Foot Ulcers Offloading ordered     Discharge Condition: Stable    Pain: 0    Ambulatory Status:Walker    Discharge Destination: Home    Transportation:Car    Accompanied by: Family    Discharge instructions reviewed with Family and copy or written instructions have been provided. All questions/concerns have been addressed at this time.

## 2024-06-15 ENCOUNTER — HOSPITAL ENCOUNTER (INPATIENT)
Facility: HOSPITAL | Age: 84
LOS: 4 days | Discharge: HOME HEALTH | DRG: 638 | End: 2024-06-19
Attending: STUDENT IN AN ORGANIZED HEALTH CARE EDUCATION/TRAINING PROGRAM | Admitting: INTERNAL MEDICINE
Payer: MEDICARE

## 2024-06-15 ENCOUNTER — APPOINTMENT (OUTPATIENT)
Dept: VASCULAR SURGERY | Facility: HOSPITAL | Age: 84
DRG: 638 | End: 2024-06-15
Attending: STUDENT IN AN ORGANIZED HEALTH CARE EDUCATION/TRAINING PROGRAM
Payer: MEDICARE

## 2024-06-15 DIAGNOSIS — L03.115 CELLULITIS OF RIGHT LOWER EXTREMITY: Primary | ICD-10-CM

## 2024-06-15 LAB
ALBUMIN SERPL-MCNC: 2.8 G/DL (ref 3.5–5)
ALBUMIN/GLOB SERPL: 0.8 (ref 1.1–2.2)
ALP SERPL-CCNC: 57 U/L (ref 45–117)
ALT SERPL-CCNC: 31 U/L (ref 12–78)
ANION GAP SERPL CALC-SCNC: 1 MMOL/L (ref 5–15)
AST SERPL-CCNC: 21 U/L (ref 15–37)
BASOPHILS # BLD: 0 K/UL (ref 0–0.1)
BASOPHILS NFR BLD: 0 % (ref 0–1)
BILIRUB SERPL-MCNC: 0.5 MG/DL (ref 0.2–1)
BUN SERPL-MCNC: 54 MG/DL (ref 6–20)
BUN/CREAT SERPL: 30 (ref 12–20)
CALCIUM SERPL-MCNC: 8.7 MG/DL (ref 8.5–10.1)
CHLORIDE SERPL-SCNC: 104 MMOL/L (ref 97–108)
CO2 SERPL-SCNC: 33 MMOL/L (ref 21–32)
CREAT SERPL-MCNC: 1.79 MG/DL (ref 0.7–1.3)
DIFFERENTIAL METHOD BLD: ABNORMAL
EOSINOPHIL # BLD: 0.2 K/UL (ref 0–0.4)
EOSINOPHIL NFR BLD: 2 % (ref 0–7)
ERYTHROCYTE [DISTWIDTH] IN BLOOD BY AUTOMATED COUNT: 15.6 % (ref 11.5–14.5)
GLOBULIN SER CALC-MCNC: 3.4 G/DL (ref 2–4)
GLUCOSE SERPL-MCNC: 118 MG/DL (ref 65–100)
HCT VFR BLD AUTO: 31.8 % (ref 36.6–50.3)
HGB BLD-MCNC: 10.4 G/DL (ref 12.1–17)
IMM GRANULOCYTES # BLD AUTO: 0.1 K/UL (ref 0–0.04)
IMM GRANULOCYTES NFR BLD AUTO: 1 % (ref 0–0.5)
LYMPHOCYTES # BLD: 1.4 K/UL (ref 0.8–3.5)
LYMPHOCYTES NFR BLD: 14 % (ref 12–49)
MCH RBC QN AUTO: 32.7 PG (ref 26–34)
MCHC RBC AUTO-ENTMCNC: 32.7 G/DL (ref 30–36.5)
MCV RBC AUTO: 100 FL (ref 80–99)
MONOCYTES # BLD: 1.6 K/UL (ref 0–1)
MONOCYTES NFR BLD: 16 % (ref 5–13)
NEUTS SEG # BLD: 6.8 K/UL (ref 1.8–8)
NEUTS SEG NFR BLD: 67 % (ref 32–75)
NRBC # BLD: 0 K/UL (ref 0–0.01)
NRBC BLD-RTO: 0 PER 100 WBC
PLATELET # BLD AUTO: 176 K/UL (ref 150–400)
PMV BLD AUTO: 10 FL (ref 8.9–12.9)
POTASSIUM SERPL-SCNC: 4.3 MMOL/L (ref 3.5–5.1)
PROT SERPL-MCNC: 6.2 G/DL (ref 6.4–8.2)
RBC # BLD AUTO: 3.18 M/UL (ref 4.1–5.7)
SODIUM SERPL-SCNC: 138 MMOL/L (ref 136–145)
WBC # BLD AUTO: 10 K/UL (ref 4.1–11.1)

## 2024-06-15 PROCEDURE — 93971 EXTREMITY STUDY: CPT

## 2024-06-15 PROCEDURE — 36415 COLL VENOUS BLD VENIPUNCTURE: CPT

## 2024-06-15 PROCEDURE — 94761 N-INVAS EAR/PLS OXIMETRY MLT: CPT

## 2024-06-15 PROCEDURE — 80053 COMPREHEN METABOLIC PANEL: CPT

## 2024-06-15 PROCEDURE — 6360000002 HC RX W HCPCS: Performed by: STUDENT IN AN ORGANIZED HEALTH CARE EDUCATION/TRAINING PROGRAM

## 2024-06-15 PROCEDURE — 2580000003 HC RX 258: Performed by: STUDENT IN AN ORGANIZED HEALTH CARE EDUCATION/TRAINING PROGRAM

## 2024-06-15 PROCEDURE — 85025 COMPLETE CBC W/AUTO DIFF WBC: CPT

## 2024-06-15 PROCEDURE — 1100000000 HC RM PRIVATE

## 2024-06-15 PROCEDURE — 83036 HEMOGLOBIN GLYCOSYLATED A1C: CPT

## 2024-06-15 PROCEDURE — 99285 EMERGENCY DEPT VISIT HI MDM: CPT

## 2024-06-15 RX ORDER — MAGNESIUM SULFATE IN WATER 40 MG/ML
2000 INJECTION, SOLUTION INTRAVENOUS PRN
Status: DISCONTINUED | OUTPATIENT
Start: 2024-06-15 | End: 2024-06-19 | Stop reason: HOSPADM

## 2024-06-15 RX ORDER — SODIUM CHLORIDE 9 MG/ML
INJECTION, SOLUTION INTRAVENOUS PRN
Status: DISCONTINUED | OUTPATIENT
Start: 2024-06-15 | End: 2024-06-19 | Stop reason: HOSPADM

## 2024-06-15 RX ORDER — ENOXAPARIN SODIUM 100 MG/ML
40 INJECTION SUBCUTANEOUS DAILY
Status: DISCONTINUED | OUTPATIENT
Start: 2024-06-16 | End: 2024-06-16

## 2024-06-15 RX ORDER — ACETAMINOPHEN 325 MG/1
650 TABLET ORAL EVERY 6 HOURS PRN
Status: DISCONTINUED | OUTPATIENT
Start: 2024-06-15 | End: 2024-06-19 | Stop reason: HOSPADM

## 2024-06-15 RX ORDER — ACETAMINOPHEN 650 MG/1
650 SUPPOSITORY RECTAL EVERY 6 HOURS PRN
Status: DISCONTINUED | OUTPATIENT
Start: 2024-06-15 | End: 2024-06-19 | Stop reason: HOSPADM

## 2024-06-15 RX ORDER — LANOLIN ALCOHOL/MO/W.PET/CERES
3 CREAM (GRAM) TOPICAL NIGHTLY PRN
Status: DISCONTINUED | OUTPATIENT
Start: 2024-06-15 | End: 2024-06-19 | Stop reason: HOSPADM

## 2024-06-15 RX ORDER — POTASSIUM CHLORIDE 750 MG/1
40 TABLET, FILM COATED, EXTENDED RELEASE ORAL PRN
Status: DISCONTINUED | OUTPATIENT
Start: 2024-06-15 | End: 2024-06-19 | Stop reason: HOSPADM

## 2024-06-15 RX ORDER — SODIUM CHLORIDE 0.9 % (FLUSH) 0.9 %
5-40 SYRINGE (ML) INJECTION PRN
Status: DISCONTINUED | OUTPATIENT
Start: 2024-06-15 | End: 2024-06-19 | Stop reason: HOSPADM

## 2024-06-15 RX ORDER — POTASSIUM CHLORIDE 7.45 MG/ML
10 INJECTION INTRAVENOUS PRN
Status: DISCONTINUED | OUTPATIENT
Start: 2024-06-15 | End: 2024-06-19 | Stop reason: HOSPADM

## 2024-06-15 RX ORDER — POLYETHYLENE GLYCOL 3350 17 G/17G
17 POWDER, FOR SOLUTION ORAL DAILY PRN
Status: DISCONTINUED | OUTPATIENT
Start: 2024-06-15 | End: 2024-06-19 | Stop reason: HOSPADM

## 2024-06-15 RX ORDER — SODIUM CHLORIDE 0.9 % (FLUSH) 0.9 %
5-40 SYRINGE (ML) INJECTION EVERY 12 HOURS SCHEDULED
Status: DISCONTINUED | OUTPATIENT
Start: 2024-06-15 | End: 2024-06-19 | Stop reason: HOSPADM

## 2024-06-15 RX ORDER — ONDANSETRON 4 MG/1
4 TABLET, ORALLY DISINTEGRATING ORAL EVERY 8 HOURS PRN
Status: DISCONTINUED | OUTPATIENT
Start: 2024-06-15 | End: 2024-06-19 | Stop reason: HOSPADM

## 2024-06-15 RX ORDER — 0.9 % SODIUM CHLORIDE 0.9 %
1000 INTRAVENOUS SOLUTION INTRAVENOUS ONCE
Status: DISCONTINUED | OUTPATIENT
Start: 2024-06-15 | End: 2024-06-15

## 2024-06-15 RX ORDER — ONDANSETRON 2 MG/ML
4 INJECTION INTRAMUSCULAR; INTRAVENOUS EVERY 6 HOURS PRN
Status: DISCONTINUED | OUTPATIENT
Start: 2024-06-15 | End: 2024-06-19 | Stop reason: HOSPADM

## 2024-06-15 RX ADMIN — WATER 2000 MG: 1 INJECTION INTRAMUSCULAR; INTRAVENOUS; SUBCUTANEOUS at 23:50

## 2024-06-15 ASSESSMENT — PAIN DESCRIPTION - DESCRIPTORS: DESCRIPTORS: ACHING

## 2024-06-15 ASSESSMENT — PAIN DESCRIPTION - LOCATION: LOCATION: ANKLE

## 2024-06-15 ASSESSMENT — PAIN DESCRIPTION - ORIENTATION: ORIENTATION: RIGHT

## 2024-06-15 ASSESSMENT — PAIN - FUNCTIONAL ASSESSMENT: PAIN_FUNCTIONAL_ASSESSMENT: 0-10

## 2024-06-15 ASSESSMENT — PAIN SCALES - GENERAL: PAINLEVEL_OUTOF10: 4

## 2024-06-16 ENCOUNTER — APPOINTMENT (OUTPATIENT)
Facility: HOSPITAL | Age: 84
DRG: 638 | End: 2024-06-16
Payer: MEDICARE

## 2024-06-16 LAB
APPEARANCE UR: CLEAR
BACTERIA URNS QL MICRO: NEGATIVE /HPF
BILIRUB UR QL: NEGATIVE
COLOR UR: ABNORMAL
CREAT UR-MCNC: 31 MG/DL
CRP SERPL-MCNC: 8.18 MG/DL (ref 0–0.3)
ECHO BSA: 2.13 M2
EPITH CASTS URNS QL MICRO: ABNORMAL /LPF
EST. AVERAGE GLUCOSE BLD GHB EST-MCNC: 114 MG/DL
GLUCOSE BLD STRIP.AUTO-MCNC: 113 MG/DL (ref 65–117)
GLUCOSE BLD STRIP.AUTO-MCNC: 97 MG/DL (ref 65–117)
GLUCOSE UR STRIP.AUTO-MCNC: NEGATIVE MG/DL
HBA1C MFR BLD: 5.6 % (ref 4–5.6)
HGB UR QL STRIP: ABNORMAL
HYALINE CASTS URNS QL MICRO: ABNORMAL /LPF (ref 0–2)
KETONES UR QL STRIP.AUTO: NEGATIVE MG/DL
LEUKOCYTE ESTERASE UR QL STRIP.AUTO: NEGATIVE
NITRITE UR QL STRIP.AUTO: NEGATIVE
PH UR STRIP: 7.5 (ref 5–8)
PROCALCITONIN SERPL-MCNC: 0.08 NG/ML
PROT UR STRIP-MCNC: NEGATIVE MG/DL
RBC #/AREA URNS HPF: ABNORMAL /HPF (ref 0–5)
SERVICE CMNT-IMP: NORMAL
SERVICE CMNT-IMP: NORMAL
SODIUM UR-SCNC: 131 MMOL/L
SP GR UR REFRACTOMETRY: 1.01 (ref 1–1.03)
SPECIMEN HOLD: NORMAL
UROBILINOGEN UR QL STRIP.AUTO: 0.2 EU/DL (ref 0.2–1)
WBC URNS QL MICRO: ABNORMAL /HPF (ref 0–4)

## 2024-06-16 PROCEDURE — 82570 ASSAY OF URINE CREATININE: CPT

## 2024-06-16 PROCEDURE — 1100000000 HC RM PRIVATE

## 2024-06-16 PROCEDURE — 81001 URINALYSIS AUTO W/SCOPE: CPT

## 2024-06-16 PROCEDURE — A9579 GAD-BASE MR CONTRAST NOS,1ML: HCPCS | Performed by: RADIOLOGY

## 2024-06-16 PROCEDURE — 2580000003 HC RX 258: Performed by: INTERNAL MEDICINE

## 2024-06-16 PROCEDURE — 84145 PROCALCITONIN (PCT): CPT

## 2024-06-16 PROCEDURE — 84300 ASSAY OF URINE SODIUM: CPT

## 2024-06-16 PROCEDURE — 6360000002 HC RX W HCPCS: Performed by: INTERNAL MEDICINE

## 2024-06-16 PROCEDURE — 73610 X-RAY EXAM OF ANKLE: CPT

## 2024-06-16 PROCEDURE — 94761 N-INVAS EAR/PLS OXIMETRY MLT: CPT

## 2024-06-16 PROCEDURE — 6370000000 HC RX 637 (ALT 250 FOR IP): Performed by: INTERNAL MEDICINE

## 2024-06-16 PROCEDURE — 93005 ELECTROCARDIOGRAM TRACING: CPT | Performed by: STUDENT IN AN ORGANIZED HEALTH CARE EDUCATION/TRAINING PROGRAM

## 2024-06-16 PROCEDURE — 6360000004 HC RX CONTRAST MEDICATION: Performed by: RADIOLOGY

## 2024-06-16 PROCEDURE — 73630 X-RAY EXAM OF FOOT: CPT

## 2024-06-16 PROCEDURE — 86140 C-REACTIVE PROTEIN: CPT

## 2024-06-16 PROCEDURE — 36415 COLL VENOUS BLD VENIPUNCTURE: CPT

## 2024-06-16 PROCEDURE — 99222 1ST HOSP IP/OBS MODERATE 55: CPT | Performed by: PODIATRIST

## 2024-06-16 PROCEDURE — 82962 GLUCOSE BLOOD TEST: CPT

## 2024-06-16 PROCEDURE — 73722 MRI JOINT OF LWR EXTR W/DYE: CPT

## 2024-06-16 RX ORDER — LANOLIN ALCOHOL/MO/W.PET/CERES
400 CREAM (GRAM) TOPICAL DAILY
Status: DISCONTINUED | OUTPATIENT
Start: 2024-06-16 | End: 2024-06-19 | Stop reason: HOSPADM

## 2024-06-16 RX ORDER — INSULIN LISPRO 100 [IU]/ML
0-8 INJECTION, SOLUTION INTRAVENOUS; SUBCUTANEOUS
Status: DISCONTINUED | OUTPATIENT
Start: 2024-06-16 | End: 2024-06-18

## 2024-06-16 RX ORDER — FLUTICASONE PROPIONATE 50 MCG
2 SPRAY, SUSPENSION (ML) NASAL DAILY PRN
Status: DISCONTINUED | OUTPATIENT
Start: 2024-06-16 | End: 2024-06-19 | Stop reason: HOSPADM

## 2024-06-16 RX ORDER — HYDRALAZINE HYDROCHLORIDE 20 MG/ML
10 INJECTION INTRAMUSCULAR; INTRAVENOUS EVERY 4 HOURS PRN
Status: DISCONTINUED | OUTPATIENT
Start: 2024-06-16 | End: 2024-06-16

## 2024-06-16 RX ORDER — SODIUM CHLORIDE 9 MG/ML
INJECTION, SOLUTION INTRAVENOUS CONTINUOUS
Status: DISCONTINUED | OUTPATIENT
Start: 2024-06-16 | End: 2024-06-18

## 2024-06-16 RX ORDER — CETIRIZINE HYDROCHLORIDE 10 MG/1
10 TABLET ORAL DAILY
Status: DISCONTINUED | OUTPATIENT
Start: 2024-06-16 | End: 2024-06-19 | Stop reason: HOSPADM

## 2024-06-16 RX ORDER — INSULIN LISPRO 100 [IU]/ML
0-4 INJECTION, SOLUTION INTRAVENOUS; SUBCUTANEOUS NIGHTLY
Status: DISCONTINUED | OUTPATIENT
Start: 2024-06-16 | End: 2024-06-18

## 2024-06-16 RX ORDER — HYDRALAZINE HYDROCHLORIDE 20 MG/ML
10 INJECTION INTRAMUSCULAR; INTRAVENOUS EVERY 6 HOURS PRN
Status: DISCONTINUED | OUTPATIENT
Start: 2024-06-16 | End: 2024-06-19 | Stop reason: HOSPADM

## 2024-06-16 RX ORDER — ASCORBIC ACID 500 MG
500 TABLET ORAL DAILY
Status: DISCONTINUED | OUTPATIENT
Start: 2024-06-16 | End: 2024-06-19 | Stop reason: HOSPADM

## 2024-06-16 RX ORDER — ISOSORBIDE MONONITRATE 30 MG/1
30 TABLET, EXTENDED RELEASE ORAL DAILY
Status: DISCONTINUED | OUTPATIENT
Start: 2024-06-16 | End: 2024-06-19 | Stop reason: HOSPADM

## 2024-06-16 RX ORDER — MAGNESIUM 30 MG
30 TABLET ORAL DAILY
COMMUNITY

## 2024-06-16 RX ORDER — VITAMIN B COMPLEX
2000 TABLET ORAL DAILY
Status: DISCONTINUED | OUTPATIENT
Start: 2024-06-16 | End: 2024-06-19 | Stop reason: HOSPADM

## 2024-06-16 RX ORDER — DEXTROSE MONOHYDRATE 100 MG/ML
INJECTION, SOLUTION INTRAVENOUS CONTINUOUS PRN
Status: DISCONTINUED | OUTPATIENT
Start: 2024-06-16 | End: 2024-06-16

## 2024-06-16 RX ORDER — AMLODIPINE BESYLATE 5 MG/1
10 TABLET ORAL DAILY
Status: DISCONTINUED | OUTPATIENT
Start: 2024-06-16 | End: 2024-06-19 | Stop reason: HOSPADM

## 2024-06-16 RX ORDER — FERROUS GLUCONATE 324(38)MG
300 TABLET ORAL
Status: DISCONTINUED | OUTPATIENT
Start: 2024-06-16 | End: 2024-06-19 | Stop reason: HOSPADM

## 2024-06-16 RX ADMIN — ISOSORBIDE MONONITRATE 30 MG: 30 TABLET, EXTENDED RELEASE ORAL at 01:23

## 2024-06-16 RX ADMIN — WATER 2000 MG: 1 INJECTION INTRAMUSCULAR; INTRAVENOUS; SUBCUTANEOUS at 08:50

## 2024-06-16 RX ADMIN — SODIUM CHLORIDE: 9 INJECTION, SOLUTION INTRAVENOUS at 06:42

## 2024-06-16 RX ADMIN — CEFEPIME 2000 MG: 2 INJECTION, POWDER, FOR SOLUTION INTRAVENOUS at 20:12

## 2024-06-16 RX ADMIN — HYDRALAZINE HYDROCHLORIDE 10 MG: 20 INJECTION INTRAMUSCULAR; INTRAVENOUS at 02:37

## 2024-06-16 RX ADMIN — SODIUM CHLORIDE, PRESERVATIVE FREE 10 ML: 5 INJECTION INTRAVENOUS at 08:56

## 2024-06-16 RX ADMIN — SODIUM CHLORIDE, PRESERVATIVE FREE 10 ML: 5 INJECTION INTRAVENOUS at 02:37

## 2024-06-16 RX ADMIN — GADOTERIDOL 18 ML: 279.3 INJECTION, SOLUTION INTRAVENOUS at 15:27

## 2024-06-16 RX ADMIN — SODIUM CHLORIDE, PRESERVATIVE FREE 10 ML: 5 INJECTION INTRAVENOUS at 20:23

## 2024-06-16 RX ADMIN — AMLODIPINE BESYLATE 10 MG: 5 TABLET ORAL at 01:23

## 2024-06-16 RX ADMIN — Medication 2000 UNITS: at 08:50

## 2024-06-16 RX ADMIN — VANCOMYCIN HYDROCHLORIDE 2250 MG: 5 INJECTION, POWDER, LYOPHILIZED, FOR SOLUTION INTRAVENOUS at 08:56

## 2024-06-16 RX ADMIN — ACETAMINOPHEN 650 MG: 325 TABLET ORAL at 20:20

## 2024-06-16 RX ADMIN — CETIRIZINE HYDROCHLORIDE 10 MG: 10 TABLET, FILM COATED ORAL at 08:50

## 2024-06-16 RX ADMIN — Medication 400 MG: at 07:25

## 2024-06-16 RX ADMIN — OXYCODONE HYDROCHLORIDE AND ACETAMINOPHEN 500 MG: 500 TABLET ORAL at 08:50

## 2024-06-16 RX ADMIN — SODIUM CHLORIDE, PRESERVATIVE FREE 10 ML: 5 INJECTION INTRAVENOUS at 00:09

## 2024-06-16 RX ADMIN — FERROUS GLUCONATE 324 MG: 324 TABLET ORAL at 08:50

## 2024-06-16 ASSESSMENT — PAIN DESCRIPTION - PAIN TYPE: TYPE: CHRONIC PAIN

## 2024-06-16 ASSESSMENT — PAIN SCALES - GENERAL
PAINLEVEL_OUTOF10: 0
PAINLEVEL_OUTOF10: 2
PAINLEVEL_OUTOF10: 0

## 2024-06-16 ASSESSMENT — PAIN DESCRIPTION - FREQUENCY: FREQUENCY: INTERMITTENT

## 2024-06-16 ASSESSMENT — PAIN DESCRIPTION - LOCATION: LOCATION: FOOT

## 2024-06-16 ASSESSMENT — PAIN DESCRIPTION - ORIENTATION: ORIENTATION: RIGHT

## 2024-06-16 ASSESSMENT — PAIN DESCRIPTION - ONSET: ONSET: ON-GOING

## 2024-06-16 ASSESSMENT — PAIN DESCRIPTION - DESCRIPTORS: DESCRIPTORS: ACHING

## 2024-06-16 ASSESSMENT — PAIN - FUNCTIONAL ASSESSMENT: PAIN_FUNCTIONAL_ASSESSMENT: ACTIVITIES ARE NOT PREVENTED

## 2024-06-16 NOTE — CARE COORDINATION
06/16/24 1437   Service Assessment   Patient Orientation Alert and Oriented   Cognition Alert   History Provided By Patient;Significant Other   Primary Caregiver Self   Support Systems Spouse/Significant Other   Patient's Healthcare Decision Maker is: Legal Next of Kin   PCP Verified by CM Yes  (Dr. MARY Valdes)   Last Visit to PCP Within last 3 months   Prior Functional Level Independent in ADLs/IADLs   Can patient return to prior living arrangement Yes   Ability to make needs known: Good   Family able to assist with home care needs: Yes   Would you like for me to discuss the discharge plan with any other family members/significant others, and if so, who? Yes  (wife Gillian)   Financial Resources Medicare   CM/SW Referral Disease Management Education   Social/Functional History   Lives With Spouse   Type of Home House   Home Layout Two level;Bed/Bath upstairs   Home Access Stairs to enter with rails   Entrance Stairs - Number of Steps 4   Home Equipment Walker - Rolling;Cane   Receives Help From Family   ADL Assistance Independent   Ambulation Assistance Independent   Transfer Assistance Independent   Active  No   Patient's  Info wife Gillian   Mode of Transportation Car   Occupation Retired   Discharge Planning   Type of Residence House   Living Arrangements Spouse/Significant Other   Current Services Prior To Admission None   Potential Assistance Needed N/A   DME Ordered? No   Potential Assistance Purchasing Medications No   History of falls? 0   Services At/After Discharge   Mode of Transport at Discharge Other (see comment)  (per wife)       RUR 16 (Score %) moderate   Is This a Readmission NO    Current status  Patient continues to require medical management including ongoing assessment and monitoring.  Admitted for cellulitis of right lower extremity.  Has chronic right lower extremity wound.  Had prior home health services in [place].  Patient independent with ADLs but requires supervision with

## 2024-06-16 NOTE — ED PROVIDER NOTES
[facility] [place]  EMERGENCY DEPARTMENT ENCOUNTER      Pt Name: Jasiel Jose  MRN: [id number]  Birthdate 1940  Date of evaluation: 6/15/2024  Provider: Chilango Fonseca MD    CHIEF COMPLAINT       Chief Complaint   Patient presents with    Leg Swelling         HISTORY OF PRESENT ILLNESS   84-year-old male with history of A-fib on Xarelto, HTN, neuropathy, chronic right foot wound presents to the ED with chief complaint of worsening redness and swelling of his right lower leg.  Patient has been treated with doxycycline for the past couple of months for right lower leg cellulitis and has follow-up with orthopedics in 2 days for chronic right foot wound that has been present for 2 years.  However, over the last day he has noticed worsening redness of his right lower leg and now has a patch of redness on his right thigh that is mildly painful.  He has had generalized malaise and nausea today.  No fevers, chills, chest pain, difficulty breathing, abdominal pain, urinary symptoms, or bowel symptoms.  He denies any change to his chronic wound.  His significant other changes the dressing 3 times a week and she says it is unchanged.  He has been compliant with his doxycycline at home.    The history is provided by the patient.       Review of External Medical Records:     Nursing Notes were reviewed.    REVIEW OF SYSTEMS       Review of Systems   Respiratory:  Negative for shortness of breath.    Cardiovascular:  Negative for chest pain.       Except as noted above the remainder of the review of systems was reviewed and negative.       PAST MEDICAL HISTORY     Past Medical History:   Diagnosis Date    Arthritis     Back, knees, shoulders    Atrial fibrillation (HCC)     BCC (basal cell carcinoma of skin)     Benign hypertensive heart disease without heart failure     Diverticulosis     DJD (degenerative joint disease) of knee     right    GI bleed     Hematuria     due to certain medications    Hypertension

## 2024-06-16 NOTE — CONSULTS
extremity.  VASC: Pedal pulses (DP/PT) diminished to B/L LE. CFT<3sec to all digits of B/L LE.  No pedal hair growth noted to the level of the digits for B/L LE. Skin temp is warm to warm from proximal to distal for B/L LE. Neg homans/gabino signs to B/L LE. No varicosities or telangectasias noted to B/L LE.  NEURO: Protective and epicritic sensations grossly absent to B/L LE severe pes planus noted to the right foot.  Unstable ankle joint right foot.  MSK: (-) POP, No gross deformities. Good muscle tone and bulk noted to B/L LE.  PSYCH: Cooperative with normal mood and affect     Lab Review:   Recent Results (from the past 24 hour(s))   CBC with Auto Differential    Collection Time: 06/15/24  9:48 PM   Result Value Ref Range    WBC 10.0 4.1 - 11.1 K/uL    RBC 3.18 (L) 4.10 - 5.70 M/uL    Hemoglobin 10.4 (L) 12.1 - 17.0 g/dL    Hematocrit 31.8 (L) 36.6 - 50.3 %    .0 (H) 80.0 - 99.0 FL    MCH 32.7 26.0 - 34.0 PG    MCHC 32.7 30.0 - 36.5 g/dL    RDW 15.6 (H) 11.5 - 14.5 %    Platelets 176 150 - 400 K/uL    MPV 10.0 8.9 - 12.9 FL    Nucleated RBCs 0.0 0  WBC    nRBC 0.00 0.00 - 0.01 K/uL    Neutrophils % 67 32 - 75 %    Lymphocytes % 14 12 - 49 %    Monocytes % 16 (H) 5 - 13 %    Eosinophils % 2 0 - 7 %    Basophils % 0 0 - 1 %    Immature Granulocytes % 1 (H) 0.0 - 0.5 %    Neutrophils Absolute 6.8 1.8 - 8.0 K/UL    Lymphocytes Absolute 1.4 0.8 - 3.5 K/UL    Monocytes Absolute 1.6 (H) 0.0 - 1.0 K/UL    Eosinophils Absolute 0.2 0.0 - 0.4 K/UL    Basophils Absolute 0.0 0.0 - 0.1 K/UL    Immature Granulocytes Absolute 0.1 (H) 0.00 - 0.04 K/UL    Differential Type AUTOMATED     Comprehensive Metabolic Panel    Collection Time: 06/15/24  9:48 PM   Result Value Ref Range    Sodium 138 136 - 145 mmol/L    Potassium 4.3 3.5 - 5.1 mmol/L    Chloride 104 97 - 108 mmol/L    CO2 33 (H) 21 - 32 mmol/L    Anion Gap 1 (L) 5 - 15 mmol/L    Glucose 118 (H) 65 - 100 mg/dL    BUN 54 (H) 6 - 20 MG/DL    Creatinine 1.79 (H)

## 2024-06-16 NOTE — ED TRIAGE NOTES
Patient ambulatory to triage with his walker for concern of increase right leg swelling, redness and feeling nauseated.  Patient family member states he feels worst today than before and she is worried about sepsis.   Patient reports being on doxycycline since April 2024, just finished a course of prednisone.

## 2024-06-16 NOTE — ED NOTES
Patient has had elevated BP in /63, 179/72, 199/72    Message sent via Perfect Serve to Hospitalist (Dr STEPHON Gilliam), order placed for amlodipine and Imdur     Report called to Marisol GRULLON, no further questions, patient to be transported with tech

## 2024-06-16 NOTE — H&P
Patient: Jasiel Jose   84 y.o. male   : 1940   MRN: [id number] Attending: Tressa Gilliam DO  CODE STATUS: Full Code   PRIMARY CARE MD: Jared Valdes MD      ASSESSMENT & PLAN  1.  Cellulitis of right lower extremity/osteomyelitis of right foot.  Patient was given 1 dose of IV ceftriaxone in the emergency department.  Will escalate to IV cefepime.  Add IV vancomycin.  Request MRI of the right foot.  Procalcitonin levels are normal, but it is taken into account that patient has been on doxycycline at home since .  2.  Mild acute kidney injury.  Baseline creatinine is 0.8-0.9, currently 1.79.  Gentle IV fluids with normal saline at 75 cc/h; we are being cautious about IV fluids given that patient does have mild swelling of the right leg.  3.  Hypertensive urgency.  Patient is resumed on home medications of Imdur 30 mg p.o. daily.  Losartan-hydrochlorothiazide 100-12.5 mg p.o. daily is being held due to acute kidney injury as is Lasix 20 mg p.o. every other day.  4.  Obstructive sleep apnea.  Compliant to CPAP.        Chief Complaint:   Chief Complaint   Patient presents with    Leg Swelling       History Gathered From: patient    History of Present Illness  Patient is an 84-year-old male who is alert, oriented X.3, mentally astute.  He is a former [employer] officer, and a retired  lives at home with his wife.  Patient spends half of his time in [place], and the other half of the year in [place].  He recently was diagnosed with extensive osteomyelitis of the right foot in [place] in , and was on IV antibiotics for several weeks before being transitioned to p.o. doxycycline in .  He remains on p.o. doxycycline at this time.  Please see below for right foot MRI of 2024 performed in [place].    Patient presented to the emergency department today as he developed redness and swelling in the right leg again today.  Patient has a chronic wound on the right

## 2024-06-17 LAB
ALBUMIN SERPL-MCNC: 2.8 G/DL (ref 3.5–5)
ALBUMIN/GLOB SERPL: 0.8 (ref 1.1–2.2)
ALP SERPL-CCNC: 53 U/L (ref 45–117)
ALT SERPL-CCNC: 28 U/L (ref 12–78)
ANION GAP SERPL CALC-SCNC: 4 MMOL/L (ref 5–15)
AST SERPL-CCNC: 21 U/L (ref 15–37)
BASOPHILS # BLD: 0 K/UL (ref 0–0.1)
BASOPHILS NFR BLD: 0 % (ref 0–1)
BILIRUB SERPL-MCNC: 1.4 MG/DL (ref 0.2–1)
BUN SERPL-MCNC: 41 MG/DL (ref 6–20)
BUN/CREAT SERPL: 26 (ref 12–20)
CALCIUM SERPL-MCNC: 8.8 MG/DL (ref 8.5–10.1)
CHLORIDE SERPL-SCNC: 107 MMOL/L (ref 97–108)
CO2 SERPL-SCNC: 27 MMOL/L (ref 21–32)
CREAT SERPL-MCNC: 1.56 MG/DL (ref 0.7–1.3)
DIFFERENTIAL METHOD BLD: ABNORMAL
EKG DIAGNOSIS: NORMAL
EKG Q-T INTERVAL: 454 MS
EKG QRS DURATION: 90 MS
EKG QTC CALCULATION (BAZETT): 454 MS
EKG R AXIS: 0 DEGREES
EKG T AXIS: 13 DEGREES
EKG VENTRICULAR RATE: 60 BPM
EOSINOPHIL # BLD: 0.2 K/UL (ref 0–0.4)
EOSINOPHIL NFR BLD: 3 % (ref 0–7)
ERYTHROCYTE [DISTWIDTH] IN BLOOD BY AUTOMATED COUNT: 15.4 % (ref 11.5–14.5)
GLOBULIN SER CALC-MCNC: 3.4 G/DL (ref 2–4)
GLUCOSE BLD STRIP.AUTO-MCNC: 95 MG/DL (ref 65–117)
GLUCOSE SERPL-MCNC: 100 MG/DL (ref 65–100)
HCT VFR BLD AUTO: 32.8 % (ref 36.6–50.3)
HGB BLD-MCNC: 10.9 G/DL (ref 12.1–17)
IMM GRANULOCYTES # BLD AUTO: 0.1 K/UL (ref 0–0.04)
IMM GRANULOCYTES NFR BLD AUTO: 1 % (ref 0–0.5)
LYMPHOCYTES # BLD: 0.9 K/UL (ref 0.8–3.5)
LYMPHOCYTES NFR BLD: 10 % (ref 12–49)
MCH RBC QN AUTO: 33.3 PG (ref 26–34)
MCHC RBC AUTO-ENTMCNC: 33.2 G/DL (ref 30–36.5)
MCV RBC AUTO: 100.3 FL (ref 80–99)
MONOCYTES # BLD: 1.3 K/UL (ref 0–1)
MONOCYTES NFR BLD: 15 % (ref 5–13)
NEUTS SEG # BLD: 6.6 K/UL (ref 1.8–8)
NEUTS SEG NFR BLD: 71 % (ref 32–75)
NRBC # BLD: 0 K/UL (ref 0–0.01)
NRBC BLD-RTO: 0 PER 100 WBC
PLATELET # BLD AUTO: 152 K/UL (ref 150–400)
PMV BLD AUTO: 9.8 FL (ref 8.9–12.9)
POTASSIUM SERPL-SCNC: 4.2 MMOL/L (ref 3.5–5.1)
PROT SERPL-MCNC: 6.2 G/DL (ref 6.4–8.2)
RBC # BLD AUTO: 3.27 M/UL (ref 4.1–5.7)
SERVICE CMNT-IMP: NORMAL
SODIUM SERPL-SCNC: 138 MMOL/L (ref 136–145)
VANCOMYCIN SERPL-MCNC: 11.9 UG/ML
WBC # BLD AUTO: 9.1 K/UL (ref 4.1–11.1)

## 2024-06-17 PROCEDURE — 97162 PT EVAL MOD COMPLEX 30 MIN: CPT

## 2024-06-17 PROCEDURE — 6360000002 HC RX W HCPCS: Performed by: INTERNAL MEDICINE

## 2024-06-17 PROCEDURE — 2580000003 HC RX 258: Performed by: INTERNAL MEDICINE

## 2024-06-17 PROCEDURE — 36415 COLL VENOUS BLD VENIPUNCTURE: CPT

## 2024-06-17 PROCEDURE — 80053 COMPREHEN METABOLIC PANEL: CPT

## 2024-06-17 PROCEDURE — 94761 N-INVAS EAR/PLS OXIMETRY MLT: CPT

## 2024-06-17 PROCEDURE — 1100000000 HC RM PRIVATE

## 2024-06-17 PROCEDURE — 97116 GAIT TRAINING THERAPY: CPT

## 2024-06-17 PROCEDURE — 6370000000 HC RX 637 (ALT 250 FOR IP): Performed by: INTERNAL MEDICINE

## 2024-06-17 PROCEDURE — 99232 SBSQ HOSP IP/OBS MODERATE 35: CPT | Performed by: PODIATRIST

## 2024-06-17 PROCEDURE — 80202 ASSAY OF VANCOMYCIN: CPT

## 2024-06-17 PROCEDURE — 85025 COMPLETE CBC W/AUTO DIFF WBC: CPT

## 2024-06-17 PROCEDURE — 97530 THERAPEUTIC ACTIVITIES: CPT

## 2024-06-17 PROCEDURE — 2580000003 HC RX 258: Performed by: STUDENT IN AN ORGANIZED HEALTH CARE EDUCATION/TRAINING PROGRAM

## 2024-06-17 PROCEDURE — 6370000000 HC RX 637 (ALT 250 FOR IP)

## 2024-06-17 PROCEDURE — 82962 GLUCOSE BLOOD TEST: CPT

## 2024-06-17 RX ORDER — DOCUSATE SODIUM 100 MG/1
100 CAPSULE, LIQUID FILLED ORAL 2 TIMES DAILY
Status: DISCONTINUED | OUTPATIENT
Start: 2024-06-17 | End: 2024-06-19 | Stop reason: HOSPADM

## 2024-06-17 RX ADMIN — SODIUM CHLORIDE: 9 INJECTION, SOLUTION INTRAVENOUS at 07:25

## 2024-06-17 RX ADMIN — SODIUM CHLORIDE: 9 INJECTION, SOLUTION INTRAVENOUS at 20:53

## 2024-06-17 RX ADMIN — FERROUS GLUCONATE 324 MG: 324 TABLET ORAL at 08:28

## 2024-06-17 RX ADMIN — Medication 400 MG: at 08:28

## 2024-06-17 RX ADMIN — SODIUM CHLORIDE, PRESERVATIVE FREE 10 ML: 5 INJECTION INTRAVENOUS at 04:44

## 2024-06-17 RX ADMIN — ISOSORBIDE MONONITRATE 30 MG: 30 TABLET, EXTENDED RELEASE ORAL at 08:28

## 2024-06-17 RX ADMIN — Medication 2000 UNITS: at 08:28

## 2024-06-17 RX ADMIN — DOCUSATE SODIUM 100 MG: 100 CAPSULE, LIQUID FILLED ORAL at 22:18

## 2024-06-17 RX ADMIN — CEFEPIME 2000 MG: 2 INJECTION, POWDER, FOR SOLUTION INTRAVENOUS at 08:35

## 2024-06-17 RX ADMIN — POLYETHYLENE GLYCOL 3350 17 G: 17 POWDER, FOR SOLUTION ORAL at 11:14

## 2024-06-17 RX ADMIN — AMLODIPINE BESYLATE 10 MG: 5 TABLET ORAL at 08:29

## 2024-06-17 RX ADMIN — SODIUM CHLORIDE, PRESERVATIVE FREE 10 ML: 5 INJECTION INTRAVENOUS at 11:05

## 2024-06-17 RX ADMIN — OXYCODONE HYDROCHLORIDE AND ACETAMINOPHEN 500 MG: 500 TABLET ORAL at 08:28

## 2024-06-17 RX ADMIN — CETIRIZINE HYDROCHLORIDE 10 MG: 10 TABLET, FILM COATED ORAL at 08:29

## 2024-06-17 RX ADMIN — CEFEPIME 2000 MG: 2 INJECTION, POWDER, FOR SOLUTION INTRAVENOUS at 20:47

## 2024-06-17 RX ADMIN — VANCOMYCIN HYDROCHLORIDE 1000 MG: 1 INJECTION, POWDER, LYOPHILIZED, FOR SOLUTION INTRAVENOUS at 13:03

## 2024-06-17 RX ADMIN — HYDRALAZINE HYDROCHLORIDE 10 MG: 20 INJECTION INTRAMUSCULAR; INTRAVENOUS at 04:43

## 2024-06-17 ASSESSMENT — PAIN SCALES - GENERAL: PAINLEVEL_OUTOF10: 0

## 2024-06-17 NOTE — PLAN OF CARE
Problem: Physical Therapy - Adult  Goal: By Discharge: Performs mobility at highest level of function for planned discharge setting.  See evaluation for individualized goals.  Description: FUNCTIONAL STATUS PRIOR TO ADMISSION: Patient was modified independent using a rolling walker for functional mobility. He states he uses SPC and railing on full flight of stairs to 2nd flr    HOME SUPPORT PRIOR TO ADMISSION: The patient lived with wife but did not require assistance.    Physical Therapy Goals  Initiated 6/17/2024  1.  Patient will move from supine to sit and sit to supine in bed with modified independence within 7 day(s).    2.  Patient will perform sit to stand with contact guard assist within 7 day(s).  3.  Patient will transfer from bed to chair and chair to bed with contact guard assist using the least restrictive device within 7 day(s).  4.  Patient will ambulate with contact guard assist for 5'x2 feet with the least restrictive device within 7 day(s).   5.  Patient will ascend/descend 4 stairs with 1 handrail(s) with minimal assistance within 7 day(s).  Outcome: Progressing   PHYSICAL THERAPY EVALUATION    Patient: Jasiel Jose (84 y.o. male)  Date: 6/17/2024  Primary Diagnosis: Cellulitis of right lower extremity [L03.115]       Precautions: Restrictions/Precautions: Weight Bearing (NWB RLE)   Lower Extremity Weight Bearing Restrictions  Right Lower Extremity Weight Bearing: Non Weight Bearing                  ASSESSMENT :   DEFICITS/IMPAIRMENTS:   The patient is limited by decreased functional mobility, independence in ADLs, strength, body mechanics, activity tolerance, safety awareness, coordination, balance, proprioception, increased pain levels.  Pt admitted due to chronic RLE heel wound with potential OM dx per chart review.    Based on the impairments listed above pt needing Mod A with sit to stand with RW following NWB orders for RLE per podiatry, Dr. Fermin.  Pt able to take 2 small steps to HOB.

## 2024-06-17 NOTE — PLAN OF CARE
Problem: Safety - Adult  Goal: Free from fall injury  Outcome: Progressing     Problem: Pain  Goal: Verbalizes/displays adequate comfort level or baseline comfort level  Outcome: Progressing     Problem: Skin/Tissue Integrity  Goal: Absence of new skin breakdown  Description: 1.  Monitor for areas of redness and/or skin breakdown  2.  Assess vascular access sites hourly  3.  Every 4-6 hours minimum:  Change oxygen saturation probe site  4.  Every 4-6 hours:  If on nasal continuous positive airway pressure, respiratory therapy assess nares and determine need for appliance change or resting period.  Outcome: Progressing     Problem: Physical Therapy - Adult  Goal: By Discharge: Performs mobility at highest level of function for planned discharge setting.  See evaluation for individualized goals.  Description: FUNCTIONAL STATUS PRIOR TO ADMISSION: Patient was modified independent using a rolling walker for functional mobility. He states he uses SPC and railing on full flight of stairs to 2nd flr    HOME SUPPORT PRIOR TO ADMISSION: The patient lived with wife but did not require assistance.    Physical Therapy Goals  Initiated 6/17/2024  1.  Patient will move from supine to sit and sit to supine in bed with modified independence within 7 day(s).    2.  Patient will perform sit to stand with contact guard assist within 7 day(s).  3.  Patient will transfer from bed to chair and chair to bed with contact guard assist using the least restrictive device within 7 day(s).  4.  Patient will ambulate with contact guard assist for 5'x2 feet with the least restrictive device within 7 day(s).   5.  Patient will ascend/descend 4 stairs with 1 handrail(s) with minimal assistance within 7 day(s).  6/17/2024 1547 by Fannie Mehta, PT  Outcome: Progressing

## 2024-06-18 LAB
CREAT SERPL-MCNC: 1.4 MG/DL (ref 0.7–1.3)
GLUCOSE BLD STRIP.AUTO-MCNC: 87 MG/DL (ref 65–117)
SERVICE CMNT-IMP: NORMAL

## 2024-06-18 PROCEDURE — 94761 N-INVAS EAR/PLS OXIMETRY MLT: CPT

## 2024-06-18 PROCEDURE — 6370000000 HC RX 637 (ALT 250 FOR IP)

## 2024-06-18 PROCEDURE — 36415 COLL VENOUS BLD VENIPUNCTURE: CPT

## 2024-06-18 PROCEDURE — 6370000000 HC RX 637 (ALT 250 FOR IP): Performed by: STUDENT IN AN ORGANIZED HEALTH CARE EDUCATION/TRAINING PROGRAM

## 2024-06-18 PROCEDURE — 2580000003 HC RX 258: Performed by: INTERNAL MEDICINE

## 2024-06-18 PROCEDURE — 82962 GLUCOSE BLOOD TEST: CPT

## 2024-06-18 PROCEDURE — 6370000000 HC RX 637 (ALT 250 FOR IP): Performed by: INTERNAL MEDICINE

## 2024-06-18 PROCEDURE — 1100000000 HC RM PRIVATE

## 2024-06-18 PROCEDURE — 6360000002 HC RX W HCPCS: Performed by: INTERNAL MEDICINE

## 2024-06-18 PROCEDURE — 82565 ASSAY OF CREATININE: CPT

## 2024-06-18 RX ORDER — ENEMA 19; 7 G/133ML; G/133ML
1 ENEMA RECTAL
Status: DISPENSED | OUTPATIENT
Start: 2024-06-18 | End: 2024-06-19

## 2024-06-18 RX ORDER — AMOXICILLIN AND CLAVULANATE POTASSIUM 875; 125 MG/1; MG/1
1 TABLET, FILM COATED ORAL EVERY 12 HOURS SCHEDULED
Status: DISCONTINUED | OUTPATIENT
Start: 2024-06-18 | End: 2024-06-19 | Stop reason: HOSPADM

## 2024-06-18 RX ADMIN — FERROUS GLUCONATE 324 MG: 324 TABLET ORAL at 08:58

## 2024-06-18 RX ADMIN — DOCUSATE SODIUM 100 MG: 100 CAPSULE, LIQUID FILLED ORAL at 08:59

## 2024-06-18 RX ADMIN — ISOSORBIDE MONONITRATE 30 MG: 30 TABLET, EXTENDED RELEASE ORAL at 08:58

## 2024-06-18 RX ADMIN — Medication 400 MG: at 07:28

## 2024-06-18 RX ADMIN — OXYCODONE HYDROCHLORIDE AND ACETAMINOPHEN 500 MG: 500 TABLET ORAL at 08:59

## 2024-06-18 RX ADMIN — DOCUSATE SODIUM 100 MG: 100 CAPSULE, LIQUID FILLED ORAL at 20:58

## 2024-06-18 RX ADMIN — HYDRALAZINE HYDROCHLORIDE 10 MG: 20 INJECTION INTRAMUSCULAR; INTRAVENOUS at 03:42

## 2024-06-18 RX ADMIN — SODIUM CHLORIDE, PRESERVATIVE FREE 10 ML: 5 INJECTION INTRAVENOUS at 20:58

## 2024-06-18 RX ADMIN — AMOXICILLIN AND CLAVULANATE POTASSIUM 1 TABLET: 875; 125 TABLET, FILM COATED ORAL at 08:59

## 2024-06-18 RX ADMIN — RIVAROXABAN 15 MG: 15 TABLET, FILM COATED ORAL at 17:08

## 2024-06-18 RX ADMIN — AMOXICILLIN AND CLAVULANATE POTASSIUM 1 TABLET: 875; 125 TABLET, FILM COATED ORAL at 20:58

## 2024-06-18 RX ADMIN — AMLODIPINE BESYLATE 10 MG: 5 TABLET ORAL at 08:58

## 2024-06-18 RX ADMIN — SODIUM CHLORIDE, PRESERVATIVE FREE 10 ML: 5 INJECTION INTRAVENOUS at 09:00

## 2024-06-18 RX ADMIN — CETIRIZINE HYDROCHLORIDE 10 MG: 10 TABLET, FILM COATED ORAL at 08:59

## 2024-06-18 RX ADMIN — Medication 2000 UNITS: at 08:58

## 2024-06-18 NOTE — PLAN OF CARE
Problem: Safety - Adult  Goal: Free from fall injury  6/18/2024 0311 by Silvino Sebastian, RN  Outcome: Progressing     Problem: Pain  Goal: Verbalizes/displays adequate comfort level or baseline comfort level  6/18/2024 0311 by Silvino Sebastian RN  Outcome: Progressing     Problem: Skin/Tissue Integrity  Goal: Absence of new skin breakdown  Description: 1.  Monitor for areas of redness and/or skin breakdown  2.  Assess vascular access sites hourly  3.  Every 4-6 hours minimum:  Change oxygen saturation probe site  4.  Every 4-6 hours:  If on nasal continuous positive airway pressure, respiratory therapy assess nares and determine need for appliance change or resting period.  6/18/2024 0311 by Silvino Sebastian RN  Outcome: Progressing

## 2024-06-18 NOTE — CARE COORDINATION
Care Management Progress Note    Reason for Admission:   Cellulitis of right lower extremity [L03.115]         Patient Admission Status: Inpatient  RUR: Readmission Risk Score: 14.3    Hospitalization in the last 30 days (Readmission):  No      3:30pm: CM met with Pt and spouse at the bedside to discuss recommendation for discharge. Both Pt and spouse were agreeable to recommendation for IPR at this time. CM was requested to send a referral to [facility], [facility], and [facility].       CM sent referrals.       LUIS EDUARDO Govea, MARYANN  [facility] Care Manager  [phone]

## 2024-06-19 ENCOUNTER — TELEPHONE (OUTPATIENT)
Age: 84
End: 2024-06-19

## 2024-06-19 VITALS
WEIGHT: 200 LBS | RESPIRATION RATE: 17 BRPM | BODY MASS INDEX: 28 KG/M2 | TEMPERATURE: 98.6 F | SYSTOLIC BLOOD PRESSURE: 126 MMHG | OXYGEN SATURATION: 95 % | HEIGHT: 71 IN | DIASTOLIC BLOOD PRESSURE: 67 MMHG | HEART RATE: 63 BPM

## 2024-06-19 LAB
ANION GAP SERPL CALC-SCNC: 6 MMOL/L (ref 5–15)
BUN SERPL-MCNC: 29 MG/DL (ref 6–20)
BUN/CREAT SERPL: 20 (ref 12–20)
CALCIUM SERPL-MCNC: 8.2 MG/DL (ref 8.5–10.1)
CHLORIDE SERPL-SCNC: 107 MMOL/L (ref 97–108)
CO2 SERPL-SCNC: 25 MMOL/L (ref 21–32)
CREAT SERPL-MCNC: 1.42 MG/DL (ref 0.7–1.3)
GLUCOSE SERPL-MCNC: 134 MG/DL (ref 65–100)
POTASSIUM SERPL-SCNC: 4 MMOL/L (ref 3.5–5.1)
SODIUM SERPL-SCNC: 138 MMOL/L (ref 136–145)

## 2024-06-19 PROCEDURE — 80048 BASIC METABOLIC PNL TOTAL CA: CPT

## 2024-06-19 PROCEDURE — 94761 N-INVAS EAR/PLS OXIMETRY MLT: CPT

## 2024-06-19 PROCEDURE — 6370000000 HC RX 637 (ALT 250 FOR IP): Performed by: INTERNAL MEDICINE

## 2024-06-19 PROCEDURE — 36415 COLL VENOUS BLD VENIPUNCTURE: CPT

## 2024-06-19 PROCEDURE — 6370000000 HC RX 637 (ALT 250 FOR IP): Performed by: STUDENT IN AN ORGANIZED HEALTH CARE EDUCATION/TRAINING PROGRAM

## 2024-06-19 PROCEDURE — 2580000003 HC RX 258: Performed by: INTERNAL MEDICINE

## 2024-06-19 PROCEDURE — 6370000000 HC RX 637 (ALT 250 FOR IP)

## 2024-06-19 PROCEDURE — 97530 THERAPEUTIC ACTIVITIES: CPT

## 2024-06-19 PROCEDURE — 97165 OT EVAL LOW COMPLEX 30 MIN: CPT

## 2024-06-19 PROCEDURE — 97116 GAIT TRAINING THERAPY: CPT

## 2024-06-19 RX ORDER — LANOLIN ALCOHOL/MO/W.PET/CERES
3 CREAM (GRAM) TOPICAL NIGHTLY PRN
Qty: 15 TABLET | Refills: 0 | Status: SHIPPED | OUTPATIENT
Start: 2024-06-19 | End: 2024-07-04

## 2024-06-19 RX ORDER — PSEUDOEPHEDRINE HCL 30 MG
100 TABLET ORAL 2 TIMES DAILY
Qty: 30 CAPSULE | Refills: 0 | Status: SHIPPED | OUTPATIENT
Start: 2024-06-19 | End: 2024-07-04

## 2024-06-19 RX ORDER — SODIUM CHLORIDE 9 MG/ML
INJECTION, SOLUTION INTRAVENOUS CONTINUOUS
Status: DISCONTINUED | OUTPATIENT
Start: 2024-06-19 | End: 2024-06-19 | Stop reason: HOSPADM

## 2024-06-19 RX ORDER — AMOXICILLIN AND CLAVULANATE POTASSIUM 875; 125 MG/1; MG/1
1 TABLET, FILM COATED ORAL EVERY 12 HOURS SCHEDULED
Qty: 11 TABLET | Refills: 0 | Status: SHIPPED | OUTPATIENT
Start: 2024-06-19 | End: 2024-06-25

## 2024-06-19 RX ORDER — POLYETHYLENE GLYCOL 3350 17 G/17G
17 POWDER, FOR SOLUTION ORAL DAILY PRN
Qty: 30 PACKET | Refills: 0 | Status: SHIPPED | OUTPATIENT
Start: 2024-06-19 | End: 2024-07-19

## 2024-06-19 RX ORDER — FLUTICASONE PROPIONATE 50 MCG
2 SPRAY, SUSPENSION (ML) NASAL DAILY PRN
Qty: 16 G | Refills: 3 | Status: SHIPPED | OUTPATIENT
Start: 2024-06-19

## 2024-06-19 RX ADMIN — SODIUM CHLORIDE, PRESERVATIVE FREE 10 ML: 5 INJECTION INTRAVENOUS at 08:06

## 2024-06-19 RX ADMIN — DOCUSATE SODIUM 100 MG: 100 CAPSULE, LIQUID FILLED ORAL at 08:03

## 2024-06-19 RX ADMIN — FERROUS GLUCONATE 324 MG: 324 TABLET ORAL at 08:03

## 2024-06-19 RX ADMIN — ISOSORBIDE MONONITRATE 30 MG: 30 TABLET, EXTENDED RELEASE ORAL at 08:03

## 2024-06-19 RX ADMIN — AMOXICILLIN AND CLAVULANATE POTASSIUM 1 TABLET: 875; 125 TABLET, FILM COATED ORAL at 08:03

## 2024-06-19 RX ADMIN — CETIRIZINE HYDROCHLORIDE 10 MG: 10 TABLET, FILM COATED ORAL at 08:03

## 2024-06-19 RX ADMIN — OXYCODONE HYDROCHLORIDE AND ACETAMINOPHEN 500 MG: 500 TABLET ORAL at 08:02

## 2024-06-19 RX ADMIN — AMLODIPINE BESYLATE 10 MG: 5 TABLET ORAL at 08:03

## 2024-06-19 RX ADMIN — Medication 400 MG: at 07:01

## 2024-06-19 RX ADMIN — Medication 2000 UNITS: at 08:03

## 2024-06-19 ASSESSMENT — PAIN SCALES - GENERAL
PAINLEVEL_OUTOF10: 0
PAINLEVEL_OUTOF10: 0

## 2024-06-19 NOTE — PLAN OF CARE
Problem: Physical Therapy - Adult  Goal: By Discharge: Performs mobility at highest level of function for planned discharge setting.  See evaluation for individualized goals.  Description: FUNCTIONAL STATUS PRIOR TO ADMISSION: Patient was modified independent using a rolling walker for functional mobility. He states he uses SPC and railing on full flight of stairs to 2nd flr    HOME SUPPORT PRIOR TO ADMISSION: The patient lived with wife but did not require assistance.    Physical Therapy Goals  Initiated 6/17/2024  1.  Patient will move from supine to sit and sit to supine in bed with modified independence within 7 day(s).    2.  Patient will perform sit to stand with contact guard assist within 7 day(s).  3.  Patient will transfer from bed to chair and chair to bed with contact guard assist using the least restrictive device within 7 day(s).  4.  Patient will ambulate with contact guard assist for 5'x2 feet with the least restrictive device within 7 day(s).   5.  Patient will ascend/descend 4 stairs with 1 handrail(s) with minimal assistance within 7 day(s).  Outcome: Progressing   PHYSICAL THERAPY TREATMENT    Patient: Jasiel Jose (84 y.o. male)  Date: 6/19/2024  Diagnosis: Cellulitis of right lower extremity [L03.115] Cellulitis of right lower extremity      Precautions: Fall Risk, Skin Right Lower Extremity Weight Bearing: Weight Bearing As Tolerated             Required Braces or Orthoses  Right Lower Extremity Brace:  (flat post op shoe)      ASSESSMENT:  Patient continues to benefit from skilled PT services and is progressing towards goals. Weight bearing status and proper LE foot wear discussed with Dr. Fermin via perfect serve. Ultimately plan is for pt to ambulate WBAT to RLE using flat surgical shoe. He is able to don footwear independently and progresses to transfer and gait training using RW. Pt requires supervision for ambulation and tolerates activity without complaints. With WBAT status he

## 2024-06-19 NOTE — TELEPHONE ENCOUNTER
Spouse of PT called , Patient is in need of , Special molding brace for his foot & ankle  She said DR. Fermin knows what this is

## 2024-06-19 NOTE — DISCHARGE INSTRUCTIONS
HOSPITALIST DISCHARGE INSTRUCTIONS  NAME:  Jasiel Jose   :  1940   MRN:  [id number]     Date/Time:  2024 12:14 PM    ADMIT DATE: 6/15/2024     DISCHARGE DATE: 2024     DISCHARGE DIAGNOSIS:  Cellulitis     DISCHARGE INSTRUCTIONS:  Thank you for allowing us to participate in your care. Your discharging Hospitalist is Dago Gonsales MD. You were admitted for evaluation and treatment of the above.     # Cellulitis of right lower extremity/osteomyelitis of right foot, improved  # Osteomyelitis ruled out  Pt reports symptoms have started after callous debridement at podiatry office, the ulcer didn't heal, and got infected   -Xray ankle/ foot: Osteomyelitis is not excluded.   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 There is a pseudoarthrosis between the lateral malleolus and the calcaneus with significant degenerative change and surrounding edema.  Ulcer in the plantar aspect of the heel.  -Podiatry has evaluated the patient and reported no surgical intervention is required  Plan  -DC IV Vanco and cefepime, continue patient on Augmentin p.o.  -Per podiatry recommendations: Recommend local wound care. Wound care Silvasorb and adhesive foam dressing.  - Patient will benefit from [place] brace outpatient to bring stability to ankle joint  -PT has recommended SNF VS IPR for the patient, case management is working on  -Can restart patient's Xarelto  -Wound care consulted and following       # Mild acute kidney injury.  Improving  Improved with IVF  Encourage oral hydration      # Hypertension.    Restart home meds      #Obstructive sleep apnea.    Compliant to CPAP.     # Constipation  Resolved after enema yesterday       MEDICATIONS:    It is important that you take the medication exactly as they are prescribed.   Keep your medication in the bottles provided by the pharmacist and keep a list of the

## 2024-06-19 NOTE — PLAN OF CARE
OCCUPATIONAL THERAPY EVALUATION/DISCHARGE  Patient: Jasiel Jose (84 y.o. male)  Date: 6/19/2024  Primary Diagnosis: Cellulitis of right lower extremity [L03.115]         Precautions: Fall Risk, Skin Right Lower Extremity Weight Bearing: Weight Bearing As Tolerated                ASSESSMENT :  Patient received semi supine in bed A&OX4 and agreeable for OT eval/tx (check PT note for details however pt cleared for WBAT RLE with post op shoe by Podiatry). Per pt report, pt lives with spouse in a two story home with 5 steps milly rails to enter and aprox 17 steps alternating rails to second level bedroom/bathroom and is MI for self care and functional transfers/mobility (SPC for steps and RW at all other times).     Patient presents close to baseline for self care (MI LB Dressing seated macho post op shoe and shoe, MI grooming seated at sink and supervision standing simulated, supervision toileting/cloth mgmt simulated, independent UB dressing simulated) and functional transfers/mobility (independent sup->Sit and scooting EOB, MI/supervision sit<->stand and toilet transfer with Rw and gait belt). Patient with no acute OT needs at this time thus will D/C from skilled OT services after eval (pt not interested in HHOT). Recommend discharge to home with family when medically appropriate.     Functional Outcome Measure:  The patient scored 24/24 on the [facility] AM-PAC outcome measure      PLAN :  Recommend with staff: up to chair for meals, up to commode for toileting    Recommendation for discharge: (in order for the patient to meet his/her long term goals): No skilled occupational therapy (pt not interested in OT services at this time)    Other factors to consider for discharge: no additional factors    IF patient discharges home will need the following DME: none     SUBJECTIVE:   Patient stated, “what do I need OT for?.”    OBJECTIVE DATA SUMMARY:     Past Medical History:   Diagnosis Date    Arthritis     Back,

## 2024-06-19 NOTE — DISCHARGE SUMMARY
Hospitalist Discharge Summary     Patient ID:  Jasiel Jose  [id number]  84 y.o.  1940    Admit date: 6/15/2024    Discharge date and time: 6/19/2024    Admission Diagnoses: Cellulitis of right lower extremity [L03.115]    Discharge Diagnoses:    Principal Problem:    Cellulitis of right lower extremity  Resolved Problems:    * No resolved hospital problems. *         Hospital Course:   84 y.o.  male who is being seen for right foot diabetic ulcer, Patient recently was in [place] and had an MRI back in February in which stated he had osteomyelitis. Patient went on 6 weeks of IV antibiotics      # Cellulitis of right lower extremity/osteomyelitis of right foot, improved  # Osteomyelitis ruled out  Pt reports symptoms have started after callous debridement at podiatry office, the ulcer didn't heal, and got infected   -Xray ankle/ foot: Osteomyelitis is not excluded.   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 There is a pseudoarthrosis between the lateral malleolus and the calcaneus with significant degenerative change and surrounding edema.  Ulcer in the plantar aspect of the heel.  -Podiatry has evaluated the patient and reported no surgical intervention is required  Plan  -DC IV Vanco and cefepime, continue patient on Augmentin p.o.  -Per podiatry recommendations: Recommend local wound care. Wound care Silvasorb and adhesive foam dressing.  - Patient will benefit from [place]ri[place] brace outpatient to bring stability to ankle joint  -PT has recommended SNF VS IPR for the patient, case management is working on  -Can restart patient's Xarelto  -Wound care consulted and following  -Case management is currently working with patient, primary wanted to go home was outpatient PT     # Mild acute kidney injury.  Improved with IVF      Baseline creatinine is 0.8-0.9, 1.79 on admission, improving  Plan  Restart

## 2024-06-19 NOTE — CARE COORDINATION
06/19/24 1224   Services At/After Discharge   Transition of Care Consult (CM Consult) Discharge Planning   Services At/After Discharge Outpatient  (PT)   Mode of Transport at Discharge Other (see comment)  (spouse to transport)   Condition of Participation: Discharge Planning   The Patient and/or Patient Representative was provided with a Choice of Provider? Patient   The Patient and/Or Patient Representative agree with the Discharge Plan? Yes   Freedom of Choice list was provided with basic dialogue that supports the patient's individualized plan of care/goals, treatment preferences, and shares the quality data associated with the providers?  Yes             CM notified by therapy that discharge recommendations have changed from IPR to HH. CM notified both Pt and spouse about this. Pt's spouse stated that she prefers Pt to discharge home with outpatient therapy at this time.     CM notified attending of this.      No further discharge needs indicated at this time. Pt is cleared from CM standpoint.     LUIS EDUARDO Govea, CM  [facility] Care Manager  [phone]

## 2024-06-20 ENCOUNTER — OFFICE VISIT (OUTPATIENT)
Age: 84
End: 2024-06-20
Payer: MEDICARE

## 2024-06-20 VITALS
HEIGHT: 71 IN | BODY MASS INDEX: 27.86 KG/M2 | TEMPERATURE: 97.4 F | WEIGHT: 199 LBS | HEART RATE: 71 BPM | DIASTOLIC BLOOD PRESSURE: 70 MMHG | OXYGEN SATURATION: 98 % | RESPIRATION RATE: 17 BRPM | SYSTOLIC BLOOD PRESSURE: 132 MMHG

## 2024-06-20 DIAGNOSIS — L97.515 ULCER OF RIGHT FOOT WITH MUSCLE INVOLVEMENT WITHOUT EVIDENCE OF NECROSIS (HCC): Primary | ICD-10-CM

## 2024-06-20 DIAGNOSIS — M14.671 CHARCOT ANKLE, RIGHT: ICD-10-CM

## 2024-06-20 PROCEDURE — G8427 DOCREV CUR MEDS BY ELIG CLIN: HCPCS | Performed by: PODIATRIST

## 2024-06-20 PROCEDURE — 99213 OFFICE O/P EST LOW 20 MIN: CPT | Performed by: PODIATRIST

## 2024-06-20 PROCEDURE — 1123F ACP DISCUSS/DSCN MKR DOCD: CPT | Performed by: PODIATRIST

## 2024-06-20 PROCEDURE — G8419 CALC BMI OUT NRM PARAM NOF/U: HCPCS | Performed by: PODIATRIST

## 2024-06-20 PROCEDURE — 1111F DSCHRG MED/CURRENT MED MERGE: CPT | Performed by: PODIATRIST

## 2024-06-20 PROCEDURE — 1036F TOBACCO NON-USER: CPT | Performed by: PODIATRIST

## 2024-06-20 ASSESSMENT — PATIENT HEALTH QUESTIONNAIRE - PHQ9
1. LITTLE INTEREST OR PLEASURE IN DOING THINGS: NOT AT ALL
2. FEELING DOWN, DEPRESSED OR HOPELESS: NOT AT ALL
SUM OF ALL RESPONSES TO PHQ QUESTIONS 1-9: 0
SUM OF ALL RESPONSES TO PHQ9 QUESTIONS 1 & 2: 0
SUM OF ALL RESPONSES TO PHQ QUESTIONS 1-9: 0

## 2024-06-21 NOTE — PROGRESS NOTES
Hospitalist Progress Note      NAME:  Jasiel Jose   :  1940  MRM:  [id number]    Date/Time: 2024  11:35 AM           Assessment / Plan:     84 y.o.  male who is being seen for right foot diabetic ulcer, Patient recently was in [place] and had an MRI back in February in which stated he had osteomyelitis. Patient went on 6 weeks of IV antibiotics     # Cellulitis of right lower extremity/osteomyelitis of right foot.    # Osteomyelitis  Patient was given 1 dose of IV ceftriaxone in the emergency department.    patient has been on doxycycline at home since .  Plan  -Podiatry consulted repeating x-ray was ordered overnight  -Continue IV Vanco and cefepime  -Follow-up with podiatry      # Mild acute kidney injury.    Baseline creatinine is 0.8-0.9, currently 1.79.    Plan  Gentle IV fluids with normal saline at 75 cc/h;       # Hypertension.  Patient is resumed on home medications of Imdur 30 mg p.o. daily.  Losartan-hydrochlorothiazide 100-12.5 mg p.o. daily is being held due to acute kidney injury as is Lasix 20 mg p.o. every other day.    #Obstructive sleep apnea.    Compliant to CPAP.    #BMI (Calculated): 27.91    I have personally reviewed the radiographs, laboratory data in Epic and decisions and statements above are based partially on this personal interpretation.                 Care Plan discussed with: Patient    Discussed:  Care Plan    Prophylaxis:  xeralto on hold     Disposition:  Home w/Family           ___________________________________________________    Attending Physician: Dago Gonsales MD        Subjective:     Chief Complaint: Worsening leg cellulitis    ROS:  (bold if positive, if negative)    Tolerating PT  Tolerating Diet          Objective:   No events reported overnight for the patient, discussed with him the plan, patient is concerned with the will have to go through amputation, as he failed IV antibiotics in the past with worsening symptoms    Vitals:        
    Hospitalist Progress Note      NAME:  Jasiel Jose   :  1940  MRM:  [id number]    Date/Time: 2024  11:52 AM           Assessment / Plan:     84 y.o.  male who is being seen for right foot diabetic ulcer, Patient recently was in [place] and had an MRI back in February in which stated he had osteomyelitis. Patient went on 6 weeks of IV antibiotics     # Cellulitis of right lower extremity/osteomyelitis of right foot, improved  # Osteomyelitis ruled out  Pt reports symptoms have started after callous debridement at podiatry office, the ulcer didn't heal, and got infected   -Xray ankle/ foot: Osteomyelitis is not excluded.   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 There is a pseudoarthrosis between the lateral malleolus and the calcaneus with significant degenerative change and surrounding edema.  Ulcer in the plantar aspect of the heel.  -Podiatry has evaluated the patient and reported no surgical intervention is required  Plan  -DC IV Vanco and cefepime, continue patient on Augmentin p.o.  -Per podiatry recommendations: Recommend local wound care. Wound care Silvasorb and adhesive foam dressing.  - Patient will benefit from [place]rizona bra outpatient to bring stability to ankle joint  -PT has recommended SNF VS IPR for the patient, case management is working on  -Can restart patient's Xarelto  -Wound care consulted and following  -Case management is currently working with patient  For placement    # Mild acute kidney injury.  Improving  Baseline creatinine is 0.8-0.9, 1.79 on admission, improving  Plan  Restart IVF  -daily labs     # Hypertension.  Patient is resumed on home medications of Imdur 30 mg p.o. daily.  Losartan-hydrochlorothiazide 100-12.5 mg p.o. daily is being held due to acute kidney injury as is Lasix 20 mg p.o. every other day.  -Will continue to hold for today, and can restart 
  Physician Progress Note      PATIENT:               JANELLE CYR  CSN #:                  [id number]  :                       1940  ADMIT DATE:       6/15/2024 8:56 PM  DISCH DATE:        2024 1:49 PM  RESPONDING  PROVIDER #:        Dago Dobbs MD          QUERY TEXT:    Pt admitted with cellulitis, OM, diabetic foot infection.  Pt states his   symptoms started after a \"callous debridement at podiatry office\".? If   possible, please document in progress notes and discharge summary the   relationship if any between the debridement and the infection/cellulitis:  ?  The medical record reflects the following:  Risk Factors: DM, OM, debridement    Clinical Indicators:    Hosp :  Cellulitis of right lower extremity/osteomyelitis of right foot.  Osteomyelitis  Pt reports symptoms have started after callous debridement at podiatry office,   the ulcer didn't heal, and got infected  Patient was given 1 dose of IV ceftriaxone in the emergency department.  patient has been on doxycycline at home since     Treatment: IV Vanc; IV Cefepime    Thank you,  Luma Reina, RN, BSN, CRCR, CCDS, SMART  Clinical   [email or web address]  Options provided:  -- Cellulitis is due to the debridement  -- Cellulitis is not due to the debridement, but is due to DM  -- Other - I will add my own diagnosis  -- Disagree - Not applicable / Not valid  -- Disagree - Clinically unable to determine / Unknown  -- Refer to Clinical Documentation Reviewer    PROVIDER RESPONSE TEXT:    Cellulitis is not due to the procedure but due to DM    Query created by: Luma Reina on 2024 7:59 AM      Electronically signed by:  Dago Dobbs MD 2024 8:07 AM          
0116 Report received from Karla KENNY RN last documented vital signs at 2200 blood pressure 179/61 she stated she address the blood pressure with MD and they will give blood pressure meds prior transfer.    0142 Pt arrived and I noticed all the high blood pressure document. Not been present during reportPCT checked blood pressure 195/89 Rapid response initiated.They(ER) should not bring the patient with high blood pressure need to stabilized first before sending to the unit.      
0732: notified Dr Gonsales that pt had a BP of 180/63 @ 0330. Requested to stop IVF to see if BP becomes more manageable. IVF stopped at 0740 will reassess BP in 1 hour    1540: BP has greatly improved since stopping IVF. Reached out to MD and was told to discontinue IVF.  
0900: Pt had small BM before charting RN administered enema. Fleet enema given and pt held fluid for 3 mins before moving to BSC.   
1000: pt had large amount of stool over the last hour      1120: texted MD that there were no orders for blood sugar checks but there was scheduled humalog. MD stated that the pt was not diabetic and that he would DC the humalog orders.  
2215 Found iv leaking and out pt is irritable don't want to be bothered.New iv access placed on his right forearm.    0400 Tech found his iv out on the floor.New iv placed on his right forearm and am labs collected.Pt blood pressure high as needed Hydralazine 10 mg iv per parameter given.  
[facility] Dosing Services: Antimicrobial Stewardship Daily Doc  Consult for antibiotic dosing of vancomycin by Dr. Gilliam  Indication: chronic osteo right foot  Previously completed 6 weeks of IV abx back in February for osteo, has been on doxycyline since completing IV abx    Xray right ankle: osteo not excluded, extensive chronic changes  MRI: no acute osteo, no abscess     Day of Therapy: 2    Ht Readings from Last 1 Encounters:   06/15/24 1.803 m (5' 11\")        Wt Readings from Last 1 Encounters:   06/15/24 90.7 kg (200 lb)      Vancomycin therapy:  Loading dose: Vancomycin 2250 mg x1 dose given 6/16 @ 856  Maintenance dose: dosing by levels due to RIVAS  Dose calculated to approximate a           a. Target AUC/SILAS of 400-600          b. Trough of 15-20 mcg/mL     Last level: 11.9 mcg/ml @ 929, ~24 hours post LD    Plan: Start scheduled regimen of 1000mg IV every 24 hours, RIVAS at admit-Scr improving but remains elevated (baseline Scr ~0.8), WBC WNL, afebrile. Podiatry following. Recommend level within 48 hours (not yet ordered). Possible amputation later this week.     Dose administration notes: Doses given appropriately as scheduled    Other Antimicrobial   (not dosed by pharmacist) Cefepime 2g IV every 12 hours-d2   Cultures none   Serum Creatinine Lab Results   Component Value Date/Time    CREATININE 1.56 06/17/2024 04:36 AM          Creatinine Clearance Estimated Creatinine Clearance: 39 mL/min (A) (based on SCr of 1.56 mg/dL (H)).     Temp Temp: 97.9 °F (36.6 °C) (Oral)       WBC Lab Results   Component Value Date/Time    WBC 9.1 06/17/2024 04:36 AM          Procalcitonin Lab Results   Component Value Date/Time    PROCAL 0.08 06/16/2024 01:00 AM      For Antifungals, Metronidazole and Nafcillin: Lab Results   Component Value Date/Time    ALT 28 06/17/2024 04:36 AM    AST 21 06/17/2024 04:36 AM        Pharmacist: Claudia Emery, PharmD, BCPS  [phone]      
[facility] Note:      This patient’s herbal medication: Ubiquinone has been discontinued during the hospitalization per hospital policy.  If deemed medically necessary, the medication can be reordered by the prescriber. The patient will then be asked to bring in their own supply of medication.     Thank you,  OMAYRA RACHEL [facility]      Phone: [phone]   
PHYSICAL THERAPY:Pt declined  PT after encouragement.PT will continue to follow.  
Pharmacist adjusted cefepime to 2 gram IV once then 2 gram IV every 12 hours EI for crcl of 34 ml/min and indication of possible osteo.    Thank you,      Sakina Bedoya, PharmD, BCPS    
Rapid Called at 0147    Responded to RRT at 0147 for Hypertension    Provider at bedside: YES  Interventions ordered: EKG, hydralazine IV PRN.   Sepsis Suspected: No  Transfer to Higher Level of Care: na    Pt is alert with high BP. RRT was called, MD is at bedside assessing pt. Pt is no symptom, no CP, nor SOB, no blurred vision, no HA, no numbness nor tinglings in his extremities. PRN BP control meds was ordered. Keep current POC.     Visit Vitals  BP (!) 186/82   Pulse 60   Temp 98.6 °F (37 °C) (Oral)   Resp 16   Ht 1.803 m (5' 11\")   Wt 90.7 kg (200 lb)   SpO2 96%   BMI 27.89 kg/m²        Rapid Ended at 0157  RRT RN assisted with transport to accepting unit [facility].    Pablito Ibarra, RN   
Spiritual Care Partner Volunteer visited patient at [facility] in [facility] MULTI-SPECIALTY ONCOLOGY 1 on 6/17/2024     Documented by:  Ramon Nolan MDiv  Staff   Paging Service [phone] (MARY)    
[facility] Dosing Services: Antimicrobial Stewardship Daily Doc  Consult for antibiotic dosing of Vancomycin by Dr. Gilliam  Indication: SSTI, Suspected Osteo  Day of Therapy: 1    Ht Readings from Last 1 Encounters:   06/15/24 1.803 m (5' 11\")        Wt Readings from Last 1 Encounters:   06/15/24 90.7 kg (200 lb)      Vancomycin therapy:  Loading dose: Vancomycin 2250 mg x1 dose now/given  Pharmacist will dose based on level due to scr elevation above baseline. Scr on admission 1.79 mg/dl. Scr at baseline 0.82.  Dose calculated to approximate a           a. Target AUC/SILAS of 400-600          b. Trough of 15-20 mcg/mL   Plan: Level ordered 6/17 8 am.      Other Antimicrobial   (not dosed by pharmacist) cefepime   Cultures    Serum Creatinine Lab Results   Component Value Date/Time    CREATININE 1.79 06/15/2024 09:48 PM          Creatinine Clearance Estimated Creatinine Clearance: 34 mL/min (A) (based on SCr of 1.79 mg/dL (H)).     Temp Temp: 97.3 °F (36.3 °C) (Oral)       WBC Lab Results   Component Value Date/Time    WBC 10.0 06/15/2024 09:48 PM          Procalcitonin Lab Results   Component Value Date/Time    PROCAL 0.08 06/16/2024 01:00 AM      For Antifungals, Metronidazole and Nafcillin: Lab Results   Component Value Date/Time    ALT 31 06/15/2024 09:48 PM    AST 21 06/15/2024 09:48 PM        Pharmacist:   Sakina Bedoya, BhavaniD, BCPS    [phone]   
[facility] Dosing Services    Cefepime was automatically dose-adjusted per [facility] P&T Committee Protocol, with respect to renal function.      Assessment/Plan: Estimated Creatinine Clearance: 34 mL/min (A) (based on SCr of 1.79 mg/dL (H)). Cefepime changed to 2 grams IV push x 1 dose followed by Cefepime 2 grams IV every 24 hours (extended infusion) per P&T approved protocol for indication skin and soft tissue infection in patient with CrCl 30--60 mL/min.    Creatinine Clearance Estimated Creatinine Clearance: 34 mL/min (A) (based on SCr of 1.79 mg/dL (H)).   BUN Lab Results   Component Value Date/Time    BUN 54 06/15/2024 09:48 PM         Pharmacist LIBERTAD GREGG [facility]    
Cellulitis of the right leg improved today , open wounds please refer to pic   Neuro:  Cranial nerves 3-12 are grossly intact,  strength is 5/5 bilaterally and dorsi / plantarflexion is 5/5 bilaterally, follows commands appropriately  Psych:  Good insight, oriented to person, place and time, alert    Medications Reviewed: (see below)    Lab Data Reviewed: (see below)    ______________________________________________________________________    Medications:     Current Facility-Administered Medications   Medication Dose Route Frequency    sodium phosphate (FLEET) rectal enema 1 enema  1 enema Rectal Once PRN    amoxicillin-clavulanate (AUGMENTIN) 875-125 MG per tablet 1 tablet  1 tablet Oral 2 times per day    docusate sodium (COLACE) capsule 100 mg  100 mg Oral BID    isosorbide mononitrate (IMDUR) extended release tablet 30 mg  30 mg Oral Daily    fluticasone (FLONASE) 50 MCG/ACT nasal spray 2 spray  2 spray Nasal Daily PRN    Vitamin D (CHOLECALCIFEROL) tablet 2,000 Units  2,000 Units Oral Daily    amLODIPine (NORVASC) tablet 10 mg  10 mg Oral Daily    0.9 % sodium chloride infusion   IntraVENous Continuous    hydrALAZINE (APRESOLINE) injection 10 mg  10 mg IntraVENous Q6H PRN    cetirizine (ZYRTEC) tablet 10 mg  10 mg Oral Daily    ferrous gluconate (FERGON) tablet 324 mg  324 mg Oral Daily with breakfast    ascorbic acid (VITAMIN C) tablet 500 mg  500 mg Oral Daily    magnesium oxide (MAG-OX) tablet 400 mg  400 mg Oral Daily    rivaroxaban (XARELTO) tablet 15 mg  15 mg Oral Dinner    insulin lispro (HUMALOG,ADMELOG) injection vial 0-8 Units  0-8 Units SubCUTAneous TID WC    insulin lispro (HUMALOG,ADMELOG) injection vial 0-4 Units  0-4 Units SubCUTAneous Nightly    glucose chewable tablet 16 g  4 tablet Oral PRN    glucagon injection 1 mg  1 mg SubCUTAneous PRN    sodium chloride flush 0.9 % injection 5-40 mL  5-40 mL IntraVENous 2 times per day    sodium chloride flush 0.9 % injection 5-40 mL  5-40 mL 
2,000 mg IntraVENous PRN    ondansetron (ZOFRAN-ODT) disintegrating tablet 4 mg  4 mg Oral Q8H PRN    Or    ondansetron (ZOFRAN) injection 4 mg  4 mg IntraVENous Q6H PRN    polyethylene glycol (GLYCOLAX) packet 17 g  17 g Oral Daily PRN    acetaminophen (TYLENOL) tablet 650 mg  650 mg Oral Q6H PRN    Or    acetaminophen (TYLENOL) suppository 650 mg  650 mg Rectal Q6H PRN    melatonin tablet 3 mg  3 mg Oral Nightly PRN            Lab Review:     Recent Labs     06/15/24  2148 06/17/24  0436   WBC 10.0 9.1   HGB 10.4* 10.9*   HCT 31.8* 32.8*    152       Recent Labs     06/15/24  2148 06/17/24  0436    138   K 4.3 4.2    107   CO2 33* 27   BUN 54* 41*   ALT 31 28       No components found for: \"GLPOC\"      
[facility]  [address] [place] [address]  O: [phone]  F: [phone]    Electronically signed by Zev Fermin DPM on 6/17/2024 at 8:38 AM

## 2024-06-24 ENCOUNTER — HOSPITAL ENCOUNTER (OUTPATIENT)
Facility: HOSPITAL | Age: 84
Discharge: HOME | End: 2024-06-24
Attending: PODIATRIST
Payer: MEDICARE

## 2024-06-24 VITALS
HEART RATE: 67 BPM | RESPIRATION RATE: 16 BRPM | SYSTOLIC BLOOD PRESSURE: 147 MMHG | DIASTOLIC BLOOD PRESSURE: 69 MMHG | TEMPERATURE: 98.3 F

## 2024-06-24 DIAGNOSIS — S91.301A OPEN WOUND OF RIGHT FOOT, INITIAL ENCOUNTER: Primary | ICD-10-CM

## 2024-06-24 PROCEDURE — 11042 DBRDMT SUBQ TIS 1ST 20SQCM/<: CPT

## 2024-06-24 RX ORDER — GENTAMICIN SULFATE 1 MG/G
OINTMENT TOPICAL ONCE
OUTPATIENT
Start: 2024-06-24 | End: 2024-06-24

## 2024-06-24 RX ORDER — SODIUM CHLOR/HYPOCHLOROUS ACID 0.033 %
SOLUTION, IRRIGATION IRRIGATION ONCE
OUTPATIENT
Start: 2024-06-24 | End: 2024-06-24

## 2024-06-24 RX ORDER — LIDOCAINE HYDROCHLORIDE 20 MG/ML
JELLY TOPICAL ONCE
OUTPATIENT
Start: 2024-06-24 | End: 2024-06-24

## 2024-06-24 RX ORDER — TRIAMCINOLONE ACETONIDE 1 MG/G
OINTMENT TOPICAL ONCE
OUTPATIENT
Start: 2024-06-24 | End: 2024-06-24

## 2024-06-24 RX ORDER — LIDOCAINE 40 MG/G
CREAM TOPICAL ONCE
OUTPATIENT
Start: 2024-06-24 | End: 2024-06-24

## 2024-06-24 RX ORDER — IBUPROFEN 200 MG
TABLET ORAL ONCE
OUTPATIENT
Start: 2024-06-24 | End: 2024-06-24

## 2024-06-24 RX ORDER — LIDOCAINE HYDROCHLORIDE 40 MG/ML
SOLUTION TOPICAL ONCE
OUTPATIENT
Start: 2024-06-24 | End: 2024-06-24

## 2024-06-24 RX ORDER — BETAMETHASONE DIPROPIONATE 0.05 %
OINTMENT (GRAM) TOPICAL ONCE
OUTPATIENT
Start: 2024-06-24 | End: 2024-06-24

## 2024-06-24 RX ORDER — LIDOCAINE 50 MG/G
OINTMENT TOPICAL ONCE
OUTPATIENT
Start: 2024-06-24 | End: 2024-06-24

## 2024-06-24 RX ORDER — CLOBETASOL PROPIONATE 0.5 MG/G
OINTMENT TOPICAL ONCE
OUTPATIENT
Start: 2024-06-24 | End: 2024-06-24

## 2024-06-24 RX ORDER — GINSENG 100 MG
CAPSULE ORAL ONCE
OUTPATIENT
Start: 2024-06-24 | End: 2024-06-24

## 2024-06-24 RX ORDER — BACITRACIN ZINC AND POLYMYXIN B SULFATE 500; 1000 [USP'U]/G; [USP'U]/G
OINTMENT TOPICAL ONCE
OUTPATIENT
Start: 2024-06-24 | End: 2024-06-24

## 2024-06-24 ASSESSMENT — PAIN DESCRIPTION - ORIENTATION: ORIENTATION: RIGHT

## 2024-06-24 ASSESSMENT — PAIN - FUNCTIONAL ASSESSMENT: PAIN_FUNCTIONAL_ASSESSMENT: ACTIVITIES ARE NOT PREVENTED

## 2024-06-24 ASSESSMENT — PAIN DESCRIPTION - LOCATION: LOCATION: FOOT

## 2024-06-24 ASSESSMENT — PAIN DESCRIPTION - DESCRIPTORS: DESCRIPTORS: ACHING

## 2024-06-24 ASSESSMENT — PAIN DESCRIPTION - FREQUENCY: FREQUENCY: INTERMITTENT

## 2024-06-24 ASSESSMENT — PAIN DESCRIPTION - PAIN TYPE: TYPE: CHRONIC PAIN

## 2024-06-24 ASSESSMENT — PAIN SCALES - GENERAL: PAINLEVEL_OUTOF10: 2

## 2024-06-24 ASSESSMENT — PAIN DESCRIPTION - ONSET: ONSET: ON-GOING

## 2024-06-24 NOTE — FLOWSHEET NOTE
06/24/24 0935   Right Leg Edema Point of Measurement   Leg circumference 39 cm   Ankle circumference 25 cm   RLE Neurovascular Assessment   Capillary Refill Less than/Equal to 3 seconds   Color Pink   Temperature Warm   R Pedal Pulse +1   Wound 10/16/23 Foot Right;Plantar #1   Date First Assessed/Time First Assessed: 10/16/23 1454   Present on Original Admission: Yes  Primary Wound Type: Neuropathic  Location: Foot  Wound Location Orientation: Right;Plantar  Wound Description (Comments): #1   Wound Image    Dressing Status Old drainage noted   Wound Cleansed Cleansed with saline   Wound Length (cm) 0.6 cm   Wound Width (cm) 0.8 cm   Wound Depth (cm) 1.4 cm   Wound Surface Area (cm^2) 0.48 cm^2   Change in Wound Size % (l*w) 40   Wound Volume (cm^3) 0.672 cm^3   Wound Healing % 62   Wound Assessment Other (Comment)  (unable to assess due to callus)   Drainage Amount None (dry)   Odor None   Giselle-wound Assessment Hyperkeratosis (callous)   Margins Epibole (rolled edges)   Wound 10/16/23 Foot Right;Lateral #2   Date First Assessed/Time First Assessed: 10/16/23 1454   Present on Original Admission: Yes  Primary Wound Type: Neuropathic  Location: Foot  Wound Location Orientation: Right;Lateral  Wound Description (Comments): #2   Wound Image    Dressing Status Old drainage noted   Wound Cleansed Cleansed with saline   Wound Length (cm) 0.9 cm   Wound Width (cm) 0.3 cm   Wound Depth (cm) 0.3 cm   Wound Surface Area (cm^2) 0.27 cm^2   Change in Wound Size % (l*w) 92.97   Wound Volume (cm^3) 0.081 cm^3   Wound Healing % 98   Wound Assessment Pale granulation tissue;Pink/red   Drainage Amount Moderate (25-50%)   Drainage Description Serosanguinous   Odor None   Giselle-wound Assessment Hyperkeratosis (callous)   Margins Epibole (rolled edges)     BP (!) 147/69   Pulse 67   Temp 98.3 °F (36.8 °C) (Temporal)   Resp 16

## 2024-06-24 NOTE — WOUND CARE
Patient presents back for chronic wounds. He was recently hospitalized for cellulitis and discharged on oral antibiotics which he is taking currently.     Procedure Note  Indications: Based on my examination of this patient's wound(s)/ulcer(s) today, debridement is required to promote healing and evaluate the wound base.    Debridement: Excisional Debridement    Using: #15 surgical blade the wound(s)/ulcer(s) was/were debrided down through and including the removal of subcutaneous tissue.  Performed by: Trudy Chisholm DPM  Consent obtained: Yes  Time out taken: No:   Pain Control:         Devitalized Tissue Debrided: slough and callus    Pre Debridement Measurements:  Are located in the Wound/Ulcer Documentation Flow Sheet    Diabetic/Pressure/Non Pressure Ulcers only:  Ulcer: Non-Pressure ulcer, fat layer exposed     Wound/Ulcer #: 2  Post Debridement Measurements:  Wound/Ulcer Descriptions are Pre Debridement except measurements:  Wound 10/16/23 Foot Right;Plantar #1 (Active)   Wound Image   06/24/24 0935   Wound Etiology Other 05/20/24 0948   Dressing Status Old drainage noted 06/24/24 0935   Wound Cleansed Cleansed with saline 06/24/24 0935   Dressing/Treatment Alginate with Ag;Gauze dressing/dressing sponge;Roll gauze;Tape/Soft cloth adhesive tape 06/24/24 1017   Offloading for Diabetic Foot Ulcers Offloading ordered 06/24/24 1017   Wound Length (cm) 0.6 cm 06/24/24 0935   Wound Width (cm) 0.8 cm 06/24/24 0935   Wound Depth (cm) 1.4 cm 06/24/24 0935   Wound Surface Area (cm^2) 0.48 cm^2 06/24/24 0935   Change in Wound Size % (l*w) 40 06/24/24 0935   Wound Volume (cm^3) 0.672 cm^3 06/24/24 0935   Wound Healing % 62 06/24/24 0935   Post-Procedure Length (cm) 0.6 cm 06/24/24 1003   Post-Procedure Width (cm) 0.8 cm 06/24/24 1003   Post-Procedure Depth (cm) 1.5 cm 06/24/24 1003   Post-Procedure Surface Area (cm^2) 0.48 cm^2 06/24/24 1003   Post-Procedure Volume (cm^3) 0.72 cm^3 06/24/24 1003   Undermining Starts ___

## 2024-06-24 NOTE — FLOWSHEET NOTE
06/24/24 1017   Right Leg Edema Point of Measurement   Compression Therapy Tubular elastic support bandage   Wound 10/16/23 Foot Right;Plantar #1   Date First Assessed/Time First Assessed: 10/16/23 1454   Present on Original Admission: Yes  Primary Wound Type: Neuropathic  Location: Foot  Wound Location Orientation: Right;Plantar  Wound Description (Comments): #1   Dressing/Treatment Alginate with Ag;Gauze dressing/dressing sponge;Roll gauze;Tape/Soft cloth adhesive tape   Offloading for Diabetic Foot Ulcers Offloading ordered   Wound 10/16/23 Foot Right;Lateral #2   Date First Assessed/Time First Assessed: 10/16/23 1454   Present on Original Admission: Yes  Primary Wound Type: Neuropathic  Location: Foot  Wound Location Orientation: Right;Lateral  Wound Description (Comments): #2   Dressing/Treatment Alginate with Ag;Gauze dressing/dressing sponge;Roll gauze;Tape/Soft cloth adhesive tape   Offloading for Diabetic Foot Ulcers Offloading ordered     Discharge Condition: Stable    Pain: 0    Ambulatory Status:Rollator Walker    Discharge Destination: Home    Transportation:Car    Accompanied by: Family    Discharge instructions reviewed with Family and copy or written instructions have been provided. All questions/concerns have been addressed at this time.

## 2024-06-24 NOTE — PATIENT INSTRUCTIONS
Discharge Instructions for  [facility]  [address] [place] [address]  Telephone: [phone]   FAX [phone]    NAME:  Jasiel Jose  YOB: 1940  ACCOUNT NUMBER :  [id number]  DATE:  6/24/2024     : Meseret    Wound Cleansing:   Do not scrub or use excessive force.  Cleanse wound prior to applying a clean dressing with:    [] Cleanse wound with: BABY SHAMPOO LATHER  LEAVE on 1-2 MINUTES ON WOUND THEN RINSE WITH WATER OR SALINE    [] May Shower at Discharge     [] Shower on days of dressing change, remove dressing first    [x] Keep Wound Dry in Shower (may purchase a cast cover if needed)     Topical Treatments:  Do not apply lotions, creams, or ointments to wound bed unless directed.   [x] Apply moisturizing lotion A&D ointment  to skin surrounding the wound prior to dressing change.  [] Apply antifungal ointment to skin surrounding the wound prior to dressing change.  [] Apply thin film of Zinc barrier ointment to skin immediately around wound.    Dressings:              Wound Location Right Lateral Foot          Apply Primary Dressing:      [x] Gentian violet painted to wound and pratik-wound  [x] Collagen (Kacie)  [x] Aquacel AG  [x] Double Layer tubi  to right leg    Cover and Secure with:  [x] Gauze    [x] Kerlix  [x] tape    Change dressing:    [x] Every other day      Dressings:      Wound Location Right plantar foot     Apply Primary Dressing:       [x] Aquacel AG packed into wound base   [x] Double Layer tubi  to right leg    Cover and Secure with:  [x] Gauze    [x] Kerlix  [x] tape    Change dressing:    [x] Every other day     Off-Loading: [x] Surgical shoe or Defender Boot   [x] Off-loading when : [x] walking     Dietary:  [x] Increase Protein: examples ( Meat, cheese, eggs, greek yogurt, premier protein drink, fish, nuts )     Activity:  Activity as tolerated:    [x] Patient has no activity restrictions    [x] Offload with

## 2024-07-01 ASSESSMENT — ENCOUNTER SYMPTOMS
DIARRHEA: 0
ABDOMINAL PAIN: 0
VOMITING: 0
SHORTNESS OF BREATH: 0

## 2024-07-01 NOTE — PROGRESS NOTES
Identified pt with two pt identifiers (name and ). Reviewed chart in preparation for visit and have obtained necessary documentation.    Jasiel Jose is a 84 y.o. male  Chief Complaint   Patient presents with    Follow-up     Brace Molding      /70 (Site: Left Upper Arm, Position: Sitting, Cuff Size: Large Adult)   Pulse 71   Temp 97.4 °F (36.3 °C) (Oral)   Resp 17   Ht 1.803 m (5' 11\")   Wt 90.3 kg (199 lb)   SpO2 98%   BMI 27.75 kg/m²     1. Have you been to the ER, urgent care clinic since your last visit?  Hospitalized since your last visit?yes -     2. Have you seen or consulted any other health care providers outside of the [facility] since your last visit?  Include any pap smears or colon screening. no     Patient and provider made aware of elevated BP x2. Patient asymptomatic. Patient reminded to monitor BP, continue to take BP medications if prescribed, and follow up with PCP/Cardiologist.  Patient expressed understanding and agreement.     
joint and help off load ulcer at this time.  - Ulcer noted to be improving. No signs of infection. Patient to follow up at the [place] wound care center.  - Discussed with patient to limit walking and stating to promote wound healing.    Return in about 2 weeks (around 7/4/2024).       Zev Fermin DPM, CWSP, AACFAS  Critical access hospital - [facility]  [address] [place] [id number]  O: [phone]  F: [phone]

## 2024-07-08 ENCOUNTER — HOSPITAL ENCOUNTER (OUTPATIENT)
Facility: HOSPITAL | Age: 84
Discharge: HOME | End: 2024-07-08
Attending: PODIATRIST
Payer: MEDICARE

## 2024-07-08 VITALS
RESPIRATION RATE: 18 BRPM | TEMPERATURE: 98.1 F | SYSTOLIC BLOOD PRESSURE: 135 MMHG | DIASTOLIC BLOOD PRESSURE: 69 MMHG | HEART RATE: 67 BPM

## 2024-07-08 DIAGNOSIS — S91.301S OPEN WOUND OF RIGHT FOOT, SEQUELA: Primary | ICD-10-CM

## 2024-07-08 DIAGNOSIS — S91.301A OPEN WOUND OF RIGHT FOOT, INITIAL ENCOUNTER: ICD-10-CM

## 2024-07-08 PROCEDURE — 11042 DBRDMT SUBQ TIS 1ST 20SQCM/<: CPT

## 2024-07-08 RX ORDER — LIDOCAINE 40 MG/G
CREAM TOPICAL ONCE
OUTPATIENT
Start: 2024-07-08 | End: 2024-07-08

## 2024-07-08 RX ORDER — TRIAMCINOLONE ACETONIDE 1 MG/G
OINTMENT TOPICAL ONCE
OUTPATIENT
Start: 2024-07-08 | End: 2024-07-08

## 2024-07-08 RX ORDER — GENTAMICIN SULFATE 1 MG/G
OINTMENT TOPICAL ONCE
OUTPATIENT
Start: 2024-07-08 | End: 2024-07-08

## 2024-07-08 RX ORDER — LIDOCAINE HYDROCHLORIDE 40 MG/ML
SOLUTION TOPICAL ONCE
OUTPATIENT
Start: 2024-07-08 | End: 2024-07-08

## 2024-07-08 RX ORDER — SODIUM CHLOR/HYPOCHLOROUS ACID 0.033 %
SOLUTION, IRRIGATION IRRIGATION ONCE
OUTPATIENT
Start: 2024-07-08 | End: 2024-07-08

## 2024-07-08 RX ORDER — LIDOCAINE HYDROCHLORIDE 20 MG/ML
JELLY TOPICAL ONCE
OUTPATIENT
Start: 2024-07-08 | End: 2024-07-08

## 2024-07-08 RX ORDER — BETAMETHASONE DIPROPIONATE 0.05 %
OINTMENT (GRAM) TOPICAL ONCE
OUTPATIENT
Start: 2024-07-08 | End: 2024-07-08

## 2024-07-08 RX ORDER — BACITRACIN ZINC AND POLYMYXIN B SULFATE 500; 1000 [USP'U]/G; [USP'U]/G
OINTMENT TOPICAL ONCE
OUTPATIENT
Start: 2024-07-08 | End: 2024-07-08

## 2024-07-08 RX ORDER — LIDOCAINE 50 MG/G
OINTMENT TOPICAL ONCE
OUTPATIENT
Start: 2024-07-08 | End: 2024-07-08

## 2024-07-08 RX ORDER — GINSENG 100 MG
CAPSULE ORAL ONCE
OUTPATIENT
Start: 2024-07-08 | End: 2024-07-08

## 2024-07-08 RX ORDER — CLOBETASOL PROPIONATE 0.5 MG/G
OINTMENT TOPICAL ONCE
OUTPATIENT
Start: 2024-07-08 | End: 2024-07-08

## 2024-07-08 RX ORDER — IBUPROFEN 200 MG
TABLET ORAL ONCE
OUTPATIENT
Start: 2024-07-08 | End: 2024-07-08

## 2024-07-08 ASSESSMENT — PAIN DESCRIPTION - DESCRIPTORS: DESCRIPTORS: ACHING

## 2024-07-08 ASSESSMENT — PAIN DESCRIPTION - PAIN TYPE: TYPE: CHRONIC PAIN

## 2024-07-08 ASSESSMENT — PAIN DESCRIPTION - ORIENTATION: ORIENTATION: RIGHT

## 2024-07-08 ASSESSMENT — PAIN SCALES - GENERAL: PAINLEVEL_OUTOF10: 3

## 2024-07-08 ASSESSMENT — PAIN DESCRIPTION - LOCATION: LOCATION: FOOT

## 2024-07-08 NOTE — WOUND CARE
Procedure Note  Indications: Based on my examination of this patient's wound(s)/ulcer(s) today, debridement is required to promote healing and evaluate the wound base.    Debridement: Excisional Debridement    Using: #15 surgical blade the wound(s)/ulcer(s) was/were debrided down through and including the removal of subcutaneous tissue.  Performed by: Trudy Chisholm DPM  Consent obtained: Yes  Time out taken: No:   Pain Control: Anesthetic  Anesthetic: 4% Lidocaine Liquid Topical       Devitalized Tissue Debrided: slough and callus    Pre Debridement Measurements:  Are located in the Wound/Ulcer Documentation Flow Sheet    Diabetic/Pressure/Non Pressure Ulcers only:  Ulcer: Non-Pressure ulcer, fat layer exposed     Wound/Ulcer #: 3  Post Debridement Measurements:  Wound/Ulcer Descriptions are Pre Debridement except measurements:  Wound 10/16/23 Foot Right;Plantar #1 (Active)   Wound Image   07/08/24 0956   Wound Etiology Other 05/20/24 0948   Dressing Status New dressing applied 07/08/24 1031   Wound Cleansed Cleansed with saline 07/08/24 0956   Dressing/Treatment Alginate with Ag;Gauze dressing/dressing sponge;Roll gauze;Tape/Soft cloth adhesive tape 07/08/24 1031   Offloading for Diabetic Foot Ulcers Offloading ordered 07/08/24 1031   Wound Length (cm) 0.2 cm 07/08/24 0956   Wound Width (cm) 0.5 cm 07/08/24 0956   Wound Depth (cm) 2 cm 07/08/24 0956   Wound Surface Area (cm^2) 0.1 cm^2 07/08/24 0956   Change in Wound Size % (l*w) 87.5 07/08/24 0956   Wound Volume (cm^3) 0.2 cm^3 07/08/24 0956   Wound Healing % 89 07/08/24 0956   Post-Procedure Length (cm) 0.2 cm 07/08/24 1023   Post-Procedure Width (cm) 0.5 cm 07/08/24 1023   Post-Procedure Depth (cm) 2.1 cm 07/08/24 1023   Post-Procedure Surface Area (cm^2) 0.1 cm^2 07/08/24 1023   Post-Procedure Volume (cm^3) 0.21 cm^3 07/08/24 1023   Undermining Starts ___ O'Clock 10 06/10/24 0934   Undermining Ends___ O'Clock 11 06/10/24 0934   Undermining Maxium Distance (cm)

## 2024-07-08 NOTE — PATIENT INSTRUCTIONS
Discharge Instructions for  [facility]  [address] [place] [address]  Telephone: [phone]   FAX [phone]    NAME:  Jasiel Jose  YOB: 1940  ACCOUNT NUMBER :  [id number]  DATE:  7/8/2024    Look into custom brace to stabilize ankle    : Meseret    Wound Cleansing:   Do not scrub or use excessive force.  Cleanse wound prior to applying a clean dressing with:    [] Cleanse wound with: BABY SHAMPOO LATHER  LEAVE on 1-2 MINUTES ON WOUND THEN RINSE WITH WATER OR SALINE    [] May Shower at Discharge     [] Shower on days of dressing change, remove dressing first    [x] Keep Wound Dry in Shower (may purchase a cast cover if needed)     Topical Treatments:  Do not apply lotions, creams, or ointments to wound bed unless directed.   [x] Apply moisturizing lotion A&D ointment  to skin surrounding the wound prior to dressing change.  [] Apply antifungal ointment to skin surrounding the wound prior to dressing change.  [] Apply thin film of Zinc barrier ointment to skin immediately around wound.    Dressings:                Wound Location Right Posterior Heel          Apply Primary Dressing:      [x] Medi-honey gel    Cover and Secure with:  [x] Gauze [] ABD       [] Optilock    [] Drawtex  [] Toma [x] Kerlix     [] Mepilex Border  [] Ace Wrap [] Other:   [x] Roll Tape       Change dressing:   [] Daily      [x] Every Other Day   [] Three times per week  [] Once a week   [] Do Not Change Dressing     [] Other:     Dressings:              Wound Location Right Lateral Foot          Apply Primary Dressing:      [x] Gentian violet painted to wound and pratik-wound  [x] Zinc based paste to raw pratik-wound, do not get in wound   [x] Aquacel AG packing, to wick   [x] Double Layer tubi  to right leg    Cover and Secure with:  [x] Gauze    [x] Kerlix  [x] tape    Change dressing:    [x] Every other day      Dressings:      Wound Location Right plantar

## 2024-07-08 NOTE — FLOWSHEET NOTE
07/08/24 1031   Right Leg Edema Point of Measurement   Compression Therapy Tubular elastic support bandage   Tubular Elastic Support Bandage Compression Pressure Medium   Wound 07/08/24 Heel Right #3   Date First Assessed/Time First Assessed: 07/08/24 0958   Location: Heel  Wound Location Orientation: Right  Wound Description (Comments): #3   Dressing Status New dressing applied   Dressing/Treatment Betadine swabs/povidone iodine;Moist to dry;Gauze dressing/dressing sponge;Roll gauze;Tape/Soft cloth adhesive tape   Offloading for Diabetic Foot Ulcers Offloading ordered   Wound 10/16/23 Foot Right;Plantar #1   Date First Assessed/Time First Assessed: 10/16/23 1454   Present on Original Admission: Yes  Primary Wound Type: Neuropathic  Location: Foot  Wound Location Orientation: Right;Plantar  Wound Description (Comments): #1   Dressing Status New dressing applied   Dressing/Treatment Alginate with Ag;Gauze dressing/dressing sponge;Roll gauze;Tape/Soft cloth adhesive tape   Offloading for Diabetic Foot Ulcers Offloading ordered   Wound 10/16/23 Foot Right;Lateral #2   Date First Assessed/Time First Assessed: 10/16/23 1454   Present on Original Admission: Yes  Primary Wound Type: Neuropathic  Location: Foot  Wound Location Orientation: Right;Lateral  Wound Description (Comments): #2   Dressing Status New dressing applied   Dressing/Treatment Alginate with Ag;Gauze dressing/dressing sponge;Roll gauze;Tape/Soft cloth adhesive tape;Other (comment)  (gentian violet to wound and periwound; zinc paste to raw periwound (not in wound); aquacel packing to wick)   Offloading for Diabetic Foot Ulcers Offloading ordered     Discharge Condition: Stable    Pain: 3    Ambulatory Status:Walking and Walker    Discharge Destination: Home    Transportation:Car    Accompanied by: Self and Family    Discharge instructions reviewed with Self and Family and copy or written instructions have been provided. All questions/concerns have been 
Assessed/Time First Assessed: 10/16/23 1454   Present on Original Admission: Yes  Primary Wound Type: Neuropathic  Location: Foot  Wound Location Orientation: Right;Lateral  Wound Description (Comments): #2   Wound Image    Dressing Status Old drainage noted   Wound Cleansed Cleansed with saline   Wound Length (cm) 0.4 cm   Wound Width (cm) 0.4 cm   Wound Depth (cm) 0.7 cm   Wound Surface Area (cm^2) 0.16 cm^2   Change in Wound Size % (l*w) 95.83   Wound Volume (cm^3) 0.112 cm^3   Wound Healing % 97   Distance Tunneling (cm) 3.4 cm   Tunneling Position ___ O'Clock 2   Wound Assessment Hyper granulation tissue   Drainage Amount Large (50-75% saturated)   Drainage Description Sanguinous   Odor None   Giselle-wound Assessment Fragile   Margins Flat/open edges;Epibole (rolled edges)   Wound Thickness Description not for Pressure Injury Full thickness     /69   Pulse 67   Temp 98.1 °F (36.7 °C) (Temporal)   Resp 18

## 2024-07-22 ENCOUNTER — HOSPITAL ENCOUNTER (OUTPATIENT)
Facility: HOSPITAL | Age: 84
Discharge: HOME | End: 2024-07-22
Attending: PODIATRIST
Payer: MEDICARE

## 2024-07-22 VITALS
RESPIRATION RATE: 20 BRPM | DIASTOLIC BLOOD PRESSURE: 60 MMHG | SYSTOLIC BLOOD PRESSURE: 147 MMHG | TEMPERATURE: 97.8 F | HEART RATE: 71 BPM

## 2024-07-22 DIAGNOSIS — S91.301A OPEN WOUND OF RIGHT FOOT, INITIAL ENCOUNTER: Primary | ICD-10-CM

## 2024-07-22 PROCEDURE — 17250 CHEM CAUT OF GRANLTJ TISSUE: CPT

## 2024-07-22 PROCEDURE — 11042 DBRDMT SUBQ TIS 1ST 20SQCM/<: CPT

## 2024-07-22 RX ORDER — BETAMETHASONE DIPROPIONATE 0.05 %
OINTMENT (GRAM) TOPICAL ONCE
OUTPATIENT
Start: 2024-07-22 | End: 2024-07-22

## 2024-07-22 RX ORDER — IBUPROFEN 200 MG
TABLET ORAL ONCE
OUTPATIENT
Start: 2024-07-22 | End: 2024-07-22

## 2024-07-22 RX ORDER — GINSENG 100 MG
CAPSULE ORAL ONCE
OUTPATIENT
Start: 2024-07-22 | End: 2024-07-22

## 2024-07-22 RX ORDER — LIDOCAINE HYDROCHLORIDE 40 MG/ML
SOLUTION TOPICAL ONCE
OUTPATIENT
Start: 2024-07-22 | End: 2024-07-22

## 2024-07-22 RX ORDER — CLOBETASOL PROPIONATE 0.5 MG/G
OINTMENT TOPICAL ONCE
OUTPATIENT
Start: 2024-07-22 | End: 2024-07-22

## 2024-07-22 RX ORDER — SODIUM CHLOR/HYPOCHLOROUS ACID 0.033 %
SOLUTION, IRRIGATION IRRIGATION ONCE
OUTPATIENT
Start: 2024-07-22 | End: 2024-07-22

## 2024-07-22 RX ORDER — LIDOCAINE 50 MG/G
OINTMENT TOPICAL ONCE
OUTPATIENT
Start: 2024-07-22 | End: 2024-07-22

## 2024-07-22 RX ORDER — LIDOCAINE HYDROCHLORIDE 20 MG/ML
JELLY TOPICAL ONCE
OUTPATIENT
Start: 2024-07-22 | End: 2024-07-22

## 2024-07-22 RX ORDER — LIDOCAINE 40 MG/G
CREAM TOPICAL ONCE
OUTPATIENT
Start: 2024-07-22 | End: 2024-07-22

## 2024-07-22 RX ORDER — GENTAMICIN SULFATE 1 MG/G
OINTMENT TOPICAL ONCE
OUTPATIENT
Start: 2024-07-22 | End: 2024-07-22

## 2024-07-22 RX ORDER — BACITRACIN ZINC AND POLYMYXIN B SULFATE 500; 1000 [USP'U]/G; [USP'U]/G
OINTMENT TOPICAL ONCE
OUTPATIENT
Start: 2024-07-22 | End: 2024-07-22

## 2024-07-22 RX ORDER — AMOXICILLIN AND CLAVULANATE POTASSIUM 875; 125 MG/1; MG/1
1 TABLET, FILM COATED ORAL 2 TIMES DAILY
Qty: 28 TABLET | Refills: 0 | Status: SHIPPED | OUTPATIENT
Start: 2024-07-22 | End: 2024-08-05

## 2024-07-22 RX ORDER — TRIAMCINOLONE ACETONIDE 1 MG/G
OINTMENT TOPICAL ONCE
OUTPATIENT
Start: 2024-07-22 | End: 2024-07-22

## 2024-07-22 NOTE — FLOWSHEET NOTE
07/22/24 1022   Right Leg Edema Point of Measurement   Compression Therapy Tubular elastic support bandage  (size F and G)   Tubular Elastic Support Bandage Compression Pressure Medium   Wound 07/08/24 Heel Right #3   Date First Assessed/Time First Assessed: 07/08/24 0958   Location: Heel  Wound Location Orientation: Right  Wound Description (Comments): #3   Dressing/Treatment Alginate with Ag;ABD;Gauze dressing/dressing sponge;Roll gauze;Tape/Soft cloth adhesive tape;Other (comment)  (gentian violet to pratik wound)   Offloading for Diabetic Foot Ulcers Offloading ordered   Wound 10/16/23 Foot Right;Plantar #1   Date First Assessed/Time First Assessed: 10/16/23 1454   Present on Original Admission: Yes  Primary Wound Type: Neuropathic  Location: Foot  Wound Location Orientation: Right;Plantar  Wound Description (Comments): #1   Dressing/Treatment Alginate with Ag;Gauze dressing/dressing sponge;Roll gauze;Tape/Soft cloth adhesive tape   Offloading for Diabetic Foot Ulcers Offloading ordered   Wound 10/16/23 Foot Right;Lateral #2   Date First Assessed/Time First Assessed: 10/16/23 1454   Present on Original Admission: Yes  Primary Wound Type: Neuropathic  Location: Foot  Wound Location Orientation: Right;Lateral  Wound Description (Comments): #2   Dressing/Treatment Gauze dressing/dressing sponge;Roll gauze;Tape/Soft cloth adhesive tape;Other (comment)  (gentian violet to wound/pratik wound, zince past to raw pratik wound, mesalt wick to wound and cover with mesalt)   Offloading for Diabetic Foot Ulcers Offloading ordered     Discharge Condition: Stable    Pain: 5    Ambulatory Status: Rolling Walker    Discharge Destination: home    Transportation:car    Accompanied by: FAMILY    Discharge instructions reviewed with FAMILY and patiet and copy or written instructions have been provided. All questions/concerns have been addressed at this time.

## 2024-07-22 NOTE — WOUND CARE
[facility]   Wound Care and [facility] Therapy Center   Medical Staff Progress Note     Jasiel Jose  MEDICAL RECORD NUMBER:  [id number]  AGE: 84 y.o.   GENDER: male  : 1940  EPISODE DATE:  2024    Chief complaint and reason for visit:     Chief Complaint   Patient presents with    Wound Check     Right foot      Patient presents back today for chronic right foot wounds. He has been caring for it as directed. Patient states he noticed increased redness in his leg for the past few days.    Medical Decision Making:     Problem List Items Addressed This Visit          Other    Open wound of right foot - Primary    Relevant Orders    Initiate Outpatient Wound Care Protocol       Wounds and Treatment Plan:    Rx for Augmentin sent to patient's pharmacy    Discussed minimal walking to avoid pressure on the wounds.     Wound 10/16/23 Foot Right;Plantar #1 (Active)   Wound Image   24 0931   Wound Etiology Other 24 0948   Dressing Status New dressing applied 24 1031   Wound Cleansed Cleansed with saline 24 0931   Dressing/Treatment Alginate with Ag;Gauze dressing/dressing sponge;Roll gauze;Tape/Soft cloth adhesive tape 24 1022   Offloading for Diabetic Foot Ulcers Offloading ordered 24 1022   Wound Length (cm) 0.3 cm 24 0931   Wound Width (cm) 0.4 cm 24 0931   Wound Depth (cm) 2 cm 24 0931   Wound Surface Area (cm^2) 0.12 cm^2 24 0931   Change in Wound Size % (l*w) 85 24 0931   Wound Volume (cm^3) 0.24 cm^3 24 0931   Wound Healing % 86 24 0931   Post-Procedure Length (cm) 0.3 cm 24 0957   Post-Procedure Width (cm) 0.4 cm 24 0957   Post-Procedure Depth (cm) 2.1 cm 24 0957   Post-Procedure Surface Area (cm^2) 0.12 cm^2 24 0957   Post-Procedure Volume (cm^3) 0.252 cm^3 24 0957   Undermining Starts ___ O'Clock 10 06/10/24 0934   Undermining Ends___ O'Clock 11 06/10/24 0934

## 2024-07-22 NOTE — WOUND CARE
Home Health Referral for skilled Nursing wound care       Supply Company:     [employer]  for skilled nursing wound care and PT     Ordering Center:     [facility]  [address]  [facility] [address] [phone]  WOUND CARE [phone]  FAX NUMBER [phone]    Patient Information:      Janelle Jose  [address]  [facility] [address] [phone]   : 1940  AGE: 84 y.o.     GENDER: male   TODAYS DATE:  2024    Insurance:      PRIMARY INSURANCE:  [id number] - (Medicare)    Payer/Plan Subscr  Sex Relation Sub. Ins. ID Effective Group Num   1. [facility]* JANELLE JOSE 1940 Male Self [id number]                                    [address]   2. MEDICARE - [place]* JANELLE JOSE 1940 Male Self [id number]                                    [address] [address]       Patient Wound Information:      Problem List Items Addressed This Visit          Other    Open wound of right foot - Primary    Relevant Orders    Initiate Outpatient Wound Care Protocol     Wound Care Documentation:  Wound 10/16/23 Foot Right;Plantar #1 (Active)   Wound Image   24 0931   Wound Etiology Other 24 0948   Dressing Status New dressing applied 24 1031   Wound Cleansed Cleansed with saline 24 0931   Dressing/Treatment Alginate with Ag;Gauze dressing/dressing sponge;Roll gauze;Tape/Soft cloth adhesive tape 24 1022   Offloading for Diabetic Foot Ulcers Offloading ordered 24 1022   Wound Length (cm) 0.3 cm 24 0931   Wound Width (cm) 0.4 cm 24 0931   Wound Depth (cm) 2 cm 24 0931   Wound Surface Area (cm^2) 0.12 cm^2 24 0931   Change in Wound Size % (l*w) 85 24 0931   Wound Volume (cm^3) 0.24 cm^3 24 0931   Wound Healing % 86 24 0931   Post-Procedure Length (cm) 0.3 cm 24   Post-Procedure Width (cm) 0.4 cm 24   Post-Procedure Depth (cm) 2.1 cm 24   Post-Procedure

## 2024-07-22 NOTE — FLOWSHEET NOTE
07/22/24 0931   Anesthetic   Anesthetic 4% Lidocaine Liquid Topical   Right Leg Edema Point of Measurement   Leg circumference 40.5 cm   Ankle circumference 27 cm   RLE Neurovascular Assessment   Capillary Refill Less than/Equal to 3 seconds   Color Pink   Temperature Warm   R Post Tibial Pulse Doppler   Wound 07/08/24 Heel Right #3   Date First Assessed/Time First Assessed: 07/08/24 0958   Location: Heel  Wound Location Orientation: Right  Wound Description (Comments): #3   Wound Image    Wound Cleansed Cleansed with saline   Wound Length (cm) 4.5 cm   Wound Width (cm) 3 cm   Wound Depth (cm) 0.2 cm   Wound Surface Area (cm^2) 13.5 cm^2   Change in Wound Size % (l*w) -429.41   Wound Volume (cm^3) 2.7 cm^3   Wound Healing % -429   Wound Assessment [place]/red;Slough   Drainage Amount Moderate (25-50%)   Drainage Description Sanguinous   Odor None   Giselle-wound Assessment Blanchable erythema;Maceration   Margins Flat/open edges   Wound Thickness Description not for Pressure Injury Full thickness   Wound 10/16/23 Foot Right;Plantar #1   Date First Assessed/Time First Assessed: 10/16/23 1454   Present on Original Admission: Yes  Primary Wound Type: Neuropathic  Location: Foot  Wound Location Orientation: Right;Plantar  Wound Description (Comments): #1   Wound Image    Wound Cleansed Cleansed with saline   Wound Length (cm) 0.3 cm   Wound Width (cm) 0.4 cm   Wound Depth (cm) 2 cm   Wound Surface Area (cm^2) 0.12 cm^2   Change in Wound Size % (l*w) 85   Wound Volume (cm^3) 0.24 cm^3   Wound Healing % 86   Wound Assessment Other (Comment)  (callouse)   Drainage Amount Scant (moist but unmeasurable)   Drainage Description Serosanguinous   Odor None   Giselle-wound Assessment Hyperkeratosis (callous)   Margins Undefined edges   Wound 10/16/23 Foot Right;Lateral #2   Date First Assessed/Time First Assessed: 10/16/23 1454   Present on Original Admission: Yes  Primary Wound Type: Neuropathic  Location: Foot  Wound Location

## 2024-07-22 NOTE — PATIENT INSTRUCTIONS
Discharge Instructions for  [facility]  [address] [place] [address]  Telephone: [phone]   FAX [phone]    NAME:  Jasiel Jose  YOB: 1940  ACCOUNT NUMBER :  [id number]  DATE:  7/22/2024    Look into custom brace to stabilize ankle   Home Health referral skilled wound care and PT to assist with offloading and walking to offload heel      : MITCHEL Carl     Wound Cleansing:   Do not scrub or use excessive force.  Cleanse wound prior to applying a clean dressing with:    [] Cleanse wound with: BABY SHAMPOO LATHER  LEAVE on 1-2 MINUTES ON WOUND THEN RINSE WITH WATER OR SALINE    [] May Shower at Discharge     [] Shower on days of dressing change, remove dressing first    [x] Keep Wound Dry in Shower (may purchase a cast cover if needed)     Topical Treatments:  Do not apply lotions, creams, or ointments to wound bed unless directed.   [x] Apply moisturizing lotion A&D ointment  to skin surrounding the wound prior to dressing change.  [] Apply antifungal ointment to skin surrounding the wound prior to dressing change.  [] Apply thin film of Zinc barrier ointment to skin immediately around wound.    Dressings:                Wound Location Right Posterior Heel          Apply Primary Dressing:      [x] Gentian violet to pratik wound, aquacell ag     Cover and Secure with:  [x] Gauze [x] ABD       [] Optilock    [] Drawtex  [] Toma [x] Kerlix     [] Mepilex Border  [] Ace Wrap [] Other:   [x] Roll Tape       Change dressing:   [] Daily      [x] Every Other Day   [] Three times per week  [] Once a week   [] Do Not Change Dressing     [] Other:     Dressings:              Wound Location Right Lateral Foot          Apply Primary Dressing:      [x] Gentian violet painted to wound and pratik-wound  [x] Zinc based paste to raw pratik-wound, do not get in wound   [x] Mesalt wick to wound and cover with Mesalt   [x] Double Layer tubi  to right leg    Cover

## 2024-08-05 ENCOUNTER — HOSPITAL ENCOUNTER (INPATIENT)
Facility: HOSPITAL | Age: 84
LOS: 8 days | Discharge: NURSING, REHAB OR LONG-TERM CARE | DRG: 475 | End: 2024-08-13
Attending: STUDENT IN AN ORGANIZED HEALTH CARE EDUCATION/TRAINING PROGRAM | Admitting: HOSPITALIST
Payer: MEDICARE

## 2024-08-05 ENCOUNTER — HOSPITAL ENCOUNTER (OUTPATIENT)
Facility: HOSPITAL | Age: 84
Discharge: HOME | End: 2024-08-05
Attending: PODIATRIST
Payer: MEDICARE

## 2024-08-05 ENCOUNTER — APPOINTMENT (OUTPATIENT)
Facility: HOSPITAL | Age: 84
DRG: 475 | End: 2024-08-05
Payer: MEDICARE

## 2024-08-05 VITALS
RESPIRATION RATE: 18 BRPM | HEART RATE: 55 BPM | TEMPERATURE: 97.8 F | DIASTOLIC BLOOD PRESSURE: 63 MMHG | SYSTOLIC BLOOD PRESSURE: 118 MMHG

## 2024-08-05 DIAGNOSIS — M86.9 OSTEOMYELITIS OF RIGHT FOOT, UNSPECIFIED TYPE (HCC): ICD-10-CM

## 2024-08-05 DIAGNOSIS — L03.115 CELLULITIS OF RIGHT LOWER EXTREMITY: Primary | ICD-10-CM

## 2024-08-05 DIAGNOSIS — S91.301A OPEN WOUND OF RIGHT FOOT, INITIAL ENCOUNTER: Primary | ICD-10-CM

## 2024-08-05 DIAGNOSIS — S88.119A: ICD-10-CM

## 2024-08-05 PROBLEM — L03.90 CELLULITIS: Status: ACTIVE | Noted: 2024-08-05

## 2024-08-05 LAB
ANION GAP SERPL CALC-SCNC: 10 MMOL/L (ref 5–15)
BASOPHILS # BLD: 0 K/UL (ref 0–0.1)
BASOPHILS NFR BLD: 0 % (ref 0–1)
BUN SERPL-MCNC: 46 MG/DL (ref 6–20)
BUN/CREAT SERPL: 28 (ref 12–20)
CALCIUM SERPL-MCNC: 8.9 MG/DL (ref 8.5–10.1)
CHLORIDE SERPL-SCNC: 99 MMOL/L (ref 97–108)
CO2 SERPL-SCNC: 26 MMOL/L (ref 21–32)
COMMENT:: NORMAL
CREAT SERPL-MCNC: 1.64 MG/DL (ref 0.7–1.3)
CRP SERPL-MCNC: 4.49 MG/DL (ref 0–0.3)
DIFFERENTIAL METHOD BLD: ABNORMAL
EOSINOPHIL # BLD: 0.2 K/UL (ref 0–0.4)
EOSINOPHIL NFR BLD: 2 % (ref 0–7)
ERYTHROCYTE [DISTWIDTH] IN BLOOD BY AUTOMATED COUNT: 14.5 % (ref 11.5–14.5)
ERYTHROCYTE [SEDIMENTATION RATE] IN BLOOD: 72 MM/HR (ref 0–20)
GLUCOSE SERPL-MCNC: 73 MG/DL (ref 65–100)
HCT VFR BLD AUTO: 31.9 % (ref 36.6–50.3)
HGB BLD-MCNC: 10.6 G/DL (ref 12.1–17)
IMM GRANULOCYTES # BLD AUTO: 0 K/UL (ref 0–0.04)
IMM GRANULOCYTES NFR BLD AUTO: 0 % (ref 0–0.5)
LACTATE SERPL-SCNC: 1.6 MMOL/L (ref 0.4–2)
LYMPHOCYTES # BLD: 1.2 K/UL (ref 0.8–3.5)
LYMPHOCYTES NFR BLD: 13 % (ref 12–49)
MCH RBC QN AUTO: 32.8 PG (ref 26–34)
MCHC RBC AUTO-ENTMCNC: 33.2 G/DL (ref 30–36.5)
MCV RBC AUTO: 98.8 FL (ref 80–99)
MONOCYTES # BLD: 1.2 K/UL (ref 0–1)
MONOCYTES NFR BLD: 13 % (ref 5–13)
NEUTS SEG # BLD: 6.3 K/UL (ref 1.8–8)
NEUTS SEG NFR BLD: 72 % (ref 32–75)
NRBC # BLD: 0 K/UL (ref 0–0.01)
NRBC BLD-RTO: 0 PER 100 WBC
PLATELET # BLD AUTO: 277 K/UL (ref 150–400)
PMV BLD AUTO: 10.1 FL (ref 8.9–12.9)
POTASSIUM SERPL-SCNC: 4.8 MMOL/L (ref 3.5–5.1)
RBC # BLD AUTO: 3.23 M/UL (ref 4.1–5.7)
SODIUM SERPL-SCNC: 135 MMOL/L (ref 136–145)
SPECIMEN HOLD: NORMAL
WBC # BLD AUTO: 8.9 K/UL (ref 4.1–11.1)

## 2024-08-05 PROCEDURE — 83605 ASSAY OF LACTIC ACID: CPT

## 2024-08-05 PROCEDURE — 6370000000 HC RX 637 (ALT 250 FOR IP): Performed by: HOSPITALIST

## 2024-08-05 PROCEDURE — 6360000002 HC RX W HCPCS: Performed by: STUDENT IN AN ORGANIZED HEALTH CARE EDUCATION/TRAINING PROGRAM

## 2024-08-05 PROCEDURE — 36415 COLL VENOUS BLD VENIPUNCTURE: CPT

## 2024-08-05 PROCEDURE — 1100000000 HC RM PRIVATE

## 2024-08-05 PROCEDURE — 2580000003 HC RX 258: Performed by: STUDENT IN AN ORGANIZED HEALTH CARE EDUCATION/TRAINING PROGRAM

## 2024-08-05 PROCEDURE — 85652 RBC SED RATE AUTOMATED: CPT

## 2024-08-05 PROCEDURE — 80048 BASIC METABOLIC PNL TOTAL CA: CPT

## 2024-08-05 PROCEDURE — 2580000003 HC RX 258: Performed by: HOSPITALIST

## 2024-08-05 PROCEDURE — 11042 DBRDMT SUBQ TIS 1ST 20SQCM/<: CPT

## 2024-08-05 PROCEDURE — 11043 DBRDMT MUSC&/FSCA 1ST 20/<: CPT

## 2024-08-05 PROCEDURE — 86140 C-REACTIVE PROTEIN: CPT

## 2024-08-05 PROCEDURE — 99285 EMERGENCY DEPT VISIT HI MDM: CPT

## 2024-08-05 PROCEDURE — 73630 X-RAY EXAM OF FOOT: CPT

## 2024-08-05 PROCEDURE — 6360000002 HC RX W HCPCS: Performed by: HOSPITALIST

## 2024-08-05 PROCEDURE — 85025 COMPLETE CBC W/AUTO DIFF WBC: CPT

## 2024-08-05 RX ORDER — SODIUM CHLORIDE 9 MG/ML
INJECTION, SOLUTION INTRAVENOUS PRN
Status: DISCONTINUED | OUTPATIENT
Start: 2024-08-05 | End: 2024-08-13 | Stop reason: HOSPADM

## 2024-08-05 RX ORDER — POTASSIUM CHLORIDE 7.45 MG/ML
10 INJECTION INTRAVENOUS PRN
Status: DISCONTINUED | OUTPATIENT
Start: 2024-08-05 | End: 2024-08-13 | Stop reason: HOSPADM

## 2024-08-05 RX ORDER — SODIUM CHLORIDE 0.9 % (FLUSH) 0.9 %
5-40 SYRINGE (ML) INJECTION PRN
Status: DISCONTINUED | OUTPATIENT
Start: 2024-08-05 | End: 2024-08-13 | Stop reason: HOSPADM

## 2024-08-05 RX ORDER — MAGNESIUM SULFATE IN WATER 40 MG/ML
2000 INJECTION, SOLUTION INTRAVENOUS PRN
Status: DISCONTINUED | OUTPATIENT
Start: 2024-08-05 | End: 2024-08-13 | Stop reason: HOSPADM

## 2024-08-05 RX ORDER — FLUTICASONE PROPIONATE 50 MCG
2 SPRAY, SUSPENSION (ML) NASAL DAILY PRN
Status: DISCONTINUED | OUTPATIENT
Start: 2024-08-05 | End: 2024-08-13 | Stop reason: HOSPADM

## 2024-08-05 RX ORDER — ISOSORBIDE MONONITRATE 30 MG/1
30 TABLET, EXTENDED RELEASE ORAL DAILY
Status: DISCONTINUED | OUTPATIENT
Start: 2024-08-05 | End: 2024-08-13 | Stop reason: HOSPADM

## 2024-08-05 RX ORDER — UBIDECARENONE 100 MG
100 CAPSULE ORAL DAILY
Status: DISCONTINUED | OUTPATIENT
Start: 2024-08-06 | End: 2024-08-13 | Stop reason: HOSPADM

## 2024-08-05 RX ORDER — SODIUM CHLORIDE 0.9 % (FLUSH) 0.9 %
5-40 SYRINGE (ML) INJECTION EVERY 12 HOURS SCHEDULED
Status: DISCONTINUED | OUTPATIENT
Start: 2024-08-05 | End: 2024-08-13 | Stop reason: HOSPADM

## 2024-08-05 RX ORDER — GINSENG 100 MG
CAPSULE ORAL ONCE
OUTPATIENT
Start: 2024-08-05 | End: 2024-08-05

## 2024-08-05 RX ORDER — BACITRACIN ZINC AND POLYMYXIN B SULFATE 500; 1000 [USP'U]/G; [USP'U]/G
OINTMENT TOPICAL ONCE
OUTPATIENT
Start: 2024-08-05 | End: 2024-08-05

## 2024-08-05 RX ORDER — LOSARTAN POTASSIUM 50 MG/1
100 TABLET ORAL DAILY
Status: DISCONTINUED | OUTPATIENT
Start: 2024-08-05 | End: 2024-08-13 | Stop reason: HOSPADM

## 2024-08-05 RX ORDER — POTASSIUM CHLORIDE 750 MG/1
40 TABLET, FILM COATED, EXTENDED RELEASE ORAL PRN
Status: DISCONTINUED | OUTPATIENT
Start: 2024-08-05 | End: 2024-08-13 | Stop reason: HOSPADM

## 2024-08-05 RX ORDER — ONDANSETRON 2 MG/ML
4 INJECTION INTRAMUSCULAR; INTRAVENOUS EVERY 6 HOURS PRN
Status: DISCONTINUED | OUTPATIENT
Start: 2024-08-05 | End: 2024-08-13 | Stop reason: HOSPADM

## 2024-08-05 RX ORDER — HYDROCHLOROTHIAZIDE 25 MG/1
25 TABLET ORAL DAILY
Status: DISCONTINUED | OUTPATIENT
Start: 2024-08-05 | End: 2024-08-13 | Stop reason: HOSPADM

## 2024-08-05 RX ORDER — CLOBETASOL PROPIONATE 0.5 MG/G
OINTMENT TOPICAL ONCE
OUTPATIENT
Start: 2024-08-05 | End: 2024-08-05

## 2024-08-05 RX ORDER — CETIRIZINE HYDROCHLORIDE 10 MG/1
5 TABLET ORAL DAILY
Status: DISCONTINUED | OUTPATIENT
Start: 2024-08-06 | End: 2024-08-13 | Stop reason: HOSPADM

## 2024-08-05 RX ORDER — LIDOCAINE HYDROCHLORIDE 20 MG/ML
JELLY TOPICAL ONCE
OUTPATIENT
Start: 2024-08-05 | End: 2024-08-05

## 2024-08-05 RX ORDER — TRIAMCINOLONE ACETONIDE 1 MG/G
OINTMENT TOPICAL ONCE
OUTPATIENT
Start: 2024-08-05 | End: 2024-08-05

## 2024-08-05 RX ORDER — MAGNESIUM 30 MG
30 TABLET ORAL DAILY
Status: DISCONTINUED | OUTPATIENT
Start: 2024-08-06 | End: 2024-08-05 | Stop reason: SDUPTHER

## 2024-08-05 RX ORDER — METRONIDAZOLE 500 MG/100ML
500 INJECTION, SOLUTION INTRAVENOUS EVERY 8 HOURS
Status: DISCONTINUED | OUTPATIENT
Start: 2024-08-05 | End: 2024-08-12

## 2024-08-05 RX ORDER — FERROUS GLUCONATE 324(38)MG
324 TABLET ORAL DAILY
Status: DISCONTINUED | OUTPATIENT
Start: 2024-08-05 | End: 2024-08-13 | Stop reason: HOSPADM

## 2024-08-05 RX ORDER — ASCORBIC ACID 500 MG
500 TABLET ORAL DAILY
Status: DISCONTINUED | OUTPATIENT
Start: 2024-08-06 | End: 2024-08-13 | Stop reason: HOSPADM

## 2024-08-05 RX ORDER — LIDOCAINE 50 MG/G
OINTMENT TOPICAL ONCE
OUTPATIENT
Start: 2024-08-05 | End: 2024-08-05

## 2024-08-05 RX ORDER — LANOLIN ALCOHOL/MO/W.PET/CERES
400 CREAM (GRAM) TOPICAL DAILY
Status: DISCONTINUED | OUTPATIENT
Start: 2024-08-06 | End: 2024-08-13 | Stop reason: HOSPADM

## 2024-08-05 RX ORDER — ONDANSETRON 4 MG/1
4 TABLET, ORALLY DISINTEGRATING ORAL EVERY 8 HOURS PRN
Status: DISCONTINUED | OUTPATIENT
Start: 2024-08-05 | End: 2024-08-13 | Stop reason: HOSPADM

## 2024-08-05 RX ORDER — GENTAMICIN SULFATE 1 MG/G
OINTMENT TOPICAL ONCE
OUTPATIENT
Start: 2024-08-05 | End: 2024-08-05

## 2024-08-05 RX ORDER — VITAMIN B COMPLEX
2000 TABLET ORAL DAILY
Status: DISCONTINUED | OUTPATIENT
Start: 2024-08-06 | End: 2024-08-13 | Stop reason: HOSPADM

## 2024-08-05 RX ORDER — FUROSEMIDE 20 MG/1
20 TABLET ORAL DAILY
Status: DISCONTINUED | OUTPATIENT
Start: 2024-08-06 | End: 2024-08-13 | Stop reason: HOSPADM

## 2024-08-05 RX ORDER — LOSARTAN POTASSIUM AND HYDROCHLOROTHIAZIDE 25; 100 MG/1; MG/1
1 TABLET ORAL DAILY
Status: DISCONTINUED | OUTPATIENT
Start: 2024-08-05 | End: 2024-08-05

## 2024-08-05 RX ORDER — LIDOCAINE 40 MG/G
CREAM TOPICAL ONCE
OUTPATIENT
Start: 2024-08-05 | End: 2024-08-05

## 2024-08-05 RX ORDER — SODIUM CHLOR/HYPOCHLOROUS ACID 0.033 %
SOLUTION, IRRIGATION IRRIGATION ONCE
OUTPATIENT
Start: 2024-08-05 | End: 2024-08-05

## 2024-08-05 RX ORDER — BETAMETHASONE DIPROPIONATE 0.05 %
OINTMENT (GRAM) TOPICAL ONCE
OUTPATIENT
Start: 2024-08-05 | End: 2024-08-05

## 2024-08-05 RX ORDER — POLYETHYLENE GLYCOL 3350 17 G/17G
17 POWDER, FOR SOLUTION ORAL DAILY PRN
Status: DISCONTINUED | OUTPATIENT
Start: 2024-08-05 | End: 2024-08-13 | Stop reason: HOSPADM

## 2024-08-05 RX ORDER — LIDOCAINE HYDROCHLORIDE 40 MG/ML
SOLUTION TOPICAL ONCE
OUTPATIENT
Start: 2024-08-05 | End: 2024-08-05

## 2024-08-05 RX ORDER — IBUPROFEN 200 MG
TABLET ORAL ONCE
OUTPATIENT
Start: 2024-08-05 | End: 2024-08-05

## 2024-08-05 RX ADMIN — SODIUM CHLORIDE 1250 MG: 9 INJECTION, SOLUTION INTRAVENOUS at 18:05

## 2024-08-05 RX ADMIN — ISOSORBIDE MONONITRATE 30 MG: 30 TABLET, EXTENDED RELEASE ORAL at 21:42

## 2024-08-05 RX ADMIN — FERROUS GLUCONATE 324 MG: 324 TABLET ORAL at 22:39

## 2024-08-05 RX ADMIN — VANCOMYCIN HYDROCHLORIDE 1000 MG: 1 INJECTION, POWDER, LYOPHILIZED, FOR SOLUTION INTRAVENOUS at 22:49

## 2024-08-05 RX ADMIN — LOSARTAN POTASSIUM 100 MG: 50 TABLET, FILM COATED ORAL at 21:41

## 2024-08-05 RX ADMIN — PIPERACILLIN AND TAZOBACTAM 4500 MG: 4; .5 INJECTION, POWDER, FOR SOLUTION INTRAVENOUS at 16:04

## 2024-08-05 RX ADMIN — METRONIDAZOLE 500 MG: 500 INJECTION, SOLUTION INTRAVENOUS at 22:09

## 2024-08-05 RX ADMIN — RIVAROXABAN 15 MG: 15 TABLET, FILM COATED ORAL at 21:42

## 2024-08-05 RX ADMIN — WATER 2000 MG: 1 INJECTION INTRAMUSCULAR; INTRAVENOUS; SUBCUTANEOUS at 22:03

## 2024-08-05 RX ADMIN — HYDROCHLOROTHIAZIDE 25 MG: 25 TABLET ORAL at 21:41

## 2024-08-05 ASSESSMENT — PAIN DESCRIPTION - FREQUENCY: FREQUENCY: INTERMITTENT

## 2024-08-05 ASSESSMENT — PAIN DESCRIPTION - DESCRIPTORS: DESCRIPTORS: SPASM

## 2024-08-05 ASSESSMENT — PAIN SCALES - GENERAL
PAINLEVEL_OUTOF10: 0
PAINLEVEL_OUTOF10: 4
PAINLEVEL_OUTOF10: 0
PAINLEVEL_OUTOF10: 4

## 2024-08-05 ASSESSMENT — PAIN DESCRIPTION - PAIN TYPE
TYPE: CHRONIC PAIN
TYPE: CHRONIC PAIN

## 2024-08-05 ASSESSMENT — PAIN DESCRIPTION - ORIENTATION
ORIENTATION: RIGHT;DISTAL
ORIENTATION: RIGHT

## 2024-08-05 ASSESSMENT — PAIN DESCRIPTION - LOCATION
LOCATION: FOOT
LOCATION: FOOT;LEG

## 2024-08-05 ASSESSMENT — PAIN - FUNCTIONAL ASSESSMENT
PAIN_FUNCTIONAL_ASSESSMENT: 0-10
PAIN_FUNCTIONAL_ASSESSMENT: PREVENTS OR INTERFERES SOME ACTIVE ACTIVITIES AND ADLS

## 2024-08-05 ASSESSMENT — PAIN DESCRIPTION - ONSET: ONSET: ON-GOING

## 2024-08-05 NOTE — ED PROVIDER NOTES
[facility] EMERGENCY DEPT  EMERGENCY DEPARTMENT ENCOUNTER      Pt Name: Jasiel Jose  MRN: [id number]  Birthdate 1940  Date of evaluation: 8/5/2024  Provider: Briana Allred MD    CHIEF COMPLAINT       Chief Complaint   Patient presents with    Wound Infection         HISTORY OF PRESENT ILLNESS   (Location/Symptom, Timing/Onset, Context/Setting, Quality, Duration, Modifying Factors, Severity)  Note limiting factors.   The history is provided by the patient and the spouse.     84-year-old male with a history of arthritis, A-fib, bladder cancer, hypertension, sleep apnea presenting for wound infection.  Reports he was seen by his wound DrLorenza Chisholm and was told to come in for IV antibiotics, has just completed Augmentin and has had increased redness and swelling on his right leg concerning for cellulitis not responding to the antibiotics.    Review of External Medical Records:         Nursing Notes were reviewed.      REVIEW OF SYSTEMS    (2-9 systems for level 4, 10 or more for level 5)     Except as noted above the remainder of the review of systems was reviewed and negative.       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

## 2024-08-05 NOTE — WOUND CARE
08/05/24 0952   Drainage Description Serosanguinous 08/05/24 0952   Odor None 08/05/24 0952   Giselle-wound Assessment Fragile;Blanchable erythema 08/05/24 0952   Margins Epibole (rolled edges) 08/05/24 0952   Wound Thickness Description not for Pressure Injury Full thickness 08/05/24 0952   Number of days: 293       Wound 07/08/24 Heel Right;Posterior #3 (Active)   Wound Image   08/05/24 0952   Wound Etiology Pressure Unstageable 07/08/24 1019   Dressing Status Old drainage noted 08/05/24 0952   Wound Cleansed Cleansed with saline 08/05/24 0952   Dressing/Treatment Betadine swabs/povidone iodine;Gauze dressing/dressing sponge;ABD;Roll gauze;Tape/Soft cloth adhesive tape 08/05/24 1101   Offloading for Diabetic Foot Ulcers Offloading ordered 08/05/24 1101   Wound Length (cm) 2.5 cm 08/05/24 0952   Wound Width (cm) 1.5 cm 08/05/24 0952   Wound Depth (cm) 0.2 cm 08/05/24 0952   Wound Surface Area (cm^2) 3.75 cm^2 08/05/24 0952   Change in Wound Size % (l*w) -47.06 08/05/24 0952   Wound Volume (cm^3) 0.75 cm^3 08/05/24 0952   Wound Healing % -47 08/05/24 0952   Post-Procedure Length (cm) 4.5 cm 07/22/24 0957   Post-Procedure Width (cm) 3 cm 07/22/24 0957   Post-Procedure Depth (cm) 0.3 cm 07/22/24 0957   Post-Procedure Surface Area (cm^2) 13.5 cm^2 07/22/24 0957   Post-Procedure Volume (cm^3) 4.05 cm^3 07/22/24 0957   Wound Assessment Pink/red;Slough 08/05/24 0952   Drainage Amount Moderate (25-50%) 08/05/24 0952   Drainage Description Serosanguinous 08/05/24 0952   Odor None 08/05/24 0952   Giselle-wound Assessment Blanchable erythema;Maceration 07/22/24 0931   Margins Flat/open edges 08/05/24 0952   Wound Thickness Description not for Pressure Injury Full thickness 08/05/24 0952   Number of days: 28       Wound 08/05/24 Foot Right;Plantar #4 (Active)   Wound Image   08/05/24 0952   Wound Etiology Pressure Unstageable 08/05/24 1041   Dressing Status Old drainage noted 08/05/24 0952   Wound Cleansed Cleansed with saline

## 2024-08-05 NOTE — ED NOTES
TRANSFER - OUT REPORT:    Verbal report given to MITCHEL Merino, and BISI Collins on Jasiel Jose  being transferred to [facility] for routine progression of patient care       Report consisted of patient's Situation, Background, Assessment and   Recommendations(SBAR).     Information from the following report(s) Nurse Handoff Report, ED Encounter Summary, ED SBAR, and Intake/Output was reviewed with the receiving nurse.    [place] Fall Assessment:    Presents to emergency department  because of falls (Syncope, seizure, or loss of consciousness): No  Age > 70: Yes  Altered Mental Status, Intoxication with alcohol or substance confusion (Disorientation, impaired judgment, poor safety awaremess, or inability to follow instructions): No  Impaired Mobility: Ambulates or transfers with assistive devices or assistance; Unable to ambulate or transer.: No  Nursing Judgement: Yes          Lines:   Peripheral IV 08/05/24 Left Antecubital (Active)   Site Assessment Clean, dry & intact 08/05/24 1248   Line Status Flushed 08/05/24 1248   Phlebitis Assessment No symptoms 08/05/24 1248   Infiltration Assessment 0 08/05/24 1248   Dressing Status Clean, dry & intact 08/05/24 1248   Dressing Intervention New 08/05/24 1248        Opportunity for questions and clarification was provided.      Patient transported with:  Monitor

## 2024-08-05 NOTE — FLOWSHEET NOTE
08/05/24 0952   Anesthetic   Anesthetic 4% Lidocaine Liquid Topical   Right Leg Edema Point of Measurement   Leg circumference 42.6 cm   Ankle circumference 30.4 cm   RLE Neurovascular Assessment   Capillary Refill Less than/Equal to 3 seconds   Color Pink;Red   Temperature Warm   R Pedal Pulse +1   Wound 08/05/24 Foot Right;Plantar #4   Date First Assessed/Time First Assessed: 08/05/24 1003   Present on Original Admission: No  Wound Approximate Age at First Assessment (Weeks): 2 weeks  Location: Foot  Wound Location Orientation: Right;Plantar  Wound Description (Comments): #4   Wound Image    Dressing Status Old drainage noted   Wound Cleansed Cleansed with saline   Wound Length (cm) 2.5 cm   Wound Width (cm) 2.6 cm   Wound Depth (cm) 0.5 cm   Wound Surface Area (cm^2) 6.5 cm^2   Wound Volume (cm^3) 3.25 cm^3   Undermining Starts ___ O'Clock 12   Undermining Ends___ O'Clock 9   Undermining Maxium Distance (cm) 2.2   Wound Assessment Ruptured blister;Slough;Pink/red   Drainage Amount Moderate (25-50%)   Drainage Description Serosanguinous   Odor None   Giselle-wound Assessment Dry/flaky   Margins Undefined edges   Wound 10/16/23 Heel Right;Plantar #1   Date First Assessed/Time First Assessed: 10/16/23 1454   Present on Original Admission: Yes  Primary Wound Type: Neuropathic  Location: Heel  Wound Location Orientation: Right;Plantar  Wound Description (Comments): #1   Wound Image    Dressing Status Old drainage noted   Wound Cleansed Cleansed with saline   Wound Length (cm) 1 cm   Wound Width (cm) 0.4 cm   Wound Depth (cm) 1.7 cm   Wound Surface Area (cm^2) 0.4 cm^2   Change in Wound Size % (l*w) 50   Wound Volume (cm^3) 0.68 cm^3   Wound Healing % 61   Undermining Starts ___ O'Clock 10   Undermining Ends___ O'Clock 12   Undermining Maxium Distance (cm) 1.6   Wound Assessment Slough;Pink/red  (probes to bone)   Drainage Amount Moderate (25-50%)   Drainage Description Serosanguinous   Odor None   Giselle-wound

## 2024-08-05 NOTE — FLOWSHEET NOTE
08/05/24 1101   Wound 08/05/24 Foot Right;Plantar #4   Date First Assessed/Time First Assessed: 08/05/24 1003   Present on Original Admission: No  Wound Approximate Age at First Assessment (Weeks): 2 weeks  Location: Foot  Wound Location Orientation: Right;Plantar  Wound Description (Comments): #4   Dressing/Treatment Betadine swabs/povidone iodine;Gauze dressing/dressing sponge;ABD;Roll gauze;Tape/Soft cloth adhesive tape   Offloading for Diabetic Foot Ulcers Offloading ordered   Wound 10/16/23 Heel Right;Plantar #1   Date First Assessed/Time First Assessed: 10/16/23 1454   Present on Original Admission: Yes  Primary Wound Type: Neuropathic  Location: Heel  Wound Location Orientation: Right;Plantar  Wound Description (Comments): #1   Dressing/Treatment Betadine swabs/povidone iodine;Gauze dressing/dressing sponge;ABD;Roll gauze;Tape/Soft cloth adhesive tape   Offloading for Diabetic Foot Ulcers Offloading ordered   Wound 07/08/24 Heel Right;Posterior #3   Date First Assessed/Time First Assessed: 07/08/24 0958   Location: Heel  Wound Location Orientation: Right;Posterior  Wound Description (Comments): #3   Dressing/Treatment Betadine swabs/povidone iodine;Gauze dressing/dressing sponge;ABD;Roll gauze;Tape/Soft cloth adhesive tape   Offloading for Diabetic Foot Ulcers Offloading ordered   Wound 10/16/23 Foot Right;Lateral #2   Date First Assessed/Time First Assessed: 10/16/23 1454   Present on Original Admission: Yes  Primary Wound Type: Neuropathic  Location: Foot  Wound Location Orientation: Right;Lateral  Wound Description (Comments): #2   Dressing/Treatment Betadine swabs/povidone iodine;Gauze dressing/dressing sponge;ABD;Roll gauze;Tape change   Offloading for Diabetic Foot Ulcers Offloading ordered     /63   Pulse 55   Temp 97.8 °F (36.6 °C) (Temporal)   Resp 18

## 2024-08-05 NOTE — ED TRIAGE NOTES
Pt wheeled to treatment area with a complaint of a right sided foot infection. Pt states he was referred to ED by wound clinic (Dr. Trudy Chisholm) for IV antibiotics. Pt states he has 3 wounds on the bottom of his foot that have been persistent since 2022.

## 2024-08-05 NOTE — H&P
Hospitalist Admission Note      NAME:  Jasiel Jose   :  1940   MRN:  [id number]     Date/Time:  2024 7:38 PM    Patient PCP: Jared Valdes MD    ________________________________________________________________________    Given the patient's current clinical presentation, I have a high level of concern for decompensation if discharged from the emergency department.  Complex decision making was performed, which includes reviewing the patient's available past medical records, laboratory results, and x-ray films.       My assessment of this patient's clinical condition and my plan of care is as follows.    Assessment / Plan:  Patient is a 84-year-old male with history of chronic atrial fibrillation, hypertension, CKD stage III, anemia comes to the hospital with a chronic wound to the right foot.  Patient was following with podiatry and was recently treated with Augmentin.  Patient failed outpatient treatment was sent to the ER for IV antibiotics.  Patient is found to have osteomyelitis of the right foot.  Patient is admitted for IV antibiotics and ID evaluation.    1.  Osteomyelitis of the right foot  Patient has a wound on the right foot and was treated with outpatient Augmentin but it did not get better.  X-rays positive for osteomyelitis.  Patient will be started on IV cefepime and Flagyl.  ID consult in the morning  Podiatry consult.  Patient sees Dr. Chisholm.    2.  Bladder cancer  Patient was diagnosed with bladder tumor last year in [place].    3.  Chronic atrial fibrillation  Patient is on Xarelto.  Patient not on any rate controlling drugs.  He does have a history of GI bleed and was referred for Watchman device.    4.  Hypertension  Patient takes losartan, HCTZ    5.  CKD stage III  Avoid nephrotoxic medications    6.  Anemia  Probably due to CKD            I have personally reviewed the radiographs, laboratory data in Epic and decisions and statements above are based partially on this personal

## 2024-08-05 NOTE — PATIENT INSTRUCTIONS
as tolerated:    [x] Patient has no activity restrictions    [x] Offload with surgical shoe or Defender boot                                          Return Appointment:  [x] Return Appointment: With Dr. Chisholm once out of hospital    Wound Care Center Information: Should you experience any significant changes in your wound(s) or have questions about your wound care, please contact the [facility] at MONDAY-THURSDAY 8:00 am - 4:30 AND Friday 8:00 AM- 12:00 PM .  If you need help with your wound outside these hours and cannot wait until we are again available, contact your PCP or go to the hospital emergency room.     PLEASE NOTE: IF YOU ARE UNABLE TO OBTAIN WOUND SUPPLIES, CONTINUE TO USE THE SUPPLIES YOU HAVE AVAILABLE UNTIL YOU ARE ABLE TO REACH US. IT IS MOST IMPORTANT TO KEEP THE WOUND COVERED AT ALL TIMES.     Physician Signature:_______________________ Dr. Trudy Chisholm

## 2024-08-06 ENCOUNTER — APPOINTMENT (OUTPATIENT)
Facility: HOSPITAL | Age: 84
DRG: 475 | End: 2024-08-06
Payer: MEDICARE

## 2024-08-06 LAB
ANION GAP SERPL CALC-SCNC: 5 MMOL/L (ref 5–15)
BASOPHILS # BLD: 0 K/UL (ref 0–0.1)
BASOPHILS NFR BLD: 1 % (ref 0–1)
BUN SERPL-MCNC: 40 MG/DL (ref 6–20)
BUN/CREAT SERPL: 26 (ref 12–20)
CALCIUM SERPL-MCNC: 8.6 MG/DL (ref 8.5–10.1)
CHLORIDE SERPL-SCNC: 106 MMOL/L (ref 97–108)
CO2 SERPL-SCNC: 25 MMOL/L (ref 21–32)
CREAT SERPL-MCNC: 1.55 MG/DL (ref 0.7–1.3)
DIFFERENTIAL METHOD BLD: ABNORMAL
EOSINOPHIL # BLD: 0.3 K/UL (ref 0–0.4)
EOSINOPHIL NFR BLD: 4 % (ref 0–7)
ERYTHROCYTE [DISTWIDTH] IN BLOOD BY AUTOMATED COUNT: 14.1 % (ref 11.5–14.5)
GLUCOSE SERPL-MCNC: 97 MG/DL (ref 65–100)
HCT VFR BLD AUTO: 30.2 % (ref 36.6–50.3)
HGB BLD-MCNC: 9.8 G/DL (ref 12.1–17)
IMM GRANULOCYTES # BLD AUTO: 0 K/UL (ref 0–0.04)
IMM GRANULOCYTES NFR BLD AUTO: 0 % (ref 0–0.5)
LYMPHOCYTES # BLD: 1.4 K/UL (ref 0.8–3.5)
LYMPHOCYTES NFR BLD: 18 % (ref 12–49)
MCH RBC QN AUTO: 33.4 PG (ref 26–34)
MCHC RBC AUTO-ENTMCNC: 32.5 G/DL (ref 30–36.5)
MCV RBC AUTO: 103.1 FL (ref 80–99)
MONOCYTES # BLD: 1 K/UL (ref 0–1)
MONOCYTES NFR BLD: 13 % (ref 5–13)
NEUTS SEG # BLD: 5 K/UL (ref 1.8–8)
NEUTS SEG NFR BLD: 65 % (ref 32–75)
NRBC # BLD: 0 K/UL (ref 0–0.01)
NRBC BLD-RTO: 0 PER 100 WBC
PLATELET # BLD AUTO: 247 K/UL (ref 150–400)
PMV BLD AUTO: 9.3 FL (ref 8.9–12.9)
POTASSIUM SERPL-SCNC: 4 MMOL/L (ref 3.5–5.1)
RBC # BLD AUTO: 2.93 M/UL (ref 4.1–5.7)
SODIUM SERPL-SCNC: 136 MMOL/L (ref 136–145)
WBC # BLD AUTO: 7.7 K/UL (ref 4.1–11.1)

## 2024-08-06 PROCEDURE — 87205 SMEAR GRAM STAIN: CPT

## 2024-08-06 PROCEDURE — 73718 MRI LOWER EXTREMITY W/O DYE: CPT

## 2024-08-06 PROCEDURE — 6370000000 HC RX 637 (ALT 250 FOR IP): Performed by: HOSPITALIST

## 2024-08-06 PROCEDURE — 36415 COLL VENOUS BLD VENIPUNCTURE: CPT

## 2024-08-06 PROCEDURE — 87070 CULTURE OTHR SPECIMN AEROBIC: CPT

## 2024-08-06 PROCEDURE — 80048 BASIC METABOLIC PNL TOTAL CA: CPT

## 2024-08-06 PROCEDURE — 94761 N-INVAS EAR/PLS OXIMETRY MLT: CPT

## 2024-08-06 PROCEDURE — 1100000000 HC RM PRIVATE

## 2024-08-06 PROCEDURE — 6360000002 HC RX W HCPCS: Performed by: INTERNAL MEDICINE

## 2024-08-06 PROCEDURE — 6360000002 HC RX W HCPCS: Performed by: HOSPITALIST

## 2024-08-06 PROCEDURE — 2580000003 HC RX 258: Performed by: HOSPITALIST

## 2024-08-06 PROCEDURE — 2580000003 HC RX 258: Performed by: INTERNAL MEDICINE

## 2024-08-06 PROCEDURE — 85025 COMPLETE CBC W/AUTO DIFF WBC: CPT

## 2024-08-06 RX ADMIN — LOSARTAN POTASSIUM 100 MG: 50 TABLET, FILM COATED ORAL at 08:43

## 2024-08-06 RX ADMIN — Medication 2000 UNITS: at 08:43

## 2024-08-06 RX ADMIN — Medication 100 MG: at 08:43

## 2024-08-06 RX ADMIN — OXYCODONE HYDROCHLORIDE AND ACETAMINOPHEN 500 MG: 500 TABLET ORAL at 08:43

## 2024-08-06 RX ADMIN — SODIUM CHLORIDE 1250 MG: 9 INJECTION, SOLUTION INTRAVENOUS at 22:57

## 2024-08-06 RX ADMIN — CEFEPIME 2000 MG: 20 INJECTION, POWDER, FOR SOLUTION INTRAVENOUS at 08:49

## 2024-08-06 RX ADMIN — FERROUS GLUCONATE 324 MG: 324 TABLET ORAL at 20:38

## 2024-08-06 RX ADMIN — METRONIDAZOLE 500 MG: 500 INJECTION, SOLUTION INTRAVENOUS at 20:31

## 2024-08-06 RX ADMIN — FUROSEMIDE 20 MG: 20 TABLET ORAL at 08:43

## 2024-08-06 RX ADMIN — HYDROCHLOROTHIAZIDE 25 MG: 25 TABLET ORAL at 08:43

## 2024-08-06 RX ADMIN — SODIUM CHLORIDE, PRESERVATIVE FREE 5 ML: 5 INJECTION INTRAVENOUS at 20:31

## 2024-08-06 RX ADMIN — METRONIDAZOLE 500 MG: 500 INJECTION, SOLUTION INTRAVENOUS at 04:56

## 2024-08-06 RX ADMIN — Medication 400 MG: at 08:44

## 2024-08-06 RX ADMIN — CETIRIZINE HYDROCHLORIDE 5 MG: 10 TABLET, FILM COATED ORAL at 08:43

## 2024-08-06 RX ADMIN — RIVAROXABAN 15 MG: 15 TABLET, FILM COATED ORAL at 17:27

## 2024-08-06 RX ADMIN — ISOSORBIDE MONONITRATE 30 MG: 30 TABLET, EXTENDED RELEASE ORAL at 08:43

## 2024-08-06 RX ADMIN — METRONIDAZOLE 500 MG: 500 INJECTION, SOLUTION INTRAVENOUS at 12:35

## 2024-08-06 RX ADMIN — CEFEPIME 2000 MG: 20 INJECTION, POWDER, FOR SOLUTION INTRAVENOUS at 20:29

## 2024-08-06 RX ADMIN — POLYETHYLENE GLYCOL 3350 17 G: 17 POWDER, FOR SOLUTION ORAL at 20:37

## 2024-08-06 RX ADMIN — SODIUM CHLORIDE, PRESERVATIVE FREE 10 ML: 5 INJECTION INTRAVENOUS at 08:44

## 2024-08-06 ASSESSMENT — PAIN SCALES - GENERAL: PAINLEVEL_OUTOF10: 0

## 2024-08-06 NOTE — CARE COORDINATION
08/06/24 1351   Service Assessment   Patient Orientation Alert and Oriented   Cognition Alert   History Provided By Medical Record   Primary Caregiver Self   Support Systems Spouse/Significant Other   Patient's Healthcare Decision Maker is: Legal Next of Kin   PCP Verified by CM Yes   Last Visit to PCP Within last 3 months   Prior Functional Level Independent in ADLs/IADLs   Current Functional Level Independent in ADLs/IADLs   Can patient return to prior living arrangement Yes   Ability to make needs known: Good   Family able to assist with home care needs: Yes   Would you like for me to discuss the discharge plan with any other family members/significant others, and if so, who? Yes  (Gillian Fuller (Spouse) [phone])   Social/Functional History   Lives With Spouse   Type of Home House   Home Layout Two level   Home Access Stairs to enter with rails   Entrance Stairs - Number of Steps 4   Home Equipment Walker - Rolling;Cane   ADL Assistance Independent   Active  No   Patient's  Info Gillian Fuller (Spouse) [phone]   Mode of Transportation Car   Discharge Planning   Patient expects to be discharged to: House   Care Management Progress Note    Reason for Admission:   Cellulitis [L03.90]  Cellulitis of right lower extremity [L03.115]  Osteomyelitis of right foot, unspecified type (HCC) [M86.9]         Patient Admission Status: Inpatient  RUR: Readmission Risk Score: 16.9      Initial assessment completed. Pt reported to live in a private residence with his spouse. Pt was being followed by [facility] [place]. Pt was recently referred to [facility]  PT and nursing.       LUIS EDUARDO Govea, CM  [facility] Care Manager  [phone]

## 2024-08-07 PROBLEM — M86.9 OSTEOMYELITIS OF RIGHT FOOT (HCC): Status: ACTIVE | Noted: 2024-08-07

## 2024-08-07 PROBLEM — N18.30 STAGE 3 CHRONIC KIDNEY DISEASE (HCC): Status: ACTIVE | Noted: 2024-08-07

## 2024-08-07 LAB
ANION GAP SERPL CALC-SCNC: 5 MMOL/L (ref 5–15)
BACTERIA SPEC CULT: NORMAL
BACTERIA SPEC CULT: NORMAL
BUN SERPL-MCNC: 34 MG/DL (ref 6–20)
BUN/CREAT SERPL: 22 (ref 12–20)
CALCIUM SERPL-MCNC: 8.4 MG/DL (ref 8.5–10.1)
CHLORIDE SERPL-SCNC: 104 MMOL/L (ref 97–108)
CO2 SERPL-SCNC: 27 MMOL/L (ref 21–32)
CREAT SERPL-MCNC: 1.57 MG/DL (ref 0.7–1.3)
ERYTHROCYTE [DISTWIDTH] IN BLOOD BY AUTOMATED COUNT: 14 % (ref 11.5–14.5)
GLUCOSE SERPL-MCNC: 84 MG/DL (ref 65–100)
HCT VFR BLD AUTO: 32.5 % (ref 36.6–50.3)
HGB BLD-MCNC: 10.7 G/DL (ref 12.1–17)
MAGNESIUM SERPL-MCNC: 2.1 MG/DL (ref 1.6–2.4)
MCH RBC QN AUTO: 32.9 PG (ref 26–34)
MCHC RBC AUTO-ENTMCNC: 32.9 G/DL (ref 30–36.5)
MCV RBC AUTO: 100 FL (ref 80–99)
NRBC # BLD: 0 K/UL (ref 0–0.01)
NRBC BLD-RTO: 0 PER 100 WBC
PLATELET # BLD AUTO: 237 K/UL (ref 150–400)
PMV BLD AUTO: 9.5 FL (ref 8.9–12.9)
POTASSIUM SERPL-SCNC: 4.2 MMOL/L (ref 3.5–5.1)
RBC # BLD AUTO: 3.25 M/UL (ref 4.1–5.7)
SERVICE CMNT-IMP: NORMAL
SODIUM SERPL-SCNC: 136 MMOL/L (ref 136–145)
VANCOMYCIN SERPL-MCNC: 14 UG/ML
WBC # BLD AUTO: 7.8 K/UL (ref 4.1–11.1)

## 2024-08-07 PROCEDURE — 36415 COLL VENOUS BLD VENIPUNCTURE: CPT

## 2024-08-07 PROCEDURE — 99222 1ST HOSP IP/OBS MODERATE 55: CPT | Performed by: INTERNAL MEDICINE

## 2024-08-07 PROCEDURE — 2580000003 HC RX 258: Performed by: HOSPITALIST

## 2024-08-07 PROCEDURE — 1100000000 HC RM PRIVATE

## 2024-08-07 PROCEDURE — 80202 ASSAY OF VANCOMYCIN: CPT

## 2024-08-07 PROCEDURE — 94761 N-INVAS EAR/PLS OXIMETRY MLT: CPT

## 2024-08-07 PROCEDURE — 80048 BASIC METABOLIC PNL TOTAL CA: CPT

## 2024-08-07 PROCEDURE — 85027 COMPLETE CBC AUTOMATED: CPT

## 2024-08-07 PROCEDURE — 6360000002 HC RX W HCPCS: Performed by: HOSPITALIST

## 2024-08-07 PROCEDURE — 83735 ASSAY OF MAGNESIUM: CPT

## 2024-08-07 PROCEDURE — 6370000000 HC RX 637 (ALT 250 FOR IP): Performed by: HOSPITALIST

## 2024-08-07 PROCEDURE — 2700000000 HC OXYGEN THERAPY PER DAY

## 2024-08-07 PROCEDURE — 2580000003 HC RX 258: Performed by: INTERNAL MEDICINE

## 2024-08-07 PROCEDURE — 6360000002 HC RX W HCPCS: Performed by: INTERNAL MEDICINE

## 2024-08-07 RX ORDER — ACETAMINOPHEN 325 MG/1
650 TABLET ORAL EVERY 6 HOURS PRN
Status: DISCONTINUED | OUTPATIENT
Start: 2024-08-07 | End: 2024-08-13 | Stop reason: HOSPADM

## 2024-08-07 RX ADMIN — POLYETHYLENE GLYCOL 3350 17 G: 17 POWDER, FOR SOLUTION ORAL at 20:11

## 2024-08-07 RX ADMIN — CEFEPIME 2000 MG: 20 INJECTION, POWDER, FOR SOLUTION INTRAVENOUS at 20:09

## 2024-08-07 RX ADMIN — SODIUM CHLORIDE, PRESERVATIVE FREE 5 ML: 5 INJECTION INTRAVENOUS at 20:11

## 2024-08-07 RX ADMIN — FUROSEMIDE 20 MG: 20 TABLET ORAL at 08:15

## 2024-08-07 RX ADMIN — CEFEPIME 2000 MG: 20 INJECTION, POWDER, FOR SOLUTION INTRAVENOUS at 08:20

## 2024-08-07 RX ADMIN — ISOSORBIDE MONONITRATE 30 MG: 30 TABLET, EXTENDED RELEASE ORAL at 08:15

## 2024-08-07 RX ADMIN — Medication 2000 UNITS: at 08:14

## 2024-08-07 RX ADMIN — VANCOMYCIN HYDROCHLORIDE 1000 MG: 1 INJECTION, POWDER, LYOPHILIZED, FOR SOLUTION INTRAVENOUS at 22:09

## 2024-08-07 RX ADMIN — HYDROCHLOROTHIAZIDE 25 MG: 25 TABLET ORAL at 08:15

## 2024-08-07 RX ADMIN — FERROUS GLUCONATE 324 MG: 324 TABLET ORAL at 22:03

## 2024-08-07 RX ADMIN — Medication 100 MG: at 08:15

## 2024-08-07 RX ADMIN — METRONIDAZOLE 500 MG: 500 INJECTION, SOLUTION INTRAVENOUS at 20:10

## 2024-08-07 RX ADMIN — ONDANSETRON 4 MG: 2 INJECTION INTRAMUSCULAR; INTRAVENOUS at 19:03

## 2024-08-07 RX ADMIN — CETIRIZINE HYDROCHLORIDE 5 MG: 10 TABLET, FILM COATED ORAL at 08:15

## 2024-08-07 RX ADMIN — RIVAROXABAN 15 MG: 15 TABLET, FILM COATED ORAL at 18:55

## 2024-08-07 RX ADMIN — METRONIDAZOLE 500 MG: 500 INJECTION, SOLUTION INTRAVENOUS at 04:13

## 2024-08-07 RX ADMIN — OXYCODONE HYDROCHLORIDE AND ACETAMINOPHEN 500 MG: 500 TABLET ORAL at 08:16

## 2024-08-07 RX ADMIN — LOSARTAN POTASSIUM 100 MG: 50 TABLET, FILM COATED ORAL at 08:15

## 2024-08-07 RX ADMIN — METRONIDAZOLE 500 MG: 500 INJECTION, SOLUTION INTRAVENOUS at 16:21

## 2024-08-07 RX ADMIN — Medication 400 MG: at 08:15

## 2024-08-07 ASSESSMENT — PAIN SCALES - GENERAL: PAINLEVEL_OUTOF10: 3

## 2024-08-07 NOTE — CONSULTS
use: Not Currently     Alcohol/week: 2.0 standard drinks of alcohol     Past Surgical History:   Procedure Laterality Date    CATARACT REMOVAL Bilateral     COLONOSCOPY  2019    Removed polyps    CYSTOSCOPY  2023    ESOPHAGOGASTRODUODENOSCOPY  01/03/2024    HERNIA REPAIR      MOHS SURGERY Left     x 3 Forhead and ear    MOLE REMOVAL Left 11/30/2023    TONSILLECTOMY  1943    TOTAL HIP ARTHROPLASTY Left 2016    TOTAL KNEE ARTHROPLASTY Right 2019    VEIN SURGERY Bilateral 2015      Prior to Admission medications    Medication Sig Start Date End Date Taking? Authorizing Provider   fluticasone (FLONASE) 50 MCG/ACT nasal spray 2 sprays by Nasal route daily as needed for Rhinitis 6/19/24   Dago Gonsales MD   melatonin 3 MG TABS tablet Take 1 tablet by mouth nightly as needed (insomina) 6/19/24 7/4/24  Dago Gonsales MD   Ferrous Gluconate (IRON 27 PO) Take 1 tablet by mouth daily    Fabienne Zapata MD   magnesium 30 MG tablet Take 1 tablet by mouth Daily    Fabienne Zapata MD   isosorbide mononitrate (IMDUR) 30 MG extended release tablet Take 1 tablet by mouth daily    Fabienne Zapata MD   furosemide (LASIX) 20 MG tablet Take 1 tablet by mouth every other day    Fabienne Zapata MD   Ascorbic Acid (VITAMIN C PO) Take by mouth    ProviderFabienne MD   UBIQUINONE PO Take 100 mg by mouth daily    Automatic Reconciliation, Ar   acetaminophen (TYLENOL) 500 MG tablet Take 1-2 tablets by mouth every 6 hours as needed 9/21/20   Automatic Reconciliation, Ar   Cholecalciferol 50 MCG (2000 UT) CAPS Take 1 capsule by mouth 1/10/11   Automatic Reconciliation, Ar   fluticasone (FLONASE) 50 MCG/ACT nasal spray 2 sprays by Nasal route as needed    Automatic Reconciliation, Ar   loratadine (CLARITIN) 10 MG tablet loratadine 10 mg tablet   Take 1 tablet every day by oral route as directed.    Automatic Reconciliation, Ar   losartan-hydroCHLOROthiazide (HYZAAR) 100-25 MG per tablet Take 1 tablet by mouth daily 
24 2239    cetirizine (ZYRTEC) tablet 5 mg  5 mg Oral Daily Lester Aviles MD   5 mg at 24 0843    coenzyme Q10 capsule 100 mg  100 mg Oral Daily Lester Aviles MD   100 mg at 24 0843    magnesium oxide (MAG-OX) tablet 400 mg  400 mg Oral Daily Lester Aviles MD   400 mg at 24 0844      Allergies   Allergen Reactions    Ace Inhibitors Cough     Patients states he is not allergic    Nsaids Other (See Comments)     \"causes blood in urine\"    Oxycodone-Acetaminophen Other (See Comments)     constipation    Penicillins Rash    Povidone Iodine Dermatitis        Review of Systems   Constitutional:  Negative for activity change, appetite change, chills, fatigue and fever.   HENT:  Negative for ear pain.    Respiratory:  Negative for shortness of breath.    Cardiovascular:  Negative for leg swelling.   Gastrointestinal:  Negative for abdominal pain, diarrhea and vomiting.   Skin:  Positive for wound.   Neurological:  Positive for numbness. Negative for weakness.   Psychiatric/Behavioral:  Negative for behavioral problems. The patient is not nervous/anxious.         Objective:     Patient Vitals for the past 8 hrs:   BP Temp Temp src Pulse Resp SpO2   24 1137 (!) 141/63 97.9 °F (36.6 °C) Oral 54 16 97 %   24 0827 (!) 150/55 98.1 °F (36.7 °C) Oral 52 16 97 %   24 0504 (!) 155/68 -- -- 50 -- --     Temp (24hrs), Av °F (36.7 °C), Min:97.5 °F (36.4 °C), Max:98.8 °F (37.1 °C)      Physical Exam:    GEN: Pt is AAOx4 and in NAD. No dressing noted to B/L LE. Family noted at BS.  DERM: Wound plantar foot and lateral ankle.  No purulent drainage noted.  Wound to plantar aspect noted to probe to bone.  Erythema noted to the right lower extremity.  Edema noted to right lower extremity.  VASC: Pedal pulses (DP/PT) diminished to B/L LE. CFT<3sec to all digits of B/L LE.  No pedal hair growth noted to the level of the digits for B/L LE. Skin temp is warm to warm from proximal to

## 2024-08-08 ENCOUNTER — ANESTHESIA EVENT (OUTPATIENT)
Facility: HOSPITAL | Age: 84
End: 2024-08-08
Payer: MEDICARE

## 2024-08-08 ENCOUNTER — ANESTHESIA (OUTPATIENT)
Facility: HOSPITAL | Age: 84
End: 2024-08-08
Payer: MEDICARE

## 2024-08-08 LAB
BACTERIA SPEC CULT: NORMAL
GRAM STN SPEC: NORMAL
GRAM STN SPEC: NORMAL
SERVICE CMNT-IMP: NORMAL

## 2024-08-08 PROCEDURE — 6360000002 HC RX W HCPCS: Performed by: ANESTHESIOLOGY

## 2024-08-08 PROCEDURE — 2709999900 HC NON-CHARGEABLE SUPPLY

## 2024-08-08 PROCEDURE — 7100000001 HC PACU RECOVERY - ADDTL 15 MIN

## 2024-08-08 PROCEDURE — 6370000000 HC RX 637 (ALT 250 FOR IP): Performed by: HOSPITALIST

## 2024-08-08 PROCEDURE — 6370000000 HC RX 637 (ALT 250 FOR IP): Performed by: INTERNAL MEDICINE

## 2024-08-08 PROCEDURE — 2580000003 HC RX 258: Performed by: HOSPITALIST

## 2024-08-08 PROCEDURE — 7100000000 HC PACU RECOVERY - FIRST 15 MIN

## 2024-08-08 PROCEDURE — 64445 NJX AA&/STRD SCIATIC NRV IMG: CPT | Performed by: ANESTHESIOLOGY

## 2024-08-08 PROCEDURE — 6360000002 HC RX W HCPCS: Performed by: INTERNAL MEDICINE

## 2024-08-08 PROCEDURE — 6360000002 HC RX W HCPCS: Performed by: HOSPITALIST

## 2024-08-08 PROCEDURE — 3700000001 HC ADD 15 MINUTES (ANESTHESIA)

## 2024-08-08 PROCEDURE — 1100000000 HC RM PRIVATE

## 2024-08-08 PROCEDURE — 88311 DECALCIFY TISSUE: CPT

## 2024-08-08 PROCEDURE — 94761 N-INVAS EAR/PLS OXIMETRY MLT: CPT

## 2024-08-08 PROCEDURE — 0Y6H0Z1 DETACHMENT AT RIGHT LOWER LEG, HIGH, OPEN APPROACH: ICD-10-PCS

## 2024-08-08 PROCEDURE — 2580000003 HC RX 258: Performed by: INTERNAL MEDICINE

## 2024-08-08 PROCEDURE — 3600000003 HC SURGERY LEVEL 3 BASE

## 2024-08-08 PROCEDURE — 2500000003 HC RX 250 WO HCPCS: Performed by: NURSE ANESTHETIST, CERTIFIED REGISTERED

## 2024-08-08 PROCEDURE — 3600000013 HC SURGERY LEVEL 3 ADDTL 15MIN

## 2024-08-08 PROCEDURE — 2580000003 HC RX 258: Performed by: NURSE ANESTHETIST, CERTIFIED REGISTERED

## 2024-08-08 PROCEDURE — 88307 TISSUE EXAM BY PATHOLOGIST: CPT

## 2024-08-08 PROCEDURE — 6360000002 HC RX W HCPCS: Performed by: NURSE ANESTHETIST, CERTIFIED REGISTERED

## 2024-08-08 PROCEDURE — 3700000000 HC ANESTHESIA ATTENDED CARE

## 2024-08-08 RX ORDER — PROPOFOL 10 MG/ML
INJECTION, EMULSION INTRAVENOUS PRN
Status: DISCONTINUED | OUTPATIENT
Start: 2024-08-08 | End: 2024-08-08 | Stop reason: SDUPTHER

## 2024-08-08 RX ORDER — ROPIVACAINE HYDROCHLORIDE 2 MG/ML
INJECTION, SOLUTION EPIDURAL; INFILTRATION; PERINEURAL PRN
Status: DISCONTINUED | OUTPATIENT
Start: 2024-08-08 | End: 2024-08-08 | Stop reason: SDUPTHER

## 2024-08-08 RX ORDER — MORPHINE SULFATE 4 MG/ML
4 INJECTION, SOLUTION INTRAMUSCULAR; INTRAVENOUS EVERY 4 HOURS PRN
Status: DISCONTINUED | OUTPATIENT
Start: 2024-08-08 | End: 2024-08-13 | Stop reason: HOSPADM

## 2024-08-08 RX ORDER — OXYCODONE HYDROCHLORIDE 5 MG/1
5 TABLET ORAL EVERY 4 HOURS PRN
Status: DISCONTINUED | OUTPATIENT
Start: 2024-08-08 | End: 2024-08-13 | Stop reason: HOSPADM

## 2024-08-08 RX ORDER — FENTANYL CITRATE 50 UG/ML
INJECTION, SOLUTION INTRAMUSCULAR; INTRAVENOUS PRN
Status: DISCONTINUED | OUTPATIENT
Start: 2024-08-08 | End: 2024-08-08 | Stop reason: SDUPTHER

## 2024-08-08 RX ORDER — ROPIVACAINE HYDROCHLORIDE 5 MG/ML
INJECTION, SOLUTION EPIDURAL; INFILTRATION; PERINEURAL PRN
Status: DISCONTINUED | OUTPATIENT
Start: 2024-08-08 | End: 2024-08-08 | Stop reason: SDUPTHER

## 2024-08-08 RX ORDER — ONDANSETRON 2 MG/ML
INJECTION INTRAMUSCULAR; INTRAVENOUS PRN
Status: DISCONTINUED | OUTPATIENT
Start: 2024-08-08 | End: 2024-08-08 | Stop reason: SDUPTHER

## 2024-08-08 RX ORDER — LIDOCAINE HYDROCHLORIDE 20 MG/ML
INJECTION, SOLUTION EPIDURAL; INFILTRATION; INTRACAUDAL; PERINEURAL PRN
Status: DISCONTINUED | OUTPATIENT
Start: 2024-08-08 | End: 2024-08-08 | Stop reason: SDUPTHER

## 2024-08-08 RX ORDER — MIDAZOLAM HYDROCHLORIDE 1 MG/ML
INJECTION INTRAMUSCULAR; INTRAVENOUS PRN
Status: DISCONTINUED | OUTPATIENT
Start: 2024-08-08 | End: 2024-08-08 | Stop reason: SDUPTHER

## 2024-08-08 RX ORDER — MORPHINE SULFATE 2 MG/ML
2 INJECTION, SOLUTION INTRAMUSCULAR; INTRAVENOUS
Status: DISCONTINUED | OUTPATIENT
Start: 2024-08-08 | End: 2024-08-09

## 2024-08-08 RX ORDER — SODIUM CHLORIDE, SODIUM LACTATE, POTASSIUM CHLORIDE, CALCIUM CHLORIDE 600; 310; 30; 20 MG/100ML; MG/100ML; MG/100ML; MG/100ML
INJECTION, SOLUTION INTRAVENOUS CONTINUOUS PRN
Status: DISCONTINUED | OUTPATIENT
Start: 2024-08-08 | End: 2024-08-08 | Stop reason: SDUPTHER

## 2024-08-08 RX ADMIN — SODIUM CHLORIDE, POTASSIUM CHLORIDE, SODIUM LACTATE AND CALCIUM CHLORIDE: 600; 310; 30; 20 INJECTION, SOLUTION INTRAVENOUS at 10:34

## 2024-08-08 RX ADMIN — PROPOFOL 100 MG: 10 INJECTION, EMULSION INTRAVENOUS at 10:44

## 2024-08-08 RX ADMIN — RIVAROXABAN 15 MG: 15 TABLET, FILM COATED ORAL at 17:01

## 2024-08-08 RX ADMIN — FENTANYL CITRATE 25 MCG: 50 INJECTION, SOLUTION INTRAMUSCULAR; INTRAVENOUS at 10:40

## 2024-08-08 RX ADMIN — MIDAZOLAM HYDROCHLORIDE 2 MG: 1 INJECTION, SOLUTION INTRAMUSCULAR; INTRAVENOUS at 10:30

## 2024-08-08 RX ADMIN — ROPIVACAINE HYDROCHLORIDE 30 ML: 5 INJECTION, SOLUTION EPIDURAL; INFILTRATION; PERINEURAL at 10:23

## 2024-08-08 RX ADMIN — SODIUM CHLORIDE, PRESERVATIVE FREE 10 ML: 5 INJECTION INTRAVENOUS at 21:31

## 2024-08-08 RX ADMIN — METRONIDAZOLE 500 MG: 500 INJECTION, SOLUTION INTRAVENOUS at 21:30

## 2024-08-08 RX ADMIN — CEFEPIME 2000 MG: 20 INJECTION, POWDER, FOR SOLUTION INTRAVENOUS at 09:12

## 2024-08-08 RX ADMIN — FENTANYL CITRATE 25 MCG: 50 INJECTION, SOLUTION INTRAMUSCULAR; INTRAVENOUS at 10:44

## 2024-08-08 RX ADMIN — FENTANYL CITRATE 100 MCG: 50 INJECTION, SOLUTION INTRAMUSCULAR; INTRAVENOUS at 10:13

## 2024-08-08 RX ADMIN — CEFEPIME 2000 MG: 20 INJECTION, POWDER, FOR SOLUTION INTRAVENOUS at 10:49

## 2024-08-08 RX ADMIN — FERROUS GLUCONATE 324 MG: 324 TABLET ORAL at 21:22

## 2024-08-08 RX ADMIN — FENTANYL CITRATE 50 MCG: 50 INJECTION, SOLUTION INTRAMUSCULAR; INTRAVENOUS at 10:56

## 2024-08-08 RX ADMIN — ROPIVACAINE HYDROCHLORIDE 20 ML: 2 INJECTION, SOLUTION EPIDURAL; INFILTRATION at 10:18

## 2024-08-08 RX ADMIN — OXYCODONE 5 MG: 5 TABLET ORAL at 17:01

## 2024-08-08 RX ADMIN — METRONIDAZOLE 500 MG: 500 INJECTION, SOLUTION INTRAVENOUS at 04:18

## 2024-08-08 RX ADMIN — VANCOMYCIN HYDROCHLORIDE 1000 MG: 1 INJECTION, POWDER, LYOPHILIZED, FOR SOLUTION INTRAVENOUS at 23:56

## 2024-08-08 RX ADMIN — LIDOCAINE HYDROCHLORIDE 60 MG: 20 INJECTION, SOLUTION EPIDURAL; INFILTRATION; INTRACAUDAL; PERINEURAL at 10:44

## 2024-08-08 RX ADMIN — MORPHINE SULFATE 4 MG: 4 INJECTION INTRAVENOUS at 19:44

## 2024-08-08 RX ADMIN — CEFEPIME 2000 MG: 20 INJECTION, POWDER, FOR SOLUTION INTRAVENOUS at 21:29

## 2024-08-08 RX ADMIN — OXYCODONE 5 MG: 5 TABLET ORAL at 21:22

## 2024-08-08 RX ADMIN — ONDANSETRON HYDROCHLORIDE 4 MG: 2 SOLUTION INTRAMUSCULAR; INTRAVENOUS at 10:30

## 2024-08-08 RX ADMIN — MORPHINE SULFATE 2 MG: 2 INJECTION, SOLUTION INTRAMUSCULAR; INTRAVENOUS at 23:54

## 2024-08-08 ASSESSMENT — PAIN DESCRIPTION - ORIENTATION
ORIENTATION: RIGHT

## 2024-08-08 ASSESSMENT — PAIN SCALES - GENERAL
PAINLEVEL_OUTOF10: 2
PAINLEVEL_OUTOF10: 5
PAINLEVEL_OUTOF10: 3
PAINLEVEL_OUTOF10: 8
PAINLEVEL_OUTOF10: 4
PAINLEVEL_OUTOF10: 0
PAINLEVEL_OUTOF10: 5
PAINLEVEL_OUTOF10: 0

## 2024-08-08 ASSESSMENT — PAIN DESCRIPTION - DESCRIPTORS: DESCRIPTORS: DISCOMFORT;ACHING

## 2024-08-08 ASSESSMENT — PAIN - FUNCTIONAL ASSESSMENT: PAIN_FUNCTIONAL_ASSESSMENT: 0-10

## 2024-08-08 ASSESSMENT — PAIN DESCRIPTION - LOCATION
LOCATION: LEG
LOCATION: LEG
LOCATION: KNEE

## 2024-08-08 NOTE — BRIEF OP NOTE
Brief Postoperative Note      Patient: Jasiel Jose  YOB: 1940  MRN: [id number]    Date of Procedure: 8/8/2024    Pre-Op Diagnosis Codes:     * Osteomyelitis of other site, unspecified type (HCC) [M86.9]    Post-Op Diagnosis: Same       Procedure(s):  RIGHT LEG AMPUTATION BELOW KNEE    Surgeon(s):  Ray Amaro MD    Assistant:  Surgical Assistant: Karen Rai    Anesthesia: General    Estimated Blood Loss (mL): less than 100     Complications: None    Specimens:   ID Type Source Tests Collected by Time Destination   1 : RIGHT below knee amputation Tissue Leg SURGICAL PATHOLOGY Ray Amaro MD 8/8/2024 1100        Implants:  * No implants in log *      Drains: * No LDAs found *    Findings:  Infection Present At Time Of Surgery (PATOS) (choose all levels that have infection present):  - Organ Space infection (below fascia) present as evidenced by fluid consistent with infection  Other Findings: 0    Electronically signed by Ray Amaro MD on 8/8/2024 at 11:38 AM

## 2024-08-08 NOTE — ANESTHESIA POST-OP
TRANSFER - OUT REPORT:    Verbal report given to se Jose  being transferred to 5th floor for routine post-op       Report consisted of patient's Situation, Background, Assessment and   Recommendations(SBAR).     Information from the following report(s) Nurse Handoff Report was reviewed with the receiving nurse.           Lines:   Extended Dwell Peripherial IV 08/07/24 Left Basilic (Active)   Criteria for Appropriate Use Limited/no vessel suitable for conventional peripheral access 08/08/24 0433   Site Assessment Clean, dry & intact 08/08/24 1159   Phlebitis Assessment No symptoms 08/08/24 1159   Infiltration Assessment 0 08/08/24 1159   Line Status Infusing 08/08/24 1159   Line Care Ports disinfected 08/08/24 1159   Alcohol Cap Used Yes 08/08/24 1159   Date of Last Dressing Change 08/07/24 08/07/24 1514   Dressing Type Transparent w/CHG gel 08/08/24 1159   Dressing Status Clean, dry & intact 08/08/24 1159   Dressing Intervention New 08/07/24 1514        Opportunity for questions and clarification was provided.      Patient transported with:  Registered Nurse

## 2024-08-08 NOTE — ANESTHESIA PRE PROCEDURE
Department of Anesthesiology  Preprocedure Note       Name:  Jasiel Jose   Age:  84 y.o.  :  1940                                          MRN:  [id number]         Date:  2024      Surgeon: Surgeon(s):  Ray Amaro MD    Procedure: Procedure(s):  RIGHT LEG AMPUTATION BELOW KNEE    Medications prior to admission:   Prior to Admission medications    Medication Sig Start Date End Date Taking? Authorizing Provider   fluticasone (FLONASE) 50 MCG/ACT nasal spray 2 sprays by Nasal route daily as needed for Rhinitis 24   Dago Gonsales MD   melatonin 3 MG TABS tablet Take 1 tablet by mouth nightly as needed (insomina) 24  Dago Gonsales MD   Ferrous Gluconate (IRON 27 PO) Take 1 tablet by mouth daily    Fabienne Zapata MD   magnesium 30 MG tablet Take 1 tablet by mouth Daily    Fabienne Zapata MD   isosorbide mononitrate (IMDUR) 30 MG extended release tablet Take 1 tablet by mouth daily    Fabienne Zapata MD   furosemide (LASIX) 20 MG tablet Take 1 tablet by mouth every other day    Fabienne Zapata MD   Ascorbic Acid (VITAMIN C PO) Take by mouth    ProviderFabienne MD   UBIQUINONE PO Take 100 mg by mouth daily    Automatic Reconciliation, Ar   acetaminophen (TYLENOL) 500 MG tablet Take 1-2 tablets by mouth every 6 hours as needed 20   Automatic Reconciliation, Ar   Cholecalciferol 50 MCG ( UT) CAPS Take 1 capsule by mouth 1/10/11   Automatic Reconciliation, Ar   fluticasone (FLONASE) 50 MCG/ACT nasal spray 2 sprays by Nasal route as needed    Automatic Reconciliation, Ar   loratadine (CLARITIN) 10 MG tablet loratadine 10 mg tablet   Take 1 tablet every day by oral route as directed.    Automatic Reconciliation, Ar   losartan-hydroCHLOROthiazide (HYZAAR) 100-25 MG per tablet Take 1 tablet by mouth daily 20   Automatic Reconciliation, Ar   rivaroxaban (XARELTO) 20 MG TABS tablet Take 15 mg by mouth Daily with supper 19   Automatic

## 2024-08-08 NOTE — ANESTHESIA PROCEDURE NOTES
Peripheral Block    Patient location during procedure: pre-op  Reason for block: procedure for pain, post-op pain management, primary anesthetic and at surgeon's request  Start time: 8/8/2024 10:13 AM  End time: 8/8/2024 10:23 AM  Staffing  Performed: anesthesiologist   Anesthesiologist: Marlene Ramírez MD  Performed by: Marlene Ramírez MD  Authorized by: Marlene Ramírez MD    Preanesthetic Checklist  Completed: patient identified, IV checked, site marked, risks and benefits discussed, surgical/procedural consents, timeout performed, anesthesia consent given, oxygen available and monitors applied/VS acknowledged  Peripheral Block   Patient position: supine  Prep: ChloraPrep  Provider prep: mask and sterile gloves  Patient monitoring: cardiac monitor, continuous pulse ox, continuous capnometry, frequent blood pressure checks, IV access, oxygen and responsive to questions  Block type: Sciatic and Femoral  Laterality: right  Injection technique: single-shot  Guidance: nerve stimulator and ultrasound guided    Needle   Needle type: Other   Needle gauge: 21 G  Needle localization: nerve stimulator and ultrasound guidance  Needle length: 10 cmOther needle type: STIMUPLEX  Assessment   Injection assessment: negative aspiration for heme, no paresthesia on injection, local visualized surrounding nerve on ultrasound and no intravascular symptoms  Hemodynamics: stable  Outcomes: patient tolerated procedure well    Additional Notes  Femoral block performed with ultrasound guidance; 2\" stimuplex 22g needle used, 30cc 0.5% ropivacaine injected slowly with intermittent aspiration.

## 2024-08-08 NOTE — ANESTHESIA POSTPROCEDURE EVALUATION
Department of Anesthesiology  Postprocedure Note    Patient: Jasiel Jose  MRN: [id number]  YOB: 1940  Date of evaluation: 8/8/2024    Procedure Summary       Date: 08/08/24 Room / Location: [facility] MAIN OR F7 / [facility] MAIN OR    Anesthesia Start: 1034 Anesthesia Stop: 1150    Procedure: RIGHT LEG AMPUTATION BELOW KNEE (Right: Leg Lower) Diagnosis:       Osteomyelitis of other site, unspecified type (HCC)      (Osteomyelitis of other site, unspecified type (HCC) [M86.9])    Surgeons: Ray Amaro MD Responsible Provider: Marlene Ramírez MD    Anesthesia Type: general, regional ASA Status: 3            Anesthesia Type: No value filed.    Tracey Phase I: Tracey Score: 7    Tracey Phase II:      Anesthesia Post Evaluation    Patient location during evaluation: PACU  Patient participation: complete - patient participated  Level of consciousness: awake  Airway patency: patent  Nausea & Vomiting: no vomiting and no nausea  Cardiovascular status: hemodynamically stable  Respiratory status: acceptable  Hydration status: stable  Pain management: adequate    No notable events documented.

## 2024-08-09 LAB
CREAT SERPL-MCNC: 1.56 MG/DL (ref 0.7–1.3)
ERYTHROCYTE [DISTWIDTH] IN BLOOD BY AUTOMATED COUNT: 14.2 % (ref 11.5–14.5)
HCT VFR BLD AUTO: 32.2 % (ref 36.6–50.3)
HGB BLD-MCNC: 10.6 G/DL (ref 12.1–17)
MCH RBC QN AUTO: 33.4 PG (ref 26–34)
MCHC RBC AUTO-ENTMCNC: 32.9 G/DL (ref 30–36.5)
MCV RBC AUTO: 101.6 FL (ref 80–99)
NRBC # BLD: 0 K/UL (ref 0–0.01)
NRBC BLD-RTO: 0 PER 100 WBC
PLATELET # BLD AUTO: 258 K/UL (ref 150–400)
PMV BLD AUTO: 9.7 FL (ref 8.9–12.9)
RBC # BLD AUTO: 3.17 M/UL (ref 4.1–5.7)
WBC # BLD AUTO: 9.6 K/UL (ref 4.1–11.1)

## 2024-08-09 PROCEDURE — 97535 SELF CARE MNGMENT TRAINING: CPT

## 2024-08-09 PROCEDURE — 6360000002 HC RX W HCPCS: Performed by: INTERNAL MEDICINE

## 2024-08-09 PROCEDURE — 94761 N-INVAS EAR/PLS OXIMETRY MLT: CPT

## 2024-08-09 PROCEDURE — 6370000000 HC RX 637 (ALT 250 FOR IP)

## 2024-08-09 PROCEDURE — 1100000000 HC RM PRIVATE

## 2024-08-09 PROCEDURE — 97530 THERAPEUTIC ACTIVITIES: CPT

## 2024-08-09 PROCEDURE — 6360000002 HC RX W HCPCS

## 2024-08-09 PROCEDURE — 85027 COMPLETE CBC AUTOMATED: CPT

## 2024-08-09 PROCEDURE — 2580000003 HC RX 258

## 2024-08-09 PROCEDURE — 6370000000 HC RX 637 (ALT 250 FOR IP): Performed by: INTERNAL MEDICINE

## 2024-08-09 PROCEDURE — 97163 PT EVAL HIGH COMPLEX 45 MIN: CPT

## 2024-08-09 PROCEDURE — 97165 OT EVAL LOW COMPLEX 30 MIN: CPT

## 2024-08-09 PROCEDURE — 99232 SBSQ HOSP IP/OBS MODERATE 35: CPT | Performed by: INTERNAL MEDICINE

## 2024-08-09 PROCEDURE — 82565 ASSAY OF CREATININE: CPT

## 2024-08-09 PROCEDURE — 36415 COLL VENOUS BLD VENIPUNCTURE: CPT

## 2024-08-09 PROCEDURE — 97110 THERAPEUTIC EXERCISES: CPT

## 2024-08-09 RX ORDER — BACLOFEN 10 MG/1
10 TABLET ORAL 3 TIMES DAILY PRN
Status: DISCONTINUED | OUTPATIENT
Start: 2024-08-09 | End: 2024-08-13 | Stop reason: HOSPADM

## 2024-08-09 RX ORDER — HYDROMORPHONE HYDROCHLORIDE 1 MG/ML
1 INJECTION, SOLUTION INTRAMUSCULAR; INTRAVENOUS; SUBCUTANEOUS
Status: DISCONTINUED | OUTPATIENT
Start: 2024-08-09 | End: 2024-08-13 | Stop reason: HOSPADM

## 2024-08-09 RX ADMIN — RIVAROXABAN 15 MG: 15 TABLET, FILM COATED ORAL at 17:47

## 2024-08-09 RX ADMIN — METRONIDAZOLE 500 MG: 500 INJECTION, SOLUTION INTRAVENOUS at 21:24

## 2024-08-09 RX ADMIN — BACLOFEN 10 MG: 10 TABLET ORAL at 17:47

## 2024-08-09 RX ADMIN — OXYCODONE 5 MG: 5 TABLET ORAL at 23:53

## 2024-08-09 RX ADMIN — CEFEPIME 2000 MG: 20 INJECTION, POWDER, FOR SOLUTION INTRAVENOUS at 09:35

## 2024-08-09 RX ADMIN — CEFEPIME 2000 MG: 20 INJECTION, POWDER, FOR SOLUTION INTRAVENOUS at 21:26

## 2024-08-09 RX ADMIN — OXYCODONE HYDROCHLORIDE AND ACETAMINOPHEN 500 MG: 500 TABLET ORAL at 09:15

## 2024-08-09 RX ADMIN — Medication 2000 UNITS: at 09:10

## 2024-08-09 RX ADMIN — METRONIDAZOLE 500 MG: 500 INJECTION, SOLUTION INTRAVENOUS at 04:44

## 2024-08-09 RX ADMIN — SODIUM CHLORIDE, PRESERVATIVE FREE 10 ML: 5 INJECTION INTRAVENOUS at 21:27

## 2024-08-09 RX ADMIN — ONDANSETRON 4 MG: 2 INJECTION INTRAMUSCULAR; INTRAVENOUS at 12:11

## 2024-08-09 RX ADMIN — ISOSORBIDE MONONITRATE 30 MG: 30 TABLET, EXTENDED RELEASE ORAL at 09:14

## 2024-08-09 RX ADMIN — Medication 400 MG: at 09:15

## 2024-08-09 RX ADMIN — MORPHINE SULFATE 4 MG: 4 INJECTION INTRAVENOUS at 21:23

## 2024-08-09 RX ADMIN — ONDANSETRON 4 MG: 2 INJECTION INTRAMUSCULAR; INTRAVENOUS at 18:02

## 2024-08-09 RX ADMIN — FUROSEMIDE 20 MG: 20 TABLET ORAL at 09:11

## 2024-08-09 RX ADMIN — FERROUS GLUCONATE 324 MG: 324 TABLET ORAL at 23:52

## 2024-08-09 RX ADMIN — HYDROCHLOROTHIAZIDE 25 MG: 25 TABLET ORAL at 09:12

## 2024-08-09 RX ADMIN — VANCOMYCIN HYDROCHLORIDE 1000 MG: 1 INJECTION, POWDER, LYOPHILIZED, FOR SOLUTION INTRAVENOUS at 23:54

## 2024-08-09 RX ADMIN — SODIUM CHLORIDE, PRESERVATIVE FREE 10 ML: 5 INJECTION INTRAVENOUS at 09:25

## 2024-08-09 RX ADMIN — Medication 100 MG: at 09:11

## 2024-08-09 RX ADMIN — METRONIDAZOLE 500 MG: 500 INJECTION, SOLUTION INTRAVENOUS at 12:25

## 2024-08-09 RX ADMIN — CETIRIZINE HYDROCHLORIDE 5 MG: 10 TABLET, FILM COATED ORAL at 09:12

## 2024-08-09 RX ADMIN — MORPHINE SULFATE 4 MG: 4 INJECTION INTRAVENOUS at 04:41

## 2024-08-09 RX ADMIN — LOSARTAN POTASSIUM 100 MG: 50 TABLET, FILM COATED ORAL at 09:15

## 2024-08-09 RX ADMIN — MORPHINE SULFATE 4 MG: 4 INJECTION INTRAVENOUS at 09:04

## 2024-08-09 ASSESSMENT — PAIN SCALES - GENERAL
PAINLEVEL_OUTOF10: 4
PAINLEVEL_OUTOF10: 8
PAINLEVEL_OUTOF10: 4
PAINLEVEL_OUTOF10: 4
PAINLEVEL_OUTOF10: 5
PAINLEVEL_OUTOF10: 6
PAINLEVEL_OUTOF10: 4
PAINLEVEL_OUTOF10: 7
PAINLEVEL_OUTOF10: 7
PAINLEVEL_OUTOF10: 6

## 2024-08-09 ASSESSMENT — PAIN DESCRIPTION - DESCRIPTORS
DESCRIPTORS: SORE
DESCRIPTORS: SORE
DESCRIPTORS: SPASM

## 2024-08-09 ASSESSMENT — PAIN DESCRIPTION - LOCATION
LOCATION: LEG

## 2024-08-09 ASSESSMENT — PAIN DESCRIPTION - ORIENTATION
ORIENTATION: RIGHT

## 2024-08-09 ASSESSMENT — PAIN - FUNCTIONAL ASSESSMENT: PAIN_FUNCTIONAL_ASSESSMENT: PREVENTS OR INTERFERES SOME ACTIVE ACTIVITIES AND ADLS

## 2024-08-09 NOTE — CARE COORDINATION
Care Management Progress Note    Reason for Admission:   Cellulitis [L03.90]  Cellulitis of right lower extremity [L03.115]  Osteomyelitis of right foot, unspecified type (HCC) [M86.9]  Procedure(s) (LRB):  RIGHT LEG AMPUTATION BELOW KNEE (Right)  1 Day Post-Op    Patient Admission Status: Inpatient  RUR: Readmission Risk Score: 17.3        Transition of care plan:  IPR: pending referrals   Outpatient follow-up.  Will need BLS transport arranged       CM met with Pt and spouse at the bedside. CM discussed recommendation for IPR at discharge. Pt and spouse were agreeable. CM was requested to send referrals to [facility], [facility], and [facility] rehab.      Referrals were sent.       LUIS EDUARDO Govea, [facility] Care Manager  [phone]

## 2024-08-10 LAB
ANION GAP SERPL CALC-SCNC: 4 MMOL/L (ref 5–15)
BUN SERPL-MCNC: 31 MG/DL (ref 6–20)
BUN/CREAT SERPL: 19 (ref 12–20)
CALCIUM SERPL-MCNC: 8.6 MG/DL (ref 8.5–10.1)
CHLORIDE SERPL-SCNC: 105 MMOL/L (ref 97–108)
CO2 SERPL-SCNC: 27 MMOL/L (ref 21–32)
CREAT SERPL-MCNC: 1.61 MG/DL (ref 0.7–1.3)
ERYTHROCYTE [DISTWIDTH] IN BLOOD BY AUTOMATED COUNT: 14.4 % (ref 11.5–14.5)
GLUCOSE SERPL-MCNC: 100 MG/DL (ref 65–100)
HCT VFR BLD AUTO: 31.1 % (ref 36.6–50.3)
HGB BLD-MCNC: 10.4 G/DL (ref 12.1–17)
MAGNESIUM SERPL-MCNC: 2.2 MG/DL (ref 1.6–2.4)
MCH RBC QN AUTO: 33 PG (ref 26–34)
MCHC RBC AUTO-ENTMCNC: 33.4 G/DL (ref 30–36.5)
MCV RBC AUTO: 98.7 FL (ref 80–99)
NRBC # BLD: 0 K/UL (ref 0–0.01)
NRBC BLD-RTO: 0 PER 100 WBC
PLATELET # BLD AUTO: 240 K/UL (ref 150–400)
PMV BLD AUTO: 9.3 FL (ref 8.9–12.9)
POTASSIUM SERPL-SCNC: 4 MMOL/L (ref 3.5–5.1)
RBC # BLD AUTO: 3.15 M/UL (ref 4.1–5.7)
SODIUM SERPL-SCNC: 136 MMOL/L (ref 136–145)
WBC # BLD AUTO: 9.7 K/UL (ref 4.1–11.1)

## 2024-08-10 PROCEDURE — 83735 ASSAY OF MAGNESIUM: CPT

## 2024-08-10 PROCEDURE — 6360000002 HC RX W HCPCS: Performed by: INTERNAL MEDICINE

## 2024-08-10 PROCEDURE — 6360000002 HC RX W HCPCS

## 2024-08-10 PROCEDURE — 80048 BASIC METABOLIC PNL TOTAL CA: CPT

## 2024-08-10 PROCEDURE — 2580000003 HC RX 258

## 2024-08-10 PROCEDURE — 6370000000 HC RX 637 (ALT 250 FOR IP): Performed by: INTERNAL MEDICINE

## 2024-08-10 PROCEDURE — 1100000000 HC RM PRIVATE

## 2024-08-10 PROCEDURE — 97530 THERAPEUTIC ACTIVITIES: CPT

## 2024-08-10 PROCEDURE — 36415 COLL VENOUS BLD VENIPUNCTURE: CPT

## 2024-08-10 PROCEDURE — 94761 N-INVAS EAR/PLS OXIMETRY MLT: CPT

## 2024-08-10 PROCEDURE — 85027 COMPLETE CBC AUTOMATED: CPT

## 2024-08-10 PROCEDURE — 6370000000 HC RX 637 (ALT 250 FOR IP)

## 2024-08-10 RX ADMIN — MORPHINE SULFATE 4 MG: 4 INJECTION INTRAVENOUS at 16:43

## 2024-08-10 RX ADMIN — ISOSORBIDE MONONITRATE 30 MG: 30 TABLET, EXTENDED RELEASE ORAL at 10:13

## 2024-08-10 RX ADMIN — OXYCODONE 5 MG: 5 TABLET ORAL at 20:42

## 2024-08-10 RX ADMIN — OXYCODONE HYDROCHLORIDE AND ACETAMINOPHEN 500 MG: 500 TABLET ORAL at 10:13

## 2024-08-10 RX ADMIN — SODIUM CHLORIDE, PRESERVATIVE FREE 10 ML: 5 INJECTION INTRAVENOUS at 10:25

## 2024-08-10 RX ADMIN — METRONIDAZOLE 500 MG: 500 INJECTION, SOLUTION INTRAVENOUS at 12:39

## 2024-08-10 RX ADMIN — CEFEPIME 2000 MG: 20 INJECTION, POWDER, FOR SOLUTION INTRAVENOUS at 21:18

## 2024-08-10 RX ADMIN — LOSARTAN POTASSIUM 100 MG: 50 TABLET, FILM COATED ORAL at 10:13

## 2024-08-10 RX ADMIN — Medication 400 MG: at 10:14

## 2024-08-10 RX ADMIN — HYDROCHLOROTHIAZIDE 25 MG: 25 TABLET ORAL at 10:13

## 2024-08-10 RX ADMIN — SODIUM CHLORIDE, PRESERVATIVE FREE 10 ML: 5 INJECTION INTRAVENOUS at 20:06

## 2024-08-10 RX ADMIN — Medication 2000 UNITS: at 10:13

## 2024-08-10 RX ADMIN — BACLOFEN 10 MG: 10 TABLET ORAL at 12:38

## 2024-08-10 RX ADMIN — Medication 100 MG: at 10:13

## 2024-08-10 RX ADMIN — CETIRIZINE HYDROCHLORIDE 5 MG: 10 TABLET, FILM COATED ORAL at 10:14

## 2024-08-10 RX ADMIN — METRONIDAZOLE 500 MG: 500 INJECTION, SOLUTION INTRAVENOUS at 20:04

## 2024-08-10 RX ADMIN — METRONIDAZOLE 500 MG: 500 INJECTION, SOLUTION INTRAVENOUS at 05:17

## 2024-08-10 RX ADMIN — CEFEPIME 2000 MG: 20 INJECTION, POWDER, FOR SOLUTION INTRAVENOUS at 10:24

## 2024-08-10 RX ADMIN — FUROSEMIDE 20 MG: 20 TABLET ORAL at 10:12

## 2024-08-10 RX ADMIN — FERROUS GLUCONATE 324 MG: 324 TABLET ORAL at 20:42

## 2024-08-10 RX ADMIN — RIVAROXABAN 15 MG: 15 TABLET, FILM COATED ORAL at 16:44

## 2024-08-10 RX ADMIN — MORPHINE SULFATE 4 MG: 4 INJECTION INTRAVENOUS at 05:10

## 2024-08-10 ASSESSMENT — PAIN SCALES - GENERAL
PAINLEVEL_OUTOF10: 10
PAINLEVEL_OUTOF10: 7
PAINLEVEL_OUTOF10: 6
PAINLEVEL_OUTOF10: 6
PAINLEVEL_OUTOF10: 2
PAINLEVEL_OUTOF10: 4
PAINLEVEL_OUTOF10: 4
PAINLEVEL_OUTOF10: 2
PAINLEVEL_OUTOF10: 2

## 2024-08-10 ASSESSMENT — PAIN - FUNCTIONAL ASSESSMENT
PAIN_FUNCTIONAL_ASSESSMENT: ACTIVITIES ARE NOT PREVENTED
PAIN_FUNCTIONAL_ASSESSMENT: PREVENTS OR INTERFERES SOME ACTIVE ACTIVITIES AND ADLS

## 2024-08-10 ASSESSMENT — PAIN DESCRIPTION - LOCATION
LOCATION: LEG

## 2024-08-10 ASSESSMENT — PAIN SCALES - WONG BAKER: WONGBAKER_NUMERICALRESPONSE: HURTS A LITTLE BIT

## 2024-08-10 ASSESSMENT — PAIN DESCRIPTION - DESCRIPTORS
DESCRIPTORS: SPASM
DESCRIPTORS: SPASM

## 2024-08-10 ASSESSMENT — PAIN DESCRIPTION - ORIENTATION
ORIENTATION: RIGHT
ORIENTATION: RIGHT

## 2024-08-10 NOTE — CARE COORDINATION
12:42 PM  Update from [facility], they will have a bed tomorrow and accepted patient.  This is the rehab hospital that is the patient's preference.   CM spoke with patient's wife, discussed average length of stay and HH v/s outpatient options post rehab.  BLS to be arranged for after 3pm tomorrow once dc is confirmed.    11:10 AM  [facility] is still reviewing the referral.  They do not have beds this weekend.     8:39 AM  Message has been sent this am to [facility] as that is the preference for rehab that family has expressed.  Inquiring as to their ability to accept patient and their bed status, as [facility] has accepted.  Their beds are limited, but have acknowledged they accepted patient. Awaiting [facility] response    Transition of care  RUR 17%  1- CM following for transition of care  2- anticipate transfer to [facility] tomorrow at 3pm once dc is confirmed.  3- BLS being arranged.

## 2024-08-11 LAB
ANION GAP SERPL CALC-SCNC: 6 MMOL/L (ref 5–15)
BUN SERPL-MCNC: 38 MG/DL (ref 6–20)
BUN/CREAT SERPL: 22 (ref 12–20)
CALCIUM SERPL-MCNC: 8.1 MG/DL (ref 8.5–10.1)
CHLORIDE SERPL-SCNC: 105 MMOL/L (ref 97–108)
CO2 SERPL-SCNC: 24 MMOL/L (ref 21–32)
CREAT SERPL-MCNC: 1.7 MG/DL (ref 0.7–1.3)
ERYTHROCYTE [DISTWIDTH] IN BLOOD BY AUTOMATED COUNT: 14.5 % (ref 11.5–14.5)
GLUCOSE SERPL-MCNC: 115 MG/DL (ref 65–100)
HCT VFR BLD AUTO: 30.7 % (ref 36.6–50.3)
HGB BLD-MCNC: 10.1 G/DL (ref 12.1–17)
MAGNESIUM SERPL-MCNC: 2.2 MG/DL (ref 1.6–2.4)
MCH RBC QN AUTO: 33.1 PG (ref 26–34)
MCHC RBC AUTO-ENTMCNC: 32.9 G/DL (ref 30–36.5)
MCV RBC AUTO: 100.7 FL (ref 80–99)
NRBC # BLD: 0 K/UL (ref 0–0.01)
NRBC BLD-RTO: 0 PER 100 WBC
PLATELET # BLD AUTO: 234 K/UL (ref 150–400)
PMV BLD AUTO: 9.3 FL (ref 8.9–12.9)
POTASSIUM SERPL-SCNC: 4 MMOL/L (ref 3.5–5.1)
RBC # BLD AUTO: 3.05 M/UL (ref 4.1–5.7)
SODIUM SERPL-SCNC: 135 MMOL/L (ref 136–145)
WBC # BLD AUTO: 10.3 K/UL (ref 4.1–11.1)

## 2024-08-11 PROCEDURE — 94761 N-INVAS EAR/PLS OXIMETRY MLT: CPT

## 2024-08-11 PROCEDURE — 6370000000 HC RX 637 (ALT 250 FOR IP)

## 2024-08-11 PROCEDURE — 2500000003 HC RX 250 WO HCPCS

## 2024-08-11 PROCEDURE — 6370000000 HC RX 637 (ALT 250 FOR IP): Performed by: INTERNAL MEDICINE

## 2024-08-11 PROCEDURE — 80048 BASIC METABOLIC PNL TOTAL CA: CPT

## 2024-08-11 PROCEDURE — 2580000003 HC RX 258

## 2024-08-11 PROCEDURE — 1100000000 HC RM PRIVATE

## 2024-08-11 PROCEDURE — 6360000002 HC RX W HCPCS

## 2024-08-11 PROCEDURE — 85027 COMPLETE CBC AUTOMATED: CPT

## 2024-08-11 PROCEDURE — 36415 COLL VENOUS BLD VENIPUNCTURE: CPT

## 2024-08-11 PROCEDURE — 83735 ASSAY OF MAGNESIUM: CPT

## 2024-08-11 RX ADMIN — Medication 100 MG: at 09:47

## 2024-08-11 RX ADMIN — OXYCODONE HYDROCHLORIDE AND ACETAMINOPHEN 500 MG: 500 TABLET ORAL at 09:44

## 2024-08-11 RX ADMIN — Medication 400 MG: at 09:45

## 2024-08-11 RX ADMIN — CEFEPIME 2000 MG: 20 INJECTION, POWDER, FOR SOLUTION INTRAVENOUS at 09:49

## 2024-08-11 RX ADMIN — METRONIDAZOLE 500 MG: 500 INJECTION, SOLUTION INTRAVENOUS at 04:31

## 2024-08-11 RX ADMIN — FERROUS GLUCONATE 324 MG: 324 TABLET ORAL at 23:25

## 2024-08-11 RX ADMIN — VANCOMYCIN HYDROCHLORIDE 1000 MG: 1 INJECTION, POWDER, LYOPHILIZED, FOR SOLUTION INTRAVENOUS at 01:41

## 2024-08-11 RX ADMIN — OXYCODONE 5 MG: 5 TABLET ORAL at 20:13

## 2024-08-11 RX ADMIN — FUROSEMIDE 20 MG: 20 TABLET ORAL at 09:46

## 2024-08-11 RX ADMIN — LOSARTAN POTASSIUM 100 MG: 50 TABLET, FILM COATED ORAL at 09:44

## 2024-08-11 RX ADMIN — HYDROMORPHONE HYDROCHLORIDE 1 MG: 1 INJECTION, SOLUTION INTRAMUSCULAR; INTRAVENOUS; SUBCUTANEOUS at 04:41

## 2024-08-11 RX ADMIN — VANCOMYCIN HYDROCHLORIDE 1000 MG: 1 INJECTION, POWDER, LYOPHILIZED, FOR SOLUTION INTRAVENOUS at 23:31

## 2024-08-11 RX ADMIN — CETIRIZINE HYDROCHLORIDE 5 MG: 10 TABLET, FILM COATED ORAL at 09:45

## 2024-08-11 RX ADMIN — RIVAROXABAN 15 MG: 15 TABLET, FILM COATED ORAL at 17:59

## 2024-08-11 RX ADMIN — Medication 2000 UNITS: at 09:43

## 2024-08-11 RX ADMIN — BACLOFEN 10 MG: 10 TABLET ORAL at 09:46

## 2024-08-11 RX ADMIN — METRONIDAZOLE 500 MG: 500 INJECTION, SOLUTION INTRAVENOUS at 20:00

## 2024-08-11 RX ADMIN — HYDROCHLOROTHIAZIDE 25 MG: 25 TABLET ORAL at 09:47

## 2024-08-11 RX ADMIN — ISOSORBIDE MONONITRATE 30 MG: 30 TABLET, EXTENDED RELEASE ORAL at 09:44

## 2024-08-11 RX ADMIN — CEFEPIME 2000 MG: 20 INJECTION, POWDER, FOR SOLUTION INTRAVENOUS at 20:16

## 2024-08-11 RX ADMIN — SODIUM CHLORIDE, PRESERVATIVE FREE 10 ML: 5 INJECTION INTRAVENOUS at 09:54

## 2024-08-11 RX ADMIN — METRONIDAZOLE 500 MG: 500 INJECTION, SOLUTION INTRAVENOUS at 12:00

## 2024-08-11 ASSESSMENT — PAIN DESCRIPTION - DESCRIPTORS: DESCRIPTORS: ACHING

## 2024-08-11 ASSESSMENT — PAIN SCALES - GENERAL
PAINLEVEL_OUTOF10: 3
PAINLEVEL_OUTOF10: 6
PAINLEVEL_OUTOF10: 3

## 2024-08-11 ASSESSMENT — PAIN DESCRIPTION - LOCATION
LOCATION: LEG
LOCATION: FOOT

## 2024-08-11 ASSESSMENT — PAIN DESCRIPTION - ORIENTATION
ORIENTATION: RIGHT
ORIENTATION: RIGHT

## 2024-08-11 NOTE — CARE COORDINATION
2:55 PM  Message received from Dr Alejandra confirming discharge is not today.  Goal of discharging on PO abx if able.  [facility] is also able to provide the IV abx if needed.  RUBEN aware of the transfer not occurring today, and physician is aware that the first available bed expected is Tuesday unless something happens to open one Monday.     CM spoke with Mrs. Jose and she is aware.  She asked if the protective covering that was applied by Vascular (Arnaldo Yimi) today needs to stay on, since he is not leaving.  Nursing made aware of the question as well, and message left by this cm at the number written on the \"boot\" [phone].    2:09 PM  RUBEN has called the nursing station to get report.  Liaison and caller made aware that we are still working to clarify DC order.  Nursing has sent messages as well.     12:10pm  Shared with Dr. Alejandra in perfect serve, the information from the [facility] pharmacy saying that the ID progress note states to continue abx for 48 hours post op through yesterday.  Today's MAR shows the 3 abx being given today and they are asking about the antibiotic plan/stop date.    [facility] informed this cm as well that the next available bed after today is Tuesday.  Working with physician regarding continued needs acutely, and adjusting the dc date if needed.     11:10 AM  Messaging Dr. Alejandra to clarify intention to discharge.     9:39 AM  RUBEN confirmed at this writing that the bed is still available for patient today.  She will update CM on room number and number to call for report once she has it.  Bed will be ready by 3pm, CM to arrange BLS transport once confirmed with physician.     Transition of Care  RUR 19%  1- Goal of transfer to [facility] when medically stable  2- ID following, can go to [facility] with IV or PO  3- Podiatry to see  4- CM following for transition of care  5- BLS transportation will be needed at [place]

## 2024-08-12 PROCEDURE — 1100000000 HC RM PRIVATE

## 2024-08-12 PROCEDURE — 97530 THERAPEUTIC ACTIVITIES: CPT

## 2024-08-12 PROCEDURE — 6370000000 HC RX 637 (ALT 250 FOR IP): Performed by: INTERNAL MEDICINE

## 2024-08-12 PROCEDURE — 99232 SBSQ HOSP IP/OBS MODERATE 35: CPT | Performed by: INTERNAL MEDICINE

## 2024-08-12 PROCEDURE — 6370000000 HC RX 637 (ALT 250 FOR IP)

## 2024-08-12 PROCEDURE — 97110 THERAPEUTIC EXERCISES: CPT

## 2024-08-12 PROCEDURE — 94761 N-INVAS EAR/PLS OXIMETRY MLT: CPT

## 2024-08-12 PROCEDURE — 2580000003 HC RX 258

## 2024-08-12 PROCEDURE — 6360000002 HC RX W HCPCS

## 2024-08-12 RX ORDER — DOCUSATE SODIUM 100 MG/1
100 CAPSULE, LIQUID FILLED ORAL 2 TIMES DAILY
Status: DISCONTINUED | OUTPATIENT
Start: 2024-08-12 | End: 2024-08-13 | Stop reason: HOSPADM

## 2024-08-12 RX ADMIN — ONDANSETRON 4 MG: 2 INJECTION INTRAMUSCULAR; INTRAVENOUS at 08:14

## 2024-08-12 RX ADMIN — RIVAROXABAN 15 MG: 15 TABLET, FILM COATED ORAL at 17:29

## 2024-08-12 RX ADMIN — Medication 400 MG: at 08:07

## 2024-08-12 RX ADMIN — FERROUS GLUCONATE 324 MG: 324 TABLET ORAL at 21:14

## 2024-08-12 RX ADMIN — SODIUM CHLORIDE, PRESERVATIVE FREE 10 ML: 5 INJECTION INTRAVENOUS at 08:09

## 2024-08-12 RX ADMIN — METRONIDAZOLE 500 MG: 500 INJECTION, SOLUTION INTRAVENOUS at 03:53

## 2024-08-12 RX ADMIN — POLYETHYLENE GLYCOL 3350 17 G: 17 POWDER, FOR SOLUTION ORAL at 12:01

## 2024-08-12 RX ADMIN — Medication 100 MG: at 08:07

## 2024-08-12 RX ADMIN — Medication 2000 UNITS: at 08:07

## 2024-08-12 RX ADMIN — LOSARTAN POTASSIUM 100 MG: 50 TABLET, FILM COATED ORAL at 08:08

## 2024-08-12 RX ADMIN — OXYCODONE 5 MG: 5 TABLET ORAL at 08:16

## 2024-08-12 RX ADMIN — DOCUSATE SODIUM 100 MG: 100 CAPSULE, LIQUID FILLED ORAL at 15:22

## 2024-08-12 RX ADMIN — SODIUM CHLORIDE, PRESERVATIVE FREE 10 ML: 5 INJECTION INTRAVENOUS at 12:02

## 2024-08-12 RX ADMIN — FUROSEMIDE 20 MG: 20 TABLET ORAL at 08:08

## 2024-08-12 RX ADMIN — CETIRIZINE HYDROCHLORIDE 5 MG: 10 TABLET, FILM COATED ORAL at 08:08

## 2024-08-12 RX ADMIN — CEFEPIME 2000 MG: 20 INJECTION, POWDER, FOR SOLUTION INTRAVENOUS at 08:20

## 2024-08-12 RX ADMIN — OXYCODONE HYDROCHLORIDE AND ACETAMINOPHEN 500 MG: 500 TABLET ORAL at 08:08

## 2024-08-12 RX ADMIN — ISOSORBIDE MONONITRATE 30 MG: 30 TABLET, EXTENDED RELEASE ORAL at 08:08

## 2024-08-12 RX ADMIN — HYDROCHLOROTHIAZIDE 25 MG: 25 TABLET ORAL at 08:07

## 2024-08-12 RX ADMIN — DOCUSATE SODIUM 100 MG: 100 CAPSULE, LIQUID FILLED ORAL at 21:14

## 2024-08-12 RX ADMIN — SODIUM CHLORIDE, PRESERVATIVE FREE 10 ML: 5 INJECTION INTRAVENOUS at 21:14

## 2024-08-12 ASSESSMENT — PAIN DESCRIPTION - LOCATION: LOCATION: LEG

## 2024-08-12 ASSESSMENT — PAIN DESCRIPTION - ORIENTATION: ORIENTATION: RIGHT

## 2024-08-12 ASSESSMENT — PAIN SCALES - GENERAL
PAINLEVEL_OUTOF10: 0
PAINLEVEL_OUTOF10: 8
PAINLEVEL_OUTOF10: 0

## 2024-08-12 ASSESSMENT — PAIN - FUNCTIONAL ASSESSMENT: PAIN_FUNCTIONAL_ASSESSMENT: ACTIVITIES ARE NOT PREVENTED

## 2024-08-12 ASSESSMENT — PAIN DESCRIPTION - DESCRIPTORS: DESCRIPTORS: SHOOTING

## 2024-08-12 NOTE — CARE COORDINATION
CM received a call from [facility]. They do not have a bed available for the Pt, but would likely have a bed available for tomorrow.     Attending and Pt notified.        LUIS EDUARDO Govea, MARYANN  [facility] Manager  [phone]

## 2024-08-13 VITALS
RESPIRATION RATE: 17 BRPM | TEMPERATURE: 97.3 F | SYSTOLIC BLOOD PRESSURE: 171 MMHG | WEIGHT: 200 LBS | DIASTOLIC BLOOD PRESSURE: 65 MMHG | HEIGHT: 71 IN | HEART RATE: 54 BPM | BODY MASS INDEX: 28 KG/M2 | OXYGEN SATURATION: 96 %

## 2024-08-13 PROCEDURE — 6370000000 HC RX 637 (ALT 250 FOR IP): Performed by: INTERNAL MEDICINE

## 2024-08-13 PROCEDURE — 2580000003 HC RX 258

## 2024-08-13 PROCEDURE — 6370000000 HC RX 637 (ALT 250 FOR IP)

## 2024-08-13 PROCEDURE — 94761 N-INVAS EAR/PLS OXIMETRY MLT: CPT

## 2024-08-13 RX ORDER — BACLOFEN 10 MG/1
10 TABLET ORAL 3 TIMES DAILY PRN
Qty: 30 TABLET | Refills: 0 | Status: SHIPPED | OUTPATIENT
Start: 2024-08-13 | End: 2024-09-12

## 2024-08-13 RX ORDER — OXYCODONE HYDROCHLORIDE 5 MG/1
5 TABLET ORAL EVERY 4 HOURS PRN
Qty: 15 TABLET | Refills: 0 | Status: SHIPPED | OUTPATIENT
Start: 2024-08-13 | End: 2024-08-16

## 2024-08-13 RX ADMIN — FUROSEMIDE 20 MG: 20 TABLET ORAL at 10:08

## 2024-08-13 RX ADMIN — OXYCODONE HYDROCHLORIDE AND ACETAMINOPHEN 500 MG: 500 TABLET ORAL at 10:08

## 2024-08-13 RX ADMIN — ISOSORBIDE MONONITRATE 30 MG: 30 TABLET, EXTENDED RELEASE ORAL at 10:08

## 2024-08-13 RX ADMIN — Medication 2000 UNITS: at 10:07

## 2024-08-13 RX ADMIN — SODIUM CHLORIDE, PRESERVATIVE FREE 10 ML: 5 INJECTION INTRAVENOUS at 10:16

## 2024-08-13 RX ADMIN — LOSARTAN POTASSIUM 100 MG: 50 TABLET, FILM COATED ORAL at 10:07

## 2024-08-13 RX ADMIN — DOCUSATE SODIUM 100 MG: 100 CAPSULE, LIQUID FILLED ORAL at 10:07

## 2024-08-13 RX ADMIN — HYDROCHLOROTHIAZIDE 25 MG: 25 TABLET ORAL at 10:07

## 2024-08-13 RX ADMIN — Medication 400 MG: at 10:08

## 2024-08-13 RX ADMIN — Medication 100 MG: at 10:08

## 2024-08-13 RX ADMIN — CETIRIZINE HYDROCHLORIDE 5 MG: 10 TABLET, FILM COATED ORAL at 10:08

## 2024-08-13 NOTE — PROGRESS NOTES
Podiatry Progress Note    Date:2024       Room:[facility]  Patient Name:Jasiel Jose     YOB: 1940     Age:84 y.o.    Subjective:     Patient is a 84 y.o. male who is being seen at bedside right foot ulcer. No new complaints at this time.    Review of Systems   Constitutional:  Negative for activity change, appetite change, chills, fatigue and fever.   HENT:  Negative for ear pain.    Respiratory:  Negative for shortness of breath.    Cardiovascular:  Negative for leg swelling.   Gastrointestinal:  Negative for abdominal pain, diarrhea and vomiting.   Skin:  Positive for wound.   Neurological:  Positive for numbness. Negative for weakness.   Psychiatric/Behavioral:  Negative for behavioral problems. The patient is not nervous/anxious.      Objective:     Vitals Last 24 Hours:  TEMPERATURE:  Temp  Av °F (36.7 °C)  Min: 97.7 °F (36.5 °C)  Max: 98.2 °F (36.8 °C)  RESPIRATIONS RANGE: Resp  Av.2  Min: 15  Max: 18  PULSE OXIMETRY RANGE: SpO2  Av.2 %  Min: 95 %  Max: 97 %  PULSE RANGE: Pulse  Av.3  Min: 52  Max: 56  BLOOD PRESSURE RANGE: Systolic (24hrs), Av , Min:132 , Max:170        Diastolic (24hrs), Av, Min:67, Max:80        I/O (24Hr):    Intake/Output Summary (Last 24 hours) at 2024  Last data filed at 2024 1539  Gross per 24 hour   Intake 1096.33 ml   Output 2380 ml   Net -1283.67 ml       Physical Exam:    GEN: Pt is AAOx4 and in NAD. No dressing noted to B/L LE. Family noted at BS.  DERM: Wound plantar foot and lateral ankle.  No purulent drainage noted.  Wound to plantar aspect noted to probe to bone.  Erythema noted to the right lower extremity.  Edema noted to right lower extremity.  VASC: Pedal pulses (DP/PT) diminished to B/L LE. CFT<3sec to all digits of B/L LE.  No pedal hair growth noted to the level of the digits for B/L LE. Skin temp is warm to warm from proximal to distal for B/L LE. Neg homans/gabino signs to B/L LE. No varicosities or 
  Hospitalist Progress Note    NAME: Jasiel Jose   :  1940   MRN:  [id number]     Date/Time:  8/10/2024 12:28 PM    Patient PCP: Jared Valdes MD       Subjective:   CHIEF COMPLAINT:  osteo right foot    Stable.  Cramping pain at times.  Better with medicine.  Pods and ID following.  Dispo planning.      Objective:   VITALS:    Vitals:    08/10/24 0900   BP: (!) 118/57   Pulse: 54   Resp: 17   Temp: 97.9 °F (36.6 °C)   SpO2:        PHYSICAL EXAM:      General:   No acute distress, resting comfortably in bed.  Heart:  RRR, No MRG  Lungs:  CTAB.  Non-labored breathing.  Abdomen:  Soft .  Nondistended.  NTTP.  BS present.  Extremities:  No edema.  Skin:  No rash. S/p BKA, bandaged  Neuro:  Alert and interactive.  No focal deficits.  Psych: Mood stable.      LAB DATA REVIEWED:    Recent Results (from the past 24 hour(s))   CBC    Collection Time: 08/10/24  5:10 AM   Result Value Ref Range    WBC 9.7 4.1 - 11.1 K/uL    RBC 3.15 (L) 4.10 - 5.70 M/uL    Hemoglobin 10.4 (L) 12.1 - 17.0 g/dL    Hematocrit 31.1 (L) 36.6 - 50.3 %    MCV 98.7 80.0 - 99.0 FL    MCH 33.0 26.0 - 34.0 PG    MCHC 33.4 30.0 - 36.5 g/dL    RDW 14.4 11.5 - 14.5 %    Platelets 240 150 - 400 K/uL    MPV 9.3 8.9 - 12.9 FL    Nucleated RBCs 0.0 0  WBC    nRBC 0.00 0.00 - 0.01 K/uL   Basic Metabolic Panel    Collection Time: 08/10/24  5:10 AM   Result Value Ref Range    Sodium 136 136 - 145 mmol/L    Potassium 4.0 3.5 - 5.1 mmol/L    Chloride 105 97 - 108 mmol/L    CO2 27 21 - 32 mmol/L    Anion Gap 4 (L) 5 - 15 mmol/L    Glucose 100 65 - 100 mg/dL    BUN 31 (H) 6 - 20 MG/DL    Creatinine 1.61 (H) 0.70 - 1.30 MG/DL    BUN/Creatinine Ratio 19 12 - 20      Est, Glom Filt Rate 41 (L) >60 ml/min/1.73m2    Calcium 8.6 8.5 - 10.1 MG/DL   Magnesium    Collection Time: 08/10/24  5:10 AM   Result Value Ref Range    Magnesium 2.2 1.6 - 2.4 mg/dL           IMAGING DATA REVIEWED:    MRI FOOT RIGHT WO CONTRAST   Final Result   1. Plantar cutaneous 
  Hospitalist Progress Note    NAME: Jasiel Jose   :  1940   MRN:  [id number]     Date/Time:  2024 7:35 AM    Patient PCP: Jared Valdes MD       Subjective:   CHIEF COMPLAINT:  osteo right foot    *Note is in preliminary form at the time of initial signature.  Final draft will be concluded prior to end of shift.  Please contact Hospitalist with any questions about plan.        Objective:   VITALS:    Vitals:    24 0511   BP:    Pulse:    Resp: 16   Temp:    SpO2:        PHYSICAL EXAM:      General:   No acute distress, resting comfortably in bed.  Heart:  RRR, No MRG  Lungs:  CTAB.  Non-labored breathing.  Abdomen:  Soft .  Nondistended.  NTTP.  BS present.  Extremities:  No edema.  Skin:  No rash. S/p BKA, bandaged  Neuro:  Alert and interactive.  No focal deficits.  Psych: Mood stable.      LAB DATA REVIEWED:    Recent Results (from the past 24 hour(s))   CBC    Collection Time: 24  4:29 AM   Result Value Ref Range    WBC 10.3 4.1 - 11.1 K/uL    RBC 3.05 (L) 4.10 - 5.70 M/uL    Hemoglobin 10.1 (L) 12.1 - 17.0 g/dL    Hematocrit 30.7 (L) 36.6 - 50.3 %    .7 (H) 80.0 - 99.0 FL    MCH 33.1 26.0 - 34.0 PG    MCHC 32.9 30.0 - 36.5 g/dL    RDW 14.5 11.5 - 14.5 %    Platelets 234 150 - 400 K/uL    MPV 9.3 8.9 - 12.9 FL    Nucleated RBCs 0.0 0  WBC    nRBC 0.00 0.00 - 0.01 K/uL   Basic Metabolic Panel    Collection Time: 24  4:29 AM   Result Value Ref Range    Sodium 135 (L) 136 - 145 mmol/L    Potassium 4.0 3.5 - 5.1 mmol/L    Chloride 105 97 - 108 mmol/L    CO2 24 21 - 32 mmol/L    Anion Gap 6 5 - 15 mmol/L    Glucose 115 (H) 65 - 100 mg/dL    BUN 38 (H) 6 - 20 MG/DL    Creatinine 1.70 (H) 0.70 - 1.30 MG/DL    BUN/Creatinine Ratio 22 (H) 12 - 20      Est, Glom Filt Rate 39 (L) >60 ml/min/1.73m2    Calcium 8.1 (L) 8.5 - 10.1 MG/DL   Magnesium    Collection Time: 24  4:29 AM   Result Value Ref Range    Magnesium 2.2 1.6 - 2.4 mg/dL           IMAGING DATA REVIEWED:  
  Hospitalist Progress Note    NAME: Jasiel Jose   :  1940   MRN:  [id number]     Date/Time:  2024 9:28 AM    Patient PCP: Jared Valdes MD       Subjective:   CHIEF COMPLAINT:  osteo right foot       Calcaneal osteo. MR pending.  Podiatry following.  On IV abx.  Await plan for debridement vs BKA and ID consultation.  No fever.  Pain controlled.          Objective:   VITALS:    Vitals:    24 0827   BP: (!) 150/55   Pulse: 52   Resp: 16   Temp: 98.1 °F (36.7 °C)   SpO2: 97%       PHYSICAL EXAM:      General:   No acute distress, resting comfortably in bed.  Heart:  RRR, No MRG  Lungs:  CTAB.  Non-labored breathing.  Abdomen:  Soft .  Nondistended.  NTTP.  BS present.  Extremities:  No edema.  Skin:  No rash.  Foot bandaged  Neuro:  Alert and interactive.  No focal deficits.  Psych: Mood stable.      LAB DATA REVIEWED:    Recent Results (from the past 24 hour(s))   CBC with Auto Differential    Collection Time: 24 12:30 PM   Result Value Ref Range    WBC 8.9 4.1 - 11.1 K/uL    RBC 3.23 (L) 4.10 - 5.70 M/uL    Hemoglobin 10.6 (L) 12.1 - 17.0 g/dL    Hematocrit 31.9 (L) 36.6 - 50.3 %    MCV 98.8 80.0 - 99.0 FL    MCH 32.8 26.0 - 34.0 PG    MCHC 33.2 30.0 - 36.5 g/dL    RDW 14.5 11.5 - 14.5 %    Platelets 277 150 - 400 K/uL    MPV 10.1 8.9 - 12.9 FL    Nucleated RBCs 0.0 0.0  WBC    nRBC 0.00 0.00 - 0.01 K/uL    Neutrophils % 72 32 - 75 %    Lymphocytes % 13 12 - 49 %    Monocytes % 13 5 - 13 %    Eosinophils % 2 0 - 7 %    Basophils % 0 0 - 1 %    Immature Granulocytes % 0 0 - 0.5 %    Neutrophils Absolute 6.3 1.8 - 8.0 K/UL    Lymphocytes Absolute 1.2 0.8 - 3.5 K/UL    Monocytes Absolute 1.2 (H) 0.0 - 1.0 K/UL    Eosinophils Absolute 0.2 0.0 - 0.4 K/UL    Basophils Absolute 0.0 0.0 - 0.1 K/UL    Immature Granulocytes Absolute 0.0 0.00 - 0.04 K/UL    Differential Type AUTOMATED     Basic Metabolic Panel    Collection Time: 24 12:30 PM   Result Value Ref Range    Sodium 135 (L) 136 
  Hospitalist Progress Note    NAME: Jasiel Jose   :  1940   MRN:  [id number]     Date/Time:  2024 8:06 AM    Patient PCP: Jared Valdes MD       Subjective:   CHIEF COMPLAINT:  osteo right foot       Pain controlled.  DI following.  No fever.  No new issues    Objective:   VITALS:    Vitals:    24 0726   BP: (!) 158/65   Pulse: 58   Resp: 16   Temp: 97.7 °F (36.5 °C)   SpO2: 95%       PHYSICAL EXAM:      General:   No acute distress, resting comfortably in bed.  Heart:  RRR, No MRG  Lungs:  CTAB.  Non-labored breathing.  Abdomen:  Soft .  Nondistended.  NTTP.  BS present.  Extremities:  No edema.  Skin:  No rash. S/p BKA, bandaged  Neuro:  Alert and interactive.  No focal deficits.  Psych: Mood stable.      LAB DATA REVIEWED:    Recent Results (from the past 24 hour(s))   Creatinine    Collection Time: 24  6:14 AM   Result Value Ref Range    Creatinine 1.56 (H) 0.70 - 1.30 MG/DL    Est, Glom Filt Rate 43 (L) >60 ml/min/1.73m2           IMAGING DATA REVIEWED:    MRI FOOT RIGHT WO CONTRAST   Final Result   1. Plantar cutaneous ulceration of the heel with sinus tract extending to the   calcaneus, associated with calcaneal erosion and diffuse edema-like signal,   nonspecific though compatible with osteomyelitis.   2. 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 The bony abnormalities could be   chronic-repetitive traumatic or infectious in etiology.      Electronically signed by Julián Quevedo MD      XR FOOT RIGHT (MIN 3 VIEWS)   Final Result      Ulceration in the plantar hindfoot with acute osteomyelitis in the posterior   plantar calcaneus         Electronically signed by NAHID KOVACS                CURRENT MEDICATION ORDERS:  Current Facility-Administered Medications: HYDROmorphone HCl PF (DILAUDID) injection 1 mg, 1 mg, IntraVENous, Q3H 
  Hospitalist Progress Note    NAME: Jasiel Jose   :  1940   MRN:  [id number]     Date/Time:  2024 2:42 PM    Patient PCP: Jared Valdes MD       Subjective:   CHIEF COMPLAINT:  osteo right foot       S/p BKA today.  Needs pain meds ordered.  Will need rehab.  On BS IV abx, likely to complete course or change to po on discharge since source controlled.    Objective:   VITALS:    Vitals:    24 1328   BP: 139/77   Pulse: 52   Resp: 18   Temp: 98.1 °F (36.7 °C)   SpO2: 96%       PHYSICAL EXAM:      General:   No acute distress, resting comfortably in bed.  Heart:  RRR, No MRG  Lungs:  CTAB.  Non-labored breathing.  Abdomen:  Soft .  Nondistended.  NTTP.  BS present.  Extremities:  No edema.  Skin:  No rash.  Foot bandaged  Neuro:  Alert and interactive.  No focal deficits.  Psych: Mood stable.      LAB DATA REVIEWED:    Recent Results (from the past 24 hour(s))   Vancomycin Level, Random    Collection Time: 24  8:03 PM   Result Value Ref Range    Vancomycin Rm 14.0 UG/ML           IMAGING DATA REVIEWED:    MRI FOOT RIGHT WO CONTRAST   Final Result   1. Plantar cutaneous ulceration of the heel with sinus tract extending to the   calcaneus, associated with calcaneal erosion and diffuse edema-like signal,   nonspecific though compatible with osteomyelitis.   2. 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 The bony abnormalities could be   chronic-repetitive traumatic or infectious in etiology.      Electronically signed by Julián Quevedo MD      XR FOOT RIGHT (MIN 3 VIEWS)   Final Result      Ulceration in the plantar hindfoot with acute osteomyelitis in the posterior   plantar calcaneus         Electronically signed by NAHID KOVACS                CURRENT MEDICATION ORDERS:  Current Facility-Administered Medications: oxyCODONE (ROXICODONE) immediate 
  Hospitalist Progress Note    NAME: Jasiel Jose   :  1940   MRN:  [id number]     Date/Time:  2024 8:04 AM    Patient PCP: Jared Valdes MD       Subjective:   CHIEF COMPLAINT:  osteo right foot       Discussed with podiatry.  Will liekly need BKA.  ID consult pending.  Pain controlled.  Family at bedside, updated        Objective:   VITALS:    Vitals:    24 0740   BP: (!) 157/80   Pulse: 56   Resp: 18   Temp: 98.2 °F (36.8 °C)   SpO2: 97%       PHYSICAL EXAM:      General:   No acute distress, resting comfortably in bed.  Heart:  RRR, No MRG  Lungs:  CTAB.  Non-labored breathing.  Abdomen:  Soft .  Nondistended.  NTTP.  BS present.  Extremities:  No edema.  Skin:  No rash.  Foot bandaged  Neuro:  Alert and interactive.  No focal deficits.  Psych: Mood stable.      LAB DATA REVIEWED:    Recent Results (from the past 24 hour(s))   Culture, Wound    Collection Time: 24 12:36 PM    Specimen: Foot   Result Value Ref Range    Special Requests NO SPECIAL REQUESTS      Gram Stain NO WBC'S SEEN      Gram Stain NO ORGANISMS SEEN      Culture PENDING    Basic Metabolic Panel    Collection Time: 24  5:32 AM   Result Value Ref Range    Sodium 136 136 - 145 mmol/L    Potassium 4.2 3.5 - 5.1 mmol/L    Chloride 104 97 - 108 mmol/L    CO2 27 21 - 32 mmol/L    Anion Gap 5 5 - 15 mmol/L    Glucose 84 65 - 100 mg/dL    BUN 34 (H) 6 - 20 MG/DL    Creatinine 1.57 (H) 0.70 - 1.30 MG/DL    BUN/Creatinine Ratio 22 (H) 12 - 20      Est, Glom Filt Rate 43 (L) >60 ml/min/1.73m2    Calcium 8.4 (L) 8.5 - 10.1 MG/DL   CBC    Collection Time: 24  5:32 AM   Result Value Ref Range    WBC 7.8 4.1 - 11.1 K/uL    RBC 3.25 (L) 4.10 - 5.70 M/uL    Hemoglobin 10.7 (L) 12.1 - 17.0 g/dL    Hematocrit 32.5 (L) 36.6 - 50.3 %    .0 (H) 80.0 - 99.0 FL    MCH 32.9 26.0 - 34.0 PG    MCHC 32.9 30.0 - 36.5 g/dL    RDW 14.0 11.5 - 14.5 %    Platelets 237 150 - 400 K/uL    MPV 9.5 8.9 - 12.9 FL    Nucleated RBCs 0.0 0 
  Physician Progress Note      PATIENT:               JANELLE CYR  CSN #:                  [id number]  :                       1940  ADMIT DATE:       2024 11:32 AM  DISCH DATE:  RESPONDING  PROVIDER #:        Catalino Alejandra MD          QUERY TEXT:    Patient admitted with Cellulitis, noted to have paroxysmal atrial fibrillation   and is maintained on Xarelto. If possible, please document in progress notes   and discharge summary if you are evaluating and/or treating any of the   following:?  ?  The medical record reflects the following:  Risk Factors: 84-year-old male, Paf, anticoagulation    Clinical Indicators:  Per H&P- Chronic atrial fibrillation  Patient is on Xarelto.  Patient not on any rate controlling drugs.    Treatment: receiving Xarelto    Allison Marcelo, RN, BSN, CRCR  [address] [place] [address]  [email or web address]  Options provided:  -- Secondary hypercoagulable state in a patient with atrial fibrillation  -- Other - I will add my own diagnosis  -- Disagree - Not applicable / Not valid  -- Disagree - Clinically unable to determine / Unknown  -- Refer to Clinical Documentation Reviewer    PROVIDER RESPONSE TEXT:    This patient has secondary hypercoagulable state in a patient with atrial   fibrillation.    Query created by: Allison Marcelo on 2024 2:48 PM      Electronically signed by:  Catalino Alejandra MD 2024 1:32 AM          
0950: called report to [facility] Nurse Cesilia Nguyen  
At about 1700, marilee red blood was noted to be seeping through about one-half of the lower portion of the new surgical dressing placed today after BKA. Attempt was made to contact the surgeon, however, a different member of the vascular team returned the call. He told this nurse to tightly place another ACE wrap on top of the existing ACE wrap. This RN asked if there was anyone from the vascular team in the hospital who could come look at the leaking dressing. He said, \"no\", and that someone from the team would look at it tomorrow.     After the conversation ended, the  RN placed two new ACE wraps on top of the existing surgical dressing.  
Attempted to reach out to doctor regarding discharge No answer. Will update Case management.   
Attempted to visit Mr Jose in room 512 for spiritual assessment; Physical Therapist was working with patient. Unable to do assessment at that time.    Rev Cesilia Xiao MDiv, BCC  To tyson marquez: [phone]  
Doing well  Wound looks good  No significant bleeding  Hgb stable  Continue as is  Prosthetist to see  Ok for discharge to rehab when bed available from a surgical standpoint  
[facility] Dosing Services: Antimicrobial Stewardship Daily Doc 8/8/2024  Consult for antibiotic dosing of Vancomycin by Dr. Alejandra  Indication: Acute osteomyelitis R foot  Day of Therapy: 3    Previous Tx for chronic osteomyelitis:  - Discharged 6/19/24 on 14 day course of augmentin  - S/p 6 weeks IV abx followed by suppressive doxycycline    Ht Readings from Last 1 Encounters:   08/05/24 1.803 m (5' 11\")        Wt Readings from Last 1 Encounters:   08/05/24 90.7 kg (200 lb)      Vancomycin therapy:  Loading dose: Vancomycin 2250 mg x1 dose now/given  Maintenance dose: Vancomycin 1000 mg IV every 24 hours   Dose calculated to approximate a           a. Target AUC/SILAS of 400-600  Last level: 14 mcg/ml () on 1250 mg Q24 hrs  Plan: Continue reduced dose 1000 mg Q24 hrs for anticipated therapeutic level. 8/8 POD0 RLE BKA.  Dose administration notes: Doses given appropriately as scheduled    Non-Kinetic Antimicrobial Dosing Regimen:   Current Regimen:  Cefepime 2 grams Q12 hrs  Recommendation: Dose appropriate, CrCl <60 ml/min  Dose administration notes: Doses given appropriately as scheduled    Other Antimicrobial   (not dosed by pharmacist) None   Cultures 8/6 MRSA: Negative  8/6 Wound: NGTD, Prelim    Blood Cx not yet ordered   Serum Creatinine Lab Results   Component Value Date/Time    CREATININE 1.57 08/07/2024 05:32 AM          Creatinine Clearance Estimated Creatinine Clearance: 39 mL/min (A) (based on SCr of 1.57 mg/dL (H)).     Temp Temp: 98.1 °F (36.7 °C) (Oral)       WBC Lab Results   Component Value Date/Time    WBC 7.8 08/07/2024 05:32 AM          Procalcitonin Lab Results   Component Value Date/Time    PROCAL 0.08 06/16/2024 01:00 AM      For Antifungals, Metronidazole and Nafcillin: Lab Results   Component Value Date/Time    ALT 28 06/17/2024 04:36 AM    AST 21 06/17/2024 04:36 AM        Pharmacist: Bradley Collins, PharmD  [phone]        
[facility] Dosing Services: Antimicrobial Stewardship Daily Doc  Consult for antibiotic dosing of Vancomycin by Dr. Alejandra  Indication: Acute osteomyelitis R foot  Day of Therapy: 2    Previous Tx for chronic osteomyelitis:  - Discharged 6/19/24 on 14 day course of augmentin  - S/p 6 weeks IV abx followed by suppressive doxycycline    Ht Readings from Last 1 Encounters:   08/05/24 1.803 m (5' 11\")        Wt Readings from Last 1 Encounters:   08/05/24 90.7 kg (200 lb)      Vancomycin therapy:  Loading dose: Vancomycin 2250 mg x1 dose now/given  Maintenance dose: Vancomycin 1250 mg IV every 24 hours   Dose calculated to approximate a           a. Target AUC/SILAS of 400-600  Last level: N/A  Plan: Reviewed vancomycin dosing history - patient with recent vancomycin administration with random level 11.9 mcg/ml post loading dose indicating appropriate clearance (6/17/24, Scr 1.56). 2250 mg load given in ER. Continue 1250 mg Q24 hrs. Will get a level for this evening. Anticipate level will trend up over time and dose may need reduced in ~3-5 days depending on levels and creatinine trend.   Dose administration notes: Doses given appropriately as scheduled    Non-Kinetic Antimicrobial Dosing Regimen:   Current Regimen:  Cefepime 2 grams Q12 hrs  Recommendation: Dose appropriate, CrCl <60 ml/min  Dose administration notes: Doses given appropriately as scheduled    Other Antimicrobial   (not dosed by pharmacist) None   Cultures 8/6 MRSA: Pending  8/6 Wound: NGTD, Prelim    Blood Cx not yet ordered   Serum Creatinine Lab Results   Component Value Date/Time    CREATININE 1.57 08/07/2024 05:32 AM          Creatinine Clearance Estimated Creatinine Clearance: 39 mL/min (A) (based on SCr of 1.57 mg/dL (H)).     Temp Temp: 98.2 °F (36.8 °C) (Oral)       WBC Lab Results   Component Value Date/Time    WBC 7.8 08/07/2024 05:32 AM          Procalcitonin Lab Results   Component Value Date/Time    PROCAL 0.08 06/16/2024 01:00 AM      For 
[facility] Progress Note  Bhavesh Franco DO  [phone] Office  [phone] Fax    2024      Assessment & Plan:     Chronic osteomyelitis involving the right foot with infection involving the calcaneus as well as possible midfoot septic arthritis/osteomyelitis.  Status post BKA .  Recommend continuing antibiotics x 48 hours postoperatively through 8/10.  If discharge planned prior may transition to linezolid 600 mg p.o. every 12 and cefdinir 300 mg p.o. every 12 hours to complete antibiotic course   CKD.  Monitor closely on IV antibiotics  Chronic atrial fibrillation.  Patient on Xarelto  Peripheral arterial disease  Peripheral neuropathy  History of penicillin allergy.  Able to tolerate cephalosporin antibiotics          Subjective:     Complaining of postoperative pain    Objective:     Vitals: BP (!) 158/65   Pulse 58   Temp 97.7 °F (36.5 °C) (Oral)   Resp 16   Ht 1.803 m (5' 11\")   Wt 90.7 kg (200 lb)   SpO2 95%   BMI 27.89 kg/m²      Tmax:  Temp (24hrs), Av.3 °F (36.8 °C), Min:97.5 °F (36.4 °C), Max:99.1 °F (37.3 °C)      Exam:   Patient is intubated:  no    Physical Examination:   General:  Alert, cooperative, no distress   Head:  Normocephalic, atraumatic.   Eyes:  Conjunctivae clear   Neck: Supple       Lungs:   No distress.     Chest wall:     Heart:     Abdomen:   distended   Extremities: Moves all.  Right BKA site dressed   Skin: No acute rash on exposed skin   Neurologic: CNII-XII intact. Normal strength     Labs:        Invalid input(s): \"ITNL\"   No results for input(s): \"CPK\", \"CKMB\" in the last 72 hours.    Invalid input(s): \"TROIQ\"  Recent Labs     24  0532 24  0939     --    K 4.2  --      --    CO2 27  --    BUN 34*  --    MG 2.1  --    WBC 7.8 9.6   HGB 10.7* 10.6*   HCT 32.5* 32.2*    258     No results for input(s): \"INR\", \"APTT\" in the last 72 hours.    Invalid input(s): \"PTP\"  Needs: urine analysis, urine sodium, 
[facility] Progress Note  Bhavesh Franco DO  [phone] Office  [phone] Fax    2024      Assessment & Plan:     Chronic osteomyelitis involving the right foot with infection involving the calcaneus as well as possible midfoot septic arthritis/osteomyelitis.  Status post BKA .  Patient was continued on antibiotics x 48 hours postoperatively.  Antibiotics may be stopped now.  No further antibiotics planned   CKD.  Monitor closely on IV antibiotics  Chronic atrial fibrillation.  Patient on Xarelto  Peripheral arterial disease  Peripheral neuropathy  History of penicillin allergy.  Able to tolerate cephalosporin antibiotics          Subjective:     No new complaints    Objective:     Vitals: BP (!) 167/71   Pulse 60   Temp 98.1 °F (36.7 °C) (Oral)   Resp 17   Ht 1.803 m (5' 11\")   Wt 90.7 kg (200 lb)   SpO2 94%   BMI 27.89 kg/m²      Tmax:  Temp (24hrs), Av.3 °F (36.8 °C), Min:97.9 °F (36.6 °C), Max:98.8 °F (37.1 °C)      Exam:   Patient is intubated:  no    Physical Examination:   General:  Alert, cooperative, no distress   Head:  Normocephalic, atraumatic.   Eyes:  Conjunctivae clear   Neck: Supple       Lungs:   No distress.     Chest wall:     Heart:     Abdomen:   distended   Extremities: Moves all.  Right BKA site dressed   Skin: No acute rash on exposed skin   Neurologic: CNII-XII intact. Normal strength     Labs:        Invalid input(s): \"ITNL\"   No results for input(s): \"CPK\", \"CKMB\" in the last 72 hours.    Invalid input(s): \"TROIQ\"  Recent Labs     08/10/24  0510 24  0429    135*   K 4.0 4.0    105   CO2 27 24   BUN 31* 38*   MG 2.2 2.2   WBC 9.7 10.3   HGB 10.4* 10.1*   HCT 31.1* 30.7*    234     No results for input(s): \"INR\", \"APTT\" in the last 72 hours.    Invalid input(s): \"PTP\"  Needs: urine analysis, urine sodium, protein and creatinine  No results found for: \"KU\"      Cultures:     Lab Results   Component Value Date/Time    
[facility] Dosing Services: Antimicrobial Stewardship Daily Doc  Consult for antibiotic dosing of Vancomycin by Dr. Alejandra  Indication: Acute osteomyelitis R foot  Day of Therapy: 1    Previous Tx for chronic osteomyelitis:  - Discharged 6/19/24 on 14 day course of augmentin  - S/p 6 weeks IV abx followed by suppressive doxycycline    Ht Readings from Last 1 Encounters:   08/05/24 1.803 m (5' 11\")        Wt Readings from Last 1 Encounters:   08/05/24 90.7 kg (200 lb)      Vancomycin therapy:  Loading dose: Vancomycin 2250 mg x1 dose now/given  Maintenance dose: Vancomycin 1250 mg IV every 24 hours   Dose calculated to approximate a           a. Target AUC/SILAS of 400-600  Last level: N/A  Plan: Reviewed vancomycin dosing history - patient with recent vancomycin administration with random level 11.9 mcg/ml post loading dose indicating appropriate clearance (6/17/24, Scr 1.56). 2250 mg load given in ER. Will start 1250 mg Q24 hrs. Anticipate level will trend up over time and dose may need reduced in ~3-5 days depending on levels and creatinine trend.   Dose administration notes: Doses given appropriately as scheduled    Non-Kinetic Antimicrobial Dosing Regimen:   Current Regimen:  Cefepime 2 grams Q12 hrs  Recommendation: Dose appropriate  Dose administration notes: Doses given appropriately as scheduled    Other Antimicrobial   (not dosed by pharmacist) None   Cultures 8/6 MRSA: Pending    Blood Cx not yet ordered   Serum Creatinine Lab Results   Component Value Date/Time    CREATININE 1.55 08/06/2024 05:15 AM          Creatinine Clearance Estimated Creatinine Clearance: 39 mL/min (A) (based on SCr of 1.55 mg/dL (H)).     Temp Temp: 98.1 °F (36.7 °C) (Oral)       WBC Lab Results   Component Value Date/Time    WBC 7.7 08/06/2024 05:15 AM          Procalcitonin Lab Results   Component Value Date/Time    PROCAL 0.08 06/16/2024 01:00 AM      For Antifungals, Metronidazole and Nafcillin: Lab Results   Component Value 
[facility] Vancomycin Pharmacy Consult 08/07/24  Vancomycin random level= 14 mcg/ml. @ 2003 (21.6 hrs post dose)  It predicts an , Troughss=20.1    Plan:  Will change Vancomycin to 1000 mg q24h (predicted WUDfm=470, Trough=16.6) and recheck level soon  Will continue current regimen    Thank you  Key Weathers, PharmD  [phone]    
Patient known to me from previous interactions  Heart reg  Lungs clear  To OR for BKA  
Pt fall protocol  Yellow arm band on patient, yellow non skid socks on   bed in low position, all side rails up, call bell in reach  Pt  & family instructed in \"call don't fall\" protocol     Use your call bell,and wait for assistance, staff not family will assist you to get up &   move about  Pt & family verbalize understanding of fall precautions and \"call don't fall\" protocol     Sbar to Yeny GRULLON  
Ultrasound IV by Telma Muse. RN :  Procedure Note    Ultrasound IV education provided to patient. Opportunities for questions given.     Ultrasound used for PIV placement:  20 gauge 8 cm PowerGlide Pro 8cm  left basilic} location.  1 X Attempt(s).    Vigorous blood return present and saline flushes with ease.     Procedure tolerated well. Primary RN aware of IV placement and added to LDA.      Karla Muse, RN  
Vascular    Doing well  Ready for IPR  Follow up with Dr Amaro in 4 wks  
tablet 324 mg, 324 mg, Oral, Daily  cetirizine (ZYRTEC) tablet 5 mg, 5 mg, Oral, Daily  coenzyme Q10 capsule 100 mg, 100 mg, Oral, Daily  magnesium oxide (MAG-OX) tablet 400 mg, 400 mg, Oral, Daily       ASSESSMENT and PLAN:     Principal Problem:    Cellulitis  Active Problems:    Osteomyelitis of right foot (HCC)    Stage 3 chronic kidney disease (HCC)  Resolved Problems:    * No resolved hospital problems. *        84-year-old male with history of chronic atrial fibrillation, hypertension, CKD stage III, anemia comes to the hospital with a chronic wound to the right foot.  Patient was following with podiatry and was recently treated with Augmentin.  Patient failed outpatient treatment was sent to the ER for IV antibiotics.  Patient is found to have osteomyelitis of the right foot.  Patient is admitted for IV antibiotics and ID evaluation.     Osteomyelitis of the right foot  Patient had a chronic ulcer on the right foot and was treated with outpatient Augmentin but it did not get better.  X-rays positive for osteomyelitis.  Patient completed IV cefepime and Flagyl, stopped 48 hours post-surgery.  MRI with calcaneal osteo and  tibiotalar infection vs inflammation  ID consult following  Podiatry consulted, s/p  LYLE, await follow up recs.  ready for discharge when arrangements made       Bladder cancer  Patient was diagnosed with bladder tumor last year in [place].     Chronic atrial fibrillation  Patient is on Xarelto.  Patient not on any rate controlling drugs.  He does have a history of GI bleed and was referred for Watchman device.     Hypertension  Patient takes losartan, HCTZ     CKD stage III  Avoid nephrotoxic medications     Anemia  Probably due to CKD      DVT ppx:  scds    Dispo:  tbd         CODE STATUS:  FULL CODE        Risk of deterioration: high         Total time spent with patient care: 35 minutes.  I personally saw and examined the patient during this time period.    Critical care: no

## 2024-08-13 NOTE — PLAN OF CARE
Problem: ABCDS Injury Assessment  Goal: Absence of physical injury  8/10/2024 2158 by June Hurtado RN  Outcome: Progressing  8/10/2024 1624 by Charline Calles RN  Outcome: Progressing     Problem: Safety - Adult  Goal: Free from fall injury  8/10/2024 2158 by June Hurtado RN  Outcome: Progressing  8/10/2024 1624 by Charline Calles RN  Outcome: Progressing     Problem: Chronic Conditions and Co-morbidities  Goal: Patient's chronic conditions and co-morbidity symptoms are monitored and maintained or improved  8/10/2024 2158 by June Hurtado RN  Outcome: Progressing  8/10/2024 1624 by Charline Calles RN  Outcome: Progressing     Problem: Skin/Tissue Integrity - Adult  Goal: Skin integrity remains intact  8/10/2024 2158 by June Hurtado RN  Outcome: Progressing  8/10/2024 1624 by Charline Calles RN  Outcome: Progressing  Goal: Incisions, wounds, or drain sites healing without S/S of infection  8/10/2024 2158 by June Hurtado RN  Outcome: Progressing  8/10/2024 1624 by Charline Calles RN  Outcome: Progressing     Problem: Discharge Planning  Goal: Discharge to home or other facility with appropriate resources  8/10/2024 2158 by June Hurtado RN  Outcome: Progressing  8/10/2024 1624 by Charline Calles RN  Outcome: Progressing     Problem: Pain  Goal: Verbalizes/displays adequate comfort level or baseline comfort level  8/10/2024 2158 by June Hurtado RN  Outcome: Progressing  8/10/2024 1624 by Charline Calles RN  Outcome: Progressing     Problem: Respiratory - Adult  Goal: Achieves optimal ventilation and oxygenation  8/10/2024 2158 by June Hurtado RN  Outcome: Progressing  8/10/2024 1624 by Charline Calles RN  Outcome: Progressing     Problem: Physical Therapy - Adult  Goal: By Discharge: Performs mobility at highest level of function for planned discharge setting.  See evaluation for individualized goals.  Description: FUNCTIONAL STATUS PRIOR TO ADMISSION: Patient was modified independent 
  Problem: ABCDS Injury Assessment  Goal: Absence of physical injury  8/12/2024 0342 by Candi Montague RN  Outcome: Progressing  8/11/2024 1743 by Charline Calles RN  Outcome: Progressing     Problem: Safety - Adult  Goal: Free from fall injury  8/12/2024 0342 by Candi Montague RN  Outcome: Progressing  8/11/2024 1743 by Charline Calles RN  Outcome: Progressing     Problem: Chronic Conditions and Co-morbidities  Goal: Patient's chronic conditions and co-morbidity symptoms are monitored and maintained or improved  8/12/2024 0342 by Candi Montague RN  Outcome: Progressing  8/11/2024 1743 by Charline Calles RN  Outcome: Progressing     Problem: Skin/Tissue Integrity - Adult  Goal: Skin integrity remains intact  8/12/2024 0342 by Candi Montague RN  Outcome: Progressing  8/11/2024 1743 by Charline Calles RN  Outcome: Progressing     Problem: Skin/Tissue Integrity - Adult  Goal: Incisions, wounds, or drain sites healing without S/S of infection  8/12/2024 0342 by Candi Montague RN  Outcome: Progressing  8/11/2024 1743 by Charline Calles RN  Outcome: Progressing     Problem: Discharge Planning  Goal: Discharge to home or other facility with appropriate resources  8/12/2024 0342 by Candi Montague RN  Outcome: Progressing  8/11/2024 1743 by Charline Calles RN  Outcome: Progressing     
  Problem: ABCDS Injury Assessment  Goal: Absence of physical injury  8/7/2024 2250 by Rosenda Traore RN  Outcome: Progressing  8/7/2024 2245 by Kristen Vidal RN  Outcome: Progressing  8/7/2024 1131 by Queenie Andre RN  Outcome: Adequate for Discharge     Problem: Safety - Adult  Goal: Free from fall injury  8/7/2024 2250 by Rosenda Traore RN  Outcome: Progressing  8/7/2024 2245 by Kristen Vidal RN  Outcome: Progressing  8/7/2024 1131 by Queenie Andre RN  Outcome: Progressing     Problem: Chronic Conditions and Co-morbidities  Goal: Patient's chronic conditions and co-morbidity symptoms are monitored and maintained or improved  8/7/2024 2250 by Rosenda Traore RN  Outcome: Progressing  8/7/2024 2245 by Kristen Vidal RN  Outcome: Progressing  Flowsheets (Taken 8/7/2024 2017)  Care Plan - Patient's Chronic Conditions and Co-Morbidity Symptoms are Monitored and Maintained or Improved:   Collaborate with multidisciplinary team to address chronic and comorbid conditions and prevent exacerbation or deterioration   Monitor and assess patient's chronic conditions and comorbid symptoms for stability, deterioration, or improvement  8/7/2024 1131 by Queenie Andre RN  Outcome: Progressing     Problem: Skin/Tissue Integrity - Adult  Goal: Skin integrity remains intact  8/7/2024 2250 by Rosenda Traore RN  Outcome: Progressing  8/7/2024 2245 by Kristen Vidal RN  Outcome: Progressing  Flowsheets (Taken 8/7/2024 2017)  Skin Integrity Remains Intact: Monitor for areas of redness and/or skin breakdown  8/7/2024 1131 by Queenie Andre RN  Outcome: Progressing  Flowsheets (Taken 8/7/2024 0321 by Kristen Vidal RN)  Skin Integrity Remains Intact: Monitor for areas of redness and/or skin breakdown  Goal: Incisions, wounds, or drain sites healing without S/S of infection  8/7/2024 2250 by Rosenda Traore RN  Outcome: Progressing  8/7/2024 2245 by 
  Problem: ABCDS Injury Assessment  Goal: Absence of physical injury  8/8/2024 2319 by Ira Gracia RN  Outcome: Progressing  8/8/2024 1412 by Queenie Andre RN  Outcome: Progressing     Problem: Safety - Adult  Goal: Free from fall injury  8/8/2024 2319 by Ira Gracia RN  Outcome: Progressing  8/8/2024 1412 by Queenie Andre RN  Outcome: Progressing     Problem: Chronic Conditions and Co-morbidities  Goal: Patient's chronic conditions and co-morbidity symptoms are monitored and maintained or improved  8/8/2024 2319 by Ira Gracia RN  Outcome: Progressing  8/8/2024 1412 by Queenie Andre RN  Outcome: Progressing     Problem: Skin/Tissue Integrity - Adult  Goal: Skin integrity remains intact  8/8/2024 2319 by Ira Gracia RN  Outcome: Progressing  8/8/2024 1412 by Queenie Andre RN  Outcome: Progressing  Goal: Incisions, wounds, or drain sites healing without S/S of infection  8/8/2024 2319 by Ira Gracia RN  Outcome: Progressing  8/8/2024 1412 by Queenie Andre RN  Outcome: Progressing     Problem: Discharge Planning  Goal: Discharge to home or other facility with appropriate resources  8/8/2024 2319 by Ira Gracia RN  Outcome: Progressing  8/8/2024 1412 by Queenie Andre RN  Outcome: Progressing     Problem: Pain  Goal: Verbalizes/displays adequate comfort level or baseline comfort level  8/8/2024 2319 by Ira Gracia RN  Outcome: Progressing  8/8/2024 1412 by Queenie Andre RN  Outcome: Progressing     Problem: Respiratory - Adult  Goal: Achieves optimal ventilation and oxygenation  8/8/2024 2319 by Ira Gracia RN  Outcome: Progressing  8/8/2024 1412 by Queenie Andre RN  Outcome: Progressing     
  Problem: ABCDS Injury Assessment  Goal: Absence of physical injury  Outcome: Progressing     Problem: Safety - Adult  Goal: Free from fall injury  Outcome: Progressing     Problem: Chronic Conditions and Co-morbidities  Goal: Patient's chronic conditions and co-morbidity symptoms are monitored and maintained or improved  Outcome: Progressing     Problem: Skin/Tissue Integrity - Adult  Goal: Skin integrity remains intact  Outcome: Progressing     Problem: Skin/Tissue Integrity - Adult  Goal: Incisions, wounds, or drain sites healing without S/S of infection  Outcome: Progressing     Problem: Discharge Planning  Goal: Discharge to home or other facility with appropriate resources  Outcome: Progressing     Problem: Pain  Goal: Verbalizes/displays adequate comfort level or baseline comfort level  Outcome: Progressing     
  Problem: ABCDS Injury Assessment  Goal: Absence of physical injury  Outcome: Progressing     Problem: Safety - Adult  Goal: Free from fall injury  Outcome: Progressing     Problem: Chronic Conditions and Co-morbidities  Goal: Patient's chronic conditions and co-morbidity symptoms are monitored and maintained or improved  Outcome: Progressing     Problem: Skin/Tissue Integrity - Adult  Goal: Skin integrity remains intact  Outcome: Progressing  Flowsheets (Taken 8/6/2024 2000)  Skin Integrity Remains Intact: Monitor for areas of redness and/or skin breakdown  Goal: Incisions, wounds, or drain sites healing without S/S of infection  Outcome: Progressing  Flowsheets (Taken 8/6/2024 2000)  Incisions, Wounds, or Drain Sites Healing Without Sign and Symptoms of Infection: ADMISSION and DAILY: Assess and document risk factors for pressure ulcer development     Problem: Discharge Planning  Goal: Discharge to home or other facility with appropriate resources  Outcome: Progressing     Problem: Pain  Goal: Verbalizes/displays adequate comfort level or baseline comfort level  Outcome: Progressing     
  Problem: ABCDS Injury Assessment  Goal: Absence of physical injury  Outcome: Progressing     Problem: Safety - Adult  Goal: Free from fall injury  Outcome: Progressing     Problem: Chronic Conditions and Co-morbidities  Goal: Patient's chronic conditions and co-morbidity symptoms are monitored and maintained or improved  Outcome: Progressing     Problem: Skin/Tissue Integrity - Adult  Goal: Skin integrity remains intact  Outcome: Progressing  Goal: Incisions, wounds, or drain sites healing without S/S of infection  Outcome: Progressing     Problem: Discharge Planning  Goal: Discharge to home or other facility with appropriate resources  Outcome: Progressing     Problem: Pain  Goal: Verbalizes/displays adequate comfort level or baseline comfort level  Outcome: Progressing     Problem: Respiratory - Adult  Goal: Achieves optimal ventilation and oxygenation  8/8/2024 1412 by Queenie Andre RN  Outcome: Progressing  8/8/2024 0211 by Catalino Kuhn RCP  Outcome: Progressing     
  Problem: Safety - Adult  Goal: Free from fall injury  Outcome: Progressing     Problem: Chronic Conditions and Co-morbidities  Goal: Patient's chronic conditions and co-morbidity symptoms are monitored and maintained or improved  Outcome: Progressing     Problem: Skin/Tissue Integrity - Adult  Goal: Skin integrity remains intact  Outcome: Progressing  Flowsheets (Taken 8/7/2024 0321 by Kristen Vidal, RN)  Skin Integrity Remains Intact: Monitor for areas of redness and/or skin breakdown  Goal: Incisions, wounds, or drain sites healing without S/S of infection  Outcome: Progressing  Flowsheets (Taken 8/7/2024 0321 by Kristen Vidal, RN)  Incisions, Wounds, or Drain Sites Healing Without Sign and Symptoms of Infection: ADMISSION and DAILY: Assess and document risk factors for pressure ulcer development     Problem: Discharge Planning  Goal: Discharge to home or other facility with appropriate resources  Outcome: Progressing     Problem: Pain  Goal: Verbalizes/displays adequate comfort level or baseline comfort level  Outcome: Progressing     Problem: ABCDS Injury Assessment  Goal: Absence of physical injury  Outcome: Adequate for Discharge     
Progressing  Flowsheets (Taken 8/7/2024 0321 by Kristen Vidal, RN)  Incisions, Wounds, or Drain Sites Healing Without Sign and Symptoms of Infection: ADMISSION and DAILY: Assess and document risk factors for pressure ulcer development     Problem: Discharge Planning  Goal: Discharge to home or other facility with appropriate resources  8/7/2024 2245 by Kristen Vidal, RN  Outcome: Progressing  Flowsheets (Taken 8/7/2024 2017)  Discharge to home or other facility with appropriate resources: Identify barriers to discharge with patient and caregiver  8/7/2024 1131 by Queenie Andre, RN  Outcome: Progressing     Problem: Pain  Goal: Verbalizes/displays adequate comfort level or baseline comfort level  8/7/2024 2245 by Kristen Vidal, RN  Outcome: Progressing  8/7/2024 1131 by Queenie Andre, RN  Outcome: Progressing     
minimal assistance for 25 feet with the least restrictive device within 7 day(s).     8/12/2024 1626 by Fannie Mehta, PT  Outcome: Progressing     Problem: Skin/Tissue Integrity  Goal: Absence of new skin breakdown  Description: 1.  Monitor for areas of redness and/or skin breakdown  2.  Assess vascular access sites hourly  3.  Every 4-6 hours minimum:  Change oxygen saturation probe site  4.  Every 4-6 hours:  If on nasal continuous positive airway pressure, respiratory therapy assess nares and determine need for appliance change or resting period.  Outcome: Progressing     
oxygen saturation probe site  4.  Every 4-6 hours:  If on nasal continuous positive airway pressure, respiratory therapy assess nares and determine need for appliance change or resting period.  8/13/2024 0758 by Alexander Zaragoza, RN  Outcome: Progressing  8/12/2024 2110 by Sage Cooley, RN  Outcome: Progressing     
Strategies;Home Exercise Program  Education Method: Verbal  Barriers to Learning: None  Education Outcome: Verbalized understanding;Demonstrated understanding;Continued education needed      VIRGIL KIM, PT  Minutes: 32  
jessica)  Balance:  Balance  Sitting: Intact  Standing - Static: Constant support;Occasional  Standing - Dynamic: Constant support;Poor   Ambulation/Gait Training:     Gait  Right Side Weight Bearing:  (new BKA)  Overall Level of Assistance: Maximum assistance;Assist X1;Additional time;Adaptive equipment  Distance (ft): 1 Feet  Assistive Device: Gait belt;Walker, rolling  Base of Support: Center of gravity altered  Gait Abnormalities: Antalgic;Decreased step clearance        Neuro Re-Education:                    Pain Ratin/10 with spasms  Pain Intervention(s):   nursing notified and addressing, patient medicated for pain prior to session, rest, elevation, and repositioning    Activity Tolerance:   Poor, requires frequent rest breaks, and SpO2 stable on room air    After treatment:   Patient left in no apparent distress sitting up in chair and Call bell within reach      COMMUNICATION/EDUCATION:   The patient's plan of care was discussed with: registered nurse           YAZAN BADILLO, PT  Minutes: 48      
Orientation Status: Within Normal Limits  Orientation Level: Oriented X4  Cognition  Overall Cognitive Status: WNL    Skin: R BKA ace bandage CDI    Edema: anticipated distal R BKA    Hearing:   Hearing  Hearing: Exceptions to WFL  Hearing Exceptions: Hard of hearing/hearing concerns, Bilateral hearing aid    Vision/Perceptual:    Vision - Basic Assessment  Vision Adaptations: Cataract surgery     Vision  Vision: Impaired  Vision Exceptions: Wears glasses at all times  Perception  Overall Perceptual Status: WFL (good awareness of R residual limb, reports some phantom limb pain)    Range of Motion:   AROM: Within functional limits         Strength:  Strength:  (besides RLE)      Coordination:  Coordination: Within functional limits     Coordination: Generally decreased, functional      Tone & Sensation:   Tone: Normal  Sensation:  (present RLE, occasional LLE)          Functional Mobility and Transfers for ADLs:    Bed Mobility:     Bed Mobility Training  Bed Mobility Training: Yes  Rolling: Supervision  Supine to Sit: Supervision  Sit to Supine: Stand-by assistance  Scooting: Stand-by assistance    Transfers:      Transfer Training  Transfer Training: Yes  Sit to Stand: Minimum assistance;Assist X2 (verbal cuing)  Stand to Sit: Minimum assistance;Assist X1;Additional time          Functional Mobility: Moderate assistance;Adaptive equipment          Balance:      Balance  Sitting: Intact  Standing: Impaired  Standing - Static: Constant support  Standing - Dynamic: Constant support      ADL Assessment:          Feeding: Setup       Grooming: Setup  Grooming Skilled Clinical Factors: supine/seated, increased assist standing    UE Bathing: Minimal assistance            LE Bathing: Maximum assistance  LE Bathing Skilled Clinical Factors: assist for distal and posterior aspect    UE Dressing: Minimal assistance       LE Dressing: Maximum assistance  LE Dressing Skilled Clinical Factors: tailor sits for distal LE access at 
without using bedrails?: A Little  How much help is needed moving to and from a bed to a chair?: A Little  How much help is needed standing up from a chair using your arms?: A Lot  How much help is needed walking in hospital room?: Total  How much help is needed climbing 3-5 steps with a railing?: Total    -PAC Inpatient Mobility Raw Score : 13  -PAC Inpatient T-Scale Score : 36.74     Cutoff score ?171,2,3 had higher odds of discharging home with home health or need of SNF/IPR.    1. Heidi Dimas, Samanta Chery, Chadwick Chaudhary, Miya Reynolds, David Zhang, Flavio Dimas.  Validity of the AM-PAC “6-Clicks” Inpatient Daily Activity and Basic Mobility Short Forms. Physical Therapy Mar 2014, 94 3) 379-391; DOI: 10.2522/ptj.91550109  2. Osmani YOUNG, Mu J, Sherif J, Gaston J. Association of AM-PAC \"6-Clicks\" Basic Mobility and Daily Activity Scores With Discharge Destination. Phys Ther. 2021 Apr 4;101(4):[id number]. doi: 10.1093/ptj/[id number]. PMID: 37771804.  3. Betsy HIGGINS, Linh D, Karina S, Tez K, Clem S. Activity Measure for Post-Acute Care \"6-Clicks\" Basic Mobility Scores Predict Discharge Destination After Acute Care Hospitalization in Select Patient Groups: A Retrospective, Observational Study. Arch Rehabil Res Clin Transl. 2022 Jul 16;4(3):560716. doi: 10.1016/j.arrct.2022.696731. PMID: 97600453; PMCID: [id number].  4. Wing MULLIGAN, Bela S, Michelle W, Kathryn P. AM-PAC Short Forms Manual 4.0. Revised 2/2020.                                                                                                                                                                                                                               Pain Rating:  None noted = does endorse phatom sensation and pain but none stated during session  Pain Intervention(s):   patient medicated for pain prior to session    Activity Tolerance:   Fair     After treatment:   Patient left in no apparent distress in bed, Call

## 2024-08-13 NOTE — DISCHARGE SUMMARY
Hospitalist Discharge Summary     Patient ID:  Jasiel Jose  [id number]  84 y.o.  1940    Admit date: 8/5/2024    Discharge date and time: 8/13/2024    Admission Diagnoses: Cellulitis [L03.90]  Cellulitis of right lower extremity [L03.115]  Osteomyelitis of right foot, unspecified type (HCC) [M86.9]    Discharge Diagnoses:    Principal Problem:    Cellulitis  Active Problems:    Osteomyelitis of right foot (HCC)    Stage 3 chronic kidney disease (HCC)  Resolved Problems:    * No resolved hospital problems. *         Hospital Course:   84-year-old male with history of chronic atrial fibrillation, hypertension, CKD stage III, anemia comes to the hospital with a chronic wound to the right foot.  Patient was following with podiatry and was recently treated with Augmentin.  Patient failed outpatient treatment was sent to the ER for IV antibiotics.  Patient is found to have osteomyelitis of the right foot.  Patient is admitted for IV antibiotics and ID evaluation.     Osteomyelitis of the right foot: POA. Acute. Patient had a chronic ulcer on the right foot and was treated with outpatient Augmentin but it did not get better. X-rays positive for osteomyelitis.MRI with calcaneal osteo and  tibiotalar infection vs inflammation. Seen by vasc and had a BKA. ID evaluated.  - Patient completed IV cefepime and Flagyl, stopped 48 hours post-surgery.  - Vasc follow up  - IPR placement     Bladder cancer: POA. Chronic. Patient was diagnosed with bladder tumor last year in [place].  - outpt follow up     Chronic atrial fibrillation / secondary hypercoagulable state d/t afib: POA. Chronic. He does have a history of GI bleed and was referred for Watchman device.  - Cont xarelto  - intrinsically rate controlled  - PCP follow up    Hypertension: POA. Chronic. Stable  - Cont home meds     CKD stage III: POA. Chronic. Stable  - Avoid nephrotoxic medications     Anemia: POA. Chronic. Stable. Probably due to CKD  - Pcp follow

## 2024-08-13 NOTE — DISCHARGE INSTRUCTIONS
HOSPITALIST DISCHARGE INSTRUCTIONS  NAME:  Jasiel Jose   :  1940   MRN:  [id number]     Date/Time:  2024 7:40 AM    ADMIT DATE: 2024     DISCHARGE DATE: 2024     DISCHARGE DIAGNOSIS:  osteomyelitis    DISCHARGE INSTRUCTIONS:  Thank you for allowing us to participate in your care. Your discharging Hospitalist is Saritha Fajardo MD. You were admitted for evaluation and treatment of the above. You were seen by a vascular doctor and a below the knee amputation was performed. You are well enough to be discharged but you must follow up with your vascular doctor and get rehab. You no longer need antibiotics as the infected area was amputated. Please take your meds as prescribed and follow up with your PCP also      MEDICATIONS:    It is important that you take the medication exactly as they are prescribed.   Keep your medication in the bottles provided by the pharmacist and keep a list of the medication names, dosages, and times to be taken in your wallet.   Do not take other medications without consulting your doctor.             If you experience any of the following symptoms then please call your primary care physician or return to the emergency room if you cannot get hold of your doctor:  Fever, chills, nausea, vomiting, diarrhea, change in mentation, falling, bleeding, shortness of breath    Follow Up:  Please call the below provider to arrange hospital follow up appointment      Jared Valdes MD  [address] [facility] [address] [phone]    Schedule an appointment as soon as possible for a visit      Ray Amaro MD  [address] [facility] [address] [phone]    Follow up          Information obtained by :  I understand that if any problems occur once I am at home I am to contact my physician.    I understand and acknowledge receipt of the instructions indicated above.

## 2024-08-13 NOTE — CARE COORDINATION
CM notified of Pt discharging today. Pt to discharge to [facility]. They are able to admit this morning.     Accepting physician: Dr. Garcia   Room 2010  Nurse to call report to: [phone]    [facility] transport has been arranged for 11am     Floor Nurse has been notified via [employer]   Pt's spouse also notified.   No further discharge needs indicated at this time. Pt is cleared from CM standpoint.     LUIS EDUARDO Govea, [facility] Care Manager  [phone]

## 2024-10-02 ENCOUNTER — TELEPHONE (OUTPATIENT)
Age: 84
End: 2024-10-02

## 2024-10-02 NOTE — TELEPHONE ENCOUNTER
calling on patients behalf to speak with  about Bradycardia, calling from [facility].      Phone [phone]

## 2024-12-09 ENCOUNTER — OFFICE VISIT (OUTPATIENT)
Age: 84
End: 2024-12-09
Payer: MEDICARE

## 2024-12-09 VITALS
WEIGHT: 207 LBS | OXYGEN SATURATION: 98 % | SYSTOLIC BLOOD PRESSURE: 124 MMHG | DIASTOLIC BLOOD PRESSURE: 58 MMHG | HEART RATE: 52 BPM | BODY MASS INDEX: 28.98 KG/M2 | HEIGHT: 71 IN

## 2024-12-09 DIAGNOSIS — I11.9 BENIGN HYPERTENSIVE HEART DISEASE WITHOUT HEART FAILURE: ICD-10-CM

## 2024-12-09 DIAGNOSIS — I73.9 PAD (PERIPHERAL ARTERY DISEASE) (HCC): ICD-10-CM

## 2024-12-09 DIAGNOSIS — I48.19 PERSISTENT ATRIAL FIBRILLATION (HCC): ICD-10-CM

## 2024-12-09 DIAGNOSIS — I87.2 EDEMA OF BOTH LOWER EXTREMITIES DUE TO PERIPHERAL VENOUS INSUFFICIENCY: Primary | ICD-10-CM

## 2024-12-09 DIAGNOSIS — I73.9 PAD (PERIPHERAL ARTERY DISEASE) (HCC): Primary | ICD-10-CM

## 2024-12-09 PROCEDURE — 99214 OFFICE O/P EST MOD 30 MIN: CPT | Performed by: INTERNAL MEDICINE

## 2024-12-09 PROCEDURE — G8427 DOCREV CUR MEDS BY ELIG CLIN: HCPCS | Performed by: INTERNAL MEDICINE

## 2024-12-09 PROCEDURE — 1126F AMNT PAIN NOTED NONE PRSNT: CPT | Performed by: INTERNAL MEDICINE

## 2024-12-09 PROCEDURE — 1123F ACP DISCUSS/DSCN MKR DOCD: CPT | Performed by: INTERNAL MEDICINE

## 2024-12-09 PROCEDURE — 1036F TOBACCO NON-USER: CPT | Performed by: INTERNAL MEDICINE

## 2024-12-09 PROCEDURE — 1159F MED LIST DOCD IN RCRD: CPT | Performed by: INTERNAL MEDICINE

## 2024-12-09 PROCEDURE — G8419 CALC BMI OUT NRM PARAM NOF/U: HCPCS | Performed by: INTERNAL MEDICINE

## 2024-12-09 PROCEDURE — 93010 ELECTROCARDIOGRAM REPORT: CPT | Performed by: INTERNAL MEDICINE

## 2024-12-09 PROCEDURE — G8484 FLU IMMUNIZE NO ADMIN: HCPCS | Performed by: INTERNAL MEDICINE

## 2024-12-09 PROCEDURE — 93005 ELECTROCARDIOGRAM TRACING: CPT | Performed by: INTERNAL MEDICINE

## 2024-12-09 NOTE — PROGRESS NOTES
Office Follow-up    NAME: Jasiel Jose   :  1940  MRM:  [id number]    Date:  2024         Assessment and Plan:       1.  Chronic atrial fibrillation: He is not on any rate control medication.  His intrinsic rate is 50 bpm. Currently on Xarelto 15 mg p.o. daily but had an episode of melena with significant anemia requiring blood transfusion in 2024 at [facility] in [place].  He is at high risk of GI bleeding.  I think he will benefit from Watchman procedure.  He was supposed to see Dr. Lal in Oct 2024 but had to cancel due to amputation of the right foot. He will reschedule in Spring 2025.    2.  Hypertension: Blood pressure is controlled.  Continue losartan/HCTZ. He is on Furosemide as well. Will continue. Cr 1.7.    3. CAD: No prior history of MI or previous stenting.  Currently on isosorbide likely from CAD indication.  Reviewed records from [place].  LVEF  45-50 on 2024. Continue isosorbide.    4.  Peripheral vascular disease:    5.  Right foot osteomyelitis: S/p right BKA in Oct 2024. He is better.     6.  Left left swelling. Pitting edema is present above the knee. Will continue Lasix but increase to 40mg for 5 days and raise legs and refer to lymphedema.     7. See Dr. Leach in 6 months.                     ATTENTION:   This medical record was transcribed using an electronic medical records/speech recognition system.  Although proofread, it may and can contain electronic, spelling and other errors.  Corrections may be executed at a later time.  Please feel free to contact us for any clarifications as needed.      Subjective:     Jasiel Jose, a 84 y.o. year-old who presents for followup.    --------    PI: Jasiel Jose is a 84 y.o. male with past medical history significant for atrial fibrillation, history of hypertension, sleep apnea, varicose veins of the leg, peripheral vascular disease with surgery on the right vascular system, osteomyelitis of the right foot he is here for

## 2024-12-09 NOTE — PROGRESS NOTES
had concerns including Atrial Fibrillation. Also swelling in his leg    Vitals:    12/09/24 1316   BP: (!) 124/58   Site: Left Upper Arm   Position: Sitting   Pulse: 52   SpO2: 98%   Weight: 93.9 kg (207 lb)   Height: 1.803 m (5' 11\")        Chest pain No    Refills No        1. Have you been to the ER, urgent care clinic since your last visit? No       Hospitalized since your last visit? No       Where?        Date?

## 2025-08-23 ENCOUNTER — HOSPITAL ENCOUNTER (EMERGENCY)
Facility: HOSPITAL | Age: 85
Discharge: HOME | End: 2025-08-24
Attending: EMERGENCY MEDICINE
Payer: MEDICARE

## 2025-08-23 DIAGNOSIS — I10 HYPERTENSION, UNSPECIFIED TYPE: ICD-10-CM

## 2025-08-23 DIAGNOSIS — R42 DIZZINESS: Primary | ICD-10-CM

## 2025-08-23 PROCEDURE — 99284 EMERGENCY DEPT VISIT MOD MDM: CPT

## 2025-08-24 VITALS
DIASTOLIC BLOOD PRESSURE: 98 MMHG | HEIGHT: 70 IN | OXYGEN SATURATION: 97 % | HEART RATE: 56 BPM | TEMPERATURE: 98.1 F | BODY MASS INDEX: 30.06 KG/M2 | RESPIRATION RATE: 16 BRPM | WEIGHT: 210 LBS | SYSTOLIC BLOOD PRESSURE: 130 MMHG

## 2025-08-24 LAB
ALBUMIN SERPL-MCNC: 3.9 G/DL (ref 3.5–5.2)
ALBUMIN/GLOB SERPL: 1.2 (ref 1.1–2.2)
ALP SERPL-CCNC: 61 U/L (ref 40–129)
ALT SERPL-CCNC: 26 U/L (ref 10–50)
ANION GAP SERPL CALC-SCNC: 14 MMOL/L (ref 2–14)
AST SERPL-CCNC: 31 U/L (ref 10–50)
BASOPHILS # BLD: 0.03 K/UL (ref 0–0.1)
BASOPHILS NFR BLD: 0.4 % (ref 0–1)
BILIRUB SERPL-MCNC: 0.3 MG/DL (ref 0–1.2)
BUN SERPL-MCNC: 38 MG/DL (ref 8–23)
BUN/CREAT SERPL: 22 (ref 12–20)
CALCIUM SERPL-MCNC: 9.4 MG/DL (ref 8.8–10.2)
CHLORIDE SERPL-SCNC: 104 MMOL/L (ref 98–107)
CO2 SERPL-SCNC: 24 MMOL/L (ref 20–29)
CREAT SERPL-MCNC: 1.69 MG/DL (ref 0.7–1.2)
DIFFERENTIAL METHOD BLD: ABNORMAL
EKG ATRIAL RATE: 326 BPM
EKG DIAGNOSIS: NORMAL
EKG Q-T INTERVAL: 478 MS
EKG QRS DURATION: 100 MS
EKG QTC CALCULATION (BAZETT): 444 MS
EKG R AXIS: 4 DEGREES
EKG T AXIS: 36 DEGREES
EKG VENTRICULAR RATE: 52 BPM
EOSINOPHIL # BLD: 0.4 K/UL (ref 0–0.4)
EOSINOPHIL NFR BLD: 5.1 % (ref 0–7)
ERYTHROCYTE [DISTWIDTH] IN BLOOD BY AUTOMATED COUNT: 13.6 % (ref 11.5–14.5)
GLOBULIN SER CALC-MCNC: 3.4 G/DL (ref 2–4)
GLUCOSE SERPL-MCNC: 111 MG/DL (ref 65–100)
HCT VFR BLD AUTO: 38.2 % (ref 36.6–50.3)
HGB BLD-MCNC: 12.8 G/DL (ref 12.1–17)
IMM GRANULOCYTES # BLD AUTO: 0.04 K/UL (ref 0–0.04)
IMM GRANULOCYTES NFR BLD AUTO: 0.5 % (ref 0–0.5)
LYMPHOCYTES # BLD: 1.66 K/UL (ref 0.8–3.5)
LYMPHOCYTES NFR BLD: 21.4 % (ref 12–49)
MCH RBC QN AUTO: 32.7 PG (ref 26–34)
MCHC RBC AUTO-ENTMCNC: 33.5 G/DL (ref 30–36.5)
MCV RBC AUTO: 97.7 FL (ref 80–99)
MONOCYTES # BLD: 0.86 K/UL (ref 0–1)
MONOCYTES NFR BLD: 11.1 % (ref 5–13)
NEUTS SEG # BLD: 4.78 K/UL (ref 1.8–8)
NEUTS SEG NFR BLD: 61.5 % (ref 32–75)
NRBC # BLD: 0 K/UL (ref 0–0.01)
NRBC BLD-RTO: 0 PER 100 WBC
PLATELET # BLD AUTO: 204 K/UL (ref 150–400)
PMV BLD AUTO: 9.7 FL (ref 8.9–12.9)
POTASSIUM SERPL-SCNC: 4.2 MMOL/L (ref 3.5–5.1)
PROT SERPL-MCNC: 7.3 G/DL (ref 6.4–8.3)
RBC # BLD AUTO: 3.91 M/UL (ref 4.1–5.7)
SODIUM SERPL-SCNC: 141 MMOL/L (ref 136–145)
TROPONIN T SERPL HS-MCNC: 34.9 NG/L (ref 0–22)
TROPONIN T SERPL HS-MCNC: 36.8 NG/L (ref 0–22)
WBC # BLD AUTO: 7.8 K/UL (ref 4.1–11.1)

## 2025-08-24 PROCEDURE — 36415 COLL VENOUS BLD VENIPUNCTURE: CPT

## 2025-08-24 PROCEDURE — 96374 THER/PROPH/DIAG INJ IV PUSH: CPT

## 2025-08-24 PROCEDURE — 84484 ASSAY OF TROPONIN QUANT: CPT

## 2025-08-24 PROCEDURE — 85025 COMPLETE CBC W/AUTO DIFF WBC: CPT

## 2025-08-24 PROCEDURE — 93005 ELECTROCARDIOGRAM TRACING: CPT | Performed by: EMERGENCY MEDICINE

## 2025-08-24 PROCEDURE — 80053 COMPREHEN METABOLIC PANEL: CPT

## 2025-08-24 PROCEDURE — 93010 ELECTROCARDIOGRAM REPORT: CPT | Performed by: HOSPITALIST

## 2025-08-24 PROCEDURE — 6360000002 HC RX W HCPCS: Performed by: EMERGENCY MEDICINE

## 2025-08-24 PROCEDURE — 6370000000 HC RX 637 (ALT 250 FOR IP): Performed by: EMERGENCY MEDICINE

## 2025-08-24 RX ORDER — MECLIZINE HYDROCHLORIDE 25 MG/1
25 TABLET ORAL ONCE
Status: COMPLETED | OUTPATIENT
Start: 2025-08-24 | End: 2025-08-24

## 2025-08-24 RX ORDER — MECLIZINE HYDROCHLORIDE 25 MG/1
25 TABLET ORAL 3 TIMES DAILY PRN
Qty: 30 TABLET | Refills: 0 | Status: SHIPPED | OUTPATIENT
Start: 2025-08-24 | End: 2025-09-03

## 2025-08-24 RX ORDER — HYDRALAZINE HYDROCHLORIDE 20 MG/ML
10 INJECTION INTRAMUSCULAR; INTRAVENOUS ONCE
Status: COMPLETED | OUTPATIENT
Start: 2025-08-24 | End: 2025-08-24

## 2025-08-24 RX ADMIN — MECLIZINE HYDROCHLORIDE 25 MG: 25 TABLET ORAL at 00:23

## 2025-08-24 RX ADMIN — HYDRALAZINE HYDROCHLORIDE 10 MG: 20 INJECTION, SOLUTION INTRAMUSCULAR; INTRAVENOUS at 00:23

## 2025-08-24 ASSESSMENT — PAIN SCALES - GENERAL: PAINLEVEL_OUTOF10: 0

## 2025-08-24 ASSESSMENT — PAIN - FUNCTIONAL ASSESSMENT: PAIN_FUNCTIONAL_ASSESSMENT: 0-10

## (undated) DEVICE — STOCKINETTE,IMPERVIOUS,12X48,STERILE: Brand: MEDLINE

## (undated) DEVICE — CEMENT BNE RADIOPAQUE FAST SET ACRYL RESIN HI VISC SIMPLEXHV

## (undated) DEVICE — DEVICE INFL 20ML 30ATM DGT FLD DISPNS SYR W ACCESSPLUS BLU

## (undated) DEVICE — PAD,ABDOMINAL,5"X9",ST,LF,25/BX: Brand: MEDLINE INDUSTRIES, INC.

## (undated) DEVICE — 3M™ STERI-DRAPE™ U-DRAPE 1015: Brand: STERI-DRAPE™

## (undated) DEVICE — PINNACLE INTRODUCER SHEATH: Brand: PINNACLE

## (undated) DEVICE — SPONGE GZ W4XL4IN COT 12 PLY TYP VII WVN C FLD DSGN STERILE

## (undated) DEVICE — MICROPUNCTURE INTRODUCER SET SILHOUETTE TRANSITIONLESS WITH NITINOL WIRE GUIDE: Brand: MICROPUNCTURE

## (undated) DEVICE — SPONGE GZ W4XL4IN COT 12 PLY TYP VII WVN C FLD DSGN

## (undated) DEVICE — CANISTER, RIGID, 3000CC: Brand: MEDLINE INDUSTRIES, INC.

## (undated) DEVICE — SUTURE STRATAFIX SYMMETRIC SZ 1 L18IN ABSRB VLT CT1 L36CM SXPP1A404

## (undated) DEVICE — BONE MARROW KIT ASPIR 11 GA

## (undated) DEVICE — 6619 2 PTNT ISO SYS INCISE AREA&LT;(&GT;&&LT;)&GT;P: Brand: STERI-DRAPE™ IOBAN™ 2

## (undated) DEVICE — KIT TRK HIP PROC VIZADISC

## (undated) DEVICE — KIT DRP FOR RIO ROBOTIC ARM ASST SYS

## (undated) DEVICE — SOLUTION IRRIG 1000ML STRL H2O USP PLAS POUR BTL

## (undated) DEVICE — STRAP,POSITIONING,KNEE/BODY,FOAM,4X60": Brand: MEDLINE

## (undated) DEVICE — CATH SUPP SNGL 0.035INX135CM -- TRAILBLAZER - ORDER BY PK/5

## (undated) DEVICE — PAD NON-ADHERENT 3X4 STRL LF --

## (undated) DEVICE — GUIDEWIRE VASC L300CM DIA0.014IN TIP L5CM 15DEG G TUNGSTEN

## (undated) DEVICE — CATHETER SUPP L150CM 15MM MRK BND SPC GWIRE 0.014IN

## (undated) DEVICE — SOLUTION IRRIG 3000ML 0.9% SOD CHL FLX CONT 0797208] ICU MEDICAL INC]

## (undated) DEVICE — COVER LT HNDL PLAS RIG 1 PER PK

## (undated) DEVICE — DESTINATION PERIPHERAL GUIDING SHEATH: Brand: DESTINATION

## (undated) DEVICE — SUTURE MCRYL SZ 3-0 L27IN ABSRB UD L26MM SH 1/2 CIR Y416H

## (undated) DEVICE — STERILE POLYISOPRENE POWDER-FREE SURGICAL GLOVES WITH EMOLLIENT COATING: Brand: PROTEXIS

## (undated) DEVICE — GAUZE,SPONGE,FLUFF,6"X6.75",STRL,5/TRAY: Brand: MEDLINE

## (undated) DEVICE — GOWN,SIRUS,NONRNF,SETINSLV,2XL,18/CS: Brand: MEDLINE

## (undated) DEVICE — PREP KIT PEEL PTCH POVIDONE IOD

## (undated) DEVICE — SUTURE MCRYL SZ 3-0 L27IN ABSRB UD L24MM PS-1 3/8 CIR PRIM Y936H

## (undated) DEVICE — SOLUTION IRRIG 1000ML 0.9% SOD CHL USP POUR PLAS BTL

## (undated) DEVICE — WIRE SAW SURG FOR BONE CUT GIGLI 510 MM LEN UNIV

## (undated) DEVICE — CONTAINER,SPECIMEN,3OZ,OR STRL: Brand: MEDLINE

## (undated) DEVICE — STERILE POLYISOPRENE POWDER-FREE SURGICAL GLOVES: Brand: PROTEXIS

## (undated) DEVICE — ANGIO-SEAL VIP VASCULAR CLOSURE DEVICE: Brand: ANGIO-SEAL

## (undated) DEVICE — NDL PRT INJ NSAF BLNT 18GX1.5 --

## (undated) DEVICE — DRESSING HEMOSTATIC SFT INTVENT W/O SLT DBL WRP QUIKCLOT LF

## (undated) DEVICE — Device

## (undated) DEVICE — RADIFOCUS GLIDEWIRE ADVANTAGE GUIDEWIRE: Brand: GLIDEWIRE ADVANTAGE

## (undated) DEVICE — SWAB CULT DBL W/O CHAR RAYON TIP AMIES GEL CLMN FOR COLL

## (undated) DEVICE — MARKER RAD KNEE TIB CKPT STEREOTAXIC IMAG LESION LOC

## (undated) DEVICE — SUTURE VICRYL SZ 0 L36IN ABSRB UD L40MM CT 1/2 CIR TAPERPOINT J958H

## (undated) DEVICE — BANDAGE,GAUZE,BULKEE II,4.5"X4.1YD,STRL: Brand: MEDLINE

## (undated) DEVICE — KIT POS FOAM HANA TBL

## (undated) DEVICE — SUTURE VICRYL SZ 0 L18IN ABSRB UD POLYGLACTIN 910 COAT BRAID J646H

## (undated) DEVICE — NEEDLE HYPO 18GA L1.5IN PNK S STL HUB POLYPR SHLD REG BVL

## (undated) DEVICE — REM POLYHESIVE ADULT PATIENT RETURN ELECTRODE: Brand: VALLEYLAB

## (undated) DEVICE — HANDPIECE SET WITH COAXIAL HIGH FLOW TIP AND SUCTION TUBE: Brand: INTERPULSE

## (undated) DEVICE — SOLUTION IRRIG 3000ML H2O STRL BAG

## (undated) DEVICE — MARKER,SKIN,WI/RULER AND LABELS: Brand: MEDLINE

## (undated) DEVICE — INFECTION CONTROL KIT SYS

## (undated) DEVICE — PENCIL SMK EVAC L10FT DIA95MM TBNG NONSTICK W ADPT TO 22MM

## (undated) DEVICE — SUTURE VCRL + SZ 1-0 L36IN ABSRB UD CTX 1/2 CIR TAPR PNT VCP977H

## (undated) DEVICE — BLADE SAW OSC COARSE 25X9MM --

## (undated) DEVICE — BANDAGE COMPR W6INXL10YD ST M E WHITE/BEIGE

## (undated) DEVICE — 3M™ TEGADERM™ TRANSPARENT FILM DRESSING FRAME STYLE, 1626W, 4 IN X 4-3/4 IN (10 CM X 12 CM), 50/CT 4CT/CASE: Brand: 3M™ TEGADERM™

## (undated) DEVICE — 3M™ IOBAN™ 2 ANTIMICROBIAL INCISE DRAPE 6650EZ: Brand: IOBAN™ 2

## (undated) DEVICE — ELECTRODE BLDE L4IN NONINSULATED EDGE

## (undated) DEVICE — PREP SKN CHLRAPRP APL 26ML STR --

## (undated) DEVICE — GLOVE SURG SZ 8 L12IN FNGR THK79MIL GRN LTX FREE

## (undated) DEVICE — DRESSING FOAM W4XL10IN AG SIL ADH ANTIMIC POSTOP OPTIFOAM

## (undated) DEVICE — INTENDED FOR TISSUE SEPARATION, AND OTHER PROCEDURES THAT REQUIRE A SHARP SURGICAL BLADE TO PUNCTURE OR CUT.: Brand: BARD-PARKER ® CARBON RIB-BACK BLADES

## (undated) DEVICE — STRYKER PERFORMANCE SERIES SAGITTAL BLADE: Brand: STRYKER PERFORMANCE SERIES

## (undated) DEVICE — EXTREMITY-SFMCASU: Brand: MEDLINE INDUSTRIES, INC.

## (undated) DEVICE — ADMINISTRATION SET 72 IN SINGLE LUER LCK NAMIC

## (undated) DEVICE — CATHETER ANGIO 5FR L65CM 0.038IN VIS OMNI FLUSH-0 SFT TIP

## (undated) DEVICE — HOOD: Brand: FLYTE

## (undated) DEVICE — SUTURE ETHLN SZ 4-0 L18IN NONABSORBABLE BLK L19MM PS-2 3/8 1667H

## (undated) DEVICE — ANGIOGRAPHY DRAPE- RICHMOND: Brand: MEDLINE INDUSTRIES, INC.

## (undated) DEVICE — DUAL IRRIGATION ADAPTOR

## (undated) DEVICE — SYR LR LCK 1ML GRAD NSAF 30ML --

## (undated) DEVICE — SPONGE LAPAROTOMY W18XL18IN WHITE STRUNG RADIOPAQUE STERILE

## (undated) DEVICE — DECANTER BAG 9": Brand: MEDLINE INDUSTRIES, INC.

## (undated) DEVICE — 4-PORT MANIFOLD: Brand: NEPTUNE 2

## (undated) DEVICE — SHEET, DRAPE, SPLIT, STERILE: Brand: MEDLINE

## (undated) DEVICE — GLOVE ORTHO 8   MSG9480

## (undated) DEVICE — DERMABOND SKIN ADH 0.7ML -- DERMABOND ADVANCED 12/BX

## (undated) DEVICE — 3M™ TEGADERM™ TRANSPARENT FILM DRESSING FRAME STYLE, 1624W, 2-3/8 IN X 2-3/4 IN (6 CM X 7 CM), 100/CT 4CT/CASE: Brand: 3M™ TEGADERM™

## (undated) DEVICE — Z DISCONTINUED LINER BOOT TRACTION NS LINDY LF

## (undated) DEVICE — SOL IRRIGATION INJ NACL 0.9% 500ML BTL

## (undated) DEVICE — DRESSING,GAUZE,XEROFORM,CURAD,5"X9",ST: Brand: CURAD

## (undated) DEVICE — SUTURE VCRL SZ 2-0 L36IN ABSRB UD L36MM CT-1 1/2 CIR J945H

## (undated) DEVICE — BANDAGE COBAN 4 IN COMPR W4INXL5YD FOAM COHESIVE QUIK STK SELF ADH SFT